# Patient Record
Sex: FEMALE | Race: WHITE | Employment: OTHER | URBAN - METROPOLITAN AREA
[De-identification: names, ages, dates, MRNs, and addresses within clinical notes are randomized per-mention and may not be internally consistent; named-entity substitution may affect disease eponyms.]

---

## 2020-06-25 ENCOUNTER — OFFICE VISIT (OUTPATIENT)
Dept: FAMILY MEDICINE CLINIC | Facility: CLINIC | Age: 85
End: 2020-06-25
Payer: MEDICARE

## 2020-06-25 VITALS
HEART RATE: 79 BPM | SYSTOLIC BLOOD PRESSURE: 116 MMHG | BODY MASS INDEX: 24.32 KG/M2 | TEMPERATURE: 98.3 F | WEIGHT: 128.8 LBS | HEIGHT: 61 IN | RESPIRATION RATE: 16 BRPM | OXYGEN SATURATION: 98 % | DIASTOLIC BLOOD PRESSURE: 84 MMHG

## 2020-06-25 DIAGNOSIS — E55.9 VITAMIN D INSUFFICIENCY: ICD-10-CM

## 2020-06-25 DIAGNOSIS — I10 ESSENTIAL HYPERTENSION: ICD-10-CM

## 2020-06-25 DIAGNOSIS — I35.0 AORTIC VALVE STENOSIS, ETIOLOGY OF CARDIAC VALVE DISEASE UNSPECIFIED: ICD-10-CM

## 2020-06-25 DIAGNOSIS — R53.83 FATIGUE, UNSPECIFIED TYPE: ICD-10-CM

## 2020-06-25 DIAGNOSIS — E78.00 PURE HYPERCHOLESTEROLEMIA: Primary | ICD-10-CM

## 2020-06-25 PROCEDURE — 1036F TOBACCO NON-USER: CPT | Performed by: FAMILY MEDICINE

## 2020-06-25 PROCEDURE — 3074F SYST BP LT 130 MM HG: CPT | Performed by: FAMILY MEDICINE

## 2020-06-25 PROCEDURE — 3008F BODY MASS INDEX DOCD: CPT | Performed by: FAMILY MEDICINE

## 2020-06-25 PROCEDURE — 99214 OFFICE O/P EST MOD 30 MIN: CPT | Performed by: FAMILY MEDICINE

## 2020-06-25 PROCEDURE — 1160F RVW MEDS BY RX/DR IN RCRD: CPT | Performed by: FAMILY MEDICINE

## 2020-06-25 PROCEDURE — 4040F PNEUMOC VAC/ADMIN/RCVD: CPT | Performed by: FAMILY MEDICINE

## 2020-06-25 PROCEDURE — 3079F DIAST BP 80-89 MM HG: CPT | Performed by: FAMILY MEDICINE

## 2020-06-25 RX ORDER — AMLODIPINE BESYLATE 5 MG/1
5 TABLET ORAL DAILY
COMMUNITY
Start: 2020-06-17

## 2020-06-25 RX ORDER — ASPIRIN 81 MG/1
81 TABLET, CHEWABLE ORAL DAILY
COMMUNITY

## 2020-06-25 RX ORDER — UBIDECARENONE 50 MG
CAPSULE ORAL
COMMUNITY
End: 2021-02-04

## 2020-06-25 RX ORDER — ATORVASTATIN CALCIUM 10 MG/1
10 TABLET, FILM COATED ORAL DAILY
Qty: 90 TABLET | Refills: 3 | Status: SHIPPED | OUTPATIENT
Start: 2020-06-25 | End: 2021-01-05 | Stop reason: SDUPTHER

## 2020-09-28 ENCOUNTER — OFFICE VISIT (OUTPATIENT)
Dept: FAMILY MEDICINE CLINIC | Facility: MEDICAL CENTER | Age: 85
End: 2020-09-28
Payer: MEDICARE

## 2020-09-28 VITALS
DIASTOLIC BLOOD PRESSURE: 70 MMHG | TEMPERATURE: 97.7 F | SYSTOLIC BLOOD PRESSURE: 154 MMHG | WEIGHT: 125.6 LBS | HEIGHT: 60 IN | HEART RATE: 68 BPM | BODY MASS INDEX: 24.66 KG/M2

## 2020-09-28 DIAGNOSIS — Z12.39 SCREENING FOR BREAST CANCER: ICD-10-CM

## 2020-09-28 DIAGNOSIS — I35.0 AORTIC VALVE STENOSIS, ETIOLOGY OF CARDIAC VALVE DISEASE UNSPECIFIED: ICD-10-CM

## 2020-09-28 DIAGNOSIS — E55.9 VITAMIN D INSUFFICIENCY: ICD-10-CM

## 2020-09-28 DIAGNOSIS — E78.00 PURE HYPERCHOLESTEROLEMIA: ICD-10-CM

## 2020-09-28 DIAGNOSIS — Z23 NEED FOR IMMUNIZATION AGAINST INFLUENZA: Primary | ICD-10-CM

## 2020-09-28 DIAGNOSIS — I10 ESSENTIAL HYPERTENSION: ICD-10-CM

## 2020-09-28 PROCEDURE — G0008 ADMIN INFLUENZA VIRUS VAC: HCPCS

## 2020-09-28 PROCEDURE — 99214 OFFICE O/P EST MOD 30 MIN: CPT | Performed by: FAMILY MEDICINE

## 2020-09-28 PROCEDURE — 90662 IIV NO PRSV INCREASED AG IM: CPT

## 2020-09-28 NOTE — PROGRESS NOTES
Vj Castro is a new patient  Previous patient of Dr Fung and Dr Ligia Saavedra  She has been in good health  She does see cardiology for aortic stenosis and hypertension  See her last visit in July with Dr Sherman  She is asymptomatic from her aortic stenosis  She in fact walks daily with no chest pain or shortness of breath  TAVR was discussed however it is not felt to be necessary at this time  She takes amlodipine for her HTN  Her previous PCP started her on atorvastatin for hyperlipidemia  She has been taking it for the last several months  She has also been taking red yeast rice every other day with this  She said that her blood pressures been well controlled  She takes her amlodipine at suppertime  HH:Diet is healthy  Eats salads, frits and vegetables  Walks dailyl  Caffeine 2 coffee daily  Alcohol none  Dietary calcium several daily  FH: No breast or colon cancer  SH:  She lives with her  who has dementia  Retired    She has been under some stress caring for her  with dementia  Sleep has been a problem and so she has been fatigued however she wishes to continue to care for him at home  She otherwise is doing well with no complaints  O: /70 (Cuff Size: Standard)   Pulse 68   Temp 97 7 °F (36 5 °C)   Ht 5' (1 524 m)   Wt 57 kg (125 lb 9 6 oz)   BMI 24 53 kg/m²    BP by me 150/70  Physical Exam  Constitutional:       General: She is not in acute distress  Appearance: She is well-developed  Neck:      Thyroid: No thyromegaly  Vascular: No carotid bruit  Cardiovascular:      Rate and Rhythm: Normal rate and regular rhythm  Heart sounds: Murmur present  Comments: Grade 2/6 harsh systolic murmur along the sternal border specially at the right upper sternal border  There is carotid radiation  Pulmonary:      Effort: Pulmonary effort is normal       Breath sounds: Normal breath sounds     Abdominal:      General: Bowel sounds are normal       Palpations: Abdomen is soft  There is no hepatomegaly or mass  Tenderness: There is no abdominal tenderness  Lymphadenopathy:      Cervical: No cervical adenopathy  Assessment  1  Hypertension-blood pressure is somewhat elevated today  Will reassess next visit  Could consider increase in amlodipine  2  Aortic [id number]-[id number]-gr with cardiology  She is asymptomatic at this time  3  Caregiver stress-discussed; offered referral to Department of Aging  4  Hyperlipidemia-recently started on the statin  Lipid profile has not yet been done with treatment  She questions the necessity for statin in the 1st place  Will get blood work and discuss further at next visit  4  Health maintenance-discussed colorectal breast cancer screenings  She is interested in having a mammogram   Would like to consider Hemoccult  Immunizations are up-to-date and will give flu shot today  Declines Shingrix and bone density screening  Plan  Check blood work  Recheck 3 weeks

## 2020-10-02 LAB
25(OH)D3 SERPL-MCNC: 20 NG/ML (ref 30–100)
ALBUMIN SERPL-MCNC: 4.1 G/DL (ref 3.6–5.1)
ALBUMIN/GLOB SERPL: 1.4 (CALC) (ref 1–2.5)
ALP SERPL-CCNC: 87 U/L (ref 37–153)
ALT SERPL-CCNC: 12 U/L (ref 6–29)
AST SERPL-CCNC: 14 U/L (ref 10–35)
BASOPHILS # BLD AUTO: 28 CELLS/UL (ref 0–200)
BASOPHILS NFR BLD AUTO: 0.5 %
BILIRUB SERPL-MCNC: 0.7 MG/DL (ref 0.2–1.2)
BUN SERPL-MCNC: 16 MG/DL (ref 7–25)
BUN/CREAT SERPL: NORMAL (CALC) (ref 6–22)
CALCIUM SERPL-MCNC: 8.9 MG/DL (ref 8.6–10.4)
CHLORIDE SERPL-SCNC: 105 MMOL/L (ref 98–110)
CHOLEST SERPL-MCNC: 142 MG/DL
CHOLEST/HDLC SERPL: 2.4 (CALC)
CO2 SERPL-SCNC: 29 MMOL/L (ref 20–32)
CREAT SERPL-MCNC: 0.8 MG/DL (ref 0.6–0.88)
EOSINOPHIL # BLD AUTO: 220 CELLS/UL (ref 15–500)
EOSINOPHIL NFR BLD AUTO: 4 %
ERYTHROCYTE [DISTWIDTH] IN BLOOD BY AUTOMATED COUNT: 12.3 % (ref 11–15)
GLOBULIN SER CALC-MCNC: 2.9 G/DL (CALC) (ref 1.9–3.7)
GLUCOSE SERPL-MCNC: 98 MG/DL (ref 65–99)
HCT VFR BLD AUTO: 38.6 % (ref 35–45)
HDLC SERPL-MCNC: 60 MG/DL
HGB BLD-MCNC: 12.4 G/DL (ref 11.7–15.5)
LDLC SERPL CALC-MCNC: 69 MG/DL (CALC)
LYMPHOCYTES # BLD AUTO: 847 CELLS/UL (ref 850–3900)
LYMPHOCYTES NFR BLD AUTO: 15.4 %
MCH RBC QN AUTO: 30.7 PG (ref 27–33)
MCHC RBC AUTO-ENTMCNC: 32.1 G/DL (ref 32–36)
MCV RBC AUTO: 95.5 FL (ref 80–100)
MONOCYTES # BLD AUTO: 484 CELLS/UL (ref 200–950)
MONOCYTES NFR BLD AUTO: 8.8 %
NEUTROPHILS # BLD AUTO: 3922 CELLS/UL (ref 1500–7800)
NEUTROPHILS NFR BLD AUTO: 71.3 %
NONHDLC SERPL-MCNC: 82 MG/DL (CALC)
PLATELET # BLD AUTO: 241 THOUSAND/UL (ref 140–400)
PMV BLD REES-ECKER: 9.6 FL (ref 7.5–12.5)
POTASSIUM SERPL-SCNC: 4 MMOL/L (ref 3.5–5.3)
PROT SERPL-MCNC: 7 G/DL (ref 6.1–8.1)
RBC # BLD AUTO: 4.04 MILLION/UL (ref 3.8–5.1)
SL AMB EGFR AFRICAN AMERICAN: 78 ML/MIN/1.73M2
SL AMB EGFR NON AFRICAN AMERICAN: 67 ML/MIN/1.73M2
SODIUM SERPL-SCNC: 138 MMOL/L (ref 135–146)
TRIGL SERPL-MCNC: 52 MG/DL
TSH SERPL-ACNC: 1.31 MIU/L (ref 0.4–4.5)
WBC # BLD AUTO: 5.5 THOUSAND/UL (ref 3.8–10.8)

## 2020-10-22 ENCOUNTER — OFFICE VISIT (OUTPATIENT)
Dept: FAMILY MEDICINE CLINIC | Facility: MEDICAL CENTER | Age: 85
End: 2020-10-22
Payer: MEDICARE

## 2020-10-22 VITALS
TEMPERATURE: 98.1 F | BODY MASS INDEX: 24.46 KG/M2 | WEIGHT: 124.6 LBS | DIASTOLIC BLOOD PRESSURE: 60 MMHG | SYSTOLIC BLOOD PRESSURE: 132 MMHG | HEART RATE: 71 BPM | HEIGHT: 60 IN

## 2020-10-22 DIAGNOSIS — Z12.31 OTHER SCREENING MAMMOGRAM: ICD-10-CM

## 2020-10-22 DIAGNOSIS — Z12.31 BREAST CANCER SCREENING BY MAMMOGRAM: ICD-10-CM

## 2020-10-22 DIAGNOSIS — E55.9 VITAMIN D INSUFFICIENCY: ICD-10-CM

## 2020-10-22 DIAGNOSIS — Z12.11 SCREEN FOR COLON CANCER: ICD-10-CM

## 2020-10-22 DIAGNOSIS — E78.00 PURE HYPERCHOLESTEROLEMIA: Primary | ICD-10-CM

## 2020-10-22 DIAGNOSIS — Z12.31 VISIT FOR SCREENING MAMMOGRAM: ICD-10-CM

## 2020-10-22 DIAGNOSIS — I10 ESSENTIAL HYPERTENSION: ICD-10-CM

## 2020-10-22 PROCEDURE — 99214 OFFICE O/P EST MOD 30 MIN: CPT | Performed by: FAMILY MEDICINE

## 2020-11-27 ENCOUNTER — TELEPHONE (OUTPATIENT)
Dept: FAMILY MEDICINE CLINIC | Facility: MEDICAL CENTER | Age: 85
End: 2020-11-27

## 2020-12-30 ENCOUNTER — TRANSCRIBE ORDERS (OUTPATIENT)
Dept: ADMINISTRATIVE | Facility: HOSPITAL | Age: 85
End: 2020-12-30

## 2020-12-30 DIAGNOSIS — Z12.31 OTHER SCREENING MAMMOGRAM: Primary | ICD-10-CM

## 2021-01-05 DIAGNOSIS — E78.00 PURE HYPERCHOLESTEROLEMIA: ICD-10-CM

## 2021-01-05 NOTE — TELEPHONE ENCOUNTER
Pt called to request Rxs for atorvastatin 10 mg once daily    Please send a 90 day supply to [facility] on [address]

## 2021-01-06 ENCOUNTER — HOSPITAL ENCOUNTER (OUTPATIENT)
Dept: RADIOLOGY | Facility: MEDICAL CENTER | Age: 86
Discharge: HOME | End: 2021-01-06
Payer: MEDICARE

## 2021-01-06 VITALS — HEIGHT: 60 IN | WEIGHT: 124 LBS | BODY MASS INDEX: 24.35 KG/M2

## 2021-01-06 DIAGNOSIS — Z12.31 VISIT FOR SCREENING MAMMOGRAM: ICD-10-CM

## 2021-01-06 DIAGNOSIS — Z12.31 OTHER SCREENING MAMMOGRAM: ICD-10-CM

## 2021-01-06 PROCEDURE — 77063 BREAST TOMOSYNTHESIS BI: CPT

## 2021-01-06 PROCEDURE — 77067 SCR MAMMO BI INCL CAD: CPT

## 2021-01-06 RX ORDER — ATORVASTATIN CALCIUM 10 MG/1
10 TABLET, FILM COATED ORAL DAILY
Qty: 90 TABLET | Refills: 3 | Status: SHIPPED | OUTPATIENT
Start: 2021-01-06 | End: 2022-01-20

## 2021-01-24 ENCOUNTER — IMMUNIZATIONS (OUTPATIENT)
Dept: FAMILY MEDICINE CLINIC | Facility: HOSPITAL | Age: 86
End: 2021-01-24

## 2021-01-24 DIAGNOSIS — Z23 ENCOUNTER FOR IMMUNIZATION: Primary | ICD-10-CM

## 2021-01-24 PROCEDURE — 0011A SARS-COV-2 / COVID-19 MRNA VACCINE (MODERNA) 100 MCG: CPT

## 2021-01-24 PROCEDURE — 91301 SARS-COV-2 / COVID-19 MRNA VACCINE (MODERNA) 100 MCG: CPT

## 2021-01-25 ENCOUNTER — TELEPHONE (OUTPATIENT)
Dept: FAMILY MEDICINE CLINIC | Facility: MEDICAL CENTER | Age: 86
End: 2021-01-25

## 2021-01-25 NOTE — TELEPHONE ENCOUNTER
----- Message from Trevor Diaz MD sent at 1/24/2021 10:35 PM EST -----  Notify mammogram normal How are things going?

## 2021-02-04 ENCOUNTER — OFFICE VISIT (OUTPATIENT)
Dept: FAMILY MEDICINE CLINIC | Facility: MEDICAL CENTER | Age: 86
End: 2021-02-04
Payer: MEDICARE

## 2021-02-04 VITALS
DIASTOLIC BLOOD PRESSURE: 70 MMHG | SYSTOLIC BLOOD PRESSURE: 128 MMHG | HEART RATE: 75 BPM | OXYGEN SATURATION: 98 % | BODY MASS INDEX: 24.5 KG/M2 | HEIGHT: 60 IN | TEMPERATURE: 98.1 F | WEIGHT: 124.8 LBS

## 2021-02-04 DIAGNOSIS — I10 ESSENTIAL HYPERTENSION: ICD-10-CM

## 2021-02-04 DIAGNOSIS — E78.00 PURE HYPERCHOLESTEROLEMIA: ICD-10-CM

## 2021-02-04 DIAGNOSIS — I35.0 NONRHEUMATIC AORTIC VALVE STENOSIS: Primary | ICD-10-CM

## 2021-02-04 PROCEDURE — 99214 OFFICE O/P EST MOD 30 MIN: CPT | Performed by: FAMILY MEDICINE

## 2021-02-04 RX ORDER — BIOTIN 1 MG
400 TABLET ORAL
COMMUNITY

## 2021-02-05 NOTE — PROGRESS NOTES
Delma Baxter is here for   Four-month follow-up  She is doing very well overall except for ongoing stress caring for her  with dementia  Sleep disturbance is been a major problem and she is not getting much sleep  Recently it is better with treatment however she may be looking into home health aides overnight  She has been taking  Amlodipine for her hypertension  Saw for follow-up of her severe c aortic stenosis   A few weeks ago  She is completely asymptomatic  Decided to hold off on TAVR at this time  O: /70 (BP Location: Left arm, Patient Position: Sitting, Cuff Size: Adult)   Pulse 75   Temp 98 1 °F (36 7 °C)   Ht 5' (1 524 m)   Wt 56 6 kg (124 lb 12 8 oz)   SpO2 98%   BMI 24 37 kg/m²    Physical Exam  Constitutional:       General: She is not in acute distress  Appearance: She is well-developed  Neck:      Thyroid: No thyromegaly  Vascular: No carotid bruit  Cardiovascular:      Rate and Rhythm: Normal rate and regular rhythm  Heart sounds: Murmur present  Comments: Systolic 2/6  Pulmonary:      Effort: Pulmonary effort is normal       Breath sounds: Normal breath sounds  Abdominal:      General: Bowel sounds are normal       Palpations: Abdomen is soft  There is no hepatomegaly or mass  Tenderness: There is no abdominal tenderness  Lymphadenopathy:      Cervical: No cervical adenopathy       Assessment   1  Hypertension-controlled with amlodipine   2  Severe aortic stenosis -asymptomatic -follow-up per Cardiology  3  Hyperlipidemia well controlled with atorvastatin   4  Caregiver stress -discussed;  Encouraged use of home health aides if necessary   [address] [place] [address] had COVID vaccination    Plan   as above  Recheck 6 months

## 2021-02-21 ENCOUNTER — IMMUNIZATIONS (OUTPATIENT)
Dept: FAMILY MEDICINE CLINIC | Facility: HOSPITAL | Age: 86
End: 2021-02-21

## 2021-02-21 DIAGNOSIS — Z23 ENCOUNTER FOR IMMUNIZATION: Primary | ICD-10-CM

## 2021-02-21 PROCEDURE — 91301 SARS-COV-2 / COVID-19 MRNA VACCINE (MODERNA) 100 MCG: CPT

## 2021-02-21 PROCEDURE — 0012A SARS-COV-2 / COVID-19 MRNA VACCINE (MODERNA) 100 MCG: CPT

## 2021-05-24 ENCOUNTER — TELEPHONE (OUTPATIENT)
Dept: FAMILY MEDICINE CLINIC | Facility: MEDICAL CENTER | Age: 86
End: 2021-05-24

## 2021-05-24 NOTE — TELEPHONE ENCOUNTER
Pt called to set up a surgical clearance/cataract on 6/9/21  Pt does have a cardiologist  Dr Villa December [phone]  Do you need her to see him prior?   No nephrologist

## 2021-05-26 NOTE — TELEPHONE ENCOUNTER
Pt called, she is scheduled to see you on 6/9 for her surgical clearance  Pt is seeing cardio that day also  I offered pt Wed 6/2 @ 3:00, but pt needs after 4:00 so her daughter can come with her  Please advise

## 2021-06-02 ENCOUNTER — OFFICE VISIT (OUTPATIENT)
Dept: FAMILY MEDICINE CLINIC | Facility: MEDICAL CENTER | Age: 86
End: 2021-06-02
Payer: MEDICARE

## 2021-06-02 VITALS
TEMPERATURE: 97.9 F | OXYGEN SATURATION: 98 % | HEART RATE: 80 BPM | RESPIRATION RATE: 16 BRPM | BODY MASS INDEX: 23.55 KG/M2 | SYSTOLIC BLOOD PRESSURE: 130 MMHG | WEIGHT: 120.6 LBS | DIASTOLIC BLOOD PRESSURE: 60 MMHG

## 2021-06-02 DIAGNOSIS — I35.0 AORTIC VALVE STENOSIS, ETIOLOGY OF CARDIAC VALVE DISEASE UNSPECIFIED: ICD-10-CM

## 2021-06-02 DIAGNOSIS — I10 ESSENTIAL HYPERTENSION: ICD-10-CM

## 2021-06-02 DIAGNOSIS — Z01.818 PRE-OP EXAMINATION: Primary | ICD-10-CM

## 2021-06-02 PROCEDURE — 99214 OFFICE O/P EST MOD 30 MIN: CPT | Performed by: FAMILY MEDICINE

## 2021-06-02 RX ORDER — OFLOXACIN 3 MG/ML
SOLUTION/ DROPS OPHTHALMIC
COMMUNITY
Start: 2021-05-21

## 2021-06-02 RX ORDER — PREDNISOLONE ACETATE 10 MG/ML
SUSPENSION/ DROPS OPHTHALMIC
COMMUNITY
Start: 2021-05-21

## 2021-06-02 NOTE — PROGRESS NOTES
Costa Scott is here for preop clearance  She is having cataract surgery right eye  on 06/16 by Dr Cathy Kirkland  PMH includes hypertension, severe aortic stenosis, hyperlipidemia  she sees cardiology for her aortic stenosis which is asymptomatic  TAVR has been recommended  She is due to see Cardiology soon for cardiac clearance  She has no history of bleeding or clotting disorders  PSH includes   Foot surgery and colonoscopy  There were no problems with anesthesia during those procedures  ALL ACETOMINOPHEN  HH  Nonsmoker  Alcohol none  [address]   Lives with  with dementia  FH N/A    Review of Systems   Constitutional: Negative for chills, fatigue and fever  HENT: Negative for congestion  Respiratory: Negative for cough  Cardiovascular: Negative for chest pain, palpitations and leg swelling  Gastrointestinal: Negative for diarrhea  Skin: Negative for rash  Neurological: Negative for dizziness and headaches  O: /60 (Cuff Size: Standard)   Pulse 80   Temp 97 9 °F (36 6 °C)   Resp 16   Wt 54 7 kg (120 lb 9 6 oz)   SpO2 98%   BMI 23 55 kg/m²    Physical Exam  Constitutional:       Appearance: She is well-developed  HENT:      Head: Normocephalic  Right Ear: Tympanic membrane and external ear normal       Left Ear: Tympanic membrane and external ear normal       Nose: Nose normal    Eyes:      General: No scleral icterus  Conjunctiva/sclera: Conjunctivae normal       Pupils: Pupils are equal, round, and reactive to light  Neck:      Musculoskeletal: Normal range of motion and neck supple  Thyroid: No thyroid mass or thyromegaly  Vascular: No carotid bruit  Cardiovascular:      Rate and Rhythm: Normal rate and regular rhythm  Pulses:           Femoral pulses are 2+ on the right side and 2+ on the left side  Popliteal pulses are 2+ on the right side and 2+ on the left side          Dorsalis pedis pulses are 2+ on the right side and 2+ on the left side         Posterior tibial pulses are 2+ on the right side and 2+ on the left side  Comments: Grade 2/6 harsh systolic murmur throughout precordium  Pulmonary:      Effort: Pulmonary effort is normal  No respiratory distress  Breath sounds: Normal breath sounds  No wheezing or rales  Abdominal:      General: Bowel sounds are normal  There is no distension  Palpations: Abdomen is soft  There is no mass  Tenderness: There is no abdominal tenderness  Musculoskeletal: Normal range of motion  Lymphadenopathy:      Head:      Right side of head: No submandibular or occipital adenopathy  Left side of head: No submandibular or occipital adenopathy  Cervical: No cervical adenopathy  Upper Body:      Right upper body: No supraclavicular adenopathy  Left upper body: No supraclavicular adenopathy  Skin:     Findings: No lesion or rash  Neurological:      Mental Status: She is oriented to person, place, and time  Psychiatric:         Behavior: Behavior normal        Assessment   1  Hypertension-controlled with amlodipine   2  Severe aortic stenosis-asymptomatic- to see Cardiology for clearance   3  Hyperlipidemia-on atorvastatin   4  Cataract right eye- scheduled for surgery    Plan   Medical clearance for cataract surgery pending cardiac clearance

## 2021-06-03 ENCOUNTER — TELEPHONE (OUTPATIENT)
Dept: FAMILY MEDICINE CLINIC | Facility: MEDICAL CENTER | Age: 86
End: 2021-06-03

## 2021-06-04 NOTE — TELEPHONE ENCOUNTER
Holding surgical clearance form for cardiac clearance  Seeing cardiologist next week  Form is at nurses desk  Not to be faxed until cardiology note and med list is included

## 2021-11-30 ENCOUNTER — APPOINTMENT (OUTPATIENT)
Dept: LAB | Facility: MEDICAL CENTER | Age: 86
End: 2021-11-30
Payer: MEDICARE

## 2021-11-30 ENCOUNTER — OFFICE VISIT (OUTPATIENT)
Dept: FAMILY MEDICINE CLINIC | Facility: MEDICAL CENTER | Age: 86
End: 2021-11-30
Payer: MEDICARE

## 2021-11-30 VITALS
SYSTOLIC BLOOD PRESSURE: 136 MMHG | DIASTOLIC BLOOD PRESSURE: 74 MMHG | BODY MASS INDEX: 24.35 KG/M2 | HEART RATE: 68 BPM | HEIGHT: 60 IN | WEIGHT: 124 LBS | RESPIRATION RATE: 16 BRPM | TEMPERATURE: 95.8 F

## 2021-11-30 DIAGNOSIS — Z12.31 ENCOUNTER FOR SCREENING MAMMOGRAM FOR MALIGNANT NEOPLASM OF BREAST: ICD-10-CM

## 2021-11-30 DIAGNOSIS — I10 PRIMARY HYPERTENSION: ICD-10-CM

## 2021-11-30 DIAGNOSIS — Z13.29 THYROID DISORDER SCREENING: ICD-10-CM

## 2021-11-30 DIAGNOSIS — Z23 IMMUNIZATION DUE: Primary | ICD-10-CM

## 2021-11-30 DIAGNOSIS — Z12.11 SCREENING FOR COLON CANCER: ICD-10-CM

## 2021-11-30 DIAGNOSIS — E78.00 PURE HYPERCHOLESTEROLEMIA: ICD-10-CM

## 2021-11-30 LAB
ALBUMIN SERPL BCP-MCNC: 3.7 G/DL (ref 3.5–5)
ALP SERPL-CCNC: 91 U/L (ref 46–116)
ALT SERPL W P-5'-P-CCNC: 26 U/L (ref 12–78)
ANION GAP SERPL CALCULATED.3IONS-SCNC: 7 MMOL/L (ref 4–13)
AST SERPL W P-5'-P-CCNC: 16 U/L (ref 5–45)
BASOPHILS # BLD AUTO: 0.03 THOUSANDS/ΜL (ref 0–0.1)
BASOPHILS NFR BLD AUTO: 1 % (ref 0–1)
BILIRUB SERPL-MCNC: 0.69 MG/DL (ref 0.2–1)
BUN SERPL-MCNC: 14 MG/DL (ref 5–25)
CALCIUM SERPL-MCNC: 9.6 MG/DL (ref 8.3–10.1)
CHLORIDE SERPL-SCNC: 105 MMOL/L (ref 100–108)
CHOLEST SERPL-MCNC: 154 MG/DL
CO2 SERPL-SCNC: 25 MMOL/L (ref 21–32)
CREAT SERPL-MCNC: 0.78 MG/DL (ref 0.6–1.3)
EOSINOPHIL # BLD AUTO: 0.06 THOUSAND/ΜL (ref 0–0.61)
EOSINOPHIL NFR BLD AUTO: 1 % (ref 0–6)
ERYTHROCYTE [DISTWIDTH] IN BLOOD BY AUTOMATED COUNT: 13.5 % (ref 11.6–15.1)
GFR SERPL CREATININE-BSD FRML MDRD: 69 ML/MIN/1.73SQ M
GLUCOSE P FAST SERPL-MCNC: 104 MG/DL (ref 65–99)
HCT VFR BLD AUTO: 38.4 % (ref 34.8–46.1)
HDLC SERPL-MCNC: 68 MG/DL
HGB BLD-MCNC: 12.6 G/DL (ref 11.5–15.4)
IMM GRANULOCYTES # BLD AUTO: 0.02 THOUSAND/UL (ref 0–0.2)
IMM GRANULOCYTES NFR BLD AUTO: 0 % (ref 0–2)
LDLC SERPL CALC-MCNC: 75 MG/DL (ref 0–100)
LYMPHOCYTES # BLD AUTO: 1.26 THOUSANDS/ΜL (ref 0.6–4.47)
LYMPHOCYTES NFR BLD AUTO: 20 % (ref 14–44)
MCH RBC QN AUTO: 31.4 PG (ref 26.8–34.3)
MCHC RBC AUTO-ENTMCNC: 32.8 G/DL (ref 31.4–37.4)
MCV RBC AUTO: 96 FL (ref 82–98)
MONOCYTES # BLD AUTO: 0.45 THOUSAND/ΜL (ref 0.17–1.22)
MONOCYTES NFR BLD AUTO: 7 % (ref 4–12)
NEUTROPHILS # BLD AUTO: 4.6 THOUSANDS/ΜL (ref 1.85–7.62)
NEUTS SEG NFR BLD AUTO: 71 % (ref 43–75)
NONHDLC SERPL-MCNC: 86 MG/DL
NRBC BLD AUTO-RTO: 0 /100 WBCS
PLATELET # BLD AUTO: 282 THOUSANDS/UL (ref 149–390)
PMV BLD AUTO: 10.1 FL (ref 8.9–12.7)
POTASSIUM SERPL-SCNC: 4.5 MMOL/L (ref 3.5–5.3)
PROT SERPL-MCNC: 8.1 G/DL (ref 6.4–8.2)
RBC # BLD AUTO: 4.01 MILLION/UL (ref 3.81–5.12)
SODIUM SERPL-SCNC: 137 MMOL/L (ref 136–145)
TRIGL SERPL-MCNC: 55 MG/DL
TSH SERPL DL<=0.05 MIU/L-ACNC: 1.09 UIU/ML (ref 0.36–3.74)
WBC # BLD AUTO: 6.42 THOUSAND/UL (ref 4.31–10.16)

## 2021-11-30 PROCEDURE — 36415 COLL VENOUS BLD VENIPUNCTURE: CPT

## 2021-11-30 PROCEDURE — 99214 OFFICE O/P EST MOD 30 MIN: CPT | Performed by: FAMILY MEDICINE

## 2021-11-30 PROCEDURE — 84443 ASSAY THYROID STIM HORMONE: CPT

## 2021-11-30 PROCEDURE — 80061 LIPID PANEL: CPT

## 2021-11-30 PROCEDURE — 90662 IIV NO PRSV INCREASED AG IM: CPT | Performed by: FAMILY MEDICINE

## 2021-11-30 PROCEDURE — G0008 ADMIN INFLUENZA VIRUS VAC: HCPCS | Performed by: FAMILY MEDICINE

## 2021-11-30 PROCEDURE — 80053 COMPREHEN METABOLIC PANEL: CPT

## 2021-11-30 PROCEDURE — 85025 COMPLETE CBC W/AUTO DIFF WBC: CPT

## 2021-12-20 ENCOUNTER — APPOINTMENT (OUTPATIENT)
Dept: LAB | Facility: MEDICAL CENTER | Age: 86
End: 2021-12-20
Payer: MEDICARE

## 2021-12-20 DIAGNOSIS — Z12.11 SCREENING FOR COLON CANCER: ICD-10-CM

## 2021-12-20 LAB — HEMOCCULT STL QL IA: NEGATIVE

## 2021-12-20 PROCEDURE — G0328 FECAL BLOOD SCRN IMMUNOASSAY: HCPCS

## 2021-12-21 ENCOUNTER — TELEPHONE (OUTPATIENT)
Dept: FAMILY MEDICINE CLINIC | Facility: MEDICAL CENTER | Age: 86
End: 2021-12-21

## 2022-01-20 DIAGNOSIS — E78.00 PURE HYPERCHOLESTEROLEMIA: ICD-10-CM

## 2022-01-20 RX ORDER — ATORVASTATIN CALCIUM 10 MG/1
TABLET, FILM COATED ORAL
Qty: 90 TABLET | Refills: 0 | Status: SHIPPED | OUTPATIENT
Start: 2022-01-20 | End: 2022-04-25

## 2022-04-25 ENCOUNTER — HOSPITAL ENCOUNTER (OUTPATIENT)
Dept: RADIOLOGY | Facility: MEDICAL CENTER | Age: 87
Discharge: HOME | End: 2022-04-25
Payer: MEDICARE

## 2022-04-25 VITALS — WEIGHT: 124 LBS | HEIGHT: 60 IN | BODY MASS INDEX: 24.35 KG/M2

## 2022-04-25 DIAGNOSIS — Z12.31 ENCOUNTER FOR SCREENING MAMMOGRAM FOR MALIGNANT NEOPLASM OF BREAST: ICD-10-CM

## 2022-04-25 DIAGNOSIS — E78.00 PURE HYPERCHOLESTEROLEMIA: ICD-10-CM

## 2022-04-25 PROCEDURE — 77063 BREAST TOMOSYNTHESIS BI: CPT

## 2022-04-25 PROCEDURE — 77067 SCR MAMMO BI INCL CAD: CPT

## 2022-04-25 RX ORDER — ATORVASTATIN CALCIUM 10 MG/1
TABLET, FILM COATED ORAL
Qty: 90 TABLET | Refills: 0 | Status: SHIPPED | OUTPATIENT
Start: 2022-04-25 | End: 2022-07-18

## 2022-06-02 ENCOUNTER — OFFICE VISIT (OUTPATIENT)
Dept: FAMILY MEDICINE CLINIC | Facility: MEDICAL CENTER | Age: 87
End: 2022-06-02
Payer: MEDICARE

## 2022-06-02 VITALS
TEMPERATURE: 98.2 F | WEIGHT: 124 LBS | HEIGHT: 60 IN | SYSTOLIC BLOOD PRESSURE: 120 MMHG | OXYGEN SATURATION: 98 % | HEART RATE: 78 BPM | DIASTOLIC BLOOD PRESSURE: 68 MMHG | BODY MASS INDEX: 24.35 KG/M2

## 2022-06-02 DIAGNOSIS — Z11.59 NEED FOR HEPATITIS C SCREENING TEST: ICD-10-CM

## 2022-06-02 DIAGNOSIS — Z00.00 MEDICARE ANNUAL WELLNESS VISIT, SUBSEQUENT: Primary | ICD-10-CM

## 2022-06-02 PROCEDURE — 1123F ACP DISCUSS/DSCN MKR DOCD: CPT | Performed by: STUDENT IN AN ORGANIZED HEALTH CARE EDUCATION/TRAINING PROGRAM

## 2022-06-02 PROCEDURE — G0439 PPPS, SUBSEQ VISIT: HCPCS | Performed by: STUDENT IN AN ORGANIZED HEALTH CARE EDUCATION/TRAINING PROGRAM

## 2022-06-02 NOTE — PROGRESS NOTES
Assessment and Plan:     Problem List Items Addressed This Visit        Other    Medicare annual wellness visit, subsequent - Primary     · Completed 2nd COVID 19 booster recently, will present card to  to update chart  Other Visit Diagnoses     Need for hepatitis C screening test        Relevant Orders    Hepatitis C antibody          Depression Screening and Follow-up Plan: Patient was screened for depression during today's encounter  They screened negative with a PHQ-2 score of 0  Falls Plan of Care: balance, strength, and gait training instructions were provided  Home safety education provided  Preventive health issues were discussed with patient, and age appropriate screening tests were ordered as noted in patient's After Visit Summary  Personalized health advice and appropriate referrals for health education or preventive services given if needed, as noted in patient's After Visit Summary       History of Present Illness:     Patient presents for Medicare Annual Wellness visit    Patient Care Team:  Nina Baum DO as PCP - General (Family Medicine)     Problem List:     Patient Active Problem List   Diagnosis    Pure hypercholesterolemia    Osteoarthritis    Left ventricular hypertrophy    Hypertension    Aortic valve stenosis    Medicare annual wellness visit, subsequent      Past Medical and Surgical History:     Past Medical History:   Diagnosis Date    Aortic valve regurgitation     Aortic valve stenosis     Hypertension     Left ventricular hypertrophy     Mitral valve regurgitation     Osteoarthritis     Pure hypercholesterolemia      Past Surgical History:   Procedure Laterality Date    BUNIONECTOMY Left 04/16/2014    COLONOSCOPY  01/04/2012    CORRECTION HAMMER TOE Left 04/16/2014      Family History:     Family History   Problem Relation Age of Onset    Coronary artery disease Father     Throat cancer Father     Diabetes Sister     No Known Problems Mother     No Known Problems Daughter     No Known Problems Maternal Grandmother     No Known Problems Maternal Grandfather     No Known Problems Paternal Grandmother     No Known Problems Paternal Grandfather     No Known Problems Brother     No Known Problems Brother     Cancer Sister     Pancreatic cancer Sister     No Known Problems Daughter     No Known Problems Son     No Known Problems Son     No Known Problems Son       Social History:     Social History     Socioeconomic History    Marital status: Unknown     Spouse name: None    Number of children: None    Years of education: None    Highest education level: None   Occupational History    None   Tobacco Use    Smoking status: Former Smoker     Packs/day: 1 00     Years: 25 00     Pack years: 25 00     Types: Cigarettes    Smokeless tobacco: Never Used   Vaping Use    Vaping Use: Never used   Substance and Sexual Activity    Alcohol use: Not Currently    Drug use: Not Currently    Sexual activity: None   Other Topics Concern    None   Social History Narrative    · Most recent tobacco use screenin2019      · Do you currently or have you served in [email or web address]:   No      Social Determinants of Health     Financial Resource Strain: Not on file   Food Insecurity: Not on file   Transportation Needs: Not on file   Physical Activity: Not on file   Stress: Not on file   Social Connections: Not on file   Intimate Partner Violence: Not on file   Housing Stability: Not on file      Medications and Allergies:     Current Outpatient Medications   Medication Sig Dispense Refill    amLODIPine (NORVASC) 5 mg tablet Take 5 mg by mouth daily      aspirin 81 mg chewable tablet Chew 81 mg daily      atorvastatin (LIPITOR) 10 mg tablet Take 1 tablet by mouth once daily 90 tablet 0    Cholecalciferol (Vitamin D3) 25 MCG (1000 UT) CAPS Take 400 Units by mouth       Multiple Vitamins-Minerals (PRESERVISION AREDS 2 PO) Take by mouth      ofloxacin (OCUFLOX) 0 3 % ophthalmic solution INSTILL 1 DROP INTO RIGHT EYE FOUR TIMES A DAY AS DIRECTED ON INSTRUCTION SHEET      prednisoLONE acetate (PRED FORTE) 1 % ophthalmic suspension INSTILL 1 DROP INTO RIGHT EYE FOUR TIMES A DAY AS DIRECTED FOR USE AFTER CATARACT SURGERY       No current facility-administered medications for this visit  Allergies   Allergen Reactions    Acetaminophen Hives      Immunizations:     Immunization History   Administered Date(s) Administered    COVID-19 MODERNA VACC 0 5 ML IM 01/24/2021, 02/21/2021    Influenza, high dose seasonal 0 7 mL 09/28/2020, 11/30/2021    Pneumococcal Conjugate 13-Valent 02/24/2017    Pneumococcal Polysaccharide PPV23 04/19/2018      Health Maintenance: There are no preventive care reminders to display for this patient  Topic Date Due    DTaP,Tdap,and Td Vaccines (1 - Tdap) Never done    COVID-19 Vaccine (3 - Booster for Moderna series) 07/21/2021      Medicare Health Risk Assessment:     /68 (BP Location: Left arm, Patient Position: Sitting, Cuff Size: Adult)   Pulse 78   Temp 98 2 °F (36 8 °C)   Ht 5' (1 524 m)   Wt 56 2 kg (124 lb)   SpO2 98%   BMI 24 22 kg/m²      Julio Jordan is here for her Subsequent Wellness visit  Health Risk Assessment:   Patient rates overall health as very good  Patient feels that their physical health rating is same  Patient is satisfied with their life  Eyesight was rated as same  Hearing was rated as same  Patient feels that their emotional and mental health rating is same  Patients states they are never, rarely angry  Patient states they are sometimes unusually tired/fatigued  Pain experienced in the last 7 days has been none  Patient states that she has experienced no weight loss or gain in last 6 months  Patient explains response to tired/fatigue questions "I am getting old, more tired"  Depression Screening:   PHQ-2 Score: 0      Fall Risk Screening:    In the past year, patient has experienced: no history of falling in past year      Urinary Incontinence Screening:   Patient has leaked urine accidently in the last six months  Home Safety:  Patient does not have trouble with stairs inside or outside of their home  Patient has working smoke alarms and has working carbon monoxide detector  Home safety hazards include: none  Nutrition:   Current diet is Regular and Limited junk food  Medications:   Patient is currently taking over-the-counter supplements  OTC medications include: see medication list  Patient is able to manage medications  Activities of Daily Living (ADLs)/Instrumental Activities of Daily Living (IADLs):   Walk and transfer into and out of bed and chair?: Yes  Dress and groom yourself?: Yes    Bathe or shower yourself?: Yes    Feed yourself? Yes  Do your laundry/housekeeping?: Yes  Manage your money, pay your bills and track your expenses?: Yes  Make your own meals?: Yes    Do your own shopping?: Yes    Previous Hospitalizations:   Any hospitalizations or ED visits within the last 12 months?: No      Advance Care Planning:   Living will: Yes    Durable POA for healthcare:  Yes    Advanced directive: Yes      Cognitive Screening:   Provider or family/friend/caregiver concerned regarding cognition?: No    PREVENTIVE SCREENINGS      Cardiovascular Screening:    General: Screening Not Indicated and History Lipid Disorder      Diabetes Screening:     General: Screening Current      Colorectal Cancer Screening:     General: Screening Not Indicated      Breast Cancer Screening:     General: Screening Current      Cervical Cancer Screening:    General: Screening Not Indicated      Osteoporosis Screening:    General: Screening Current      Abdominal Aortic Aneurysm (AAA) Screening:        General: Screening Not Indicated      Lung Cancer Screening:     General: Screening Not Indicated      Hepatitis C Screening:    General: Risks and Benefits Discussed    Hep C Screening Accepted: Yes      Screening, Brief Intervention, and Referral to Treatment (SBIRT)    Screening  Typical number of drinks in a day: 0  Typical number of drinks in a week: 0  Interpretation: Low risk drinking behavior  Single Item Drug Screening:  How often have you used an illegal drug (including marijuana) or a prescription medication for non-medical reasons in the past year? never    Single Item Drug Screen Score: 0  Interpretation: Negative screen for possible drug use disorder    Other Counseling Topics:   Car/seat belt/driving safety, skin self-exam, sunscreen and calcium and vitamin D intake and regular weightbearing exercise  Has upcoming appointment with Dermatology for basal cell carcinoma face        Tonya Bending, DO

## 2022-06-02 NOTE — PATIENT INSTRUCTIONS
Physical Activity for Older Adults   AMBULATORY CARE:   What you need to know about physical activity and aging:  Physical activity can help you get or stay physically and mentally healthy as you age  You may be able to do your daily activities more easily  If you have arthritis, your joints may move more easily and with less pain  Your bones and muscles will get stronger  Strength and better balance help prevent osteoporosis and reduce your risk for falls  Regular activity can improve your mood and appetite, and help you sleep better  Your risk for diseases such as cancer, heart disease, diabetes, and stroke will be lower  Activity can help you control your blood pressure and blood sugar levels, and lower your cholesterol  Activity can also slow or prevent cognitive (thinking) problems as you age  Call your doctor or physical therapist if:   · You have pain with any physical activity  · You have questions or concerns about physical activity or your health  Physical activity safety:   · Talk to your healthcare provider before you start doing physical activities  Your provider will check your general health  He or she may recommend you avoid certain activities based on your health  He or she will also make sure your physical activity plan works with any medicines you are taking  Some medicines can make you sleepy or cause balance problems  These can make certain physical activities less safe  · Start slowly and work up to more activity  It is important not to become highly active too quickly  Your body will need time to adjust  For example, you can cause serious injury if you try to lift a weight that is too heavy  Your healthcare provider can help you create a plan  The plan may start with a few minutes of physical activity on certain days of the week  You can add activities slowly over time  This may include making sessions longer, or being active on more days of the week   You can also add more weight as you get stronger  · Do a variety of activities throughout the week  Do conditioning, strength training, flexibility, and balance activities  General guidelines are included below for how much of each activity to get  Your healthcare provider or physical therapist may give you specific instructions to follow  · Stretch before and after you do any conditioning or strengthening activities  You can also do stretching activities on days you do not do any other kind of activity  · Move slowly and smoothly  Avoid fast or jerky motions to help prevent an injury  · Do exercises and stretches on both sides  This helps the muscles on both sides remain strong and flexible  · Stop if you feel sharp pain or an increase in pain  Stop the exercise and contact your healthcare provider if you have these symptoms  It is normal to feel some discomfort, such as a dull ache, during exercise  Regular exercise will help decrease your discomfort over time  · Drink liquid before, during, and after activity  Liquid helps prevent dehydration during exercise  Dehydration can cause you to become dizzy or to fall  Liquids are especially important on hot days  Activities that help improve flexibility:  You can improve your flexibility by doing stretching activities  Only stretch far enough that you feel the stretch but do not feel pain  Hold the stretch for 30 to 60 seconds, or for as long as directed  Repeat the stretch 2 or 3 times before you move to the next body area  Breathe normally  Do not hold your breath  It is important to breathe in and out so you do not tense up during exercise  Tension could prevent your muscles from stretching  The following are examples of flexibility activities:  · Crossover arm stretch:  Relax your shoulders  Hold your upper arm with the opposite hand  Pull your arm across your chest until you feel a stretch  Hold the stretch for 30 seconds  Return to the starting position  · Back stretch:  Lie on your back with your hands behind your head  Bend your knees and turn the lower half of your body to one side  Hold this position for 10 seconds  Repeat 3 times on each side  · Hip stretch:  Lie on the ground  Place both hands on the shin of 1 leg  Pull your knee toward your chest  Repeat on the other side  · Standing quadriceps stretch:  Stand and place one hand against a wall or hold the back of a chair for balance  With your weight on one leg, bend your other leg and grab your ankle  Pull your heel toward your buttocks  · Standing hamstring stretch:  Stand with your feet hips distance apart  Life 1 leg and rest it on a firm surface, such as a table or chair  Keep your toes pointing up  Slide your hands forward along the side of your leg until you feel a stretch  Keep your chest lifted and your back straight  Hold for 30 seconds  Activities that help improve balance:  Good balance can help you prevent falls  Do balance activities a few times each week  You might want to ask someone to help you while you do these activities  The person can help make sure you do not fall  Make sure your path or activity area is clear of objects before you begin  The following are examples of activities that help improve balance:  · Balance on 1 foot:  Hold something sturdy, such as a wall, chair, or walker  Balance your weight on both feet first  Then slowly lift 1 foot off the ground  When you have your balance, you can try letting go of the sturdy object  Make sure it is available for you to hold again if needed  Try to stand on 1 foot for about 10 seconds  Then repeat on the other foot  · Walking in a straight line:  Put your weight equally on both feet to start  Make sure you have your balance  Then walk forward slowly, putting 1 foot directly in front of the other  · Walking backward: If no one is available to stand next to you, stand next to a wall  Lean against the wall, and keep your arm on the wall as you walk  Make sure you have your balance  Then walk backward  Go slowly  Make sure you have good balance before you take the next step back  · Matthew Chi:  Matthew Chi combines slow, precise movements with deep breathing and meditation  Classes may be available  An instructor will show you how to do the moves  Activities that help improve conditioning:  Conditioning includes activities that increase your heart rate and breathing  Activity that uses body weight is called weightbearing exercise  Examples include walking, dancing, and raking leaves  Weightbearing activities help strengthen bones  Nonweightbearing activities include riding a bicycle and swimming  Aim for 150 to 300 minutes (2 5 to 5 hours) of moderate aerobic activity each week  The following are examples of activities that help improve conditioning:  · Walking:  Walking is a good, low-impact exercise  If you are not able to go outdoors, you can still walk in your home  Make sure the walking path in your home is clear and has good lighting  You can also march in place  · Chair exercises:  Chair exercises are a safe way to move if you have balance problems  Sit in a hard chair that has a back  Keep your feet on the ground when you work your arms  Hold the seat or arms of the chair when you work your legs  You can lift weights, use a resistance band, hold an object such as a soup can, or just move your arms and legs  Chair exercise group classes may also be available  An instructor shows you moves to do while you sit in the chair  · Water aerobics:  Water creates resistance  This means it is harder to move in water than it is on dry ground  Water aerobics can help prevent falls while you exercise  You will raise your heart rate and breathing just by walking along the bottom of the pool through the water  You can also  place and work your upper body   Hold pool equipment such as a flotation device or pool noodle to add weight to your activity  Water aerobics group classes may also be available  An instructor shows you moves to do in the water  Activities that help build strength:  Strength training helps you keep the muscles you have and build new muscles  Strong muscles help protect your bones  You can also improve your balance by strengthening your leg, back, and core (abdomen) muscles  If you do not have wights or resistance bands, you can use household items, such as soup cans  You can stand or sit in a chair or wheelchair when you do strength training  Try to work all the major muscle groups, such as your legs, arms, abdomen, and chest  Do strength training at least 2 times each week  Spread the days out so you are not doing strength training 2 days in a row  You can cause injury if you do not rest muscles in between sessions  The following are examples of strength training exercises:  · Weightlifting:  Start with a weight you can lift easily  You can work up to [employer]  Do not try to lift heavy weights right away  You might cause a muscle or tendon injury  Repeat each move until you cannot do it even 1 more time  That is 1 set  Do 2 or 3 sets on each area  · Resistance training: The ends of a resistance band can be held in your hands  You can tie 1 end to a sturdy object  Repeat each move until you cannot do it even 1 more time  That is 1 set  Do 2 or 3 sets on each area  · Other activities:  Work such as digging and shoveling can strengthen muscles  Exercises such as push-ups, sit-ups, or squats can also build muscles  Tips to help you stay on track:   · Start slowly and work up  You do not have to do all the activity at one time  You can do a few minutes at a time  Remember that some physical activity is better than none  Stand up during the day, even if you cannot walk around  Your body uses more energy when you stand   When you do conditioning activities, aim for a speed that is challenging but not too difficult  You should be able to speak a few words at a time but not be able to sing  · Plan activities you enjoy  Do a variety of activities so you stay challenged  You do not have to do regular exercises to be active  Walk outdoors, go shopping, or visit a museum  The more you enjoy your activities, the easier it will be to continue doing them  · Ask for support from the people in your life  Go for a walk after dinner with your family  Meet friends at the park  Find someone who likes the same classes you like  Make plans to attend class together  You may be more likely to go if you know another person is counting on you to be there  Get involved in community events, such as cleaning a community park  Ask someone to help you stay on track  For example, you can tell the person about your daily or weekly activity  What you need to know about nutrition and activity:  Healthy foods will give you the energy you need to be active  Activity and good nutrition work together to help you reach or maintain a healthy weight  Healthy foods include fruits, vegetables, lean meats, fish, cooked beans, whole-grain breads, and low-fat dairy products  Your healthcare provider can help you create a healthy meal plan  He or she can tell you how many calories you need to stay active and still lose weight if needed  Follow up with your doctor or physical therapist as directed:  Write down your questions so you remember to ask them during your visits  © [employer] 2022 Information is for End User's use only and may not be sold, redistributed or otherwise used for commercial purposes  All illustrations and images included in CareNotes® are the copyrighted property of [employer]  or [facility]   The above information is an  only  It is not intended as medical advice for individual conditions or treatments   Talk to your doctor, nurse or pharmacist before following any medical regimen to see if it is safe and effective for you  Medicare Preventive Visit Patient Instructions  Thank you for completing your Welcome to Medicare Visit or Medicare Annual Wellness Visit today  Your next wellness visit will be due in one year (6/3/2023)  The screening/preventive services that you may require over the next 5-10 years are detailed below  Some tests may not apply to you based off risk factors and/or age  Screening tests ordered at today's visit but not completed yet may show as past due  Also, please note that scanned in results may not display below  Preventive Screenings:  Service Recommendations Previous Testing/Comments   Colorectal Cancer Screening  * Colonoscopy    * Fecal Occult Blood Test (FOBT)/Fecal Immunochemical Test (FIT)  * Fecal DNA/Cologuard Test  * Flexible Sigmoidoscopy Age: 54-65 years old   Colonoscopy: every 10 years (may be performed more frequently if at higher risk)  OR  FOBT/FIT: every 1 year  OR  Cologuard: every 3 years  OR  Sigmoidoscopy: every 5 years  Screening may be recommended earlier than age 48 if at higher risk for colorectal cancer  Also, an individualized decision between you and your healthcare provider will decide whether screening between the ages of 74-80 would be appropriate  Colonoscopy: Not on file  FOBT/FIT: 12/20/2021  Cologuard: Not on file  Sigmoidoscopy: Not on file    Screening Not Indicated     Breast Cancer Screening Age: 36 years old  Frequency: every 1-2 years  Not required if history of left and right mastectomy Mammogram: 04/25/2022    Screening Current   Cervical Cancer Screening Between the ages of 21-29, pap smear recommended once every 3 years  Between the ages of 33-67, can perform pap smear with HPV co-testing every 5 years     Recommendations may differ for women with a history of total hysterectomy, cervical cancer, or abnormal pap smears in past  Pap Smear: Not on file    Screening Not Indicated   Hepatitis C Screening Once for adults born between 1945 and 1965  More frequently in patients at high risk for Hepatitis C Hep C Antibody: Not on file        Diabetes Screening 1-2 times per year if you're at risk for diabetes or have pre-diabetes Fasting glucose: 104 mg/dL   A1C: No results in last 5 years    Screening Current   Cholesterol Screening Once every 5 years if you don't have a lipid disorder  May order more often based on risk factors  Lipid panel: 11/30/2021    Screening Not Indicated  History Lipid Disorder     Other Preventive Screenings Covered by Medicare:  1  Abdominal Aortic Aneurysm (AAA) Screening: covered once if your at risk  You're considered to be at risk if you have a family history of AAA  2  Lung Cancer Screening: covers low dose CT scan once per year if you meet all of the following conditions: (1) Age 50-69; (2) No signs or symptoms of lung cancer; (3) Current smoker or have quit smoking within the last 15 years; (4) You have a tobacco smoking history of at least 30 pack years (packs per day multiplied by number of years you smoked); (5) You get a written order from a healthcare provider  3  Glaucoma Screening: covered annually if you're considered high risk: (1) You have diabetes OR (2) Family history of glaucoma OR (3)  aged 48 and older OR (3)  American aged 72 and older  3  Osteoporosis Screening: covered every 2 years if you meet one of the following conditions: (1) You're estrogen deficient and at risk for osteoporosis based off medical history and other findings; (2) Have a vertebral abnormality; (3) On glucocorticoid therapy for more than 3 months; (4) Have primary hyperparathyroidism; (5) On osteoporosis medications and need to assess response to drug therapy  · Last bone density test (DXA Scan): Not on file  5  HIV Screening: covered annually if you're between the age of 12-76   Also covered annually if you are younger than 13 and older than 72 with risk factors for HIV infection  For pregnant patients, it is covered up to 3 times per pregnancy  Immunizations:  Immunization Recommendations   Influenza Vaccine Annual influenza vaccination during flu season is recommended for all persons aged >= 6 months who do not have contraindications   Pneumococcal Vaccine (Prevnar and Pneumovax)  * Prevnar = PCV13  * Pneumovax = PPSV23   Adults 25-60 years old: 1-3 doses may be recommended based on certain risk factors  Adults 72 years old: Prevnar (PCV13) vaccine recommended followed by Pneumovax (PPSV23) vaccine  If already received PPSV23 since turning 65, then PCV13 recommended at least one year after PPSV23 dose  Hepatitis B Vaccine 3 dose series if at intermediate or high risk (ex: diabetes, end stage renal disease, liver disease)   Tetanus (Td) Vaccine - COST NOT COVERED BY MEDICARE PART B Following completion of primary series, a booster dose should be given every 10 years to maintain immunity against tetanus  Td may also be given as tetanus wound prophylaxis  Tdap Vaccine - COST NOT COVERED BY MEDICARE PART B Recommended at least once for all adults  For pregnant patients, recommended with each pregnancy  Shingles Vaccine (Shingrix) - COST NOT COVERED BY MEDICARE PART B  2 shot series recommended in those aged 48 and above     Health Maintenance Due:  There are no preventive care reminders to display for this patient  Immunizations Due:      Topic Date Due    DTaP,Tdap,and Td Vaccines (1 - Tdap) Never done    COVID-19 Vaccine (3 - Booster for Moderna series) 07/21/2021     Advance Directives   What are advance directives? Advance directives are legal documents that state your wishes and plans for medical care  These plans are made ahead of time in case you lose your ability to make decisions for yourself   Advance directives can apply to any medical decision, such as the treatments you want, and if you want to donate organs  What are the types of advance directives? There are many types of advance directives, and each state has rules about how to use them  You may choose a combination of any of the following:  · Living will: This is a written record of the treatment you want  You can also choose which treatments you do not want, which to limit, and which to stop at a certain time  This includes surgery, medicine, IV fluid, and tube feedings  · Durable power of  for healthcare [facility]): This is a written record that states who you want to make healthcare choices for you when you are unable to make them for yourself  This person, called a proxy, is usually a family member or a friend  You may choose more than 1 proxy  · Do not resuscitate (DNR) order:  A DNR order is used in case your heart stops beating or you stop breathing  It is a request not to have certain forms of treatment, such as CPR  A DNR order may be included in other types of advance directives  · Medical directive: This covers the care that you want if you are in a coma, near death, or unable to make decisions for yourself  You can list the treatments you want for each condition  Treatment may include pain medicine, surgery, blood transfusions, dialysis, IV or tube feedings, and a ventilator (breathing machine)  · Values history: This document has questions about your views, beliefs, and how you feel and think about life  This information can help others choose the care that you would choose  Why are advance directives important? An advance directive helps you control your care  Although spoken wishes may be used, it is better to have your wishes written down  Spoken wishes can be misunderstood, or not followed  Treatments may be given even if you do not want them  An advance directive may make it easier for your family to make difficult choices about your care     Urinary Incontinence   Urinary incontinence (UI)  is when you lose control of your bladder  UI develops because your bladder cannot store or empty urine properly  The 3 most common types of UI are stress incontinence, urge incontinence, or both  Medicines:   · May be given to help strengthen your bladder control  Report any side effects of medication to your healthcare provider  Do pelvic muscle exercises often:  Your pelvic muscles help you stop urinating  Squeeze these muscles tight for 5 seconds, then relax for 5 seconds  Gradually work up to squeezing for 10 seconds  Do 3 sets of 15 repetitions a day, or as directed  This will help strengthen your pelvic muscles and improve bladder control  Train your bladder:  Go to the bathroom at set times, such as every 2 hours, even if you do not feel the urge to go  You can also try to hold your urine when you feel the urge to go  For example, hold your urine for 5 minutes when you feel the urge to go  As that becomes easier, hold your urine for 10 minutes  Self-care:   · Keep a UI record  Write down how often you leak urine and how much you leak  Make a note of what you were doing when you leaked urine  · Drink liquids as directed  You may need to limit the amount of liquid you drink to help control your urine leakage  Do not drink any liquid right before you go to bed  Limit or do not have drinks that contain caffeine or alcohol  · Prevent constipation  Eat a variety of high-fiber foods  Good examples are high-fiber cereals, beans, vegetables, and whole-grain breads  Walking is the best way to trigger your intestines to have a bowel movement  · Exercise regularly and maintain a healthy weight  Weight loss and exercise will decrease pressure on your bladder and help you control your leakage  · Use a catheter as directed  to help empty your bladder  A catheter is a tiny, plastic tube that is put into your bladder to drain your urine  · Go to behavior therapy as directed    Behavior therapy may be used to help you learn to control your urge to urinate  © Copyright [employer] 2018 Information is for End User's use only and may not be sold, redistributed or otherwise used for commercial purposes   All illustrations and images included in CareNotes® are the copyrighted property of A D A M , Inc  or [address] [place], [place] [address]anita mirta

## 2022-10-11 PROBLEM — Z00.00 MEDICARE ANNUAL WELLNESS VISIT, SUBSEQUENT: Status: RESOLVED | Noted: 2022-06-02 | Resolved: 2022-10-11

## 2022-12-15 ENCOUNTER — RA CDI HCC (OUTPATIENT)
Dept: OTHER | Facility: HOSPITAL | Age: 87
End: 2022-12-15

## 2022-12-15 NOTE — PROGRESS NOTES
[address]  coding opportunities       Chart reviewed, no opportunity found: CHART REVIEWED, NO OPPORTUNITY FOUND        Patients Insurance     Medicare Insurance: Medicare

## 2023-02-13 ENCOUNTER — OFFICE VISIT (OUTPATIENT)
Dept: FAMILY MEDICINE CLINIC | Facility: MEDICAL CENTER | Age: 88
End: 2023-02-13

## 2023-02-13 VITALS
WEIGHT: 129.4 LBS | HEIGHT: 60 IN | TEMPERATURE: 98.4 F | HEART RATE: 80 BPM | SYSTOLIC BLOOD PRESSURE: 140 MMHG | DIASTOLIC BLOOD PRESSURE: 70 MMHG | BODY MASS INDEX: 25.4 KG/M2 | RESPIRATION RATE: 16 BRPM

## 2023-02-13 DIAGNOSIS — I35.0 NONRHEUMATIC AORTIC VALVE STENOSIS: ICD-10-CM

## 2023-02-13 DIAGNOSIS — I10 PRIMARY HYPERTENSION: ICD-10-CM

## 2023-02-13 DIAGNOSIS — Z09 FOLLOW-UP EXAM, 3-6 MONTHS SINCE PREVIOUS EXAM: Primary | ICD-10-CM

## 2023-02-13 DIAGNOSIS — I51.7 LEFT VENTRICULAR HYPERTROPHY: ICD-10-CM

## 2023-02-13 DIAGNOSIS — Z23 IMMUNIZATION DUE: ICD-10-CM

## 2023-02-13 DIAGNOSIS — M19.90 OSTEOARTHRITIS, UNSPECIFIED OSTEOARTHRITIS TYPE, UNSPECIFIED SITE: ICD-10-CM

## 2023-02-13 DIAGNOSIS — Z51.81 MEDICATION MONITORING ENCOUNTER: ICD-10-CM

## 2023-02-13 DIAGNOSIS — E78.00 PURE HYPERCHOLESTEROLEMIA: ICD-10-CM

## 2023-02-13 NOTE — PROGRESS NOTES
[address] Note  [facility] MRN: [id number] : 1935 Age: 80 y o  Assessment/Plan     1  Follow-up exam, 3-6 months since previous exam    -Patient presents in follow-up today  -She will return for nurse visit for influenza vaccine and in 6 months and sooner as needed    2  Immunization due    -Counseled about PCV 20 vaccine, patient agreeable to receiving today  -Pneumococcal Conjugate Vaccine 20-valent (Pcv20)  -Counseled about influenza vaccine, will return for nurse visit  -Counseled about COVID-19 bivalent vaccine, patient will receive at [facility] 23 about tetanus booster, to receive at pharmacy  -Declined Shingrix vaccination series    3  Nonrheumatic aortic valve stenosis    -Established with cardiology at [address] [place] [address] for echocardiogram next month  -No chest pain, shortness of breath, or syncope    4  Primary hypertension    -Stable, continue amlodipine 5 mg p o  daily at this time  -Patient aware of parameters in which case to contact    5  Left ventricular hypertrophy    -Continue with adequate hypertension control    6  Pure hypercholesterolemia    -We will follow-up lipid panel, continue Lipitor 10 mg p o  nightly at this time  - Lipid Panel with Direct LDL reflex; Future    7  Medication monitoring encounter    -Currently on statin  - Comprehensive metabolic panel; Future    8  Osteoarthritis, unspecified osteoarthritis type, unspecified site    - Body mass index is 25 27 kg/m²  BMI Counseling: Body mass index is 25 27 kg/m²  The BMI is above normal  Nutrition recommendations include encouraging healthy choices of fruits and vegetables  Exercise recommendations include exercising 3-5 times per week and strength training exercises  Rationale for BMI follow-up plan is due to patient being overweight or obese   Patient walks daily, weather dependent          acknowledged understanding of treatment plan, all questions answered  Subjective      Monik Workman is a 80 y o  female who presents for 6-month follow-up  Patient feeling well today, offers no acute complaints  She enjoyed her trip to [facility] in [place]  She particularly enjoyed [place], she states the town that she visited there she could walk to all of the locations  Patient had follow-up with Cardiology  She has order for echocardiogram next month  She denies any chest pain, shortness of breath, or syncopal events  Patient does mention that she has 3 pound weights at home however, not doing related exercises as often  She does state that she walks daily weather permitting       The following portions of the patient's history were reviewed and updated as appropriate: allergies, current medications, past family history, past medical history, past social history, past surgical history and problem list      Past Medical History:   Diagnosis Date   • Aortic valve regurgitation    • Aortic valve stenosis    • Hypertension    • Left ventricular hypertrophy    • Mitral valve regurgitation    • Osteoarthritis    • Pure hypercholesterolemia        Allergies   Allergen Reactions   • Acetaminophen Hives       Past Surgical History:   Procedure Laterality Date   • BUNIONECTOMY Left 04/16/2014   • COLONOSCOPY  01/04/2012   • CORRECTION HAMMER TOE Left 04/16/2014       Family History   Problem Relation Age of Onset   • Coronary artery disease Father    • Throat cancer Father    • Diabetes Sister    • No Known Problems Mother    • No Known Problems Daughter    • No Known Problems Maternal Grandmother    • No Known Problems Maternal Grandfather    • No Known Problems Paternal Grandmother    • No Known Problems Paternal Grandfather    • No Known Problems Brother    • No Known Problems Brother    • Cancer Sister    • Pancreatic cancer Sister    • No Known Problems Daughter    • No Known Problems Son    • No Known Problems Son    • No Known Problems Son        Social History Socioeconomic History   • Marital status: Unknown     Spouse name: None   • Number of children: None   • Years of education: None   • Highest education level: None   Occupational History   • None   Tobacco Use   • Smoking status: Former     Packs/day: 1 00     Years: 25 00     Pack years: 25      Types: Cigarettes   • Smokeless tobacco: Never   Vaping Use   • Vaping Use: Never used   Substance and Sexual Activity   • Alcohol use: Not Currently   • Drug use: Not Currently   • Sexual activity: None   Other Topics Concern   • None   Social History Narrative    · Most recent tobacco use screenin2019      · Do you currently or have you served in [employer]:   No      Social Determinants of Health     Financial Resource Strain: Not on file   Food Insecurity: Not on file   Transportation Needs: Not on file   Physical Activity: Not on file   Stress: Not on file   Social Connections: Not on file   Intimate Partner Violence: Not on file   Housing Stability: Not on file       Current Outpatient Medications   Medication Sig Dispense Refill   • amLODIPine (NORVASC) 5 mg tablet Take 5 mg by mouth daily     • atorvastatin (LIPITOR) 10 mg tablet Take 1 tablet by mouth once daily 90 tablet 1   • Cholecalciferol (Vitamin D3) 25 MCG (1000 UT) CAPS Take 400 Units by mouth      • Multiple Vitamins-Minerals (PRESERVISION AREDS 2 PO) Take by mouth       No current facility-administered medications for this visit  Review of Systems     As noted in HPI    Objective      /70 (Cuff Size: Standard)   Pulse 80   Temp 98 4 °F (36 9 °C)   Resp 16   Ht 5' (1 524 m)   Wt 58 7 kg (129 lb 6 4 oz)   BMI 25 27 kg/m²     Physical Exam  Vitals reviewed  Constitutional:       General: She is not in acute distress  Appearance: Normal appearance  She is not ill-appearing or toxic-appearing  HENT:      Head: Normocephalic and atraumatic        Right Ear: External ear normal       Left Ear: External ear normal       Nose: Nose normal       Mouth/Throat:      Mouth: Mucous membranes are moist       Pharynx: Oropharynx is clear  Eyes:      Extraocular Movements: Extraocular movements intact  Conjunctiva/sclera: Conjunctivae normal       Pupils: Pupils are equal, round, and reactive to light  Cardiovascular:      Rate and Rhythm: Normal rate and regular rhythm  Pulses: Normal pulses  Heart sounds: Murmur heard  Pulmonary:      Effort: Pulmonary effort is normal       Breath sounds: Normal breath sounds  Abdominal:      General: Abdomen is flat  Bowel sounds are normal       Palpations: Abdomen is soft  Tenderness: There is no abdominal tenderness  Musculoskeletal:      Cervical back: Neck supple  Right lower leg: No edema  Left lower leg: No edema  Skin:     General: Skin is warm and dry  Neurological:      Mental Status: She is alert and oriented to person, place, and time  Psychiatric:         Mood and Affect: Mood normal          Behavior: Behavior normal          Thought Content: Thought content normal          Judgment: Judgment normal              Some portions of this record may have been generated with voice recognition software  There may be translation, syntax, or grammatical errors  Occasional wrong word or "sound-a-like" substitutions may have occurred due to the inherent limitations of the voice recognition software  Read the chart carefully and recognize, using context, where substations may have occurred  If you have any questions, please contact the dictating provider for clarification or correction, as needed

## 2023-02-23 ENCOUNTER — APPOINTMENT (OUTPATIENT)
Dept: LAB | Facility: MEDICAL CENTER | Age: 88
End: 2023-02-23

## 2023-02-23 DIAGNOSIS — Z51.81 MEDICATION MONITORING ENCOUNTER: ICD-10-CM

## 2023-02-23 DIAGNOSIS — Z11.59 NEED FOR HEPATITIS C SCREENING TEST: ICD-10-CM

## 2023-02-23 DIAGNOSIS — E78.00 PURE HYPERCHOLESTEROLEMIA: ICD-10-CM

## 2023-02-23 LAB
ALBUMIN SERPL BCP-MCNC: 3.1 G/DL (ref 3.5–5)
ALP SERPL-CCNC: 145 U/L (ref 46–116)
ALT SERPL W P-5'-P-CCNC: 24 U/L (ref 12–78)
ANION GAP SERPL CALCULATED.3IONS-SCNC: 2 MMOL/L (ref 4–13)
AST SERPL W P-5'-P-CCNC: 21 U/L (ref 5–45)
BILIRUB SERPL-MCNC: 0.49 MG/DL (ref 0.2–1)
BUN SERPL-MCNC: 9 MG/DL (ref 5–25)
CALCIUM ALBUM COR SERPL-MCNC: 9.7 MG/DL (ref 8.3–10.1)
CALCIUM SERPL-MCNC: 9 MG/DL (ref 8.3–10.1)
CHLORIDE SERPL-SCNC: 108 MMOL/L (ref 96–108)
CHOLEST SERPL-MCNC: 102 MG/DL
CO2 SERPL-SCNC: 26 MMOL/L (ref 21–32)
CREAT SERPL-MCNC: 0.68 MG/DL (ref 0.6–1.3)
GFR SERPL CREATININE-BSD FRML MDRD: 78 ML/MIN/1.73SQ M
GLUCOSE P FAST SERPL-MCNC: 103 MG/DL (ref 65–99)
HCV AB SER QL: NORMAL
HDLC SERPL-MCNC: 48 MG/DL
LDLC SERPL CALC-MCNC: 38 MG/DL (ref 0–100)
POTASSIUM SERPL-SCNC: 4.2 MMOL/L (ref 3.5–5.3)
PROT SERPL-MCNC: 7.3 G/DL (ref 6.4–8.4)
SODIUM SERPL-SCNC: 136 MMOL/L (ref 135–147)
TRIGL SERPL-MCNC: 80 MG/DL

## 2023-03-07 DIAGNOSIS — R74.8 ELEVATED ALKALINE PHOSPHATASE LEVEL: Primary | ICD-10-CM

## 2023-03-07 DIAGNOSIS — R94.5 ABNORMAL RESULTS OF LIVER FUNCTION STUDIES: ICD-10-CM

## 2023-03-09 ENCOUNTER — APPOINTMENT (OUTPATIENT)
Dept: LAB | Facility: MEDICAL CENTER | Age: 88
End: 2023-03-09

## 2023-03-09 DIAGNOSIS — R74.8 ELEVATED ALKALINE PHOSPHATASE LEVEL: ICD-10-CM

## 2023-03-09 DIAGNOSIS — R94.5 ABNORMAL RESULTS OF LIVER FUNCTION STUDIES: ICD-10-CM

## 2023-03-09 LAB
GGT SERPL-CCNC: 26 U/L (ref 5–85)
HAV IGM SER QL: NORMAL
HBV CORE IGM SER QL: NORMAL
HBV SURFACE AG SER QL: NORMAL
HCV AB SER QL: NORMAL

## 2023-03-15 ENCOUNTER — TELEPHONE (OUTPATIENT)
Dept: FAMILY MEDICINE CLINIC | Facility: MEDICAL CENTER | Age: 88
End: 2023-03-15

## 2023-03-15 DIAGNOSIS — R74.8 ELEVATED ALKALINE PHOSPHATASE LEVEL: Primary | ICD-10-CM

## 2023-03-15 LAB
ALP BONE CFR SERPL: 20 % (ref 14–68)
ALP INTEST CFR SERPL: 3 % (ref 0–18)
ALP LIVER CFR SERPL: 77 % (ref 18–85)
ALP SERPL-CCNC: 131 IU/L (ref 44–121)

## 2023-03-15 NOTE — TELEPHONE ENCOUNTER
----- Message from Katelyn Del Toro DO sent at 3/15/2023  8:28 AM EDT -----  No particular alkaline phosphatase isoenzymes elevated however, ALP still elevated, order placed for abdominal US  Please inform patient

## 2023-03-24 ENCOUNTER — HOSPITAL ENCOUNTER (OUTPATIENT)
Dept: RADIOLOGY | Facility: MEDICAL CENTER | Age: 88
Discharge: HOME | End: 2023-03-24

## 2023-04-26 ENCOUNTER — TELEPHONE (OUTPATIENT)
Dept: FAMILY MEDICINE CLINIC | Facility: MEDICAL CENTER | Age: 88
End: 2023-04-26

## 2023-04-26 DIAGNOSIS — K86.2 PANCREAS CYST: Primary | ICD-10-CM

## 2023-04-26 NOTE — TELEPHONE ENCOUNTER
----- Message from Gerhardt Plate, DO sent at 4/26/2023  9:08 AM EDT -----  Please inform patient that ultrasound did return with multiple cysts of her pancreas for which further clarification is requested with MRI with contrast   That order has been placed  There are also gallstones however no concern for gallbladder infec  tion

## 2023-05-31 ENCOUNTER — HOSPITAL ENCOUNTER (OUTPATIENT)
Dept: MRI IMAGING | Facility: HOSPITAL | Age: 88
Discharge: HOME | End: 2023-05-31
Payer: MEDICARE

## 2023-05-31 DIAGNOSIS — K86.2 PANCREAS CYST: ICD-10-CM

## 2023-05-31 PROCEDURE — A9585 GADOBUTROL INJECTION: HCPCS | Performed by: STUDENT IN AN ORGANIZED HEALTH CARE EDUCATION/TRAINING PROGRAM

## 2023-05-31 PROCEDURE — G1004 CDSM NDSC: HCPCS

## 2023-05-31 PROCEDURE — 74183 MRI ABD W/O CNTR FLWD CNTR: CPT

## 2023-05-31 RX ADMIN — GADOBUTROL 5 ML: 604.72 INJECTION INTRAVENOUS at 16:47

## 2023-06-05 DIAGNOSIS — R74.8 ELEVATED ALKALINE PHOSPHATASE LEVEL: ICD-10-CM

## 2023-06-05 DIAGNOSIS — K86.2 PANCREAS CYST: Primary | ICD-10-CM

## 2023-06-16 ENCOUNTER — CONSULT (OUTPATIENT)
Dept: GASTROENTEROLOGY | Facility: CLINIC | Age: 88
End: 2023-06-16
Payer: MEDICARE

## 2023-06-16 VITALS
WEIGHT: 124 LBS | DIASTOLIC BLOOD PRESSURE: 84 MMHG | SYSTOLIC BLOOD PRESSURE: 161 MMHG | HEIGHT: 60 IN | BODY MASS INDEX: 24.35 KG/M2 | HEART RATE: 91 BPM

## 2023-06-16 DIAGNOSIS — K76.0 HEPATIC STEATOSIS: ICD-10-CM

## 2023-06-16 DIAGNOSIS — Z12.11 COLON CANCER SCREENING: ICD-10-CM

## 2023-06-16 DIAGNOSIS — K86.2 PANCREAS CYST: ICD-10-CM

## 2023-06-16 DIAGNOSIS — R10.30 LOWER ABDOMINAL PAIN: Primary | ICD-10-CM

## 2023-06-16 DIAGNOSIS — R74.8 ELEVATED ALKALINE PHOSPHATASE LEVEL: ICD-10-CM

## 2023-06-16 PROCEDURE — 99204 OFFICE O/P NEW MOD 45 MIN: CPT | Performed by: NURSE PRACTITIONER

## 2023-06-16 NOTE — PROGRESS NOTES
[facility] - Outpatient Consultation  Aisha Rodriguez 80 y o  female MRN: [id number]  Encounter: [id number]          ASSESSMENT AND PLAN:      1  Pancreas cyst  Patient had recent MRI done 5/31 which showed innumerable pancreatic cysts measuring up to 19 mm    - Ambulatory Referral to Gastroenterology  -Recommendations are for for 2 times  If no change after 4 years then no more follow-ups are recommended  repeat MRI in 2 years   2  Elevated alkaline phosphatase level  Recent labs noted elevated alk phos 145  MRI does show severe hepatic steatosis  We will do further liver work-up to rule out any underlying liver disease  GGT which was within normal limits  Hepatitis panel negative  Elevated alk phos may be secondary to fatty liver or underlying liver disease   - Ambulatory Referral to Gastroenterology  - Antimitochondrial antibody; Future  - ROCAEL Screen w/ Reflex to Titer/Pattern; Future  - Anti-smooth muscle antibody, IgG; Future    3  Lower abdominal pain  Patient reports lower abdominal pain which has been ongoing for several months and is unrelated to bowel movement  Patient reports that pain will be intermittent and severity of pain varies from mild to moderate  Patient never had any abdominal surgery  Patient reports she has bowel movements daily  Patient denies any blood in stool or blood from rectal area  Patient denies black tarry stool  Lower abdominal pain may be secondary to lesion, or GYN etiology  - Colonoscopy; schedule for colonoscopy for further evaluation of lower abdominal pain  Prep and procedure explained to patient in detail  Further recommendations pending results of colonoscopy  4  Hepatic steatosis  MRI done 5/31/2023 showed severe hepatic steatosis  No suspicious mass  Hepatic veins and portal veins are patent  Liver enzymes within normal limits except alk phos elevated at 145  Recent alk phos isoenzymes completed  Patient weight 124 pounds  Patient walks a mile a day  5  Colon cancer screening  Up-to-date  Patient reports she was told no further colon cancer screenings were necessary due to her age  Follow-up 4 weeks after procedure     ______________________________________________________________________    HPI:  Misti Sharpe is a 59-year-old female who was referred to office to establish care for pancreatic cyst and for further work-up of elevated alk phos  Patient had recent MRI done 5/31 which showed innumerable pancreatic cysts measuring up to 19 mm and severe hepatic steatosis  No suspicious mass  Hepatic veins and portal veins are patent  Liver enzymes within normal limits except alk phos elevated at 145  Recent alk phos isoenzymes completed  Patient weight 124 pounds  Patient walks a mile a day  Patient reports lower abdominal pain which has been ongoing for several months and is unrelated to bowel movement  Patient reports that pain will be intermittent and severity of pain varies from mild to moderate  Patient never had any abdominal surgery  Patient reports she has bowel movements daily  Patient denies any blood in stool or blood from rectal area  Patient denies black tarry stool  Patient denies nausea, vomiting, acid reflux, heartburn, epigastric or upper abdomen pain  Abdomen exam benign  Patient is a former smoker quit 30 to 40 years ago  No history of alcohol use  Denies illicit drug use  No family history of stomach or colon cancer  Father did have throat cancer  Colonoscopy done many years ago  REVIEW OF SYSTEMS:    CONSTITUTIONAL: Denies any fever, chills, rigors, and weight loss  HEENT: No earache or tinnitus  Denies hearing loss or visual disturbances  CARDIOVASCULAR: No chest pain or palpitations  RESPIRATORY: Denies any cough, hemoptysis, shortness of breath or dyspnea on exertion  GASTROINTESTINAL: As noted in the History of Present Illness  GENITOURINARY: No problems with urination   Denies any hematuria or dysuria  NEUROLOGIC: No dizziness or vertigo, denies headaches  MUSCULOSKELETAL: Denies any muscle or joint pain  SKIN: Denies skin rashes or itching  ENDOCRINE: Denies excessive thirst  Denies intolerance to heat or cold  PSYCHOSOCIAL: Denies depression or anxiety  Denies any recent memory loss         Historical Information   Past Medical History:   Diagnosis Date   • Aortic valve regurgitation    • Aortic valve stenosis    • Hypertension    • Left ventricular hypertrophy    • Mitral valve regurgitation    • Osteoarthritis    • Pure hypercholesterolemia      Past Surgical History:   Procedure Laterality Date   • BUNIONECTOMY Left 04/16/2014   • COLONOSCOPY  01/04/2012   • CORRECTION HAMMER TOE Left 04/16/2014     Social History   Social History     Substance and Sexual Activity   Alcohol Use Not Currently     Social History     Substance and Sexual Activity   Drug Use Not Currently     Social History     Tobacco Use   Smoking Status Former   • Packs/day: 1 00   • Years: 25 00   • Total pack years: 25 00   • Types: Cigarettes   Smokeless Tobacco Never     Family History   Problem Relation Age of Onset   • Coronary artery disease Father    • Throat cancer Father    • Diabetes Sister    • No Known Problems Mother    • No Known Problems Daughter    • No Known Problems Maternal Grandmother    • No Known Problems Maternal Grandfather    • No Known Problems Paternal Grandmother    • No Known Problems Paternal Grandfather    • No Known Problems Brother    • No Known Problems Brother    • Cancer Sister    • Pancreatic cancer Sister    • No Known Problems Daughter    • No Known Problems Son    • No Known Problems Son    • No Known Problems Son        Meds/Allergies       Current Outpatient Medications:   •  amLODIPine (NORVASC) 5 mg tablet  •  atorvastatin (LIPITOR) 10 mg tablet  •  Multiple Vitamins-Minerals (PRESERVISION AREDS 2 PO)  •  Cholecalciferol (Vitamin D3) 25 MCG (1000 UT) CAPS    Allergies Allergen Reactions   • Acetaminophen Hives           Objective     Blood pressure 161/84, pulse 91, height 5' (1 524 m), weight 56 2 kg (124 lb)  Body mass index is 24 22 kg/m²  PHYSICAL EXAM:      General Appearance:   Alert, cooperative, no distress   HEENT:   Normocephalic, atraumatic, anicteric      Neck:  Supple, symmetrical, trachea midline   Lungs:   Clear to auscultation bilaterally; no rales, rhonchi or wheezing; respirations unlabored    Heart[de-identified]   Regular rate and rhythm; no murmur, rub, or gallop  Abdomen:   Soft, non-tender, non-distended; normal bowel sounds; no masses, no organomegaly    Genitalia:   Deferred    Rectal:   Deferred    Extremities:  No cyanosis, clubbing or edema    Pulses:  2+ and symmetric    Skin:  No jaundice, rashes, or lesions    Lymph nodes:  No palpable cervical lymphadenopathy        Lab Results:   No visits with results within 1 Day(s) from this visit  Latest known visit with results is:   Appointment on 03/09/2023   Component Date Value   • GGT 03/09/2023 26    • Alk Phos Isoenzymes 03/09/2023 131 (H)    • Alk Phos Liver Fract 03/09/2023 77    • Alk Phos Bone Fract 03/09/2023 20    • Alk Phos Intestine Fract 03/09/2023 3    • Hepatitis B Surface Ag 03/09/2023 Non-reactive    • Hep A IgM 03/09/2023 Non-reactive    • Hepatitis C Ab 03/09/2023 Non-reactive    • Hep B C IgM 03/09/2023 Non-reactive          Radiology Results:   MRI abdomen w wo contrast and mrcp    Result Date: 6/3/2023  Narrative: MRI OF THE ABDOMEN WITH AND WITHOUT CONTRAST WITH MRCP INDICATION: 80 years / Female  K86 2: Cyst of pancreas  COMPARISON: Abdominal ultrasound 4/11/2023  TECHNIQUE:  Multiplanar/multisequence MRI of the abdomen with 3D MRCP was performed before and after administration of contrast  IV Contrast:  5 mL of Gadobutrol injection (SINGLE-DOSE) FINDINGS: LOWER CHEST:   Unremarkable  LIVER: Severe hepatic steatosis  No suspicious mass  The hepatic veins and portal veins are patent  BILE DUCTS:  No intrahepatic or extrahepatic bile duct dilation  Common bile duct is normal in caliber  No choledocholithiasis, biliary stricture or suspicious mass  GALLBLADDER:  Normal  PANCREAS: Pancreatic parenchymal atrophy  Innumerable pancreatic cysts measuring up to 19 mm in the head/uncinate process #7/20  No pancreatic ductal dilation  ADRENAL GLANDS:  Normal  SPLEEN:  Normal  KIDNEYS/PROXIMAL URETERS:  No hydroureteronephrosis  No suspicious renal mass  Simple cyst  BOWEL:   No dilated loops of bowel  PERITONEUM/RETROPERITONEUM:  No ascites  LYMPH NODES:  No abdominal lymphadenopathy  VASCULAR STRUCTURES:  No aneurysm  ABDOMINAL WALL:  Unremarkable  OSSEOUS STRUCTURES:  No suspicious osseous lesion  Impression: Innumerable pancreatic cysts measuring up to 19 mm  For simple cyst(s) between 1 5 to 2 0 cm, recommend followup every 2 years for 2 time  If no change after 4 years, then no more followups  Recommend next followup in 2 years  Preferred imaging modality:  abdomen MRI and MRCP with and without IV contrast, or triple phase abdomen CT with IV contrast, or abdomen MRI and MRCP without IV contrast  The study was marked in EPIC for significant notification   Workstation performed: [id number]

## 2023-06-16 NOTE — PATIENT INSTRUCTIONS
Placed on recall schedule for MRI in 2 years  MiraLAX and Dulcolax bowel prep  May take extra strength Gas-X as needed up to 4 times a dayScheduled date of colonoscopy (as of today):07/12/23  Physician performing colonoscopy:Daniel  Location of colonoscopy:[facility]  Bowel prep reviewed with patient:Miralax  Instructions reviewed with patient by:Abigail BARNEY  Clearances: None

## 2023-06-19 ENCOUNTER — APPOINTMENT (OUTPATIENT)
Dept: LAB | Facility: MEDICAL CENTER | Age: 88
End: 2023-06-19
Payer: MEDICARE

## 2023-06-19 DIAGNOSIS — R74.8 ELEVATED ALKALINE PHOSPHATASE LEVEL: ICD-10-CM

## 2023-06-19 LAB — ANA SER QL IA: NEGATIVE

## 2023-06-19 PROCEDURE — 86038 ANTINUCLEAR ANTIBODIES: CPT

## 2023-06-19 PROCEDURE — 86381 MITOCHONDRIAL ANTIBODY EACH: CPT

## 2023-06-19 PROCEDURE — 86015 ACTIN ANTIBODY EACH: CPT

## 2023-06-19 PROCEDURE — 36415 COLL VENOUS BLD VENIPUNCTURE: CPT

## 2023-06-20 LAB — MITOCHONDRIA M2 IGG SER-ACNC: <20 UNITS (ref 0–20)

## 2023-06-21 LAB — ACTIN IGG SERPL-ACNC: 6 UNITS (ref 0–19)

## 2023-06-28 ENCOUNTER — TELEPHONE (OUTPATIENT)
Dept: GASTROENTEROLOGY | Facility: CLINIC | Age: 88
End: 2023-06-28

## 2023-06-28 NOTE — TELEPHONE ENCOUNTER
----- Message from Matt Sheets sent at 6/28/2023  8:16 AM EDT -----  Regarding: move to the hospital  As per anesthesia  please move to the hospital due to his aortic stenosis

## 2023-06-28 NOTE — TELEPHONE ENCOUNTER
Faxed cardiac clearance to Dr Adalgisa Aguilar at [phone]   For colonoscopy scheduled for 7/13/23

## 2023-06-28 NOTE — TELEPHONE ENCOUNTER
Please call pt to complete ASC Assessment  Did pt answer yes to cardiac list?  Is clearance needed? Is pt diabetic? Thank you so much!

## 2023-06-29 ENCOUNTER — TELEPHONE (OUTPATIENT)
Dept: FAMILY MEDICINE CLINIC | Facility: MEDICAL CENTER | Age: 88
End: 2023-06-29

## 2023-06-29 NOTE — TELEPHONE ENCOUNTER
Patient denies abdominal pain, vomiting, nausea, or fever  She mainly has diarrhea in the morning but she also has not eaten much in the last few days because she has no appetite  She is staying hydrated  Advised home care and BRAT diet, stressed the importance of eating to bind up the stool  She agrees, will call us tomorrow and let us know how she is doing

## 2023-06-29 NOTE — TELEPHONE ENCOUNTER
Pt has had diarrhea since Monday, no appetite  Hasn't taken anything otc, she wasn't sure what she can take  Please call

## 2023-06-30 NOTE — TELEPHONE ENCOUNTER
[facility], [facility]  Just returning your call  I'm sorry I didn't get your call earlier and I was asked to call you to let you know how I feel today  And I do feel better  I don't have gurgling in my stomach anymore and I I'm there's just very tired more than anything  So I'll be here the rest of the day  If you want to talk to me, I'll be here at [phone]  Thank you   Bye, bye

## 2023-07-05 ENCOUNTER — TELEPHONE (OUTPATIENT)
Dept: GASTROENTEROLOGY | Facility: CLINIC | Age: 88
End: 2023-07-05

## 2023-07-05 NOTE — TELEPHONE ENCOUNTER
Spoke to pt confirming pt's colonoscopy scheduled on 7/13/23 at [facility] with Dr. Estevan Stock. Informed DH would be calling the day prior with the arrival time. Pt has instructions and did not have any questions. She did say that she was doing better and is not sure if wants to have procedure done or not. She will call us back if decides to cancel.

## 2023-07-07 ENCOUNTER — TELEPHONE (OUTPATIENT)
Dept: GASTROENTEROLOGY | Facility: CLINIC | Age: 88
End: 2023-07-07

## 2023-07-07 NOTE — TELEPHONE ENCOUNTER
Spoke to pt confirming pt's colonoscopy scheduled on 7/13/23 at [facility] with Dr. Shanda Pope. Informed [facility] would be calling the day prior with the arrival time. Pt has instructions and did not have any questions at this time.

## 2023-07-13 ENCOUNTER — ANESTHESIA EVENT (OUTPATIENT)
Dept: GASTROENTEROLOGY | Facility: HOSPITAL | Age: 88
End: 2023-07-13

## 2023-07-13 ENCOUNTER — ANESTHESIA (OUTPATIENT)
Dept: GASTROENTEROLOGY | Facility: HOSPITAL | Age: 88
End: 2023-07-13

## 2023-07-13 ENCOUNTER — HOSPITAL ENCOUNTER (OUTPATIENT)
Dept: GASTROENTEROLOGY | Facility: HOSPITAL | Age: 88
Setting detail: OUTPATIENT SURGERY
End: 2023-07-13
Payer: MEDICARE

## 2023-07-13 VITALS
RESPIRATION RATE: 18 BRPM | SYSTOLIC BLOOD PRESSURE: 145 MMHG | TEMPERATURE: 97.2 F | DIASTOLIC BLOOD PRESSURE: 84 MMHG | HEART RATE: 76 BPM | WEIGHT: 117.3 LBS | BODY MASS INDEX: 22.15 KG/M2 | HEIGHT: 61 IN | OXYGEN SATURATION: 98 %

## 2023-07-13 DIAGNOSIS — R10.30 LOWER ABDOMINAL PAIN: ICD-10-CM

## 2023-07-13 DIAGNOSIS — K63.89 COLONIC MASS: Primary | ICD-10-CM

## 2023-07-13 PROCEDURE — 45381 COLONOSCOPY SUBMUCOUS NJX: CPT | Performed by: INTERNAL MEDICINE

## 2023-07-13 PROCEDURE — 45380 COLONOSCOPY AND BIOPSY: CPT | Performed by: INTERNAL MEDICINE

## 2023-07-13 PROCEDURE — 88341 IMHCHEM/IMCYTCHM EA ADD ANTB: CPT | Performed by: PATHOLOGY

## 2023-07-13 PROCEDURE — 88342 IMHCHEM/IMCYTCHM 1ST ANTB: CPT | Performed by: PATHOLOGY

## 2023-07-13 PROCEDURE — 88305 TISSUE EXAM BY PATHOLOGIST: CPT | Performed by: PATHOLOGY

## 2023-07-13 RX ORDER — SODIUM CHLORIDE, SODIUM LACTATE, POTASSIUM CHLORIDE, CALCIUM CHLORIDE 600; 310; 30; 20 MG/100ML; MG/100ML; MG/100ML; MG/100ML
INJECTION, SOLUTION INTRAVENOUS CONTINUOUS PRN
Status: DISCONTINUED | OUTPATIENT
Start: 2023-07-13 | End: 2023-07-13

## 2023-07-13 RX ORDER — SODIUM CHLORIDE, SODIUM LACTATE, POTASSIUM CHLORIDE, CALCIUM CHLORIDE 600; 310; 30; 20 MG/100ML; MG/100ML; MG/100ML; MG/100ML
125 INJECTION, SOLUTION INTRAVENOUS CONTINUOUS
Status: CANCELLED | OUTPATIENT
Start: 2023-07-13

## 2023-07-13 RX ORDER — PROPOFOL 10 MG/ML
INJECTION, EMULSION INTRAVENOUS AS NEEDED
Status: DISCONTINUED | OUTPATIENT
Start: 2023-07-13 | End: 2023-07-13

## 2023-07-13 RX ORDER — LIDOCAINE HYDROCHLORIDE 10 MG/ML
INJECTION, SOLUTION EPIDURAL; INFILTRATION; INTRACAUDAL; PERINEURAL AS NEEDED
Status: DISCONTINUED | OUTPATIENT
Start: 2023-07-13 | End: 2023-07-13

## 2023-07-13 RX ORDER — PHENYLEPHRINE HYDROCHLORIDE 10 MG/ML
INJECTION INTRAVENOUS AS NEEDED
Status: DISCONTINUED | OUTPATIENT
Start: 2023-07-13 | End: 2023-07-13

## 2023-07-13 RX ADMIN — PHENYLEPHRINE HYDROCHLORIDE 100 MCG: 10 INJECTION INTRAVENOUS at 10:27

## 2023-07-13 RX ADMIN — PROPOFOL 20 MG: 10 INJECTION, EMULSION INTRAVENOUS at 10:20

## 2023-07-13 RX ADMIN — PHENYLEPHRINE HYDROCHLORIDE 100 MCG: 10 INJECTION INTRAVENOUS at 10:14

## 2023-07-13 RX ADMIN — LIDOCAINE HYDROCHLORIDE 30 MG: 10 INJECTION, SOLUTION EPIDURAL; INFILTRATION; INTRACAUDAL; PERINEURAL at 10:14

## 2023-07-13 RX ADMIN — PROPOFOL 20 MG: 10 INJECTION, EMULSION INTRAVENOUS at 10:16

## 2023-07-13 RX ADMIN — PROPOFOL 20 MG: 10 INJECTION, EMULSION INTRAVENOUS at 10:22

## 2023-07-13 RX ADMIN — PROPOFOL 50 MG: 10 INJECTION, EMULSION INTRAVENOUS at 10:14

## 2023-07-13 RX ADMIN — SODIUM CHLORIDE, SODIUM LACTATE, POTASSIUM CHLORIDE, AND CALCIUM CHLORIDE: .6; .31; .03; .02 INJECTION, SOLUTION INTRAVENOUS at 10:07

## 2023-07-13 RX ADMIN — PROPOFOL 10 MG: 10 INJECTION, EMULSION INTRAVENOUS at 10:28

## 2023-07-13 RX ADMIN — PROPOFOL 10 MG: 10 INJECTION, EMULSION INTRAVENOUS at 10:18

## 2023-07-13 RX ADMIN — PROPOFOL 10 MG: 10 INJECTION, EMULSION INTRAVENOUS at 10:25

## 2023-07-13 NOTE — H&P
History and Physical -  [facility]  Domo Tam 80 y.o. female MRN: [id number]                  HPI: Domo Tam is a 80y.o. year old female who presents for lower abdominal pain    REVIEW OF SYSTEMS: Per the HPI, and otherwise unremarkable.     Historical Information   Past Medical History:   Diagnosis Date   • Aortic valve regurgitation    • Aortic valve stenosis    • Hypertension    • Left ventricular hypertrophy    • Mitral valve regurgitation    • Osteoarthritis    • Pure hypercholesterolemia      Past Surgical History:   Procedure Laterality Date   • BUNIONECTOMY Left 04/16/2014   • COLONOSCOPY  01/04/2012   • CORRECTION HAMMER TOE Left 04/16/2014     Social History   Social History     Substance and Sexual Activity   Alcohol Use Not Currently     Social History     Substance and Sexual Activity   Drug Use Not Currently     Social History     Tobacco Use   Smoking Status Former   • Packs/day: 1.00   • Years: 25.00   • Total pack years: 25.00   • Types: Cigarettes   Smokeless Tobacco Never     Family History   Problem Relation Age of Onset   • Coronary artery disease Father    • Throat cancer Father    • Diabetes Sister    • No Known Problems Mother    • No Known Problems Daughter    • No Known Problems Maternal Grandmother    • No Known Problems Maternal Grandfather    • No Known Problems Paternal Grandmother    • No Known Problems Paternal Grandfather    • No Known Problems Brother    • No Known Problems Brother    • Cancer Sister    • Pancreatic cancer Sister    • No Known Problems Daughter    • No Known Problems Son    • No Known Problems Son    • No Known Problems Son        Meds/Allergies     (Not in a hospital admission)      Allergies   Allergen Reactions   • Acetaminophen Hives       Objective     /67   Pulse 99   Temp (!) 97.1 °F (36.2 °C) (Temporal)   Resp 20   Ht 5' 1" (1.549 m)   Wt 53.2 kg (117 lb 4.8 oz)   SpO2 93%   BMI 22.16 kg/m²       PHYSICAL EXAM    Gen: NAD  CV: RRR  CHEST: Clear  ABD: soft, NT/ND  EXT: no edema      ASSESSMENT/PLAN:  This is a 80y.o. year old female here for colonoscopy, and she is stable and optimized for her procedure.

## 2023-07-13 NOTE — ANESTHESIA PREPROCEDURE EVALUATION
Procedure:  COLONOSCOPY    Relevant Problems   CARDIO   (+) Aortic valve stenosis   (+) Hypertension   (+) Pure hypercholesterolemia      GI/HEPATIC   (+) Hepatic steatosis   (+) Pancreas cyst      MUSCULOSKELETAL   (+) Osteoarthritis        Physical Exam    Airway    Mallampati score: II  TM Distance: >3 FB  Neck ROM: full     Dental   upper dentures,     Cardiovascular      Pulmonary      Other Findings        Anesthesia Plan  ASA Score- 3     Anesthesia Type- IV sedation with anesthesia with ASA Monitors. Additional Monitors:   Airway Plan:           Plan Factors-Exercise tolerance (METS): >4 METS. Chart reviewed. EKG reviewed. Patient summary reviewed. Patient is not a current smoker. Induction- intravenous. Postoperative Plan-     Informed Consent- Anesthetic plan and risks discussed with patient. I personally reviewed this patient with the CRNA. Discussed and agreed on the Anesthesia Plan with the CRNA. Soy Valdes

## 2023-07-13 NOTE — ANESTHESIA POSTPROCEDURE EVALUATION
Post-Op Assessment Note    CV Status:  Stable  Pain Score: 0    Pain management: adequate     Mental Status:  Alert and awake   Hydration Status:  Euvolemic and stable   PONV Controlled:  Controlled   Airway Patency:  Patent      Post Op Vitals Reviewed: Yes      Staff: CRNA         No notable events documented.     BP   134/63   Temp     Pulse 72   Resp 14   SpO2 97

## 2023-07-14 ENCOUNTER — NURSE TRIAGE (OUTPATIENT)
Age: 88
End: 2023-07-14

## 2023-07-14 ENCOUNTER — DOCUMENTATION (OUTPATIENT)
Dept: HEMATOLOGY ONCOLOGY | Facility: CLINIC | Age: 88
End: 2023-07-14

## 2023-07-14 DIAGNOSIS — R10.30 LOWER ABDOMINAL PAIN: Primary | ICD-10-CM

## 2023-07-14 DIAGNOSIS — K63.89 COLONIC MASS: ICD-10-CM

## 2023-07-14 NOTE — PROGRESS NOTES
Intake received/ Chart reviewed for services completed outside of [facility]    Pathology completed  Colonoscopy 7/13/2023    Imaging completed:  MRI 5/31/2023 CT scheduled for 7/20/2023    All records needed are in patients chart. No records retrieval needed at this time.   Pt scheduled to see Dr Mac Esteves 8/7/2023, navigation to follow after consult is complete

## 2023-07-14 NOTE — TELEPHONE ENCOUNTER
SPOKE WITH PT, INFORMED ORDER HAS BEEN PLACED FOR NECESSARY BLOOD WORK FOR UPCOMING CT SCAN. PT AGREEABLE, WILL CALL THE LAB TO CONFIRM ORDER, SHE WILL CALL US BACK IF SHE NEEDS ANYTHING FURTHER.

## 2023-07-14 NOTE — TELEPHONE ENCOUNTER
----- Message from Benedetta Mcburney sent at 7/14/2023  3:35 PM EDT -----  Pt was told that she will need labs done prior to her cat scan on 7/20/23.  She is not sure of which labs, will need an order, please advise

## 2023-07-17 ENCOUNTER — APPOINTMENT (OUTPATIENT)
Dept: LAB | Facility: MEDICAL CENTER | Age: 88
End: 2023-07-17
Payer: MEDICARE

## 2023-07-17 DIAGNOSIS — R10.30 LOWER ABDOMINAL PAIN: ICD-10-CM

## 2023-07-17 DIAGNOSIS — K63.89 COLONIC MASS: ICD-10-CM

## 2023-07-17 LAB
ALBUMIN SERPL BCP-MCNC: 2.8 G/DL (ref 3.5–5)
ALP SERPL-CCNC: 128 U/L (ref 46–116)
ALT SERPL W P-5'-P-CCNC: 25 U/L (ref 12–78)
ANION GAP SERPL CALCULATED.3IONS-SCNC: 5 MMOL/L
AST SERPL W P-5'-P-CCNC: 24 U/L (ref 5–45)
BILIRUB SERPL-MCNC: 0.34 MG/DL (ref 0.2–1)
BUN SERPL-MCNC: 14 MG/DL (ref 5–25)
CALCIUM ALBUM COR SERPL-MCNC: 9.4 MG/DL (ref 8.3–10.1)
CALCIUM SERPL-MCNC: 8.4 MG/DL (ref 8.3–10.1)
CHLORIDE SERPL-SCNC: 106 MMOL/L (ref 96–108)
CO2 SERPL-SCNC: 24 MMOL/L (ref 21–32)
CREAT SERPL-MCNC: 0.87 MG/DL (ref 0.6–1.3)
GFR SERPL CREATININE-BSD FRML MDRD: 59 ML/MIN/1.73SQ M
GLUCOSE P FAST SERPL-MCNC: 206 MG/DL (ref 65–99)
POTASSIUM SERPL-SCNC: 4.4 MMOL/L (ref 3.5–5.3)
PROT SERPL-MCNC: 6.8 G/DL (ref 6.4–8.4)
SODIUM SERPL-SCNC: 135 MMOL/L (ref 135–147)

## 2023-07-17 PROCEDURE — 36415 COLL VENOUS BLD VENIPUNCTURE: CPT

## 2023-07-17 PROCEDURE — 80053 COMPREHEN METABOLIC PANEL: CPT

## 2023-07-17 PROCEDURE — 88305 TISSUE EXAM BY PATHOLOGIST: CPT | Performed by: PATHOLOGY

## 2023-07-18 ENCOUNTER — DOCUMENTATION (OUTPATIENT)
Dept: HEMATOLOGY ONCOLOGY | Facility: CLINIC | Age: 88
End: 2023-07-18

## 2023-07-18 ENCOUNTER — TELEPHONE (OUTPATIENT)
Age: 88
End: 2023-07-18

## 2023-07-18 ENCOUNTER — TELEPHONE (OUTPATIENT)
Dept: FAMILY MEDICINE CLINIC | Facility: MEDICAL CENTER | Age: 88
End: 2023-07-18

## 2023-07-18 DIAGNOSIS — C18.9 ADENOCARCINOMA, COLON (HCC): Primary | ICD-10-CM

## 2023-07-18 DIAGNOSIS — C18.2 MALIGNANT NEOPLASM OF ASCENDING COLON (HCC): Primary | ICD-10-CM

## 2023-07-18 NOTE — TELEPHONE ENCOUNTER
Please call Dr. Stevan Lewis office and let them know that I called patient and discussed the biopsy results. I also sent a message to the [address] line in order to try to get her set up sooner with surgical oncology and hem/onc. Patient is doing well at this time and denies any symptoms. She reports having regular bowel movements. I recommended starting MiraLAX daily if not due to concern that she may develop obstruction in the setting of large mass. She will call with any questions or concerns in the meantime.

## 2023-07-18 NOTE — TELEPHONE ENCOUNTER
I called and spoke to Panfilo Vicente at Dr Yolanda Shultz office and gave the message from Phuc Sullivan. Panfilo Vicente will relay the message to Dr Angel Luis Farnsworth.

## 2023-07-18 NOTE — TELEPHONE ENCOUNTER
Patients GI provider:  Dr. Martin Delay     Number to return call: [phone]    Reason for call: 's office, patients PCP, calling as  received a tiger text in regards to abnormal tissue exam from patients colonoscopy completed on 7/13/23. 's office wanting to know if our office will be reaching out to patient with results as they are abnormal and provider is out of the office or if she would need to reach out to the patient. Please advise.      Scheduled procedure/appointment date if applicable: N/A

## 2023-07-18 NOTE — TELEPHONE ENCOUNTER
Manny Garcia from Dr. Kaitlynn Pate office called to say that Jose Mccloud, the PA called the pt to discuss results and also called the [facility] line to get pt set up for hem/onc sooner. Pt is doing well at this time and denies any symptoms. They recommended she use Miralax daily.

## 2023-07-24 ENCOUNTER — TELEPHONE (OUTPATIENT)
Age: 88
End: 2023-07-24

## 2023-07-24 ENCOUNTER — HOSPITAL ENCOUNTER (OUTPATIENT)
Dept: RADIOLOGY | Facility: MEDICAL CENTER | Age: 88
Discharge: HOME | End: 2023-07-24
Payer: MEDICARE

## 2023-07-24 DIAGNOSIS — E78.00 PURE HYPERCHOLESTEROLEMIA: ICD-10-CM

## 2023-07-24 DIAGNOSIS — K63.89 COLONIC MASS: ICD-10-CM

## 2023-07-24 PROCEDURE — 74177 CT ABD & PELVIS W/CONTRAST: CPT

## 2023-07-24 PROCEDURE — G1004 CDSM NDSC: HCPCS

## 2023-07-24 RX ORDER — ATORVASTATIN CALCIUM 10 MG/1
TABLET, FILM COATED ORAL
Qty: 90 TABLET | Refills: 0 | Status: SHIPPED | OUTPATIENT
Start: 2023-07-24

## 2023-07-24 RX ADMIN — IOHEXOL 85 ML: 350 INJECTION, SOLUTION INTRAVENOUS at 13:57

## 2023-07-24 NOTE — TELEPHONE ENCOUNTER
Patients GI provider:  Dr. Radha Coker     Number to return call: [phone]    Reason for call: Dr. Cuauhtemoc Rivera from [facility] requesting a call back regarding imaging results and recommended next steps for the patient     Scheduled procedure/appointment date if applicable: N/A

## 2023-07-24 NOTE — TELEPHONE ENCOUNTER
Spoke to patient and discussed findings. She is currently moving her bowels, denies any nausea/vomiting or significant abdominal pain. Discussed that these would be findings of obstruction and would require her to present to the ER urgently and she stated understanding. She will otherwise follow-up with surgical oncology as scheduled on Monday.

## 2023-07-28 PROBLEM — C18.9 COLON ADENOCARCINOMA (HCC): Status: ACTIVE | Noted: 2023-07-28

## 2023-07-31 ENCOUNTER — CONSULT (OUTPATIENT)
Dept: SURGICAL ONCOLOGY | Facility: CLINIC | Age: 88
End: 2023-07-31
Payer: MEDICARE

## 2023-07-31 ENCOUNTER — TELEPHONE (OUTPATIENT)
Dept: ANESTHESIOLOGY | Facility: CLINIC | Age: 88
End: 2023-07-31

## 2023-07-31 ENCOUNTER — TELEPHONE (OUTPATIENT)
Dept: HEMATOLOGY ONCOLOGY | Facility: CLINIC | Age: 88
End: 2023-07-31

## 2023-07-31 VITALS
BODY MASS INDEX: 22.47 KG/M2 | DIASTOLIC BLOOD PRESSURE: 86 MMHG | HEIGHT: 61 IN | SYSTOLIC BLOOD PRESSURE: 148 MMHG | HEART RATE: 85 BPM | RESPIRATION RATE: 18 BRPM | TEMPERATURE: 97.3 F | WEIGHT: 119 LBS | OXYGEN SATURATION: 98 %

## 2023-07-31 DIAGNOSIS — C18.9 COLON ADENOCARCINOMA (HCC): Primary | ICD-10-CM

## 2023-07-31 DIAGNOSIS — K52.89 OTHER SPECIFIED NONINFECTIVE GASTROENTERITIS AND COLITIS: ICD-10-CM

## 2023-07-31 PROCEDURE — 99205 OFFICE O/P NEW HI 60 MIN: CPT | Performed by: STUDENT IN AN ORGANIZED HEALTH CARE EDUCATION/TRAINING PROGRAM

## 2023-07-31 NOTE — TELEPHONE ENCOUNTER
Called and spoke to patient regarding scheduling her CT scan for her. Advised it is on 8/8/2023 at 8:00AM at the [address] [facility] location.

## 2023-07-31 NOTE — H&P (VIEW-ONLY)
Surgical Oncology Consultation    [address] [facility]  [facility]  [address] [facility] [address]    Patient: Karen Kruger  1935  [id number]    Primary Care provider:  Renee Rock DO  [address] [facility] [address]    Referring provider:  Referral Self  No address on file    Diagnoses and all orders for this visit:    Colon adenocarcinoma ([address])  -     Incentive spirometry; Standing  -     Insert and maintain IV line; Standing  -     Void On-Call to O.R.; Standing  -     Place sequential compression device; Standing  -     Case request operating room: RESECTION COLON RIGHT; Standing  -     CBC and differential; Future  -     Comprehensive metabolic panel; Future  -     Prepare Leukoreduced RBC: 2 Units; Future  -     Type and screen; Future  -     EKG 12 lead; Future  -     ceFAZolin (ANCEF) 2,000 mg in dextrose 5 % 100 mL IVPB  -     metroNIDAZOLE (FLAGYL) (HIGH DOSE) IVPB 500 mg  -     Ambulatory referral to surgical optimization; Future  -     Ambulatory referral to Cardiology; Future  -     CT chest wo contrast; Future  -     Case request operating room: RESECTION COLON RIGHT    Other specified noninfective gastroenteritis and colitis  -     ceFAZolin (ANCEF) 2,000 mg in dextrose 5 % 100 mL IVPB        Chief Complaint   Patient presents with   • Advice Only       No follow-ups on file. Oncology History   Colon adenocarcinoma ([address])   7/13/2023 Biopsy    A. Colon, “Right ascending colon mass,” Biopsy:  - Invasive adenocarcinoma with mucinous features arising in a tubular adenoma     7/28/2023 Initial Diagnosis    Colon adenocarcinoma (HCC)         History of Present Illness  : This is an 80-year-old female who is seen today with a new diagnosis of a right colon cancer. Briefly, the patient was undergoing screening colonoscopy due to intermittent vague abdominal pain. She was also due for the study.   This revealed a large bulky right colon tumor, biopsy proven to be an invasive adenocarcinoma with mucinous features. A CT of the abdomen and pelvis was obtained, revealing a ascending colon tumor with a potential intussuscepted small bowel segment. There was no evidence of disease outside of this region of the colon. She has not yet had a CT chest or CEA to complete her staging. The patient is actually feeling quite well. She states that she did in late May have what she assumed was a viral GI illness with very poor appetite and nausea with p.o. intake as well as diarrhea. This resolved after approximately 1 week. She otherwise has had very intermittent crampy abdominal pain that is nonspecific. She denies any other changes to her stool habits. No he hematochezia or melena. The patient denies any weight loss, bone pain, headache, chest symptoms. She does have a history of aortic stenosis that has not required medical or interventional treatment. She is very active and lives alone and accomplishes all of her ADLs. She walks 1-1/2 miles per day and is an ECOG of 0. Of note, the patient also has scattered pancreatic cysts which are nonconcerning at this time. Review of Systems  Complete ROS Surg Onc:   Constitutional: The patient denies new or recent history of general fatigue, no recent weight loss, mildly POOR appetite. Eyes: No complaints of visual problems, no scleral icterus. ENT: No complaints of ear pain, no hoarseness, no difficulty swallowing,  no tinnitus and no new masses in head, oral cavity, or neck. Cardiovascular: No complaints of chest pain, no palpitations, no ankle edema. Respiratory: No complaints of shortness of breath, no cough. Gastrointestinal: No complaints of jaundice, no bloody stools, no pale stools. Genitourinary: No complaints of dysuria, no hematuria, no nocturia, no frequent urination, no urethral discharge.    Musculoskeletal: No complaints of weakness, paralysis, joint stiffness or arthralgias. Integumentary: No complaints of rash, no new lesions. Neurological: No complaints of convulsions, no seizures, no dizziness. Hematologic/Lymphatic: No complaints of easy bruising. Endocrine:  No hot or cold intolerance. No polydipsia, polyphagia, or polyuria. Allergy/immunology:  No environmental allergies. No food allergies. Not immunocompromised. Patient Active Problem List   Diagnosis   • Pure hypercholesterolemia   • Osteoarthritis   • Left ventricular hypertrophy   • Hypertension   • Aortic valve stenosis   • Colon cancer screening   • Pancreas cyst   • Elevated alkaline phosphatase level   • Lower abdominal pain   • Hepatic steatosis   • Colon adenocarcinoma [facility])     Past Medical History:   Diagnosis Date   • Aortic valve regurgitation    • Aortic valve stenosis    • Hypertension    • Left ventricular hypertrophy    • Mitral valve regurgitation    • Osteoarthritis    • Pure hypercholesterolemia      Past Surgical History:   Procedure Laterality Date   • BUNIONECTOMY Left 04/16/2014   • COLONOSCOPY  01/04/2012   • CORRECTION HAMMER TOE Left 04/16/2014     Family History   Problem Relation Age of Onset   • Coronary artery disease Father    • Throat cancer Father    • Diabetes Sister    • No Known Problems Mother    • No Known Problems Daughter    • No Known Problems Maternal Grandmother    • No Known Problems Maternal Grandfather    • No Known Problems Paternal Grandmother    • No Known Problems Paternal Grandfather    • No Known Problems Brother    • No Known Problems Brother    • Cancer Sister    • Pancreatic cancer Sister    • No Known Problems Daughter    • No Known Problems Son    • No Known Problems Son    • No Known Problems Son      Social History     Socioeconomic History   • Marital status:       Spouse name: Not on file   • Number of children: Not on file   • Years of education: Not on file   • Highest education level: Not on file   Occupational History • Not on file   Tobacco Use   • Smoking status: Former     Packs/day: 1.00     Years: 25.00     Total pack years: 25.00     Types: Cigarettes   • Smokeless tobacco: Never   Vaping Use   • Vaping Use: Never used   Substance and Sexual Activity   • Alcohol use: Not Currently   • Drug use: Not Currently   • Sexual activity: Not on file   Other Topics Concern   • Not on file   Social History Narrative    · Most recent tobacco use screenin2019      · Do you currently or have you served in the [address] [place] [address] [employer]:   No      Social Determinants of Health     Financial Resource Strain: Not on file   Food Insecurity: Not on file   Transportation Needs: Not on file   Physical Activity: Not on file   Stress: Not on file   Social Connections: Not on file   Intimate Partner Violence: Not on file   Housing Stability: Not on file       Current Outpatient Medications:   •  amLODIPine (NORVASC) 5 mg tablet, Take 5 mg by mouth daily, Disp: , Rfl:   •  atorvastatin (LIPITOR) 10 mg tablet, Take 1 tablet by mouth once daily, Disp: 90 tablet, Rfl: 0  •  Multiple Vitamins-Minerals (PRESERVISION AREDS 2 PO), Take by mouth, Disp: , Rfl:   •  Cholecalciferol (Vitamin D3) 25 MCG (1000 UT) CAPS, Take 400 Units by mouth (Patient not taking: Reported on 2023), Disp: , Rfl:   Allergies   Allergen Reactions   • Acetaminophen Hives       Vitals:    23 1010   BP: 148/86   Pulse: 85   Resp: 18   Temp: (!) 97.3 °F (36.3 °C)   SpO2: 98%       Physical Exam   General: Appears well, appears stated age  Skin: Warm, anicteric  HEENT: Normocephalic, atraumatic; sclera aniceteric, mucous membranes moist; cervical nodes without adenopathy  Cardiopulmonary: RRR, Easy WOB, no BLE edema  Abd: Flat and soft, nontender, no masses appreciated, no hepatosplenomegaly  MSK: Symmetric, no cyanosis, no overt weakness  Lymphatic: No cervical, axillary or inguinal lymphadenopathy  Neuro: Affect appropriate, no gross motor abnormalities      Pathology:  As above    Labs: Reviewed in EPIC    Imaging  CT abdomen pelvis w contrast    Result Date: 7/24/2023  Narrative: CT ABDOMEN AND PELVIS WITH IV CONTRAST INDICATION:   K63.89: Other specified diseases of intestine. COMPARISON: MRI, 5/31/2023 TECHNIQUE:  CT examination of the abdomen and pelvis was performed. Multiplanar 2D reformatted images were created from the source data. This examination, like all CT scans performed in the [facility], was performed utilizing techniques to minimize radiation dose exposure, including the use of iterative reconstruction and automated exposure control. Radiation dose length product (DLP) for this visit:  300 mGy-cm IV Contrast:  85 mL of iohexol (OMNIPAQUE) Enteric Contrast:  Enteric contrast was not administered. FINDINGS: ABDOMEN LOWER CHEST:  No clinically significant abnormality identified in the visualized lower chest. LIVER/BILIARY TREE:  Unremarkable. GALLBLADDER: Under distended, with partially calcified stones. SPLEEN:  Unremarkable. PANCREAS: Redemonstrated multiple pancreatic cysts, the largest in the head 1.5 x 1.8 cm. 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 ADRENAL GLANDS:  Unremarkable. KIDNEYS/URETERS: No hydronephrosis or urinary tract calculus. One or more sharply circumscribed subcentimeter renal hypodensities are present, too small to accurately characterize, and statistically most likely benign findings. According to recent literature (Radiology 2019) no further workup of these findings is recommended. STOMACH AND BOWEL: There has been intussusception at the level of the ileocecal valve, with the leading point likely a previously described mass. The length of intussuscepted segment is approximately 6 cm. This segment felt to be involved by the tumor is  approximately 4.5-5 cm.  There is large amount of oral contrast in the colon distal to the intussusception level indicating no complete obstruction. APPENDIX: Not visualized ABDOMINOPELVIC CAVITY:  No ascites. No pneumoperitoneum. No lymphadenopathy. VESSELS:  Unremarkable for patient's age. PELVIS REPRODUCTIVE ORGANS:  Unremarkable for patient's age. URINARY BLADDER:  Unremarkable. ABDOMINAL WALL/INGUINAL REGIONS:  Unremarkable. OSSEOUS STRUCTURES:  No acute fracture or destructive osseous lesion. Multilevel degenerative changes in the spine. Impression: 6 cm ileocecal intussusception with the leading point felt to be the known right colonic tumor. No radiographic evidence of large bowel obstruction at this time. No findings of metastatic disease. No lymphadenopathy. 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 Recommend MRI follow-up in 2 years; see complete recommendations on previous MRI. Incidental findings, as per the body of the report. The major findings were discussed with the nurse practitioner covering for Dr. Katia Victor,  7/24/2023 at 1440. Workstation performed: [id number]     Colonoscopy    Result Date: 7/13/2023  Narrative: Table formatting from the original result was not included. [address] [facility] [id number] [place] [address] [phone] DATE OF SERVICE: 7/13/23 PHYSICIAN(S): Attending: Nella Fabry, MD Fellow: No Staff Documented Procedure : Colonoscopy with multiple biopsies along with tattooing INDICATION: Lower abdominal pain POST-OP DIAGNOSIS: See the impression below. HISTORY: Prior colonoscopy: More than 10 years ago. BOWEL PREPARATION: Miralax/Dulcolax PREPROCEDURE: Informed consent was obtained for the procedure, including sedation. Risks including but not limited to bleeding, infection, perforation, adverse drug reaction and aspiration were explained in detail. Also explained about less than 100% sensitivity with the exam and other alternatives. The patient was placed in the left lateral decubitus position.  Procedure: Colonoscopy DETAILS OF PROCEDURE: Patient was taken to the procedure room where a time out was performed to confirm correct patient and correct procedure. The patient underwent monitored anesthesia care, which was administered by an anesthesia professional. The patient's blood pressure, heart rate, level of consciousness, oxygen, respirations and ECG were monitored throughout the procedure. A digital rectal exam was performed. The scope was introduced through the anus and advanced to the cecum. Retroflexion was performed in the rectum. The quality of bowel preparation was evaluated using the [facility] Bowel Preparation Scale with scores of: right colon = 2, transverse colon = 2, left colon = 2. The total BBPS score was 6. Bowel prep was adequate. The patient experienced no blood loss. The procedure was moderately difficult due to obstructing mass. The patient tolerated the procedure well. There were no apparent adverse events. ANESTHESIA INFORMATION: ASA: III Anesthesia Type: IV Sedation with Anesthesia MEDICATIONS: No administrations occurring from 1009 to 1033 on 07/13/23 FINDINGS: Single invasive, malignant-appearing and ulcerated mass (not traversable) measuring 8 cm x 6 cm in the cecum and ascending colon, covering the whole circumference; performed partial removal by cold forceps biopsy; tattooed 95 cm proximal to the finding with 5 mL of carbon black and farrukh ink. Large ulcerated malignant mass in distal transverse colon, obstructing 98% of the lumen, cecum was not visualized, scope was unable to pass through the tumor.   Tattooing was done, multiple biopsies were done Two left lateral and right posterior internal medium (grade 2) hemorrhoids observed during digital rectal exam; no bleeding was identified EVENTS: Procedure Events Event Event Time ENDO CECUM REACHED 7/13/2023 10:28 AM ENDO SCOPE OUT TIME 7/13/2023 10:29 AM SPECIMENS: ID Type Source Tests Collected by Time Destination 1 : cold bx ascending colon mass Tissue Large Intestine, Right/Ascending Colon TISSUE EXAM Azalia Jane MD 7/13/2023 10:20 AM  EQUIPMENT: Colonoscope -[id number]     Impression: Single invasive, malignant-appearing and ulcerated mass (not traversable) measuring 8 cm x 6 cm in the cecum and ascending colon, covering the whole circumference; performed partial removal by cold forceps biopsy; tattooed 95 cm proximal to the finding 2 medium (grade 2) hemorrhoids RECOMMENDATION:  No further screening colonoscopies necessary  Life expectancy is less than 10 years   Await pathology results    CAT scan abdomen and pelvis with p.o. and IV contrast for staging    Surgical and medical oncology consult  Azalia Jane MD       I independently reviewed and interpreted the above laboratory and imaging data incl MR, CT, GI eval, path, colonoscopy      Discussion/Summary: This is an 80-year-old female with a new diagnosis of a colon cancer. Today we discussed this diagnosis as well as the expected treatment course. She will also require tumor markers as well as a CT of the chest. The first step for Moris Srinivasan will be a hemicolectomy. We discussed the surgery in detail including the risks benefits and alternatives. We discussed the risks to include bleeding, infection, anastomotic leak, need for additional procedures or surgery. We discussed her history of aortic stenosis and the need for cardiac clearance before surgery, as well as a visit with the surgical optimization clinic. We also discussed the more global risks of stroke, heart attack, PE, death from the overall stress of surgery. She is very healthy and an excellent functional condition and I do think she will fare well with surgery. All questions were answered today of the patient and her family, including the expected operative course, postop course, and the need for additional treatment depending on final pathology.

## 2023-07-31 NOTE — PROGRESS NOTES
Surgical Oncology Consultation    [facility]  [facility]  [address] [facility] [place] [id number]    Patient: Debo Caal  1935  [id number]    Primary Care provider:  Silas Presley DO  [address] [place] [id number]    Referring provider:  Referral Self  No address on file    Diagnoses and all orders for this visit:    Colon adenocarcinoma ([address])  -     Incentive spirometry; Standing  -     Insert and maintain IV line; Standing  -     Void On-Call to O.R.; Standing  -     Place sequential compression device; Standing  -     Case request operating room: RESECTION COLON RIGHT; Standing  -     CBC and differential; Future  -     Comprehensive metabolic panel; Future  -     Prepare Leukoreduced RBC: 2 Units; Future  -     Type and screen; Future  -     EKG 12 lead; Future  -     ceFAZolin (ANCEF) 2,000 mg in dextrose 5 % 100 mL IVPB  -     metroNIDAZOLE (FLAGYL) (HIGH DOSE) IVPB 500 mg  -     Ambulatory referral to surgical optimization; Future  -     Ambulatory referral to Cardiology; Future  -     CT chest wo contrast; Future  -     Case request operating room: RESECTION COLON RIGHT    Other specified noninfective gastroenteritis and colitis  -     ceFAZolin (ANCEF) 2,000 mg in dextrose 5 % 100 mL IVPB        Chief Complaint   Patient presents with   • Advice Only       No follow-ups on file. Oncology History   Colon adenocarcinoma ([address])   7/13/2023 Biopsy    A. Colon, “Right ascending colon mass,” Biopsy:  - Invasive adenocarcinoma with mucinous features arising in a tubular adenoma     7/28/2023 Initial Diagnosis    Colon adenocarcinoma (HCC)         History of Present Illness  : This is an 31-year-old female who is seen today with a new diagnosis of a right colon cancer. Briefly, the patient was undergoing screening colonoscopy due to intermittent vague abdominal pain. She was also due for the study.   This revealed a large bulky right colon tumor, biopsy proven to be an invasive adenocarcinoma with mucinous features. A CT of the abdomen and pelvis was obtained, revealing a ascending colon tumor with a potential intussuscepted small bowel segment. There was no evidence of disease outside of this region of the colon. She has not yet had a CT chest or CEA to complete her staging. The patient is actually feeling quite well. She states that she did in late May have what she assumed was a viral GI illness with very poor appetite and nausea with p.o. intake as well as diarrhea. This resolved after approximately 1 week. She otherwise has had very intermittent crampy abdominal pain that is nonspecific. She denies any other changes to her stool habits. No he hematochezia or melena. The patient denies any weight loss, bone pain, headache, chest symptoms. She does have a history of aortic stenosis that has not required medical or interventional treatment. She is very active and lives alone and accomplishes all of her ADLs. She walks 1-1/2 miles per day and is an ECOG of 0. Of note, the patient also has scattered pancreatic cysts which are nonconcerning at this time. Review of Systems  Complete ROS Surg Onc:   Constitutional: The patient denies new or recent history of general fatigue, no recent weight loss, mildly POOR appetite. Eyes: No complaints of visual problems, no scleral icterus. ENT: No complaints of ear pain, no hoarseness, no difficulty swallowing,  no tinnitus and no new masses in head, oral cavity, or neck. Cardiovascular: No complaints of chest pain, no palpitations, no ankle edema. Respiratory: No complaints of shortness of breath, no cough. Gastrointestinal: No complaints of jaundice, no bloody stools, no pale stools. Genitourinary: No complaints of dysuria, no hematuria, no nocturia, no frequent urination, no urethral discharge.    Musculoskeletal: No complaints of weakness, paralysis, joint stiffness or arthralgias. Integumentary: No complaints of rash, no new lesions. Neurological: No complaints of convulsions, no seizures, no dizziness. Hematologic/Lymphatic: No complaints of easy bruising. Endocrine:  No hot or cold intolerance. No polydipsia, polyphagia, or polyuria. Allergy/immunology:  No environmental allergies. No food allergies. Not immunocompromised. Patient Active Problem List   Diagnosis   • Pure hypercholesterolemia   • Osteoarthritis   • Left ventricular hypertrophy   • Hypertension   • Aortic valve stenosis   • Colon cancer screening   • Pancreas cyst   • Elevated alkaline phosphatase level   • Lower abdominal pain   • Hepatic steatosis   • Colon adenocarcinoma [facility])     Past Medical History:   Diagnosis Date   • Aortic valve regurgitation    • Aortic valve stenosis    • Hypertension    • Left ventricular hypertrophy    • Mitral valve regurgitation    • Osteoarthritis    • Pure hypercholesterolemia      Past Surgical History:   Procedure Laterality Date   • BUNIONECTOMY Left 04/16/2014   • COLONOSCOPY  01/04/2012   • CORRECTION HAMMER TOE Left 04/16/2014     Family History   Problem Relation Age of Onset   • Coronary artery disease Father    • Throat cancer Father    • Diabetes Sister    • No Known Problems Mother    • No Known Problems Daughter    • No Known Problems Maternal Grandmother    • No Known Problems Maternal Grandfather    • No Known Problems Paternal Grandmother    • No Known Problems Paternal Grandfather    • No Known Problems Brother    • No Known Problems Brother    • Cancer Sister    • Pancreatic cancer Sister    • No Known Problems Daughter    • No Known Problems Son    • No Known Problems Son    • No Known Problems Son      Social History     Socioeconomic History   • Marital status:       Spouse name: Not on file   • Number of children: Not on file   • Years of education: Not on file   • Highest education level: Not on file   Occupational History • Not on file   Tobacco Use   • Smoking status: Former     Packs/day: 1.00     Years: 25.00     Total pack years: 25.00     Types: Cigarettes   • Smokeless tobacco: Never   Vaping Use   • Vaping Use: Never used   Substance and Sexual Activity   • Alcohol use: Not Currently   • Drug use: Not Currently   • Sexual activity: Not on file   Other Topics Concern   • Not on file   Social History Narrative    · Most recent tobacco use screenin2019      · Do you currently or have you served in the [address] [place] [address] [employer]:   No      Social Determinants of Health     Financial Resource Strain: Not on file   Food Insecurity: Not on file   Transportation Needs: Not on file   Physical Activity: Not on file   Stress: Not on file   Social Connections: Not on file   Intimate Partner Violence: Not on file   Housing Stability: Not on file       Current Outpatient Medications:   •  amLODIPine (NORVASC) 5 mg tablet, Take 5 mg by mouth daily, Disp: , Rfl:   •  atorvastatin (LIPITOR) 10 mg tablet, Take 1 tablet by mouth once daily, Disp: 90 tablet, Rfl: 0  •  Multiple Vitamins-Minerals (PRESERVISION AREDS 2 PO), Take by mouth, Disp: , Rfl:   •  Cholecalciferol (Vitamin D3) 25 MCG (1000 UT) CAPS, Take 400 Units by mouth (Patient not taking: Reported on 2023), Disp: , Rfl:   Allergies   Allergen Reactions   • Acetaminophen Hives       Vitals:    23 1010   BP: 148/86   Pulse: 85   Resp: 18   Temp: (!) 97.3 °F (36.3 °C)   SpO2: 98%       Physical Exam   General: Appears well, appears stated age  Skin: Warm, anicteric  HEENT: Normocephalic, atraumatic; sclera aniceteric, mucous membranes moist; cervical nodes without adenopathy  Cardiopulmonary: RRR, Easy WOB, no BLE edema  Abd: Flat and soft, nontender, no masses appreciated, no hepatosplenomegaly  MSK: Symmetric, no cyanosis, no overt weakness  Lymphatic: No cervical, axillary or inguinal lymphadenopathy  Neuro: Affect appropriate, no gross motor abnormalities      Pathology:  As above    Labs: Reviewed in EPIC    Imaging  CT abdomen pelvis w contrast    Result Date: 7/24/2023  Narrative: CT ABDOMEN AND PELVIS WITH IV CONTRAST INDICATION:   K63.89: Other specified diseases of intestine. COMPARISON: MRI, 5/31/2023 TECHNIQUE:  CT examination of the abdomen and pelvis was performed. Multiplanar 2D reformatted images were created from the source data. This examination, like all CT scans performed in the [facility], was performed utilizing techniques to minimize radiation dose exposure, including the use of iterative reconstruction and automated exposure control. Radiation dose length product (DLP) for this visit:  300 mGy-cm IV Contrast:  85 mL of iohexol (OMNIPAQUE) Enteric Contrast:  Enteric contrast was not administered. FINDINGS: ABDOMEN LOWER CHEST:  No clinically significant abnormality identified in the visualized lower chest. LIVER/BILIARY TREE:  Unremarkable. GALLBLADDER: Under distended, with partially calcified stones. SPLEEN:  Unremarkable. PANCREAS: Redemonstrated multiple pancreatic cysts, the largest in the head 1.5 x 1.8 cm. 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 ADRENAL GLANDS:  Unremarkable. KIDNEYS/URETERS: No hydronephrosis or urinary tract calculus. One or more sharply circumscribed subcentimeter renal hypodensities are present, too small to accurately characterize, and statistically most likely benign findings. According to recent literature (Radiology 2019) no further workup of these findings is recommended. STOMACH AND BOWEL: There has been intussusception at the level of the ileocecal valve, with the leading point likely a previously described mass. The length of intussuscepted segment is approximately 6 cm. This segment felt to be involved by the tumor is  approximately 4.5-5 cm.  There is large amount of oral contrast in the colon distal to the intussusception level indicating no complete obstruction. APPENDIX: Not visualized ABDOMINOPELVIC CAVITY:  No ascites. No pneumoperitoneum. No lymphadenopathy. VESSELS:  Unremarkable for patient's age. PELVIS REPRODUCTIVE ORGANS:  Unremarkable for patient's age. URINARY BLADDER:  Unremarkable. ABDOMINAL WALL/INGUINAL REGIONS:  Unremarkable. OSSEOUS STRUCTURES:  No acute fracture or destructive osseous lesion. Multilevel degenerative changes in the spine. Impression: 6 cm ileocecal intussusception with the leading point felt to be the known right colonic tumor. No radiographic evidence of large bowel obstruction at this time. No findings of metastatic disease. No lymphadenopathy. 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 Recommend MRI follow-up in 2 years; see complete recommendations on previous MRI. Incidental findings, as per the body of the report. The major findings were discussed with the nurse practitioner covering for Dr. Ramona Garzon,  7/24/2023 at 1440. Workstation performed: [id number]     Colonoscopy    Result Date: 7/13/2023  Narrative: Table formatting from the original result was not included. [address] [facility] [address] [place] [address] [phone] DATE OF SERVICE: 7/13/23 PHYSICIAN(S): Attending: Anibal Escobedo MD Fellow: No Staff Documented Procedure : Colonoscopy with multiple biopsies along with tattooing INDICATION: Lower abdominal pain POST-OP DIAGNOSIS: See the impression below. HISTORY: Prior colonoscopy: More than 10 years ago. BOWEL PREPARATION: Miralax/Dulcolax PREPROCEDURE: Informed consent was obtained for the procedure, including sedation. Risks including but not limited to bleeding, infection, perforation, adverse drug reaction and aspiration were explained in detail. Also explained about less than 100% sensitivity with the exam and other alternatives. The patient was placed in the left lateral decubitus position.  Procedure: Colonoscopy DETAILS OF PROCEDURE: Patient was taken to the procedure room where a time out was performed to confirm correct patient and correct procedure. The patient underwent monitored anesthesia care, which was administered by an anesthesia professional. The patient's blood pressure, heart rate, level of consciousness, oxygen, respirations and ECG were monitored throughout the procedure. A digital rectal exam was performed. The scope was introduced through the anus and advanced to the cecum. Retroflexion was performed in the rectum. The quality of bowel preparation was evaluated using the [facility] Bowel Preparation Scale with scores of: right colon = 2, transverse colon = 2, left colon = 2. The total BBPS score was 6. Bowel prep was adequate. The patient experienced no blood loss. The procedure was moderately difficult due to obstructing mass. The patient tolerated the procedure well. There were no apparent adverse events. ANESTHESIA INFORMATION: ASA: III Anesthesia Type: IV Sedation with Anesthesia MEDICATIONS: No administrations occurring from 1009 to 1033 on 07/13/23 FINDINGS: Single invasive, malignant-appearing and ulcerated mass (not traversable) measuring 8 cm x 6 cm in the cecum and ascending colon, covering the whole circumference; performed partial removal by cold forceps biopsy; tattooed 95 cm proximal to the finding with 5 mL of carbon black and farrukh ink. Large ulcerated malignant mass in distal transverse colon, obstructing 98% of the lumen, cecum was not visualized, scope was unable to pass through the tumor.   Tattooing was done, multiple biopsies were done Two left lateral and right posterior internal medium (grade 2) hemorrhoids observed during digital rectal exam; no bleeding was identified EVENTS: Procedure Events Event Event Time ENDO CECUM REACHED 7/13/2023 10:28 AM ENDO SCOPE OUT TIME 7/13/2023 10:29 AM SPECIMENS: ID Type Source Tests Collected by Time Destination 1 : cold bx ascending colon mass Tissue Large Intestine, Right/Ascending Colon TISSUE EXAM Courtney Noel MD 7/13/2023 10:20 AM  EQUIPMENT: Colonoscope -[id number]     Impression: Single invasive, malignant-appearing and ulcerated mass (not traversable) measuring 8 cm x 6 cm in the cecum and ascending colon, covering the whole circumference; performed partial removal by cold forceps biopsy; tattooed 95 cm proximal to the finding 2 medium (grade 2) hemorrhoids RECOMMENDATION:  No further screening colonoscopies necessary  Life expectancy is less than 10 years   Await pathology results    CAT scan abdomen and pelvis with p.o. and IV contrast for staging    Surgical and medical oncology consult  Courtney Noel MD       I independently reviewed and interpreted the above laboratory and imaging data incl MR, CT, GI eval, path, colonoscopy      Discussion/Summary: This is an 42-year-old female with a new diagnosis of a colon cancer. Today we discussed this diagnosis as well as the expected treatment course. She will also require tumor markers as well as a CT of the chest. The first step for Lio Cea will be a hemicolectomy. We discussed the surgery in detail including the risks benefits and alternatives. We discussed the risks to include bleeding, infection, anastomotic leak, need for additional procedures or surgery. We discussed her history of aortic stenosis and the need for cardiac clearance before surgery, as well as a visit with the surgical optimization clinic. We also discussed the more global risks of stroke, heart attack, PE, death from the overall stress of surgery. She is very healthy and an excellent functional condition and I do think she will fare well with surgery. All questions were answered today of the patient and her family, including the expected operative course, postop course, and the need for additional treatment depending on final pathology.

## 2023-08-01 ENCOUNTER — TELEPHONE (OUTPATIENT)
Dept: SURGICAL ONCOLOGY | Facility: CLINIC | Age: 88
End: 2023-08-01

## 2023-08-01 ENCOUNTER — TELEPHONE (OUTPATIENT)
Dept: ANESTHESIOLOGY | Facility: CLINIC | Age: 88
End: 2023-08-01

## 2023-08-01 ENCOUNTER — CONSULT (OUTPATIENT)
Dept: HEMATOLOGY ONCOLOGY | Facility: CLINIC | Age: 88
End: 2023-08-01
Payer: MEDICARE

## 2023-08-01 VITALS
HEIGHT: 61 IN | SYSTOLIC BLOOD PRESSURE: 118 MMHG | DIASTOLIC BLOOD PRESSURE: 70 MMHG | RESPIRATION RATE: 17 BRPM | BODY MASS INDEX: 22.81 KG/M2 | HEART RATE: 85 BPM | WEIGHT: 120.8 LBS | TEMPERATURE: 97.8 F | OXYGEN SATURATION: 98 %

## 2023-08-01 DIAGNOSIS — C18.9 COLON ADENOCARCINOMA (HCC): Primary | ICD-10-CM

## 2023-08-01 DIAGNOSIS — C18.2 MALIGNANT NEOPLASM OF ASCENDING COLON (HCC): ICD-10-CM

## 2023-08-01 PROCEDURE — 99204 OFFICE O/P NEW MOD 45 MIN: CPT | Performed by: INTERNAL MEDICINE

## 2023-08-01 NOTE — TELEPHONE ENCOUNTER
Spoke to patient to reschedule CT prior to Cardiac Clearance and we did 8/2 at 11:30 in [address], Patient ok with date and time change

## 2023-08-01 NOTE — PROGRESS NOTES
Oncology Consult Note  Mason Miles 80 y.o. female MRN: [id number]  Unit/Bed#:  Encounter: [id number]      Presenting Complaint: Colon adenocarcinoma of the right side    History of Presenting Illness: Mason Miles is seen for initial consultation 8/1/2023 at the referral of Cathryn Saravia   80-year-old  female who had intermittent vague abdominal pain underwent colonoscopy with a large bulky right colon tumor with intussusception underwent biopsy on July 2023 showing invasive adenocarcinoma with mucinous features    CAT scan of the abdomen and pelvis showed ascending colon tumor with intussuscepted small bowel segment no evidence of distant metastasis    Denied any weight loss headache blurred vision nausea vomiting diarrhea constipation dysuria hematuria melena or hematochezia    MRI of the abdomen showed innumerable pancreatic cysts measuring up to 19 mm most of these are simple cysts    She has aortic valve stenosis, hypertension otherwise her ECOG score is 0-1      Review of Systems - As stated in the HPI otherwise the fourteen point review of systems was negative. Past Medical History:   Diagnosis Date   • Aortic valve regurgitation    • Aortic valve stenosis    • Hypertension    • Left ventricular hypertrophy    • Mitral valve regurgitation    • Osteoarthritis    • Pure hypercholesterolemia        Social History     Socioeconomic History   • Marital status:       Spouse name: Not on file   • Number of children: Not on file   • Years of education: Not on file   • Highest education level: Not on file   Occupational History   • Not on file   Tobacco Use   • Smoking status: Former     Packs/day: 1.00     Years: 25.00     Total pack years: 25.00     Types: Cigarettes   • Smokeless tobacco: Never   Vaping Use   • Vaping Use: Never used   Substance and Sexual Activity   • Alcohol use: Not Currently   • Drug use: Not Currently   • Sexual activity: Not on file   Other Topics Concern   • Not on file   Social History Narrative    · Most recent tobacco use screenin2019      · Do you currently or have you served in the [address] [place] [address] Street:   No      Social Determinants of Health     Financial Resource Strain: Not on file   Food Insecurity: Not on file   Transportation Needs: Not on file   Physical Activity: Not on file   Stress: Not on file   Social Connections: Not on file   Intimate Partner Violence: Not on file   Housing Stability: Not on file       Family History   Problem Relation Age of Onset   • Coronary artery disease Father    • Throat cancer Father    • Diabetes Sister    • No Known Problems Mother    • No Known Problems Daughter    • No Known Problems Maternal Grandmother    • No Known Problems Maternal Grandfather    • No Known Problems Paternal Grandmother    • No Known Problems Paternal Grandfather    • No Known Problems Brother    • No Known Problems Brother    • Cancer Sister    • Pancreatic cancer Sister    • No Known Problems Daughter    • No Known Problems Son    • No Known Problems Son    • No Known Problems Son        Allergies   Allergen Reactions   • Acetaminophen Hives         Current Outpatient Medications:   •  amLODIPine (NORVASC) 5 mg tablet, Take 5 mg by mouth daily, Disp: , Rfl:   •  atorvastatin (LIPITOR) 10 mg tablet, Take 1 tablet by mouth once daily, Disp: 90 tablet, Rfl: 0      /70 (BP Location: Left arm, Patient Position: Sitting, Cuff Size: Adult)   Pulse 85   Temp 97.8 °F (36.6 °C)   Resp 17   Ht 5' 1" (1.549 m)   Wt 54.8 kg (120 lb 12.8 oz)   SpO2 98%   BMI 22.82 kg/m²       General Appearance:    Alert, oriented        Eyes:    PERRL   Ears:    Normal external ear canals, both ears   Nose:   Nares normal, septum midline   Throat:   Mucosa moist. Pharynx without injection.     Neck:   Supple       Lungs:     Clear to auscultation bilaterally   Chest Wall:    No tenderness or deformity    Heart:    Regular rate and rhythm with systolic murmur Abdomen:     Soft, non-tender, bowel sounds +, no organomegaly           Extremities:   Extremities no cyanosis or edema       Skin:   no rash or icterus. Lymph nodes:   Cervical, supraclavicular, and axillary nodes normal   Neurologic:   CNII-XII intact, normal strength, sensation and reflexes     Throughout               No results found for this or any previous visit (from the past 48 hour(s)). CT abdomen pelvis w contrast    Result Date: 7/24/2023  Narrative: CT ABDOMEN AND PELVIS WITH IV CONTRAST INDICATION:   K63.89: Other specified diseases of intestine. COMPARISON: MRI, 5/31/2023 TECHNIQUE:  CT examination of the abdomen and pelvis was performed. Multiplanar 2D reformatted images were created from the source data. This examination, like all CT scans performed in the [facility], was performed utilizing techniques to minimize radiation dose exposure, including the use of iterative reconstruction and automated exposure control. Radiation dose length product (DLP) for this visit:  300 mGy-cm IV Contrast:  85 mL of iohexol (OMNIPAQUE) Enteric Contrast:  Enteric contrast was not administered. FINDINGS: ABDOMEN LOWER CHEST:  No clinically significant abnormality identified in the visualized lower chest. LIVER/BILIARY TREE:  Unremarkable. GALLBLADDER: Under distended, with partially calcified stones. SPLEEN:  Unremarkable. PANCREAS: Redemonstrated multiple pancreatic cysts, the largest in the head 1.5 x 1.8 cm. 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 ADRENAL GLANDS:  Unremarkable. KIDNEYS/URETERS: No hydronephrosis or urinary tract calculus. One or more sharply circumscribed subcentimeter renal hypodensities are present, too small to accurately characterize, and statistically most likely benign findings. According to recent literature (Radiology 2019) no further workup of these findings is recommended.  STOMACH AND BOWEL: There has been intussusception at the level of the ileocecal valve, with the leading point likely a previously described mass. The length of intussuscepted segment is approximately 6 cm. This segment felt to be involved by the tumor is  approximately 4.5-5 cm. There is large amount of oral contrast in the colon distal to the intussusception level indicating no complete obstruction. APPENDIX: Not visualized ABDOMINOPELVIC CAVITY:  No ascites. No pneumoperitoneum. No lymphadenopathy. VESSELS:  Unremarkable for patient's age. PELVIS REPRODUCTIVE ORGANS:  Unremarkable for patient's age. URINARY BLADDER:  Unremarkable. ABDOMINAL WALL/INGUINAL REGIONS:  Unremarkable. OSSEOUS STRUCTURES:  No acute fracture or destructive osseous lesion. Multilevel degenerative changes in the spine. Impression: 6 cm ileocecal intussusception with the leading point felt to be the known right colonic tumor. No radiographic evidence of large bowel obstruction at this time. No findings of metastatic disease. No lymphadenopathy. 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 Recommend MRI follow-up in 2 years; see complete recommendations on previous MRI. Incidental findings, as per the body of the report. The major findings were discussed with the nurse practitioner covering for Dr. Lisa Hill,  7/24/2023 at 1440. Workstation performed: [id number]     Colonoscopy    Result Date: 7/13/2023  Narrative: Table formatting from the original result was not included. [id number] [id number] [place] [address] [phone] DATE OF SERVICE: 7/13/23 PHYSICIAN(S): Attending: Sue Mccrary MD Fellow: No Staff Documented Procedure : Colonoscopy with multiple biopsies along with tattooing INDICATION: Lower abdominal pain POST-OP DIAGNOSIS: See the impression below. HISTORY: Prior colonoscopy: More than 10 years ago.  BOWEL PREPARATION: Miralax/Dulcolax PREPROCEDURE: Informed consent was obtained for the procedure, including sedation. Risks including but not limited to bleeding, infection, perforation, adverse drug reaction and aspiration were explained in detail. Also explained about less than 100% sensitivity with the exam and other alternatives. The patient was placed in the left lateral decubitus position. Procedure: Colonoscopy DETAILS OF PROCEDURE: Patient was taken to the procedure room where a time out was performed to confirm correct patient and correct procedure. The patient underwent monitored anesthesia care, which was administered by an anesthesia professional. The patient's blood pressure, heart rate, level of consciousness, oxygen, respirations and ECG were monitored throughout the procedure. A digital rectal exam was performed. The scope was introduced through the anus and advanced to the cecum. Retroflexion was performed in the rectum. The quality of bowel preparation was evaluated using the [facility] Bowel Preparation Scale with scores of: right colon = 2, transverse colon = 2, left colon = 2. The total BBPS score was 6. Bowel prep was adequate. The patient experienced no blood loss. The procedure was moderately difficult due to obstructing mass. The patient tolerated the procedure well. There were no apparent adverse events. ANESTHESIA INFORMATION: ASA: III Anesthesia Type: IV Sedation with Anesthesia MEDICATIONS: No administrations occurring from 1009 to 1033 on 07/13/23 FINDINGS: Single invasive, malignant-appearing and ulcerated mass (not traversable) measuring 8 cm x 6 cm in the cecum and ascending colon, covering the whole circumference; performed partial removal by cold forceps biopsy; tattooed 95 cm proximal to the finding with 5 mL of carbon black and farrukh ink. Large ulcerated malignant mass in distal transverse colon, obstructing 98% of the lumen, cecum was not visualized, scope was unable to pass through the tumor.   Tattooing was done, multiple biopsies were done Two left lateral and right posterior internal medium (grade 2) hemorrhoids observed during digital rectal exam; no bleeding was identified EVENTS: Procedure Events Event Event Time ENDO CECUM REACHED 7/13/2023 10:28 AM ENDO SCOPE OUT TIME 7/13/2023 10:29 AM SPECIMENS: ID Type Source Tests Collected by Time Destination 1 : cold bx ascending colon mass Tissue Large Intestine, Right/Ascending Colon TISSUE EXAM Cuca Johnson MD 7/13/2023 10:20 AM  EQUIPMENT: Colonoscope -[id number]     Impression: Single invasive, malignant-appearing and ulcerated mass (not traversable) measuring 8 cm x 6 cm in the cecum and ascending colon, covering the whole circumference; performed partial removal by cold forceps biopsy; tattooed 95 cm proximal to the finding 2 medium (grade 2) hemorrhoids RECOMMENDATION:  No further screening colonoscopies necessary  Life expectancy is less than 10 years   Await pathology results    CAT scan abdomen and pelvis with p.o. 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

## 2023-08-01 NOTE — TELEPHONE ENCOUNTER
Spoke with pt about surgery date. She is agreeable to 8/15 at [facility]. Reviewed that she will receive a phone call the evening before with an arrival time to the hospital the morning of surgery. Reviewed CT scan appt and cardiology clearance appt with pt. Instructed her to call with any questions or concerns.

## 2023-08-02 ENCOUNTER — OFFICE VISIT (OUTPATIENT)
Dept: LAB | Facility: CLINIC | Age: 88
End: 2023-08-02
Payer: MEDICARE

## 2023-08-02 ENCOUNTER — ANESTHESIA EVENT (INPATIENT)
Dept: PERIOP | Facility: HOSPITAL | Age: 88
DRG: 330 | End: 2023-08-02
Payer: MEDICARE

## 2023-08-02 ENCOUNTER — LAB REQUISITION (OUTPATIENT)
Dept: LAB | Facility: HOSPITAL | Age: 88
End: 2023-08-02
Payer: MEDICARE

## 2023-08-02 ENCOUNTER — HOSPITAL ENCOUNTER (OUTPATIENT)
Dept: RADIOLOGY | Facility: MEDICAL CENTER | Age: 88
Discharge: HOME | End: 2023-08-02
Payer: MEDICARE

## 2023-08-02 ENCOUNTER — APPOINTMENT (OUTPATIENT)
Dept: LAB | Facility: MEDICAL CENTER | Age: 88
End: 2023-08-02
Payer: MEDICARE

## 2023-08-02 DIAGNOSIS — C18.9 MALIGNANT NEOPLASM OF COLON, UNSPECIFIED (HCC): ICD-10-CM

## 2023-08-02 DIAGNOSIS — C18.9 COLON ADENOCARCINOMA (HCC): ICD-10-CM

## 2023-08-02 LAB
ABO GROUP BLD: NORMAL
ALBUMIN SERPL BCP-MCNC: 2.8 G/DL (ref 3.5–5)
ALP SERPL-CCNC: 131 U/L (ref 46–116)
ALT SERPL W P-5'-P-CCNC: 28 U/L (ref 12–78)
ANION GAP SERPL CALCULATED.3IONS-SCNC: 4 MMOL/L
AST SERPL W P-5'-P-CCNC: 26 U/L (ref 5–45)
BASOPHILS # BLD AUTO: 0.02 THOUSANDS/ÂΜL (ref 0–0.1)
BASOPHILS NFR BLD AUTO: 0 % (ref 0–1)
BILIRUB SERPL-MCNC: 0.48 MG/DL (ref 0.2–1)
BLD GP AB SCN SERPL QL: NEGATIVE
BUN SERPL-MCNC: 13 MG/DL (ref 5–25)
CALCIUM ALBUM COR SERPL-MCNC: 9.6 MG/DL (ref 8.3–10.1)
CALCIUM SERPL-MCNC: 8.6 MG/DL (ref 8.3–10.1)
CHLORIDE SERPL-SCNC: 107 MMOL/L (ref 96–108)
CO2 SERPL-SCNC: 25 MMOL/L (ref 21–32)
CREAT SERPL-MCNC: 0.81 MG/DL (ref 0.6–1.3)
EOSINOPHIL # BLD AUTO: 0.08 THOUSAND/ÂΜL (ref 0–0.61)
EOSINOPHIL NFR BLD AUTO: 1 % (ref 0–6)
ERYTHROCYTE [DISTWIDTH] IN BLOOD BY AUTOMATED COUNT: 17.5 % (ref 11.6–15.1)
GFR SERPL CREATININE-BSD FRML MDRD: 65 ML/MIN/1.73SQ M
GLUCOSE SERPL-MCNC: 140 MG/DL (ref 65–140)
HCT VFR BLD AUTO: 31.8 % (ref 34.8–46.1)
HGB BLD-MCNC: 10 G/DL (ref 11.5–15.4)
IMM GRANULOCYTES # BLD AUTO: 0.04 THOUSAND/UL (ref 0–0.2)
IMM GRANULOCYTES NFR BLD AUTO: 1 % (ref 0–2)
LYMPHOCYTES # BLD AUTO: 1.43 THOUSANDS/ÂΜL (ref 0.6–4.47)
LYMPHOCYTES NFR BLD AUTO: 20 % (ref 14–44)
MCH RBC QN AUTO: 26.7 PG (ref 26.8–34.3)
MCHC RBC AUTO-ENTMCNC: 31.4 G/DL (ref 31.4–37.4)
MCV RBC AUTO: 85 FL (ref 82–98)
MONOCYTES # BLD AUTO: 0.57 THOUSAND/ÂΜL (ref 0.17–1.22)
MONOCYTES NFR BLD AUTO: 8 % (ref 4–12)
NEUTROPHILS # BLD AUTO: 5.14 THOUSANDS/ÂΜL (ref 1.85–7.62)
NEUTS SEG NFR BLD AUTO: 70 % (ref 43–75)
NRBC BLD AUTO-RTO: 0 /100 WBCS
PLATELET # BLD AUTO: 300 THOUSANDS/UL (ref 149–390)
PMV BLD AUTO: 9.4 FL (ref 8.9–12.7)
POTASSIUM SERPL-SCNC: 3.9 MMOL/L (ref 3.5–5.3)
PROT SERPL-MCNC: 7.4 G/DL (ref 6.4–8.4)
RBC # BLD AUTO: 3.74 MILLION/UL (ref 3.81–5.12)
RH BLD: POSITIVE
SODIUM SERPL-SCNC: 136 MMOL/L (ref 135–147)
SPECIMEN EXPIRATION DATE: NORMAL
WBC # BLD AUTO: 7.28 THOUSAND/UL (ref 4.31–10.16)

## 2023-08-02 PROCEDURE — 71250 CT THORAX DX C-: CPT

## 2023-08-02 PROCEDURE — 36415 COLL VENOUS BLD VENIPUNCTURE: CPT

## 2023-08-02 PROCEDURE — G1004 CDSM NDSC: HCPCS

## 2023-08-02 PROCEDURE — 85025 COMPLETE CBC W/AUTO DIFF WBC: CPT

## 2023-08-02 PROCEDURE — 86900 BLOOD TYPING SEROLOGIC ABO: CPT | Performed by: STUDENT IN AN ORGANIZED HEALTH CARE EDUCATION/TRAINING PROGRAM

## 2023-08-02 PROCEDURE — 86901 BLOOD TYPING SEROLOGIC RH(D): CPT | Performed by: STUDENT IN AN ORGANIZED HEALTH CARE EDUCATION/TRAINING PROGRAM

## 2023-08-02 PROCEDURE — 93005 ELECTROCARDIOGRAM TRACING: CPT

## 2023-08-02 PROCEDURE — 80053 COMPREHEN METABOLIC PANEL: CPT

## 2023-08-02 PROCEDURE — 86850 RBC ANTIBODY SCREEN: CPT | Performed by: STUDENT IN AN ORGANIZED HEALTH CARE EDUCATION/TRAINING PROGRAM

## 2023-08-03 ENCOUNTER — TELEPHONE (OUTPATIENT)
Dept: SURGICAL ONCOLOGY | Facility: CLINIC | Age: 88
End: 2023-08-03

## 2023-08-03 LAB
ATRIAL RATE: 74 BPM
P AXIS: 55 DEGREES
PR INTERVAL: 134 MS
QRS AXIS: 48 DEGREES
QRSD INTERVAL: 106 MS
QT INTERVAL: 408 MS
QTC INTERVAL: 452 MS
T WAVE AXIS: 33 DEGREES
VENTRICULAR RATE: 74 BPM

## 2023-08-03 PROCEDURE — 93010 ELECTROCARDIOGRAM REPORT: CPT | Performed by: INTERNAL MEDICINE

## 2023-08-03 NOTE — TELEPHONE ENCOUNTER
LM for patient stating her albumin level is low from her PAT. Encouraged her to take in extra calories and protein prior to her surgery with Dr Travis Dumont on 8/15.

## 2023-08-07 PROBLEM — Z01.89 ENCOUNTER FOR GERIATRIC ASSESSMENT: Status: ACTIVE | Noted: 2023-08-07

## 2023-08-08 ENCOUNTER — OFFICE VISIT (OUTPATIENT)
Dept: CARDIOLOGY CLINIC | Facility: CLINIC | Age: 88
End: 2023-08-08
Payer: MEDICARE

## 2023-08-08 VITALS
OXYGEN SATURATION: 98 % | HEIGHT: 61 IN | DIASTOLIC BLOOD PRESSURE: 60 MMHG | WEIGHT: 120 LBS | SYSTOLIC BLOOD PRESSURE: 132 MMHG | HEART RATE: 85 BPM | BODY MASS INDEX: 22.66 KG/M2

## 2023-08-08 DIAGNOSIS — I51.7 LEFT VENTRICULAR HYPERTROPHY: ICD-10-CM

## 2023-08-08 DIAGNOSIS — I35.0 NONRHEUMATIC AORTIC VALVE STENOSIS: ICD-10-CM

## 2023-08-08 DIAGNOSIS — C18.9 COLON ADENOCARCINOMA (HCC): ICD-10-CM

## 2023-08-08 DIAGNOSIS — I38 PRE-OPERATIVE CARDIOVASCULAR EXAMINATION, VALVULAR HEART DISEASE: ICD-10-CM

## 2023-08-08 DIAGNOSIS — I10 PRIMARY HYPERTENSION: Primary | ICD-10-CM

## 2023-08-08 DIAGNOSIS — E78.00 PURE HYPERCHOLESTEROLEMIA: ICD-10-CM

## 2023-08-08 DIAGNOSIS — Z01.810 PRE-OPERATIVE CARDIOVASCULAR EXAMINATION, VALVULAR HEART DISEASE: ICD-10-CM

## 2023-08-08 PROCEDURE — 99204 OFFICE O/P NEW MOD 45 MIN: CPT | Performed by: INTERNAL MEDICINE

## 2023-08-08 NOTE — PROGRESS NOTES
Cardiology Consultation     Kobi Can  [id number]  1935  [facility]  [address] [address]    1. Primary hypertension        2. Colon adenocarcinoma [facility])  Ambulatory referral to Cardiology      3. Nonrheumatic aortic valve stenosis        4. Left ventricular hypertrophy        5. Pure hypercholesterolemia        6. Pre-operative cardiovascular examination, valvular heart disease          Chief Complaint   Patient presents with   • New Patient Visit     Referred by Dr. Calvin Muro    • Pre-op Exam     Surgery: RIGHT PARTIAL COLECTOMY (Abdomen) Surgeon: Dr. Calvin Muro Date: 08/15/2023      HPI: Patient is here for cardiac evaluation prior to right partial colectomy. Patient feels well, without complaints. No reported chest pain, shortness of breath, palpitations, lightheadedness, syncope, LE edema, orthopnea, PND, or significant weight changes. Patient remains active without any increased fatigue out of the ordinary, continues to walk 1.5-2 miles a few times a week. Patient Active Problem List   Diagnosis   • Pure hypercholesterolemia   • Osteoarthritis   • Left ventricular hypertrophy   • Hypertension   • Aortic valve stenosis   • Colon cancer screening   • Pancreas cyst   • Elevated alkaline phosphatase level   • Lower abdominal pain   • Hepatic steatosis   • Colon adenocarcinoma (HCC)   • Encounter for geriatric assessment   • Pre-operative cardiovascular examination, valvular heart disease     Past Medical History:   Diagnosis Date   • Aortic valve regurgitation    • Aortic valve stenosis    • Hypertension    • Left ventricular hypertrophy    • Mitral valve regurgitation    • Osteoarthritis    • Pure hypercholesterolemia      Social History     Socioeconomic History   • Marital status:       Spouse name: Not on file   • Number of children: Not on file   • Years of education: Not on file   • Highest education level: Not on file   Occupational History   • Not on file   Tobacco Use   • Smoking status: Former     Packs/day: 1.00     Years: 25.00     Total pack years: 25.00     Types: Cigarettes   • Smokeless tobacco: Never   Vaping Use   • Vaping Use: Never used   Substance and Sexual Activity   • Alcohol use: Not Currently   • Drug use: Not Currently   • Sexual activity: Not on file   Other Topics Concern   • Not on file   Social History Narrative    · Most recent tobacco use screenin2019      · Do you currently or have you served in [employer] [address] [place] [address] @Pay:   No      Social Determinants of Health     Financial Resource Strain: Not on file   Food Insecurity: Not on file   Transportation Needs: Not on file   Physical Activity: Not on file   Stress: Not on file   Social Connections: Not on file   Intimate Partner Violence: Not on file   Housing Stability: Not on file      Family History   Problem Relation Age of Onset   • Coronary artery disease Father    • Throat cancer Father    • Diabetes Sister    • No Known Problems Mother    • No Known Problems Daughter    • No Known Problems Maternal Grandmother    • No Known Problems Maternal Grandfather    • No Known Problems Paternal Grandmother    • No Known Problems Paternal Grandfather    • No Known Problems Brother    • No Known Problems Brother    • Cancer Sister    • Pancreatic cancer Sister    • No Known Problems Daughter    • No Known Problems Son    • No Known Problems Son    • No Known Problems Son      Past Surgical History:   Procedure Laterality Date   • BUNIONECTOMY Left 2014   • COLONOSCOPY  2012   • CORRECTION HAMMER TOE Left 2014       Current Outpatient Medications:   •  amLODIPine (NORVASC) 5 mg tablet, Take 5 mg by mouth daily, Disp: , Rfl:   •  atorvastatin (LIPITOR) 10 mg tablet, Take 1 tablet by mouth once daily, Disp: 90 tablet, Rfl: 0  •  Multiple Vitamins-Minerals (PRESERVISION AREDS 2 PO), Take by mouth, Disp: , Rfl:   Allergies   Allergen Reactions   • Acetaminophen Hives     Vitals:    08/08/23 0830   BP: 132/60   BP Location: Left arm   Patient Position: Sitting   Cuff Size: Adult   Pulse: 85   SpO2: 98%   Weight: 54.4 kg (120 lb)   Height: 5' 1" (1.549 m)       Labs:  Lab Requisition on 08/02/2023   Component Date Value   • ABO Grouping 08/02/2023 A    • Rh Factor 08/02/2023 Positive    • Antibody Screen 08/02/2023 Negative    • Specimen Expiration Date 08/02/2023 [id number]    Office Visit on 08/02/2023   Component Date Value   • Ventricular Rate 08/02/2023 74    • Atrial Rate 08/02/2023 74    • CT Interval 08/02/2023 134    • QRSD Interval 08/02/2023 106    • QT Interval 08/02/2023 408    • QTC Interval 08/02/2023 452    • P Axis 08/02/2023 55    • QRS Axis 08/02/2023 48    • T Wave [place] 08/02/2023 33    Appointment on 08/02/2023   Component Date Value   • WBC 08/02/2023 7.28    • RBC 08/02/2023 3.74 (L)    • Hemoglobin 08/02/2023 10.0 (L)    • Hematocrit 08/02/2023 31.8 (L)    • MCV 08/02/2023 85    • MCH 08/02/2023 26.7 (L)    • MCHC 08/02/2023 31.4    • RDW 08/02/2023 17.5 (H)    • MPV 08/02/2023 9.4    • Platelets 19/31/1258 300    • nRBC 08/02/2023 0    • Neutrophils Relative 08/02/2023 70    • Immat GRANS % 08/02/2023 1    • Lymphocytes Relative 08/02/2023 20    • Monocytes Relative 08/02/2023 8    • Eosinophils Relative 08/02/2023 1    • Basophils Relative 08/02/2023 0    • Neutrophils Absolute 08/02/2023 5.14    • Immature Grans Absolute 08/02/2023 0.04    • Lymphocytes Absolute 08/02/2023 1.43    • Monocytes Absolute 08/02/2023 0.57    • Eosinophils Absolute 08/02/2023 0.08    • Basophils Absolute 08/02/2023 0.02    • Sodium 08/02/2023 136    • Potassium 08/02/2023 3.9    • Chloride 08/02/2023 107    • CO2 08/02/2023 25    • ANION GAP 08/02/2023 4    • BUN 08/02/2023 13    • Creatinine 08/02/2023 0.81    • Glucose 08/02/2023 140    • Calcium 08/02/2023 8.6    • Corrected Calcium 08/02/2023 9.6 • AST 08/02/2023 26    • ALT 08/02/2023 28    • Alkaline Phosphatase 08/02/2023 131 (H)    • Total Protein 08/02/2023 7.4    • Albumin 08/02/2023 2.8 (L)    • Total Bilirubin 08/02/2023 0.48    • eGFR 08/02/2023 65    Appointment on 07/17/2023   Component Date Value   • Sodium 07/17/2023 135    • Potassium 07/17/2023 4.4    • Chloride 07/17/2023 106    • CO2 07/17/2023 24    • ANION GAP 07/17/2023 5    • BUN 07/17/2023 14    • Creatinine 07/17/2023 0.87    • Glucose, Fasting 07/17/2023 206 (H)    • Calcium 07/17/2023 8.4    • Corrected Calcium 07/17/2023 9.4    • AST 07/17/2023 24    • ALT 07/17/2023 25    • Alkaline Phosphatase 07/17/2023 128 (H)    • Total Protein 07/17/2023 6.8    • Albumin 07/17/2023 2.8 (L)    • Total Bilirubin 07/17/2023 0.34    • eGFR 07/17/2023 59    Hospital Outpatient Visit on 07/13/2023   Component Date Value   • Case Report 07/13/2023                      Value:Surgical Pathology Report                         Case: [id number]                                   Authorizing Provider:  Mitzi Coombs MD Collected:           07/13/2023 1020              Ordering Location:     [address] [place] [address]   Received:            07/13/2023 1354                                     Endoscopy                                                                    Pathologist:           Dorcas Zhong MD                                                           Specimen:    Large Intestine, Right/Ascending Colon, cold bx ascending colon mass                      • Addendum 3 07/13/2023                      Value: This result contains rich text formatting which cannot be displayed here. • Addendum 2 07/13/2023                      Value: This result contains rich text formatting which cannot be displayed here. • Addendum 07/13/2023                      Value: This result contains rich text formatting which cannot be displayed here. • Final Diagnosis 07/13/2023                      Value: This result contains rich text formatting which cannot be displayed here. • Note 07/13/2023                      Value: This result contains rich text formatting which cannot be displayed here. • Additional Information 07/13/2023                      Value: This result contains rich text formatting which cannot be displayed here. • Synoptic Checklist 07/13/2023                      Value:                            COLON/RECTUM POLYP FORM - GI - A                                                                                     :    Adenocarcinoma     • Gross Description 07/13/2023                      Value: This result contains rich text formatting which cannot be displayed here. • Clinical Information 07/13/2023                      Value:· Single invasive, malignant-appearing and ulcerated mass (not traversable) measuring 8 cm x 6 cm in the cecum and ascending colon, covering the whole circumference; performed partial removal by cold forceps biopsy; tattooed 95 cm proximal to the finding with 5 mL of carbon black and farrukh ink. Large ulcerated malignant mass in distal transverse colon, obstructing 98% of the lumen, cecum was not visualized, scope was unable to pass through the tumor.   Tattooing was done, multiple biopsies were done  · Two left lateral and right posterior internal medium (grade 2) hemorrhoids observed during digital rectal exam; no bleeding was identified   Appointment on 06/19/2023   Component Date Value   • Mitochondrial Ab 06/19/2023 <20.0    • ROCAEL 06/19/2023 Negative    • Smooth Muscle Ab 06/19/2023 6    Appointment on 03/09/2023   Component Date Value   • GGT 03/09/2023 26    • Alk Phos Isoenzymes 03/09/2023 131 (H)    • Alk Phos Liver Fract 03/09/2023 77    • Alk Phos Bone Fract 03/09/2023 20    • Alk Phos Intestine Fract 03/09/2023 3    • Hepatitis B Surface Ag 03/09/2023 Non-reactive    • Hep A IgM 03/09/2023 Non-reactive    • Hepatitis C Ab 03/09/2023 Non-reactive    • Hep B C IgM 03/09/2023 Non-reactive    Appointment on 02/23/2023   Component Date Value   • Hepatitis C Ab 02/23/2023 Non-reactive    • Cholesterol 02/23/2023 102    • Triglycerides 02/23/2023 80    • HDL, Direct 02/23/2023 48 (L)    • LDL Calculated 02/23/2023 38    • Sodium 02/23/2023 136    • Potassium 02/23/2023 4.2    • Chloride 02/23/2023 108    • CO2 02/23/2023 26    • ANION GAP 02/23/2023 2 (L)    • BUN 02/23/2023 9    • Creatinine 02/23/2023 0.68    • Glucose, Fasting 02/23/2023 103 (H)    • Calcium 02/23/2023 9.0    • Corrected Calcium 02/23/2023 9.7    • AST 02/23/2023 21    • ALT 02/23/2023 24    • Alkaline Phosphatase 02/23/2023 145 (H)    • Total Protein 02/23/2023 7.3    • Albumin 02/23/2023 3.1 (L)    • Total Bilirubin 02/23/2023 0.49    • eGFR 02/23/2023 78      Lab Results   Component Value Date    TRIG 80 02/23/2023    TRIG 52 10/01/2020    HDL 48 (L) 02/23/2023    HDL 60 10/01/2020     Imaging: CT abdomen pelvis w contrast    Result Date: 7/24/2023  Narrative: CT ABDOMEN AND PELVIS WITH IV CONTRAST INDICATION:   K63.89: Other specified diseases of intestine. COMPARISON: MRI, 5/31/2023 TECHNIQUE:  CT examination of the abdomen and pelvis was performed. Multiplanar 2D reformatted images were created from the source data. This examination, like all CT scans performed in the [facility], was performed utilizing techniques to minimize radiation dose exposure, including the use of iterative reconstruction and automated exposure control. Radiation dose length product (DLP) for this visit:  300 mGy-cm IV Contrast:  85 mL of iohexol (OMNIPAQUE) Enteric Contrast:  Enteric contrast was not administered. FINDINGS: ABDOMEN LOWER CHEST:  No clinically significant abnormality identified in the visualized lower chest. LIVER/BILIARY TREE:  Unremarkable. GALLBLADDER: Under distended, with partially calcified stones. SPLEEN:  Unremarkable.  PANCREAS: Redemonstrated multiple pancreatic cysts, the largest in the head 1.5 x 1.8 cm. 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 ADRENAL GLANDS:  Unremarkable. KIDNEYS/URETERS: No hydronephrosis or urinary tract calculus. One or more sharply circumscribed subcentimeter renal hypodensities are present, too small to accurately characterize, and statistically most likely benign findings. According to recent literature (Radiology 2019) no further workup of these findings is recommended. STOMACH AND BOWEL: There has been intussusception at the level of the ileocecal valve, with the leading point likely a previously described mass. The length of intussuscepted segment is approximately 6 cm. This segment felt to be involved by the tumor is  approximately 4.5-5 cm. There is large amount of oral contrast in the colon distal to the intussusception level indicating no complete obstruction. APPENDIX: Not visualized ABDOMINOPELVIC CAVITY:  No ascites. No pneumoperitoneum. No lymphadenopathy. VESSELS:  Unremarkable for patient's age. PELVIS REPRODUCTIVE ORGANS:  Unremarkable for patient's age. URINARY BLADDER:  Unremarkable. ABDOMINAL WALL/INGUINAL REGIONS:  Unremarkable. OSSEOUS STRUCTURES:  No acute fracture or destructive osseous lesion. Multilevel degenerative changes in the spine. Impression: 6 cm ileocecal intussusception with the leading point felt to be the known right colonic tumor. No radiographic evidence of large bowel obstruction at this time. No findings of metastatic disease. No lymphadenopathy. 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 Recommend MRI follow-up in 2 years; see complete recommendations on previous MRI. Incidental findings, as per the body of the report. The major findings were discussed with the nurse practitioner covering for Dr. Bulmaro Yuan,  7/24/2023 at 1440.  Workstation performed: [id number]     Colonoscopy    Result Date: 7/13/2023  Narrative: Table formatting from the original result was not included. [id number] [facility] [id number] [place] [phone] DATE OF SERVICE: 7/13/23 PHYSICIAN(S): Attending: Kevin Lombardi MD Fellow: No Staff Documented Procedure : Colonoscopy with multiple biopsies along with tattooing INDICATION: Lower abdominal pain POST-OP DIAGNOSIS: See the impression below. HISTORY: Prior colonoscopy: More than 10 years ago. BOWEL PREPARATION: Miralax/Dulcolax PREPROCEDURE: Informed consent was obtained for the procedure, including sedation. Risks including but not limited to bleeding, infection, perforation, adverse drug reaction and aspiration were explained in detail. Also explained about less than 100% sensitivity with the exam and other alternatives. The patient was placed in the left lateral decubitus position. Procedure: Colonoscopy DETAILS OF PROCEDURE: Patient was taken to the procedure room where a time out was performed to confirm correct patient and correct procedure. The patient underwent monitored anesthesia care, which was administered by an anesthesia professional. The patient's blood pressure, heart rate, level of consciousness, oxygen, respirations and ECG were monitored throughout the procedure. A digital rectal exam was performed. The scope was introduced through the anus and advanced to the cecum. Retroflexion was performed in the rectum. The quality of bowel preparation was evaluated using the [facility] Bowel Preparation Scale with scores of: right colon = 2, transverse colon = 2, left colon = 2. The total BBPS score was 6. Bowel prep was adequate. The patient experienced no blood loss. The procedure was moderately difficult due to obstructing mass. The patient tolerated the procedure well. There were no apparent adverse events.  ANESTHESIA INFORMATION: ASA: III Anesthesia Type: IV Sedation with Anesthesia MEDICATIONS: No administrations occurring from 1009 to 1033 on 07/13/23 FINDINGS: Single invasive, malignant-appearing and ulcerated mass (not traversable) measuring 8 cm x 6 cm in the cecum and ascending colon, covering the whole circumference; performed partial removal by cold forceps biopsy; tattooed 95 cm proximal to the finding with 5 mL of carbon black and farrukh ink. Large ulcerated malignant mass in distal transverse colon, obstructing 98% of the lumen, cecum was not visualized, scope was unable to pass through the tumor. Tattooing was done, multiple biopsies were done Two left lateral and right posterior internal medium (grade 2) hemorrhoids observed during digital rectal exam; no bleeding was identified EVENTS: Procedure Events Event Event Time ENDO CECUM REACHED 7/13/2023 10:28 AM ENDO SCOPE OUT TIME 7/13/2023 10:29 AM SPECIMENS: ID Type Source Tests Collected by Time Destination 1 : cold bx ascending colon mass Tissue Large Intestine, Right/Ascending Colon TISSUE EXAM Haja Espinosa MD 7/13/2023 10:20 AM  EQUIPMENT: Colonoscope -[id number]     Impression: Single invasive, malignant-appearing and ulcerated mass (not traversable) measuring 8 cm x 6 cm in the cecum and ascending colon, covering the whole circumference; performed partial removal by cold forceps biopsy; tattooed 95 cm proximal to the finding 2 medium (grade 2) hemorrhoids RECOMMENDATION:  No further screening colonoscopies necessary  Life expectancy is less than 10 years   Await pathology results    CAT scan abdomen and pelvis with p.o. and IV contrast for staging    Surgical and medical oncology consult  Haja Espinosa MD       Review of Systems:  Review of Systems   Constitutional: Negative for activity change, appetite change, chills, diaphoresis, fatigue and unexpected weight change. HENT: Negative for hearing loss, nosebleeds and sore throat. Eyes: Negative for photophobia and visual disturbance. Respiratory: Negative for cough, chest tightness, shortness of breath and wheezing.     Cardiovascular: Negative for chest pain, palpitations and leg swelling. Gastrointestinal: Negative for abdominal pain, diarrhea, nausea and vomiting. Endocrine: Negative for polyuria. Genitourinary: Negative for dysuria, frequency and hematuria. Musculoskeletal: Negative for arthralgias, back pain, gait problem and neck pain. Skin: Negative for pallor and rash. Neurological: Negative for dizziness, syncope and headaches. Hematological: Does not bruise/bleed easily. Psychiatric/Behavioral: Negative for behavioral problems and confusion. Physical Exam:  Physical Exam  Vitals reviewed. Constitutional:       Appearance: She is well-developed. She is not diaphoretic. HENT:      Head: Normocephalic and atraumatic. Nose: Nose normal.   Eyes:      General: No scleral icterus. Pupils: Pupils are equal, round, and reactive to light. Neck:      Vascular: No JVD. Cardiovascular:      Rate and Rhythm: Normal rate and regular rhythm. Heart sounds: Murmur heard. Systolic murmur is present with a grade of 3/6. Diastolic murmur is present with a grade of 2/4. No friction rub. No gallop. Pulmonary:      Effort: Pulmonary effort is normal. No respiratory distress. Breath sounds: Normal breath sounds. No wheezing or rales. Abdominal:      General: Bowel sounds are normal. There is no distension. Palpations: Abdomen is soft. Tenderness: There is no abdominal tenderness. Musculoskeletal:         General: No deformity. Normal range of motion. Cervical back: Normal range of motion and neck supple. Skin:     General: Skin is warm and dry. Findings: No rash. Neurological:      Mental Status: She is alert and oriented to person, place, and time. Cranial Nerves: No cranial nerve deficit. Psychiatric:         Behavior: Behavior normal.       Blood pressure 132/60, pulse 85, height 5' 1" (1.549 m), weight 54.4 kg (120 lb), SpO2 98 %.     Discussion/Summary:  Preop cardiac risk evaluation: With cardiac disease including aortic stenosis that is noted to be moderate to severe in severity with a mean gradient of 25 mmHg and peak gradient of 42 mmHg with an aortic valve area of 0.83 cm² on echocardiogram in March 2023. Patient also noted to have moderate aortic regurgitation and moderate annular calcification of the mitral valve with mild mitral regurgitation. She has a normal LV size and function with grade 1 diastolic dysfunction. Patient has no pulmonary hypertension. She also carries a diagnosis of hypertension and hyperlipidemia. She has no active symptoms or ECG changes suggestive of ischemia, heart failure, or arrhythmias. She is very active at baseline and goes for walks several times a week without any symptoms. No further cardiac work-up recommended prior to intermediate cardiac risk abdominal surgery. At this time, patient would be considered intermediate cardiac risk considering underlying valvular disease. Would proceed with caution regarding iatrogenic volume status since aortic stenosis is a preload dependent state, however she also has aortic regurgitation which can cause her to become volume overloaded as well. Would watch volume status closely perioperatively and avoid significant iatrogenic volume resuscitation. Continue blood pressure management with amlodipine perioperatively. No additional medications required currently. She is not on a beta-blocker at baseline, therefore the addition of one is not necessary. Hypertension: Well-controlled on current regiment. Continue amlodipine. Hyperlipidemia: LDL very well controlled at 38 in February 2023.

## 2023-08-08 NOTE — LETTER
August 8, 2023     Shaila,  [address] Hayden Smith    Patient: Domo Tam   YOB: 1935   Date of Visit: 8/8/2023       Dear Dr. Sri Phan:    Thank you for referring Domo Tam to me for evaluation. Below are my notes for this consultation. If you have questions, please do not hesitate to call me. I look forward to following your patient along with you. Sincerely,        Ana Luisa Monroy MD        CC: MD Ana Luisa Zapata MD  8/8/2023  8:46 AM  Incomplete                                             Cardiology Consultation     Domo Tam  [id number]  1935  [facility] [place] [address]    1. Colon adenocarcinoma [facility])  Ambulatory referral to Cardiology        Chief Complaint   Patient presents with   • New Patient Visit     Referred by Dr. Sanchez Collazo    • Pre-op Exam     Surgery: RIGHT PARTIAL COLECTOMY (Abdomen) Surgeon: Dr. Sanchez Collazo Date: 08/15/2023      HPI: Patient is here for cardiac evaluation prior to right partial colectomy. Patient feels well, without complaints. No reported chest pain, shortness of breath, palpitations, lightheadedness, syncope, LE edema, orthopnea, PND, or significant weight changes. Patient remains active without any increased fatigue out of the ordinary.       Patient Active Problem List   Diagnosis   • Pure hypercholesterolemia   • Osteoarthritis   • Left ventricular hypertrophy   • Hypertension   • Aortic valve stenosis   • Colon cancer screening   • Pancreas cyst   • Elevated alkaline phosphatase level   • Lower abdominal pain   • Hepatic steatosis   • Colon adenocarcinoma [facility])   • Encounter for geriatric assessment     Past Medical History:   Diagnosis Date   • Aortic valve regurgitation    • Aortic valve stenosis    • Hypertension    • Left ventricular hypertrophy    • Mitral valve regurgitation    • Osteoarthritis    • Pure hypercholesterolemia      Social History     Socioeconomic History   • Marital status:       Spouse name: Not on file   • Number of children: Not on file   • Years of education: Not on file   • Highest education level: Not on file   Occupational History   • Not on file   Tobacco Use   • Smoking status: Former     Packs/day: 1.00     Years: 25.00     Total pack years: 25.00     Types: Cigarettes   • Smokeless tobacco: Never   Vaping Use   • Vaping Use: Never used   Substance and Sexual Activity   • Alcohol use: Not Currently   • Drug use: Not Currently   • Sexual activity: Not on file   Other Topics Concern   • Not on file   Social History Narrative    · Most recent tobacco use screenin2019      · Do you currently or have you served in the [address] [place] [address] [employer]:   No      Social Determinants of Health     Financial Resource Strain: Not on file   Food Insecurity: Not on file   Transportation Needs: Not on file   Physical Activity: Not on file   Stress: Not on file   Social Connections: Not on file   Intimate Partner Violence: Not on file   Housing Stability: Not on file      Family History   Problem Relation Age of Onset   • Coronary artery disease Father    • Throat cancer Father    • Diabetes Sister    • No Known Problems Mother    • No Known Problems Daughter    • No Known Problems Maternal Grandmother    • No Known Problems Maternal Grandfather    • No Known Problems Paternal Grandmother    • No Known Problems Paternal Grandfather    • No Known Problems Brother    • No Known Problems Brother    • Cancer Sister    • Pancreatic cancer Sister    • No Known Problems Daughter    • No Known Problems Son    • No Known Problems Son    • No Known Problems Son      Past Surgical History:   Procedure Laterality Date   • BUNIONECTOMY Left 2014   • COLONOSCOPY  2012   • CORRECTION HAMMER TOE Left 2014       Current Outpatient Medications:   •  amLODIPine (NORVASC) 5 mg tablet, Take 5 mg by mouth daily, Disp: , Rfl:   •  atorvastatin (LIPITOR) 10 mg tablet, Take 1 tablet by mouth once daily, Disp: 90 tablet, Rfl: 0  •  Multiple Vitamins-Minerals (PRESERVISION AREDS 2 PO), Take by mouth, Disp: , Rfl:   Allergies   Allergen Reactions   • Acetaminophen Hives     Vitals:    08/08/23 0830   BP: 132/60   BP Location: Left arm   Patient Position: Sitting   Cuff Size: Adult   Pulse: 85   SpO2: 98%   Weight: 54.4 kg (120 lb)   Height: 5' 1" (1.549 m)       Labs:  Lab Requisition on 08/02/2023   Component Date Value   • ABO Grouping 08/02/2023 A    • Rh Factor 08/02/2023 Positive    • Antibody Screen 08/02/2023 Negative    • Specimen Expiration Date 08/02/2023 [id number]    Office Visit on 08/02/2023   Component Date Value   • Ventricular Rate 08/02/2023 74    • Atrial Rate 08/02/2023 74    • NC Interval 08/02/2023 134    • QRSD Interval 08/02/2023 106    • QT Interval 08/02/2023 408    • QTC Interval 08/02/2023 452    • P Axis 08/02/2023 55    • QRS Axis 08/02/2023 48    • T Wave [place] 08/02/2023 33    Appointment on 08/02/2023   Component Date Value   • WBC 08/02/2023 7.28    • RBC 08/02/2023 3.74 (L)    • Hemoglobin 08/02/2023 10.0 (L)    • Hematocrit 08/02/2023 31.8 (L)    • MCV 08/02/2023 85    • MCH 08/02/2023 26.7 (L)    • MCHC 08/02/2023 31.4    • RDW 08/02/2023 17.5 (H)    • MPV 08/02/2023 9.4    • Platelets 56/47/3680 300    • nRBC 08/02/2023 0    • Neutrophils Relative 08/02/2023 70    • Immat GRANS % 08/02/2023 1    • Lymphocytes Relative 08/02/2023 20    • Monocytes Relative 08/02/2023 8    • Eosinophils Relative 08/02/2023 1    • Basophils Relative 08/02/2023 0    • Neutrophils Absolute 08/02/2023 5.14    • Immature Grans Absolute 08/02/2023 0.04    • Lymphocytes Absolute 08/02/2023 1.43    • Monocytes Absolute 08/02/2023 0.57    • Eosinophils Absolute 08/02/2023 0.08    • Basophils Absolute 08/02/2023 0.02    • Sodium 08/02/2023 136    • Potassium 08/02/2023 3.9    • Chloride 08/02/2023 107    • CO2 08/02/2023 25    • ANION GAP 08/02/2023 4    • BUN 08/02/2023 13    • Creatinine 08/02/2023 0.81    • Glucose 08/02/2023 140    • Calcium 08/02/2023 8.6    • Corrected Calcium 08/02/2023 9.6    • AST 08/02/2023 26    • ALT 08/02/2023 28    • Alkaline Phosphatase 08/02/2023 131 (H)    • Total Protein 08/02/2023 7.4    • Albumin 08/02/2023 2.8 (L)    • Total Bilirubin 08/02/2023 0.48    • eGFR 08/02/2023 65    Appointment on 07/17/2023   Component Date Value   • Sodium 07/17/2023 135    • Potassium 07/17/2023 4.4    • Chloride 07/17/2023 106    • CO2 07/17/2023 24    • ANION GAP 07/17/2023 5    • BUN 07/17/2023 14    • Creatinine 07/17/2023 0.87    • Glucose, Fasting 07/17/2023 206 (H)    • Calcium 07/17/2023 8.4    • Corrected Calcium 07/17/2023 9.4    • AST 07/17/2023 24    • ALT 07/17/2023 25    • Alkaline Phosphatase 07/17/2023 128 (H)    • Total Protein 07/17/2023 6.8    • Albumin 07/17/2023 2.8 (L)    • Total Bilirubin 07/17/2023 0.34    • eGFR 07/17/2023 59    Hospital Outpatient Visit on 07/13/2023   Component Date Value   • Case Report 07/13/2023                      Value:Surgical Pathology Report                         Case: [id number]                                   Authorizing Provider:  Daniela Hernandez MD Collected:           07/13/2023 1020              Ordering Location:     [address] [place] [address]   Received:            07/13/2023 1354                                     Endoscopy                                                                    Pathologist:           Bret Emanuel MD                                                           Specimen:    Large Intestine, Right/Ascending Colon, cold bx ascending colon mass                      • Addendum 3 07/13/2023                      Value: This result contains rich text formatting which cannot be displayed here. • Addendum 2 07/13/2023                      Value: This result contains rich text formatting which cannot be displayed here. • Addendum 07/13/2023                      Value: This result contains rich text formatting which cannot be displayed here. • Final Diagnosis 07/13/2023                      Value: This result contains rich text formatting which cannot be displayed here. • Note 07/13/2023                      Value: This result contains rich text formatting which cannot be displayed here. • Additional Information 07/13/2023                      Value: This result contains rich text formatting which cannot be displayed here. • Synoptic Checklist 07/13/2023                      Value:                            COLON/RECTUM POLYP FORM - GI - A                                                                                     :    Adenocarcinoma     • Gross Description 07/13/2023                      Value: This result contains rich text formatting which cannot be displayed here. • Clinical Information 07/13/2023                      Value:· Single invasive, malignant-appearing and ulcerated mass (not traversable) measuring 8 cm x 6 cm in the cecum and ascending colon, covering the whole circumference; performed partial removal by cold forceps biopsy; tattooed 95 cm proximal to the finding with 5 mL of carbon black and farrukh ink. Large ulcerated malignant mass in distal transverse colon, obstructing 98% of the lumen, cecum was not visualized, scope was unable to pass through the tumor.   Tattooing was done, multiple biopsies were done  · Two left lateral and right posterior internal medium (grade 2) hemorrhoids observed during digital rectal exam; no bleeding was identified   Appointment on 06/19/2023   Component Date Value   • Mitochondrial Ab 06/19/2023 <20.0    • ROCAEL 06/19/2023 Negative    • Smooth Muscle Ab 06/19/2023 6    Appointment on 03/09/2023   Component Date Value   • GGT 03/09/2023 26    • Alk Phos Isoenzymes 03/09/2023 131 (H)    • Alk Phos Liver Fract 03/09/2023 77    • Alk Phos Bone Fract 03/09/2023 20    • Alk Phos Intestine Fract 03/09/2023 3    • Hepatitis B Surface Ag 03/09/2023 Non-reactive    • Hep A IgM 03/09/2023 Non-reactive    • Hepatitis C Ab 03/09/2023 Non-reactive    • Hep B C IgM 03/09/2023 Non-reactive    Appointment on 02/23/2023   Component Date Value   • Hepatitis C Ab 02/23/2023 Non-reactive    • Cholesterol 02/23/2023 102    • Triglycerides 02/23/2023 80    • HDL, Direct 02/23/2023 48 (L)    • LDL Calculated 02/23/2023 38    • Sodium 02/23/2023 136    • Potassium 02/23/2023 4.2    • Chloride 02/23/2023 108    • CO2 02/23/2023 26    • ANION GAP 02/23/2023 2 (L)    • BUN 02/23/2023 9    • Creatinine 02/23/2023 0.68    • Glucose, Fasting 02/23/2023 103 (H)    • Calcium 02/23/2023 9.0    • Corrected Calcium 02/23/2023 9.7    • AST 02/23/2023 21    • ALT 02/23/2023 24    • Alkaline Phosphatase 02/23/2023 145 (H)    • Total Protein 02/23/2023 7.3    • Albumin 02/23/2023 3.1 (L)    • Total Bilirubin 02/23/2023 0.49    • eGFR 02/23/2023 78      Lab Results   Component Value Date    TRIG 80 02/23/2023    TRIG 52 10/01/2020    HDL 48 (L) 02/23/2023    HDL 60 10/01/2020     Imaging: CT abdomen pelvis w contrast    Result Date: 7/24/2023  Narrative: CT ABDOMEN AND PELVIS WITH IV CONTRAST INDICATION:   K63.89: Other specified diseases of intestine. COMPARISON: MRI, 5/31/2023 TECHNIQUE:  CT examination of the abdomen and pelvis was performed. Multiplanar 2D reformatted images were created from the source data. This examination, like all CT scans performed in the [facility], was performed utilizing techniques to minimize radiation dose exposure, including the use of iterative reconstruction and automated exposure control. Radiation dose length product (DLP) for this visit:  300 mGy-cm IV Contrast:  85 mL of iohexol (OMNIPAQUE) Enteric Contrast:  Enteric contrast was not administered.  FINDINGS: ABDOMEN LOWER CHEST:  No clinically significant abnormality identified in the visualized lower chest. LIVER/BILIARY TREE:  Unremarkable. GALLBLADDER: Under distended, with partially calcified stones. SPLEEN:  Unremarkable. PANCREAS: Redemonstrated multiple pancreatic cysts, the largest in the head 1.5 x 1.8 cm. 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 ADRENAL GLANDS:  Unremarkable. KIDNEYS/URETERS: No hydronephrosis or urinary tract calculus. One or more sharply circumscribed subcentimeter renal hypodensities are present, too small to accurately characterize, and statistically most likely benign findings. According to recent literature (Radiology 2019) no further workup of these findings is recommended. STOMACH AND BOWEL: There has been intussusception at the level of the ileocecal valve, with the leading point likely a previously described mass. The length of intussuscepted segment is approximately 6 cm. This segment felt to be involved by the tumor is  approximately 4.5-5 cm. There is large amount of oral contrast in the colon distal to the intussusception level indicating no complete obstruction. APPENDIX: Not visualized ABDOMINOPELVIC CAVITY:  No ascites. No pneumoperitoneum. No lymphadenopathy. VESSELS:  Unremarkable for patient's age. PELVIS REPRODUCTIVE ORGANS:  Unremarkable for patient's age. URINARY BLADDER:  Unremarkable. ABDOMINAL WALL/INGUINAL REGIONS:  Unremarkable. OSSEOUS STRUCTURES:  No acute fracture or destructive osseous lesion. Multilevel degenerative changes in the spine. Impression: 6 cm ileocecal intussusception with the leading point felt to be the known right colonic tumor. No radiographic evidence of large bowel obstruction at this time. No findings of metastatic disease. No lymphadenopathy. 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 Recommend MRI follow-up in 2 years; see complete recommendations on previous MRI. Incidental findings, as per the body of the report.  The major findings were discussed with the nurse practitioner covering for Dr. Martin Delay,  7/24/2023 at 1440. Workstation performed: [id number]     Colonoscopy    Result Date: 7/13/2023  Narrative: Table formatting from the original result was not included. [address] [facility] [address] [phone] DATE OF SERVICE: 7/13/23 PHYSICIAN(S): Attending: Jennyfer Camarena MD Fellow: No Staff Documented Procedure : Colonoscopy with multiple biopsies along with tattooing INDICATION: Lower abdominal pain POST-OP DIAGNOSIS: See the impression below. HISTORY: Prior colonoscopy: More than 10 years ago. BOWEL PREPARATION: Miralax/Dulcolax PREPROCEDURE: Informed consent was obtained for the procedure, including sedation. Risks including but not limited to bleeding, infection, perforation, adverse drug reaction and aspiration were explained in detail. Also explained about less than 100% sensitivity with the exam and other alternatives. The patient was placed in the left lateral decubitus position. Procedure: Colonoscopy DETAILS OF PROCEDURE: Patient was taken to the procedure room where a time out was performed to confirm correct patient and correct procedure. The patient underwent monitored anesthesia care, which was administered by an anesthesia professional. The patient's blood pressure, heart rate, level of consciousness, oxygen, respirations and ECG were monitored throughout the procedure. A digital rectal exam was performed. The scope was introduced through the anus and advanced to the cecum. Retroflexion was performed in the rectum. The quality of bowel preparation was evaluated using the [facility] Bowel Preparation Scale with scores of: right colon = 2, transverse colon = 2, left colon = 2. The total BBPS score was 6. Bowel prep was adequate. The patient experienced no blood loss. The procedure was moderately difficult due to obstructing mass. The patient tolerated the procedure well.  There were no apparent adverse events. ANESTHESIA INFORMATION: ASA: III Anesthesia Type: IV Sedation with Anesthesia MEDICATIONS: No administrations occurring from 1009 to 1033 on 07/13/23 FINDINGS: Single invasive, malignant-appearing and ulcerated mass (not traversable) measuring 8 cm x 6 cm in the cecum and ascending colon, covering the whole circumference; performed partial removal by cold forceps biopsy; tattooed 95 cm proximal to the finding with 5 mL of carbon black and farrukh ink. Large ulcerated malignant mass in distal transverse colon, obstructing 98% of the lumen, cecum was not visualized, scope was unable to pass through the tumor. Tattooing was done, multiple biopsies were done Two left lateral and right posterior internal medium (grade 2) hemorrhoids observed during digital rectal exam; no bleeding was identified EVENTS: Procedure Events Event Event Time ENDO CECUM REACHED 7/13/2023 10:28 AM ENDO SCOPE OUT TIME 7/13/2023 10:29 AM SPECIMENS: ID Type Source Tests Collected by Time Destination 1 : cold bx ascending colon mass Tissue Large Intestine, Right/Ascending Colon TISSUE EXAM Mitzi Coombs MD 7/13/2023 10:20 AM  EQUIPMENT: Colonoscope -[id number]     Impression: Single invasive, malignant-appearing and ulcerated mass (not traversable) measuring 8 cm x 6 cm in the cecum and ascending colon, covering the whole circumference; performed partial removal by cold forceps biopsy; tattooed 95 cm proximal to the finding 2 medium (grade 2) hemorrhoids RECOMMENDATION:  No further screening colonoscopies necessary  Life expectancy is less than 10 years   Await pathology results    CAT scan abdomen and pelvis with p.o. and IV contrast for staging    Surgical and medical oncology consult  Mitzi Coombs MD       Review of Systems:  Review of Systems    Physical Exam:  Physical Exam  Blood pressure 132/60, pulse 85, height 5' 1" (1.549 m), weight 54.4 kg (120 lb), SpO2 98 %.     Discussion/Summary:  Preop cardiac risk evaluation: With cardiac disease including aortic stenosis that is noted to be moderate to severe in severity with a mean gradient of 25 mmHg and peak gradient of 42 mmHg with an aortic valve area of 0.83 cm² on echocardiogram in March 2023. Patient also noted to have moderate aortic regurgitation and moderate annular calcification of the mitral valve with mild mitral regurgitation. She has a normal LV size and function with grade 1 diastolic dysfunction. Patient has no pulmonary hypertension. She also carries a diagnosis of hypertension and hyperlipidemia. She has no active symptoms or ECG changes suggestive of ischemia, heart failure, or arrhythmias. She is very active at baseline and goes for walks several times a week without any symptoms. No further cardiac work-up recommended prior to intermediate cardiac risk abdominal surgery. At this time, patient would be considered intermediate cardiac risk considering underlying valvular disease. Would proceed with caution regarding iatrogenic volume status since aortic stenosis is a preload dependent state, however she also has aortic regurgitation which can cause her to become volume overloaded as well. Would watch volume status closely perioperatively and avoid significant iatrogenic volume resuscitation. Continue blood pressure management with amlodipine perioperatively. No additional medications required currently. She is not on a beta-blocker at baseline, therefore the addition of one is not necessary. Hypertension: Well-controlled on current regiment. Continue amlodipine. Hyperlipidemia: LDL very well controlled at 38 in February 2023.

## 2023-08-08 NOTE — LETTER
Cardiology Pre Operative Clearance      PRE OPERATIVE CARDIAC RISK ASSESSMENT    08/08/23    Alberto Whtye  1935  [id number]    Date of Surgery: 08/15/2023    Type of Surgery: RIGHT PARTIAL COLECTOMY (Abdomen)    Surgeon: Dr. Cyndi Darby    No Cardiac Contraindication for Planned Surgical Procedures    Anticoagulation: None     Physician Comment: She is very active at baseline and goes for walks several times a week without any symptoms. No further cardiac work-up recommended prior to intermediate cardiac risk abdominal surgery. At this time, patient would be considered intermediate cardiac risk considering underlying valvular disease. Would proceed with caution regarding iatrogenic volume status since aortic stenosis is a preload dependent state, however she also has aortic regurgitation which can cause her to become volume overloaded as well. Would watch volume status closely perioperatively and avoid significant iatrogenic volume resuscitation. Continue blood pressure management with amlodipine perioperatively. No additional medications required currently. She is not on a beta-blocker at baseline, therefore the addition of one is not necessary. Electronically Signed: Cory Forte.  Montrell Conley MD, [facility]

## 2023-08-09 NOTE — PRE-PROCEDURE INSTRUCTIONS
Pre-Surgery Instructions:   Medication Instructions   • amLODIPine (NORVASC) 5 mg tablet Take night before surgery   • atorvastatin (LIPITOR) 10 mg tablet Take night before surgery   • Multiple Vitamins-Minerals (PRESERVISION AREDS 2 PO) Stop taking 7 days prior to surgery. No bowel prep, clear liquids 48 hr preop    Medication instructions for day surgery reviewed. Please use only a sip of water to take your instructed medications. Avoid all over the counter vitamins, supplements and NSAIDS for one week prior to surgery per anesthesia guidelines. Tylenol is ok to take as needed. You will receive a call one business day prior to surgery with an arrival time and hospital directions. If your surgery is scheduled on a Monday, the hospital will be calling you on the Friday prior to your surgery. If you have not heard from anyone by 8pm, please call the hospital supervisor through the hospital  at [phone]. Horacio Veras [phone]). Do not eat or drink anything after midnight the night before your surgery, including candy, mints, lifesavers, or chewing gum. Do not drink alcohol 24hrs before your surgery. Try not to smoke at least 24hrs before your surgery. Follow the pre surgery showering instructions as listed in the [facility] Surgical Experience Booklet” or otherwise provided by your surgeon's office. Do not shave the surgical area 24 hours before surgery. Do not apply any lotions, creams, including makeup, cologne, deodorant, or perfumes after showering on the day of your surgery. No contact lenses, eye make-up, or artificial eyelashes. Remove nail polish, including gel polish, and any artificial, gel, or acrylic nails if possible. Remove all jewelry including rings and body piercing jewelry. Wear causal clothing that is easy to take on and off. Consider your type of surgery. Keep any valuables, jewelry, piercings at home.  Please bring any specially ordered equipment (sling, braces) if indicated. Arrange for a responsible person to drive you to and from the hospital on the day of your surgery. Visitor Guidelines discussed. Call the surgeon's office with any new illnesses, exposures, or additional questions prior to surgery. Please reference your [facility] Surgical Experience Booklet” for additional information to prepare for your upcoming surgery.

## 2023-08-10 DIAGNOSIS — N64.9 BREAST LESION: Primary | ICD-10-CM

## 2023-08-15 ENCOUNTER — HOSPITAL ENCOUNTER (INPATIENT)
Facility: HOSPITAL | Age: 88
LOS: 6 days | Discharge: HOME | DRG: 330 | End: 2023-08-21
Attending: STUDENT IN AN ORGANIZED HEALTH CARE EDUCATION/TRAINING PROGRAM | Admitting: STUDENT IN AN ORGANIZED HEALTH CARE EDUCATION/TRAINING PROGRAM
Payer: MEDICARE

## 2023-08-15 ENCOUNTER — ANESTHESIA (INPATIENT)
Dept: PERIOP | Facility: HOSPITAL | Age: 88
DRG: 330 | End: 2023-08-15
Payer: MEDICARE

## 2023-08-15 DIAGNOSIS — C18.9 COLON ADENOCARCINOMA (HCC): ICD-10-CM

## 2023-08-15 DIAGNOSIS — K52.89 OTHER SPECIFIED NONINFECTIVE GASTROENTERITIS AND COLITIS: ICD-10-CM

## 2023-08-15 DIAGNOSIS — Z90.49 S/P RIGHT COLECTOMY: Primary | ICD-10-CM

## 2023-08-15 PROBLEM — Z12.11 COLON CANCER SCREENING: Status: RESOLVED | Noted: 2022-06-02 | Resolved: 2023-08-15

## 2023-08-15 LAB
ABO GROUP BLD: NORMAL
ANION GAP SERPL CALCULATED.3IONS-SCNC: 9 MMOL/L
BUN SERPL-MCNC: 9 MG/DL (ref 5–25)
CALCIUM SERPL-MCNC: 7.6 MG/DL (ref 8.3–10.1)
CHLORIDE SERPL-SCNC: 105 MMOL/L (ref 96–108)
CO2 SERPL-SCNC: 23 MMOL/L (ref 21–32)
CREAT SERPL-MCNC: 0.63 MG/DL (ref 0.6–1.3)
ERYTHROCYTE [DISTWIDTH] IN BLOOD BY AUTOMATED COUNT: 17.2 % (ref 11.6–15.1)
GFR SERPL CREATININE-BSD FRML MDRD: 80 ML/MIN/1.73SQ M
GLUCOSE SERPL-MCNC: 203 MG/DL (ref 65–140)
GLUCOSE SERPL-MCNC: 211 MG/DL (ref 65–140)
HCT VFR BLD AUTO: 30.1 % (ref 34.8–46.1)
HGB BLD-MCNC: 9.8 G/DL (ref 11.5–15.4)
LACTATE SERPL-SCNC: 2.1 MMOL/L (ref 0.5–2)
LACTATE SERPL-SCNC: 3.2 MMOL/L (ref 0.5–2)
MAGNESIUM SERPL-MCNC: 1.9 MG/DL (ref 1.6–2.6)
MCH RBC QN AUTO: 27.5 PG (ref 26.8–34.3)
MCHC RBC AUTO-ENTMCNC: 32.6 G/DL (ref 31.4–37.4)
MCV RBC AUTO: 85 FL (ref 82–98)
PHOSPHATE SERPL-MCNC: 2.6 MG/DL (ref 2.3–4.1)
PLATELET # BLD AUTO: 328 THOUSANDS/UL (ref 149–390)
PMV BLD AUTO: 9 FL (ref 8.9–12.7)
POTASSIUM SERPL-SCNC: 2.8 MMOL/L (ref 3.5–5.3)
RBC # BLD AUTO: 3.56 MILLION/UL (ref 3.81–5.12)
RH BLD: POSITIVE
SODIUM SERPL-SCNC: 137 MMOL/L (ref 135–147)
WBC # BLD AUTO: 20.71 THOUSAND/UL (ref 4.31–10.16)

## 2023-08-15 PROCEDURE — 84100 ASSAY OF PHOSPHORUS: CPT | Performed by: STUDENT IN AN ORGANIZED HEALTH CARE EDUCATION/TRAINING PROGRAM

## 2023-08-15 PROCEDURE — 03HY32Z INSERTION OF MONITORING DEVICE INTO UPPER ARTERY, PERCUTANEOUS APPROACH: ICD-10-PCS | Performed by: STUDENT IN AN ORGANIZED HEALTH CARE EDUCATION/TRAINING PROGRAM

## 2023-08-15 PROCEDURE — 80048 BASIC METABOLIC PNL TOTAL CA: CPT | Performed by: STUDENT IN AN ORGANIZED HEALTH CARE EDUCATION/TRAINING PROGRAM

## 2023-08-15 PROCEDURE — 83605 ASSAY OF LACTIC ACID: CPT | Performed by: STUDENT IN AN ORGANIZED HEALTH CARE EDUCATION/TRAINING PROGRAM

## 2023-08-15 PROCEDURE — 99291 CRITICAL CARE FIRST HOUR: CPT | Performed by: SURGERY

## 2023-08-15 PROCEDURE — 83735 ASSAY OF MAGNESIUM: CPT | Performed by: STUDENT IN AN ORGANIZED HEALTH CARE EDUCATION/TRAINING PROGRAM

## 2023-08-15 PROCEDURE — 0DTF0ZZ RESECTION OF RIGHT LARGE INTESTINE, OPEN APPROACH: ICD-10-PCS | Performed by: STUDENT IN AN ORGANIZED HEALTH CARE EDUCATION/TRAINING PROGRAM

## 2023-08-15 PROCEDURE — 82948 REAGENT STRIP/BLOOD GLUCOSE: CPT

## 2023-08-15 PROCEDURE — 44160 REMOVAL OF COLON: CPT | Performed by: STUDENT IN AN ORGANIZED HEALTH CARE EDUCATION/TRAINING PROGRAM

## 2023-08-15 PROCEDURE — 85027 COMPLETE CBC AUTOMATED: CPT | Performed by: STUDENT IN AN ORGANIZED HEALTH CARE EDUCATION/TRAINING PROGRAM

## 2023-08-15 PROCEDURE — 88341 IMHCHEM/IMCYTCHM EA ADD ANTB: CPT | Performed by: PATHOLOGY

## 2023-08-15 PROCEDURE — C9290 INJ, BUPIVACAINE LIPOSOME: HCPCS | Performed by: INTERNAL MEDICINE

## 2023-08-15 PROCEDURE — 88309 TISSUE EXAM BY PATHOLOGIST: CPT | Performed by: PATHOLOGY

## 2023-08-15 PROCEDURE — 88342 IMHCHEM/IMCYTCHM 1ST ANTB: CPT | Performed by: PATHOLOGY

## 2023-08-15 RX ORDER — MAGNESIUM HYDROXIDE 1200 MG/15ML
LIQUID ORAL AS NEEDED
Status: DISCONTINUED | OUTPATIENT
Start: 2023-08-15 | End: 2023-08-15 | Stop reason: HOSPADM

## 2023-08-15 RX ORDER — OXYCODONE HYDROCHLORIDE 10 MG/1
10 TABLET ORAL EVERY 4 HOURS PRN
Status: DISCONTINUED | OUTPATIENT
Start: 2023-08-15 | End: 2023-08-16

## 2023-08-15 RX ORDER — LIDOCAINE HYDROCHLORIDE 10 MG/ML
INJECTION, SOLUTION EPIDURAL; INFILTRATION; INTRACAUDAL; PERINEURAL AS NEEDED
Status: DISCONTINUED | OUTPATIENT
Start: 2023-08-15 | End: 2023-08-15

## 2023-08-15 RX ORDER — CHLORHEXIDINE GLUCONATE 0.12 MG/ML
15 RINSE ORAL EVERY 12 HOURS SCHEDULED
Status: DISCONTINUED | OUTPATIENT
Start: 2023-08-15 | End: 2023-08-21 | Stop reason: HOSPADM

## 2023-08-15 RX ORDER — ATORVASTATIN CALCIUM 10 MG/1
10 TABLET, FILM COATED ORAL
Status: DISCONTINUED | OUTPATIENT
Start: 2023-08-16 | End: 2023-08-21 | Stop reason: HOSPADM

## 2023-08-15 RX ORDER — SODIUM CHLORIDE, SODIUM LACTATE, POTASSIUM CHLORIDE, CALCIUM CHLORIDE 600; 310; 30; 20 MG/100ML; MG/100ML; MG/100ML; MG/100ML
INJECTION, SOLUTION INTRAVENOUS CONTINUOUS PRN
Status: DISCONTINUED | OUTPATIENT
Start: 2023-08-15 | End: 2023-08-15

## 2023-08-15 RX ORDER — ETOMIDATE 2 MG/ML
INJECTION INTRAVENOUS AS NEEDED
Status: DISCONTINUED | OUTPATIENT
Start: 2023-08-15 | End: 2023-08-15

## 2023-08-15 RX ORDER — ONDANSETRON 2 MG/ML
INJECTION INTRAMUSCULAR; INTRAVENOUS AS NEEDED
Status: DISCONTINUED | OUTPATIENT
Start: 2023-08-15 | End: 2023-08-15

## 2023-08-15 RX ORDER — OXYCODONE HYDROCHLORIDE 5 MG/1
5 TABLET ORAL EVERY 4 HOURS PRN
Status: DISCONTINUED | OUTPATIENT
Start: 2023-08-15 | End: 2023-08-16

## 2023-08-15 RX ORDER — ROCURONIUM BROMIDE 10 MG/ML
INJECTION, SOLUTION INTRAVENOUS AS NEEDED
Status: DISCONTINUED | OUTPATIENT
Start: 2023-08-15 | End: 2023-08-15

## 2023-08-15 RX ORDER — AMLODIPINE BESYLATE 5 MG/1
5 TABLET ORAL
Status: DISCONTINUED | OUTPATIENT
Start: 2023-08-16 | End: 2023-08-21 | Stop reason: HOSPADM

## 2023-08-15 RX ORDER — ESMOLOL HYDROCHLORIDE 10 MG/ML
INJECTION INTRAVENOUS AS NEEDED
Status: DISCONTINUED | OUTPATIENT
Start: 2023-08-15 | End: 2023-08-15

## 2023-08-15 RX ORDER — CEFAZOLIN SODIUM 1 G/3ML
INJECTION, POWDER, FOR SOLUTION INTRAMUSCULAR; INTRAVENOUS AS NEEDED
Status: DISCONTINUED | OUTPATIENT
Start: 2023-08-15 | End: 2023-08-15

## 2023-08-15 RX ORDER — METRONIDAZOLE 500 MG/100ML
500 INJECTION, SOLUTION INTRAVENOUS ONCE
Status: COMPLETED | OUTPATIENT
Start: 2023-08-15 | End: 2023-08-15

## 2023-08-15 RX ORDER — INSULIN LISPRO 100 [IU]/ML
1-5 INJECTION, SOLUTION INTRAVENOUS; SUBCUTANEOUS EVERY 6 HOURS SCHEDULED
Status: DISCONTINUED | OUTPATIENT
Start: 2023-08-15 | End: 2023-08-16

## 2023-08-15 RX ORDER — OXYCODONE HYDROCHLORIDE 5 MG/1
5 TABLET ORAL EVERY 4 HOURS PRN
Status: DISCONTINUED | OUTPATIENT
Start: 2023-08-15 | End: 2023-08-15

## 2023-08-15 RX ORDER — ONDANSETRON 2 MG/ML
4 INJECTION INTRAMUSCULAR; INTRAVENOUS EVERY 6 HOURS PRN
Status: DISCONTINUED | OUTPATIENT
Start: 2023-08-15 | End: 2023-08-21 | Stop reason: HOSPADM

## 2023-08-15 RX ORDER — BUPIVACAINE HYDROCHLORIDE 2.5 MG/ML
INJECTION, SOLUTION EPIDURAL; INFILTRATION; INTRACAUDAL AS NEEDED
Status: DISCONTINUED | OUTPATIENT
Start: 2023-08-15 | End: 2023-08-15

## 2023-08-15 RX ORDER — MAGNESIUM SULFATE 1 G/100ML
1 INJECTION INTRAVENOUS ONCE
Status: COMPLETED | OUTPATIENT
Start: 2023-08-15 | End: 2023-08-15

## 2023-08-15 RX ORDER — HYDROMORPHONE HCL IN WATER/PF 6 MG/30 ML
0.2 PATIENT CONTROLLED ANALGESIA SYRINGE INTRAVENOUS
Status: DISCONTINUED | OUTPATIENT
Start: 2023-08-15 | End: 2023-08-15 | Stop reason: HOSPADM

## 2023-08-15 RX ORDER — POTASSIUM CHLORIDE 20MEQ/15ML
40 LIQUID (ML) ORAL ONCE
Status: COMPLETED | OUTPATIENT
Start: 2023-08-15 | End: 2023-08-15

## 2023-08-15 RX ORDER — FENTANYL CITRATE/PF 50 MCG/ML
25 SYRINGE (ML) INJECTION
Status: DISCONTINUED | OUTPATIENT
Start: 2023-08-15 | End: 2023-08-15 | Stop reason: HOSPADM

## 2023-08-15 RX ORDER — NALOXONE HYDROCHLORIDE 0.4 MG/ML
0.1 INJECTION, SOLUTION INTRAMUSCULAR; INTRAVENOUS; SUBCUTANEOUS
Status: DISCONTINUED | OUTPATIENT
Start: 2023-08-15 | End: 2023-08-21 | Stop reason: HOSPADM

## 2023-08-15 RX ORDER — DEXAMETHASONE SODIUM PHOSPHATE 10 MG/ML
INJECTION, SOLUTION INTRAMUSCULAR; INTRAVENOUS AS NEEDED
Status: DISCONTINUED | OUTPATIENT
Start: 2023-08-15 | End: 2023-08-15

## 2023-08-15 RX ORDER — SODIUM CHLORIDE, SODIUM LACTATE, POTASSIUM CHLORIDE, CALCIUM CHLORIDE 600; 310; 30; 20 MG/100ML; MG/100ML; MG/100ML; MG/100ML
40 INJECTION, SOLUTION INTRAVENOUS CONTINUOUS
Status: DISCONTINUED | OUTPATIENT
Start: 2023-08-15 | End: 2023-08-16

## 2023-08-15 RX ORDER — CEFAZOLIN SODIUM 2 G/50ML
2000 SOLUTION INTRAVENOUS ONCE
Status: DISCONTINUED | OUTPATIENT
Start: 2023-08-15 | End: 2023-08-15

## 2023-08-15 RX ORDER — HEPARIN SODIUM 5000 [USP'U]/ML
5000 INJECTION, SOLUTION INTRAVENOUS; SUBCUTANEOUS EVERY 8 HOURS SCHEDULED
Status: DISCONTINUED | OUTPATIENT
Start: 2023-08-15 | End: 2023-08-16

## 2023-08-15 RX ORDER — SODIUM CHLORIDE, SODIUM LACTATE, POTASSIUM CHLORIDE, CALCIUM CHLORIDE 600; 310; 30; 20 MG/100ML; MG/100ML; MG/100ML; MG/100ML
40 INJECTION, SOLUTION INTRAVENOUS CONTINUOUS
Status: DISCONTINUED | OUTPATIENT
Start: 2023-08-15 | End: 2023-08-15

## 2023-08-15 RX ORDER — FENTANYL CITRATE 50 UG/ML
INJECTION, SOLUTION INTRAMUSCULAR; INTRAVENOUS AS NEEDED
Status: DISCONTINUED | OUTPATIENT
Start: 2023-08-15 | End: 2023-08-15

## 2023-08-15 RX ORDER — ONDANSETRON 2 MG/ML
4 INJECTION INTRAMUSCULAR; INTRAVENOUS ONCE AS NEEDED
Status: DISCONTINUED | OUTPATIENT
Start: 2023-08-15 | End: 2023-08-15 | Stop reason: HOSPADM

## 2023-08-15 RX ADMIN — PHENYLEPHRINE HYDROCHLORIDE 100 MCG: 10 INJECTION INTRAVENOUS at 16:48

## 2023-08-15 RX ADMIN — METRONIDAZOLE: 500 SOLUTION INTRAVENOUS at 16:45

## 2023-08-15 RX ADMIN — CHLORHEXIDINE GLUCONATE 15 ML: 1.2 SOLUTION ORAL at 21:23

## 2023-08-15 RX ADMIN — SODIUM CHLORIDE, SODIUM LACTATE, POTASSIUM CHLORIDE, AND CALCIUM CHLORIDE 40 ML/HR: .6; .31; .03; .02 INJECTION, SOLUTION INTRAVENOUS at 20:45

## 2023-08-15 RX ADMIN — MORPHINE SULFATE 2 MG: 2 INJECTION, SOLUTION INTRAMUSCULAR; INTRAVENOUS at 21:43

## 2023-08-15 RX ADMIN — HEPARIN SODIUM 5000 UNITS: 5000 INJECTION INTRAVENOUS; SUBCUTANEOUS at 21:23

## 2023-08-15 RX ADMIN — FENTANYL CITRATE 25 MCG: 50 INJECTION INTRAMUSCULAR; INTRAVENOUS at 18:57

## 2023-08-15 RX ADMIN — CEFAZOLIN 1000 MG: 1 INJECTION, POWDER, FOR SOLUTION INTRAMUSCULAR; INTRAVENOUS at 16:50

## 2023-08-15 RX ADMIN — ROCURONIUM BROMIDE 50 MG: 10 INJECTION, SOLUTION INTRAVENOUS at 16:36

## 2023-08-15 RX ADMIN — POTASSIUM CHLORIDE 40 MEQ: 1.5 SOLUTION ORAL at 20:35

## 2023-08-15 RX ADMIN — ONDANSETRON 4 MG: 2 INJECTION INTRAMUSCULAR; INTRAVENOUS at 18:44

## 2023-08-15 RX ADMIN — SODIUM CHLORIDE, SODIUM LACTATE, POTASSIUM CHLORIDE, AND CALCIUM CHLORIDE 100 ML/HR: .6; .31; .03; .02 INJECTION, SOLUTION INTRAVENOUS at 15:40

## 2023-08-15 RX ADMIN — BUPIVACAINE 10 ML: 13.3 INJECTION, SUSPENSION, LIPOSOMAL INFILTRATION at 19:06

## 2023-08-15 RX ADMIN — FENTANYL CITRATE 25 MCG: 50 INJECTION INTRAMUSCULAR; INTRAVENOUS at 19:14

## 2023-08-15 RX ADMIN — SUGAMMADEX 120 MG: 100 INJECTION, SOLUTION INTRAVENOUS at 19:07

## 2023-08-15 RX ADMIN — SODIUM CHLORIDE, SODIUM LACTATE, POTASSIUM CHLORIDE, AND CALCIUM CHLORIDE: .6; .31; .03; .02 INJECTION, SOLUTION INTRAVENOUS at 16:39

## 2023-08-15 RX ADMIN — FENTANYL CITRATE 25 MCG: 50 INJECTION INTRAMUSCULAR; INTRAVENOUS at 19:38

## 2023-08-15 RX ADMIN — SODIUM CHLORIDE, SODIUM LACTATE, POTASSIUM CHLORIDE, CALCIUM CHLORIDE: 600; 310; 30; 20 INJECTION, SOLUTION INTRAVENOUS at 16:10

## 2023-08-15 RX ADMIN — BUPIVACAINE HYDROCHLORIDE 15 ML: 2.5 INJECTION, SOLUTION EPIDURAL; INFILTRATION; INTRACAUDAL; PERINEURAL at 19:06

## 2023-08-15 RX ADMIN — SODIUM CHLORIDE, SODIUM LACTATE, POTASSIUM CHLORIDE, AND CALCIUM CHLORIDE 250 ML: .6; .31; .03; .02 INJECTION, SOLUTION INTRAVENOUS at 21:23

## 2023-08-15 RX ADMIN — ETOMIDATE 12 MG: 2 INJECTION INTRAVENOUS at 16:36

## 2023-08-15 RX ADMIN — PHENYLEPHRINE HYDROCHLORIDE 100 MCG: 10 INJECTION INTRAVENOUS at 16:51

## 2023-08-15 RX ADMIN — OXYCODONE HYDROCHLORIDE 5 MG: 5 TABLET ORAL at 20:56

## 2023-08-15 RX ADMIN — BUPIVACAINE 10 ML: 13.3 INJECTION, SUSPENSION, LIPOSOMAL INFILTRATION at 19:07

## 2023-08-15 RX ADMIN — INSULIN LISPRO 1 UNITS: 100 INJECTION, SOLUTION INTRAVENOUS; SUBCUTANEOUS at 20:35

## 2023-08-15 RX ADMIN — ESMOLOL HYDROCHLORIDE 10 MG: 100 INJECTION, SOLUTION INTRAVENOUS at 17:03

## 2023-08-15 RX ADMIN — FENTANYL CITRATE 50 MCG: 50 INJECTION INTRAMUSCULAR; INTRAVENOUS at 16:43

## 2023-08-15 RX ADMIN — FENTANYL CITRATE 50 MCG: 50 INJECTION INTRAMUSCULAR; INTRAVENOUS at 17:00

## 2023-08-15 RX ADMIN — MAGNESIUM SULFATE HEPTAHYDRATE 1 G: 1 INJECTION, SOLUTION INTRAVENOUS at 20:53

## 2023-08-15 RX ADMIN — POTASSIUM PHOSPHATE, MONOBASIC AND POTASSIUM PHOSPHATE, DIBASIC 27 MMOL: 224; 236 INJECTION, SOLUTION, CONCENTRATE INTRAVENOUS at 21:23

## 2023-08-15 RX ADMIN — FENTANYL CITRATE 25 MCG: 50 INJECTION INTRAMUSCULAR; INTRAVENOUS at 16:05

## 2023-08-15 RX ADMIN — PHENYLEPHRINE HYDROCHLORIDE 40 MCG/MIN: 10 INJECTION INTRAVENOUS at 16:43

## 2023-08-15 RX ADMIN — LIDOCAINE HYDROCHLORIDE 50 MG: 10 INJECTION, SOLUTION EPIDURAL; INFILTRATION; INTRACAUDAL; PERINEURAL at 16:36

## 2023-08-15 RX ADMIN — ROCURONIUM BROMIDE 10 MG: 10 INJECTION, SOLUTION INTRAVENOUS at 17:59

## 2023-08-15 RX ADMIN — OXYCODONE HYDROCHLORIDE 10 MG: 10 TABLET ORAL at 23:25

## 2023-08-15 RX ADMIN — BUPIVACAINE HYDROCHLORIDE 15 ML: 2.5 INJECTION, SOLUTION EPIDURAL; INFILTRATION; INTRACAUDAL; PERINEURAL at 19:07

## 2023-08-15 RX ADMIN — FENTANYL CITRATE 25 MCG: 50 INJECTION INTRAMUSCULAR; INTRAVENOUS at 15:55

## 2023-08-15 RX ADMIN — DEXAMETHASONE SODIUM PHOSPHATE 10 MG: 10 INJECTION, SOLUTION INTRAMUSCULAR; INTRAVENOUS at 17:03

## 2023-08-15 RX ADMIN — PHENYLEPHRINE HYDROCHLORIDE 200 MCG: 10 INJECTION INTRAVENOUS at 16:40

## 2023-08-15 NOTE — ANESTHESIA PREPROCEDURE EVALUATION
Procedure:  RIGHT PARTIAL COLECTOMY (Abdomen)    Relevant Problems   CARDIO   (+) Hypertension   (+) Mitral valve regurgitation   (+) Pure hypercholesterolemia      GI/HEPATIC   (+) Colon adenocarcinoma (HCC)   (+) Hepatic steatosis   (+) Pancreas cyst      MUSCULOSKELETAL   (+) Osteoarthritis      Cardiovascular and Mediastinum   (+) Left ventricular hypertrophy   (+) Severe aortic stenosis by prior echocardiogram             Anesthesia Plan  ASA Score- 4     Anesthesia Type- general with ASA Monitors. Additional Monitors: arterial line. Airway Plan: ETT. Comment: Preinduction a line. Epidural if patient agrees. .       Plan Factors-Exercise tolerance (METS): >4 METS. Chart reviewed. EKG reviewed. Imaging results reviewed. Existing labs reviewed. Patient summary reviewed. Patient is not a current smoker. Induction- intravenous. Postoperative Plan- Plan for postoperative opioid use. Planned trial extubation    Informed Consent- Anesthetic plan and risks discussed with patient. I personally reviewed this patient with the CRNA. Discussed and agreed on the Anesthesia Plan with the CRNA. [facility] VITALS  There were no vitals taken for this visit. BP Readings from Last 3 Encounters:   08/08/23 132/60   08/01/23 118/70   07/31/23 148/86     LABS  Results from Last 12 Months   Lab Units 08/02/23  1155 07/17/23  1047   SODIUM mmol/L 136 135   POTASSIUM mmol/L 3.9 4.4   CHLORIDE mmol/L 107 106   CO2 mmol/L 25 24   ANION GAP mmol/L 4 5   BUN mg/dL 13 14   CREATININE mg/dL 0.81 0.87   CALCIUM mg/dL 8.6 8.4   GLUCOSE RANDOM mg/dL 140  --    AST U/L 26 24   ALT U/L 28 25   ALK PHOS U/L 131* 128*   TOTAL BILIRUBIN mg/dL 0.48 0.34   ALBUMIN g/dL 2.8* 2.8*     Results from Last 12 Months   Lab Units 08/02/23  1155   WBC Thousand/uL 7.28   HEMOGLOBIN g/dL 10.0*   HEMATOCRIT % 31.8*   PLATELETS Thousands/uL 300     No results for input(s): "APTT", "INR", "PTT" in the last 8784 hours.     ECG  Encounter Date: 08/02/23   ECG 12 lead   Result Value    Ventricular Rate 74    Atrial Rate 74    WY Interval 134    QRSD Interval 106    QT Interval 408    QTC Interval 452    P Axis 55    QRS Axis 48    T Wave Axis 33    Narrative    Normal sinus rhythm  Possible Left atrial enlargement  Incomplete right bundle branch block  No previous ECGs available  Confirmed by Mohsen Pearce ([id number]) on 8/3/2023 10:09:52 AM     No results found for this or any previous visit. ECHOCARDIOGRAPHY, OTHER CARDIAC TESTING, AND IMAGING  2023 TTE  Narrative    This result has an attachment that is not available. •  Left Ventricle: Left ventricle is normal in size. Wall thickness is   normal. Systolic function is normal with an ejection fraction of 60-65%. Wall motion is within normal limits. There is grade I (mild) diastolic   dysfunction. •  Left Atrium: Left atrium cavity is mildly dilated. Left atrium volume   index is moderately increased. •  Aortic Valve: The aortic valve is trileaflet. The leaflets are   moderately thickened. The leaflets are moderately calcified. There is   moderate regurgitation. There is moderate to severe stenosis with mean   gradient of 25 mmHg.  Peak gradient is 42 mmHg with SE of 0.83 cm². •  Mitral Valve: The leaflets are mildly thickened. There is moderate   annular calcification. There is mild regurgitation. There is no evidence   of mitral valve stenosis. •  Tricuspid Valve: There is trace regurgitation. •  Pulmonic Valve: There is trace regurgitation. The estimated pulmonary   artery systolic pressure is 12.8 mmHg. •  Pericardium: There is no pericardial effusion. Compared to prior similar study of 7/19/2021, slight progression of aortic   stenosis noted.  SE was 1.0 cm² with mean gradient of 21 mmHg on prior   study. Left Ventricle   Left ventricle is normal in size. Wall thickness is normal. Systolic function is normal with an ejection fraction of 60-65%.  Wall motion is within normal limits. There is grade I (mild) diastolic dysfunction. Right Ventricle   Right ventricle cavity is normal. Systolic function is normal.     Left Atrium   Left atrium cavity is mildly dilated. Left atrium volume index is moderately increased. Right Atrium   Right atrium cavity is normal.     IVC/SVC   The inferior vena cava demonstrates a diameter of <=21 mm and collapses >50%; therefore, the right atrial pressure is estimated at 0-5 mmHg. Mitral Valve   The leaflets are mildly thickened. There is moderate annular calcification. There is mild regurgitation. There is no evidence of mitral valve stenosis. Tricuspid Valve   Tricuspid valve structure is normal. There is trace regurgitation. There is no evidence of tricuspid valve stenosis. The right ventricular systolic pressure is normal.     Aortic Valve   The aortic valve is trileaflet. The leaflets are moderately thickened. The leaflets are moderately calcified. There is moderate regurgitation. There is moderate to severe stenosis with mean gradient of 25 mmHg. Peak gradient is 42 mmHg with SE of 0.83 cm². Pulmonic Valve   Pulmonic valve structure is normal. There is trace regurgitation. There is no evidence of pulmonic valve stenosis. The estimated pulmonary artery systolic pressure is 92.3 mmHg. Ascending Aorta   The aorta appears normal in size. Pericardium   There is no pericardial effusion. Study Details   A complete 2D echocardiogram was performed. Overall the study quality was good. Prior Study   There is a prior study available for comparison. As compared to the previous study, there are changes. Slight progression of aortic stenosis noted.       ANESTHESIA RISK-BENEFIT DISCUSSION  BENEFITS INCLUDE THE FOLLOWING ([address] [id number], PMID [id number]): (1) Involvement of a dedicated anesthesia team reduces mortality and morbidity for major surgeries, (2) The team provides as much analgesia/sedation/amnesia/akinesia as is reasonably possible, and (3) The team strives to reduce discomfort and distress as much as reasonably achievable. RISKS (AND PLANS TO MITIGATE RISKS) INCLUDES THE FOLLOWING:     Neurologic Risks: IntraOp awareness (Risk is ~1:1,000 - 1:14,000; PMID [id number]), Stroke (Risk ~<0.1-2% for most cases; PMID [id number]), nerve injury, vision loss, and POCD. Since neuraxial anesthesia may be used, I also discussed risk of bleeding - including epidural hematomas, infection, hypotension, and failed or patchy neuraxial blockade. Airway and Pulmonary Risks: Dental or mouth injury, throat pain, critical hypoxia, pneumothorax, prolonged intubation, post-op respiratory compromise. • Airway/Intubation risks and information: No prior advanced airway notes in [facility] EMR  • Major [facility] risk factors include: Age >80: 1 pt and Case surgical time estimated at >2hrs: 1pt, (Score 2-3= Intermediate risk, 13.3%). Cardiovascular Risks: Hypotension, arrhythmias, and gabi-op cardiac injury (MACE). In cases where specialized vascular access is needed, then additional risks including bleeding, infection, or injury to adjacent structures. If a bypass circuit is needed then risk of stroke, blood clot, and vasoplegia. • Signs of active, severe cardiac instability: Has severe AS but surgical and Cardiolgy teams feel it is of intermediate risk and desire to proceed. • Claudio's RCRI score items: none  • MACE risk: Additional risk 2/2 AS that is challenging to quantify. • Are gabi-op or intra-op beta blockers indicated? (PMID [id number]): no   FEN/GI Risks: Aspiration (Approximately 0.5% risk per the IRIS trial) and PONV (10-80% depending on Apfel criteria). • Does the patient meet ASA NPO guidelines?: Yes   Adverse drug reaction risks:  Allergic reactions, overdoses, drug-drug interactions, injury to a fetus or  in pregnant or breastfeeding patients, increased risk of injury or accident if performing potentially hazardous tasks before anesthetic medications have been fully excreted/metabolized.   • Recent medications relevant to anesthetic plan: See MAR or Med Review  • Personal or family history of anesthesia complications: no  • Pregnancy Status: Not applicable   Mortality risks associated with anesthesia based on ASA-PS (PMID 98006865): ASA-PS IV: Estimated risk  1:1,800

## 2023-08-15 NOTE — ANESTHESIA PROCEDURE NOTES
Peripheral Block    Patient location during procedure: holding area  Start time: 8/15/2023 7:06 PM  Reason for block: at surgeon's request and post-op pain management  Staffing  Performed by: Mary Ann Real MD  Authorized by: Mary Ann Real MD    Preanesthetic Checklist  Completed: patient identified, IV checked, site marked, risks and benefits discussed, surgical consent, monitors and equipment checked, pre-op evaluation and timeout performed  Peripheral Block  Patient position: supine  Prep: ChloraPrep  Patient monitoring: frequent blood pressure checks, continuous pulse oximetry and heart rate  Block type: TAP  Laterality: bilateral  Injection technique: single-shot  Procedures: ultrasound guided, Ultrasound guidance required for the procedure to increase accuracy and safety of medication placement and decrease risk of complications.   Ultrasound permanent image saved  Needle  Needle type: Stimuplex   Needle gauge: 20 G  Needle length: 4 in  Needle localization: anatomical landmarks and ultrasound guidance  Assessment  Injection assessment: incremental injection, frequent aspiration, injected with ease, negative aspiration, negative for heart rate change, no paresthesia on injection, no symptoms of intraneural/intravenous injection and needle tip visualized at all times  Paresthesia pain: none  Post-procedure:  site cleaned  patient tolerated the procedure well with no immediate complications

## 2023-08-15 NOTE — ANESTHESIA POSTPROCEDURE EVALUATION
Post-Op Assessment Note    CV Status:  Stable  Pain Score: 0    Pain management: adequate     Mental Status:  Awake   Hydration Status:  Stable   PONV Controlled:  None   Airway Patency:  Patent      Post Op Vitals Reviewed: Yes      Staff: CRNA     Post-op block assessment: no complications      No notable events documented. BP   117/46 (cuff)   Temp  98.2   Pulse  83   Resp   24   SpO2   96% on RA   Postop VS in PACU noted above, SV non-obstructed. Denies pain. Arterial line waveform dampened despite power flushing so using cuff BPs.

## 2023-08-15 NOTE — OP NOTE
OPERATIVE REPORT  PATIENT NAME: Latrice Juarez    :  1935  MRN: [id number]  Pt Location: BE OR ROOM 04    SURGERY DATE: 8/15/2023    Surgeon(s) and Role:     * Sussy Amin MD - Primary     * Jennifer Henderson MD - Dandre Cedillo MD - Assisting    Preop Diagnosis:  Colon adenocarcinoma [facility]) [C18.9]    Post-Op Diagnosis Codes:     * Colon adenocarcinoma ([address]) [C18.9]    Procedure(s):  RIGHT HEMICOLECTOMY    Specimen(s):  ID Type Source Tests Collected by Time Destination   1 : right colectomy Tissue Soft Tissue, Other TISSUE EXAM Sussy Amin MD 8/15/2023 9259        Estimated Blood Loss:   Minimal    Drains:  Urethral Catheter 16 Fr. (Active)   Number of days: 0       [REMOVED] NG/OG/Enteral Tube Orogastric 18 Fr Center mouth (Removed)   Number of days: 0       Anesthesia Type:   General w/ Epidural    Operative Indications:  Colon adenocarcinoma ([address]) [C18.9]    Operative Findings:  - No evidence of metastatic disease  - Large right colon lesion with visible transluminal tattoo s/p right colectomy with primary ileocolic anastomosis     Complications:   None    Procedure and Technique:  After obtaining informed written consent, the patient was brought back to the operating room and placed in the supine position on the operating room table with both arms extended. Bilateral lower extremity SCDs were placed, preoperative antibiotics were administered, and an arterial line was placed by the anesthesia team. The patient underwent uneventful induction of general anesthesia, a Pulido catheter was placed by nursing staff, and a universal timeout was undertaken where the patient's identity and proposed procedure were confirmed by all in the room. A midline incision was made extending from the subxiphoid region to just below the umbilicus. The subcutaneous tissues were divided with electrocautery, the underlying linea alba was identified/incised, and the peritoneum was entered.  Initial inspection revealed no injury from entry and we were able to almost immediately visulize the large tattooed right colonic lesion in question. The abdomen was carefully inspected and there was no evidence of metastasis. An Moe wound protector was placed and the Bookwalter retractor was set up. The appendix, cecum, and ascending colon were fully mobilized by dividing the lateral peritoneal attachments along the white line of Toldt distal to the hepatic flexure. The duodenum was identified/protected and the transverse colon was mobilized off of the greater omentum to allow for full mobilization of the right colon. Once we were able to completely exteriorize the terminal ileum and right colon, transection points were selected proximally/distally. The terminal ileum was divided with a 75mm blue load of the KENIA stapler, it's mesentery was taken down to the root with the Enseal device, and high ligation of the ileocolic pedicle was performed with an 0 silk. Using another blue load of the 75mm KENIA stapler, the colon was transected just left of the right branch of the middle colic artery and it's mesentery was divided with the Enseal device. The right colic pedicle was identified, secured with an 0 silk tie, and the remaining mesentery was transected with the Enseal. The right colon and terminal ileum were then able to be fully extracted and they were sent for pathology labeled as "right colectomy". The transected ileum and transverse colon were aligned and an enterotomy was made on the corner of each staple line. The anastomosis was formed with a blue load of the 75mm KENIA stapler and intra-luminal inspection revealed no evidence of hemorrhage with an anastomosis that was widely patent. The common enterotomy was closed with a blue load of the 60mm TX stapler, the crossing staple lines/corners were imbricated with 3-0 silk, and a crotch stitch was placed using the same.     Our outer gloves were changed, we transitioned to a clean closure set, and the wound protector was removed. The abdomen was irrigated/evacuated and hemostasis was confirmed to be excellent. Following the confirmation of instrument/sponge/sharp counts, the fascia was reapproximated with two 1. PDS sutures, beginning cephalad/caudally and meeting in the middle. The dermis was closed with 3-0 Vicryl, the skin was closed with 4-0 Monocryl/skin glue, and the patient was uneventfully extubated from general anesthesia to be transported to PACU in stable condition with no immediate complications noted. Dr. Octaviano Coombs was present and scrubbed for the entire procedure.     Patient Disposition:  PACU     Colon Resection  Operation performed with curative intent Yes   Tumor Location (select all that apply) Ascending colon   Extent of colon and vascular resection (select all that apply) Right hemicolectomy - ileocolic, right colic (if present)        SIGNATURE: Amie Gruber MD  DATE: August 15, 2023  TIME: 7:10 PM

## 2023-08-15 NOTE — ANESTHESIA PROCEDURE NOTES
Arterial Line Insertion    Performed by: Ascencion Britt MD PhD  Authorized by: Ascencion Britt MD PhD  Consent: Written consent obtained. Consent given by: patient  Patient understanding: patient states understanding of the procedure being performed  Patient consent: the patient's understanding of the procedure matches consent given  Patient identity confirmed: arm band, provided demographic data and hospital-assigned identification number  Time out: Immediately prior to procedure a "time out" was called to verify the correct patient, procedure, equipment, support staff and site/side marked as required.   Indications: hemodynamic monitoring  Orientation:  Left  Location: radial artery  Sedation:  Patient sedated: no    Procedure Details:  Claude's test normal: yes  Needle gauge: 20  Seldinger technique: Seldinger technique used  Number of attempts: 4    Post-procedure:  Post-procedure: dressing applied  Waveform: good waveform  Post-procedure CNS: normal  Patient tolerance: patient tolerated the procedure well with no immediate complications

## 2023-08-15 NOTE — CONSULTS
[address] [place] [address]  H&P: Critical Care  Name: Galina Webster 80 y.o. female I MRN: [id number]  Unit/Bed#: ICCU 208-01 I Date of Admission: 8/15/2023   Date of Service: 8/15/2023 I Hospital Day: 0      Assessment/Plan   Neuro:   · Diagnosis: Acute Pain  · Plan: PRN oxycodone, prn morphine. Allergy to tylenol. · Diagnosis: Acute Nausea  · Plan: PRN zofran  · Diagnosis: Geriatric population, high risk procedure,   · Gerontology consulted, appreciate recs. ICU-CAM, delirium precautions      CV:   · Diagnosis: Hx of severe AS, moderate AR, mild MR, HTN, HLD  · Plan: Cardiology evaluated preoperatively. Watch volume status closely. Avoid significant fluid shifts as able. · Restart home amlodipine 8/16 as blood pressures allow  · Restart home statin on 8/16  · Not currently requiring pressors. Goal MAP> 65. · Continue arterial line for today  · Obtain post-operative EKG      Pulm:  No active issues. Currently on room air. Encourage IS. GI:   · Diagnosis: adenocarcinoma of colon  · S/p right hemicolectomy on 8/15  · Appreciate surgical oncology recommendations  · Diet advancement per surg onc. :   · Diagnosis: Pulido in place  · Plan: Continue for today. Monitor urine output. Trend Cr. F/E/N:    · Fluids: Plyte @40  · Electrolytes: Replace for goal K>4, PO4>3, Mg>2  · Nutrition: NPO, sips of clears      Heme/Onc:   · Diagnosis: Anemia of chronic disease  · Plan: Hgb on 8/2 of 10. Post-operative Hgb 9.8  · Transfuse for Hgb <7 or if symptomatic  · Trend Hgb  · DVT ppx:  · SCDs, SQH      Endo:   No active issues. Hypoglycemia protocol. ID:   · Diagnosis: Leukocytosis  · Plan: Likely stress response 2/2 surgery. · Trend WBC, fever curve. · Received perioperative antibiotics. No indication for continued antibiotics. MSK/Skin:   · Diagnosis: Physical deconditioning  · Plan: PT/OT consult. Encourage OOB.  Frequent turning and repositioning      Disposition: Stepdown Level 1       History of Present Illness     HPI: Valentin Garza is a 80 y.o. who presents with history of severe AS, moderate AR, mild MR, and right colon adenocarcinoma now s/p right colectomy on 8/15. Per report, operation was uneventful with no blood products administered. She was extubated post-operatively, not requiring pressors. Given her significant valvular abnormalities, patient was admitted to the critical care service for close hemodynamic monitoring. Endorsing expected post-operative pain. Denies nausea, vomiting, fevers, chills. History obtained from chart review and the patient. Review of Systems     Negative except as per HPI    Historical Information   Past Medical History:  No date: Aortic valve regurgitation  No date: Aortic valve stenosis  No date: Hypertension  No date: Left ventricular hypertrophy  No date: Mitral valve regurgitation  No date: Osteoarthritis  No date: Pure hypercholesterolemia Past Surgical History:  2014: BUNIONECTOMY; Left  2012: COLONOSCOPY  2014: CORRECTION HAMMER TOE;  Left   Current Outpatient Medications   Medication Instructions   • amLODIPine (NORVASC) 5 mg, Oral, Daily at bedtime   • atorvastatin (LIPITOR) 10 mg tablet Take 1 tablet by mouth once daily   • Multiple Vitamins-Minerals (PRESERVISION AREDS 2 PO) Oral    Allergies   Allergen Reactions   • Acetaminophen Hives      Social History     Tobacco Use   • Smoking status: Former     Packs/day: 1.00     Years: 25.00     Total pack years: 25.00     Types: Cigarettes     Quit date:      Years since quittin.6   • Smokeless tobacco: Never   Vaping Use   • Vaping Use: Never used   Substance Use Topics   • Alcohol use: Not Currently   • Drug use: Not Currently    Family History   Problem Relation Age of Onset   • Coronary artery disease Father    • Throat cancer Father    • Diabetes Sister    • No Known Problems Mother    • No Known Problems Daughter    • No Known Problems Maternal Grandmother • No Known Problems Maternal Grandfather    • No Known Problems Paternal Grandmother    • No Known Problems Paternal Grandfather    • No Known Problems Brother    • No Known Problems Brother    • Cancer Sister    • Pancreatic cancer Sister    • No Known Problems Daughter    • No Known Problems Son    • No Known Problems Son    • No Known Problems Son           Objective                            Vitals I/O      Most Recent Min/Max in 24hrs   Temp 97.5 °F (36.4 °C) Temp  Min: 97.2 °F (36.2 °C)  Max: 98.2 °F (36.8 °C)   Pulse 72 Pulse  Min: 72  Max: 86   Resp 12 Resp  Min: 12  Max: 18   /54 BP  Min: 108/54  Max: 136/62   O2 Sat 97 % SpO2  Min: 95 %  Max: 97 %      Intake/Output Summary (Last 24 hours) at 8/15/2023 2036  Last data filed at 8/15/2023 2003  Gross per 24 hour   Intake 1300 ml   Output 200 ml   Net 1100 ml         Diet NPO; Sips of clear liquids     Invasive Monitoring Physical exam   Arterial Line  [place] BP    No data recorded   MAP    No data recorded    Physical Exam:  General: No acute distress, alert  HEENT: EOMI  Neurological: No focal neurological deficits  CV: Well perfused, regular rate and rhythm  Lungs: Normal work of breathing, no increased respiratory effort  Abdomen: Soft, appropriately tender, non-distended. Incision c/d/i   Genitourinary: Pulido in place  Extremities: No edema, clubbing or cyanosis  Skin: Warm, dry       Diagnostic Studies      EKG: Reviewed  Imaging:  I have personally reviewed pertinent reports.        Medications:  Scheduled PRN   [START ON 8/16/2023] amLODIPine, 5 mg, HS  [START ON 8/16/2023] atorvastatin, 10 mg, HS  heparin (porcine), 5,000 Units, Q8H [address]  insulin lispro, 1-5 Units, Q6H FIDEL  magnesium sulfate, 1 g, Once  potassium phosphate, 27 mmol, Once      morphine injection, 2 mg, Q4H PRN  naloxone, 0.1 mg, Q1MIN PRN  ondansetron, 4 mg, Q6H PRN  oxyCODONE, 5 mg, Q4H PRN  oxyCODONE, 2.5 mg, Q4H PRN       Continuous    lactated ringers, 40 mL/hr Labs:    CBC    Recent Labs     08/15/23  1940   WBC 20.71*   HGB 9.8*   HCT 30.1*        BMP    Recent Labs     08/15/23  1940   SODIUM 137   K 2.8*      CO2 23   AGAP 9   BUN 9   CREATININE 0.63   CALCIUM 7.6*       Coags    No recent results     Additional Electrolytes  Recent Labs     08/15/23  1940   MG 1.9   PHOS 2.6          Blood Gas    No recent results  No recent results LFTs  No recent results    Infectious  No recent results  Glucose  Recent Labs     08/15/23  1940   GLUC 203*             Inpatient consult to Surgical Critical Care  Consult performed by: Florentino Balderas MD  Consult ordered by: MD Florentino Gonzalez MD

## 2023-08-15 NOTE — ANESTHESIA PROCEDURE NOTES
Epidural Block    Reason for block: procedure for pain and at surgeon's request  Staffing  Performed by: Jorge Rendon MD PhD  Authorized by:  Mary Ann Real MD    Preanesthetic Checklist  Completed: patient identified, IV checked, site marked, risks and benefits discussed, surgical consent, monitors and equipment checked, pre-op evaluation and timeout performed  Epidural  Patient position: sitting  Prep: ChloraPrep  Patient monitoring: cardiac monitor and frequent blood pressure checks  Approach: midline  Location: lumbar  Injection technique: NAHID air  Needle  Needle type: Tuohy   Needle gauge: 18 G  Catheter type: side hole  Catheter size: 20 G  Test dose: negativenegative aspiration for CSF, negative aspiration for heme and no paresthesia on injection  patient tolerated the procedure well with no immediate complications

## 2023-08-16 LAB
ANION GAP SERPL CALCULATED.3IONS-SCNC: 4 MMOL/L
BUN SERPL-MCNC: 9 MG/DL (ref 5–25)
CALCIUM SERPL-MCNC: 7.9 MG/DL (ref 8.3–10.1)
CHLORIDE SERPL-SCNC: 106 MMOL/L (ref 96–108)
CO2 SERPL-SCNC: 25 MMOL/L (ref 21–32)
CREAT SERPL-MCNC: 0.59 MG/DL (ref 0.6–1.3)
ERYTHROCYTE [DISTWIDTH] IN BLOOD BY AUTOMATED COUNT: 17.2 % (ref 11.6–15.1)
GFR SERPL CREATININE-BSD FRML MDRD: 82 ML/MIN/1.73SQ M
GLUCOSE SERPL-MCNC: 179 MG/DL (ref 65–140)
GLUCOSE SERPL-MCNC: 180 MG/DL (ref 65–140)
GLUCOSE SERPL-MCNC: 193 MG/DL (ref 65–140)
GLUCOSE SERPL-MCNC: 255 MG/DL (ref 65–140)
HCT VFR BLD AUTO: 30.3 % (ref 34.8–46.1)
HGB BLD-MCNC: 10.2 G/DL (ref 11.5–15.4)
MAGNESIUM SERPL-MCNC: 2.4 MG/DL (ref 1.6–2.6)
MCH RBC QN AUTO: 27.9 PG (ref 26.8–34.3)
MCHC RBC AUTO-ENTMCNC: 33.7 G/DL (ref 31.4–37.4)
MCV RBC AUTO: 83 FL (ref 82–98)
PHOSPHATE SERPL-MCNC: 4.1 MG/DL (ref 2.3–4.1)
PLATELET # BLD AUTO: 305 THOUSANDS/UL (ref 149–390)
PMV BLD AUTO: 8.9 FL (ref 8.9–12.7)
POTASSIUM SERPL-SCNC: 3.9 MMOL/L (ref 3.5–5.3)
RBC # BLD AUTO: 3.66 MILLION/UL (ref 3.81–5.12)
SODIUM SERPL-SCNC: 135 MMOL/L (ref 135–147)
WBC # BLD AUTO: 14.55 THOUSAND/UL (ref 4.31–10.16)

## 2023-08-16 PROCEDURE — 84100 ASSAY OF PHOSPHORUS: CPT | Performed by: STUDENT IN AN ORGANIZED HEALTH CARE EDUCATION/TRAINING PROGRAM

## 2023-08-16 PROCEDURE — 80048 BASIC METABOLIC PNL TOTAL CA: CPT | Performed by: STUDENT IN AN ORGANIZED HEALTH CARE EDUCATION/TRAINING PROGRAM

## 2023-08-16 PROCEDURE — 97166 OT EVAL MOD COMPLEX 45 MIN: CPT

## 2023-08-16 PROCEDURE — 85027 COMPLETE CBC AUTOMATED: CPT | Performed by: STUDENT IN AN ORGANIZED HEALTH CARE EDUCATION/TRAINING PROGRAM

## 2023-08-16 PROCEDURE — 99222 1ST HOSP IP/OBS MODERATE 55: CPT | Performed by: STUDENT IN AN ORGANIZED HEALTH CARE EDUCATION/TRAINING PROGRAM

## 2023-08-16 PROCEDURE — 97162 PT EVAL MOD COMPLEX 30 MIN: CPT

## 2023-08-16 PROCEDURE — 99222 1ST HOSP IP/OBS MODERATE 55: CPT | Performed by: INTERNAL MEDICINE

## 2023-08-16 PROCEDURE — 83735 ASSAY OF MAGNESIUM: CPT | Performed by: STUDENT IN AN ORGANIZED HEALTH CARE EDUCATION/TRAINING PROGRAM

## 2023-08-16 PROCEDURE — 99024 POSTOP FOLLOW-UP VISIT: CPT | Performed by: STUDENT IN AN ORGANIZED HEALTH CARE EDUCATION/TRAINING PROGRAM

## 2023-08-16 PROCEDURE — 82948 REAGENT STRIP/BLOOD GLUCOSE: CPT

## 2023-08-16 PROCEDURE — 99233 SBSQ HOSP IP/OBS HIGH 50: CPT | Performed by: STUDENT IN AN ORGANIZED HEALTH CARE EDUCATION/TRAINING PROGRAM

## 2023-08-16 RX ORDER — OXYCODONE HYDROCHLORIDE 5 MG/1
5 TABLET ORAL EVERY 4 HOURS PRN
Status: DISCONTINUED | OUTPATIENT
Start: 2023-08-16 | End: 2023-08-21 | Stop reason: HOSPADM

## 2023-08-16 RX ORDER — INSULIN LISPRO 100 [IU]/ML
1-5 INJECTION, SOLUTION INTRAVENOUS; SUBCUTANEOUS
Status: DISCONTINUED | OUTPATIENT
Start: 2023-08-16 | End: 2023-08-21 | Stop reason: HOSPADM

## 2023-08-16 RX ORDER — POTASSIUM CHLORIDE 20 MEQ/1
20 TABLET, EXTENDED RELEASE ORAL ONCE
Status: COMPLETED | OUTPATIENT
Start: 2023-08-16 | End: 2023-08-16

## 2023-08-16 RX ORDER — ENOXAPARIN SODIUM 100 MG/ML
40 INJECTION SUBCUTANEOUS DAILY
Status: DISCONTINUED | OUTPATIENT
Start: 2023-08-16 | End: 2023-08-21 | Stop reason: HOSPADM

## 2023-08-16 RX ORDER — METHOCARBAMOL 500 MG/1
250 TABLET, FILM COATED ORAL EVERY 6 HOURS SCHEDULED
Status: DISCONTINUED | OUTPATIENT
Start: 2023-08-16 | End: 2023-08-21 | Stop reason: HOSPADM

## 2023-08-16 RX ADMIN — INSULIN LISPRO 2 UNITS: 100 INJECTION, SOLUTION INTRAVENOUS; SUBCUTANEOUS at 17:44

## 2023-08-16 RX ADMIN — METHOCARBAMOL 250 MG: 500 TABLET ORAL at 10:05

## 2023-08-16 RX ADMIN — POTASSIUM CHLORIDE 20 MEQ: 1500 TABLET, EXTENDED RELEASE ORAL at 08:47

## 2023-08-16 RX ADMIN — METHOCARBAMOL 250 MG: 500 TABLET ORAL at 23:39

## 2023-08-16 RX ADMIN — HEPARIN SODIUM 5000 UNITS: 5000 INJECTION INTRAVENOUS; SUBCUTANEOUS at 05:01

## 2023-08-16 RX ADMIN — OXYCODONE HYDROCHLORIDE 10 MG: 10 TABLET ORAL at 04:57

## 2023-08-16 RX ADMIN — CHLORHEXIDINE GLUCONATE 15 ML: 1.2 SOLUTION ORAL at 08:47

## 2023-08-16 RX ADMIN — INSULIN LISPRO 1 UNITS: 100 INJECTION, SOLUTION INTRAVENOUS; SUBCUTANEOUS at 05:25

## 2023-08-16 RX ADMIN — AMLODIPINE BESYLATE 5 MG: 5 TABLET ORAL at 22:08

## 2023-08-16 RX ADMIN — METHOCARBAMOL 250 MG: 500 TABLET ORAL at 17:35

## 2023-08-16 RX ADMIN — INSULIN LISPRO 1 UNITS: 100 INJECTION, SOLUTION INTRAVENOUS; SUBCUTANEOUS at 13:23

## 2023-08-16 RX ADMIN — ENOXAPARIN SODIUM 40 MG: 40 INJECTION SUBCUTANEOUS at 13:23

## 2023-08-16 RX ADMIN — ATORVASTATIN CALCIUM 10 MG: 10 TABLET, FILM COATED ORAL at 22:08

## 2023-08-16 NOTE — PHYSICAL THERAPY NOTE
Physical Therapy Evaluation     Patient's Name: Kemal Banks    Admitting Diagnosis  Colon adenocarcinoma [facility]) [C18.9]    Problem List  Patient Active Problem List   Diagnosis    Pure hypercholesterolemia    Osteoarthritis    Left ventricular hypertrophy    Hypertension    Severe aortic stenosis by prior echocardiogram    Pancreas cyst    Elevated alkaline phosphatase level    Lower abdominal pain    Hepatic steatosis    Colon adenocarcinoma [facility])    Encounter for geriatric assessment    Pre-operative cardiovascular examination, valvular heart disease    Mitral valve regurgitation    S/P right colectomy       Past Medical History  Past Medical History:   Diagnosis Date    Aortic valve regurgitation     Aortic valve stenosis     Hypertension     Left ventricular hypertrophy     Mitral valve regurgitation     Osteoarthritis     Pure hypercholesterolemia        Past Surgical History  Past Surgical History:   Procedure Laterality Date    BUNIONECTOMY Left 04/16/2014    COLONOSCOPY  01/04/2012    CORRECTION HAMMER TOE Left 04/16/2014    NM COLECTOMY PARTIAL W/ANASTOMOSIS N/A 8/15/2023    Procedure: RIGHT HEMICOLECTOMY;  Surgeon: José Rosario MD;  Location: [facility];  Service: Surgical Oncology        08/16/23 1135   PT Last Visit   PT Visit Date 08/16/23   Note Type   Note type Evaluation   Pain Assessment   Pain Assessment Tool 0-10   Pain Score No Pain   Restrictions/Precautions   Weight Bearing Precautions Per Order No   Other Precautions Multiple lines; Fall Risk;Pain;Telemetry   Home Living   Type of [address] [place] [address] Dr One level;Performs ADLs on one level; Able to live on main level with bedroom/bathroom  (1 BAYLEE)   Bathroom Shower/Tub Tub/shower unit   Bathroom Toilet Standard   Home Equipment Walker;Cane  (was not using PTA)   Prior Function   Level of [place] Independent with ADLs; Independent with functional mobility; Independent with IADLS   Lives With Alone   Receives Help From Family   IADLs Independent with driving; Independent with meal prep; Independent with medication management   Falls in the last 6 months 0   Vocational Retired   Comments Upon d/c pt plans to stay with daughter for ~1 wk and then son ~1 wk for increased assistance as needed. Home setup for daughter : 1 SH with 1 BAYLEE   General   Family/Caregiver Present Yes   Cognition   Overall Cognitive Status WFL   Arousal/Participation Cooperative   Orientation Level Oriented X4   Memory Within functional limits   Following Commands Follows all commands and directions without difficulty   Comments pt pleasant and cooeprative   RLE Assessment   RLE Assessment WFL   LLE Assessment   LLE Assessment WFL   Bed Mobility   Supine to Sit Unable to assess   Sit to Supine Unable to assess   Additional Comments pt sititng EOB upon arrival. pt left sitting OOB in recliner with all needs wihtin reach   Transfers   Sit to Stand 5  Supervision   Stand to Sit 5  Supervision   Additional Comments transfers without AD   Ambulation/Elevation   Gait pattern Excessively slow; Short stride   Gait Assistance   (CGA initially progressed to S level)   Additional items Assist x 1;Verbal cues   Assistive Device None  (light HHA, practiced with daughter for increased support as needed)   Distance 150ft   Balance   Static Sitting Fair +   Dynamic Sitting Fair +   Static Standing Fair   Dynamic Standing Fair   Ambulatory Fair   Activity Tolerance   Activity Tolerance Patient tolerated treatment well   Medical Staff Made Aware OT Gopi Gerber; co-eval performed this date 2* increased medical complexity and multiple co-morbidities   Nurse Made Aware RN cleared pt to participate in therapy session   Assessment   Prognosis Good   Assessment Pt seen for mdoerate complexity PT evaluation. Pt with active PT eval/treat and up as tolerated orders. Pt is a 80 y.o. female who was admitted to [address] [place] [address] on 8/15 with colon adenocarcinoma s/p R colectomy.  Pt's active problems include: hypercholesterolemia, OA, L ventricular hypertrophy, HTN, severe aortic stenosis, pancreas cyst.Pt  has a past medical history of Aortic valve regurgitation, Aortic valve stenosis, Hypertension, Left ventricular hypertrophy, Mitral valve regurgitation, Osteoarthritis, and Pure hypercholesterolemia. Pt resides alone in 1  with 1 BAYLEE and was independent prior to hospital admission. Currently upon evaluation pt performing funational transfers at S level and ambulation at S/ CGA. Pt was left sitting OOB in reclienr at the end of PT session with all needs within reach. Pt with no questions or concerns regarding d/c home; appears to be functioning at/ near baseline mobility levels. Pt with no further acute inpatient PT needs at this time- please re-consult if needed. PT to d/c pt and recommends home with family support. Pt reporting very supportive family- planning to stay with daughter for increased assistance. Encourage pt to ambulate at least 3-4x/day with restorative/ nursing staff while remaining in hospital. The patient's [facility] Mobility Inpatient Short Form Raw Score is 18. A Raw score of greater than 16 suggests the patient may benefit from discharge to home. Please also refer to the recommendation of the Physical Therapist for safe discharge planning.    Goals   Patient Goals to walk   Plan   PT Frequency   (eval only- d/c PT)   Recommendation   PT Discharge Recommendation No rehabilitation needs  (home with increased social support as needed)   [facility] Mobility Inpatient   Turning in Flat Bed Without Bedrails 3   Lying on Back to Sitting on Edge of Flat Bed Without Bedrails 3   Moving Bed to Chair 3   Standing Up From Chair Using Arms 3   Walk in Room 3   Climb 3-5 Stairs With Railing 3   Basic Mobility Inpatient Raw Score 18   Basic Mobility Standardized Score 41.05   Highest Level Of Mobility   JH-HLM Goal 6: Walk 10 steps or more   JH-HLM Achieved 7: Walk 25 feet or more   Modified [place] Scale Modified Brennon Scale 3           Valiant Sloan, PT DPT

## 2023-08-16 NOTE — QUICK NOTE
Postop note -surgical oncology  Saray Murillo 80 y.o. female MRN: [id number]  [facility] Encounter: [id number]    Assessment:  80 y.o. female with history of severe aortic stenosis, p/wcolon adenocarcinoma, is s/p Procedure(s) (LRB):  RIGHT HEMICOLECTOMY (N/A). Patient with lactic acidosis to 3.2 postoperatively she was given a 250 cc bolus and improved her lactate to 2.1. Plan:  - Diet NPO; Sips of clear liquids  - Pain and Nausea control PRN  - Will add PCA pump if pain control is not optimized  - Incentive spirometry  - OOB, ambulate  - Keep Pulido  - Minimize IV fluid intake    Subjective/Objective     Subjective: Has some mild discomfort but it is overall tolerable. Objective:   Vitals: Blood pressure 108/54, pulse 72, temperature 97.5 °F (36.4 °C), temperature source Oral, resp. rate 12, height 5' (1.524 m), weight 54 kg (119 lb), SpO2 97 %. ,Body mass index is 23.24 kg/m². I/O       08/14 0701  08/15 0700 08/15 0701  08/16 0700    I.V. (mL/kg)  1200 (22.2)    IV Piggyback  100    Total Intake(mL/kg)  1300 (24.1)    Urine (mL/kg/hr)  200    Total Output  200    Net  +1100                Physical Exam:  Gen: NAD, Aox3, Comfortable in bed  Chest: Normal work of breathing, no respiratory distress  Abd: Soft, ND, appropriately tender. Surgical sites are clean, dry, intact. Ext: No Edema  Skin: Warm, Dry, Intact      Lab, Imaging and other studies:   I have personally reviewed pertinent reports.   , CBC with diff:   Lab Results   Component Value Date    WBC 20.71 (H) 08/15/2023    HGB 9.8 (L) 08/15/2023    HCT 30.1 (L) 08/15/2023    MCV 85 08/15/2023     08/15/2023    RBC 3.56 (L) 08/15/2023    MCH 27.5 08/15/2023    MCHC 32.6 08/15/2023    RDW 17.2 (H) 08/15/2023    MPV 9.0 08/15/2023   , BMP/CMP:   Lab Results   Component Value Date    SODIUM 137 08/15/2023    K 2.8 (L) 08/15/2023     08/15/2023    CO2 23 08/15/2023    BUN 9 08/15/2023    CREATININE 0.63 08/15/2023    CALCIUM 7.6 (L) 08/15/2023    EGFR 80 08/15/2023   , Magnesium: No components found for: "MAG"        David Rangel MD  8/15/2023  8:52 PM

## 2023-08-16 NOTE — NUTRITION
Nutrition Assessment Summary:       08/16/23 1510   Recommendations/Interventions   Interventions/Recommendations Diet to advance   Intervention Comments When diet advances, please change supplement to chocolate Glucerna BID. Encouraged pt to continue with high protein oral nutrition supplements after discharge. Recommendations to Provider Advance diet as medically able, recommend low fiber. Diabetic clear liquids ordered per primary team, pt with no hx of DM in PMH but having blood sugars >180 in the hospital. If blood sugars improve, recommend removing CHO restriction.

## 2023-08-16 NOTE — CONSULTS
Consultation - Geriatrics   Kwadwo Au 80 y.o. female MRN: [id number]  Unit/Bed#: [facility] 208-01 Encounter: [id number]      Assessment/Plan  Encephalopathy  Mild confusion noted on exam, oriented x 4, but believes she was here 2 nights ago  At risk for delirium secondary to age, anesthesia, surgery, medications  Baseline: alert and oriented x 4, no hx of confusion  Maintain delirium precautions:  Provide frequent redirection, reorientation, distraction techniques  Avoid deliriogenic medications such as tramadol, benzodiazepines, anticholinergics,  Benadryl  Treat pain, See geriatric pain protocol  Monitor for constipation and urinary retention  Encourage early and frequent moblization, OOB  Encourage Hydration/ Nutrition  Implement sleep hygiene, limit night time interuptions, group activities    Acute pain due to surgery  Geriatric pain protocol  Acetaminophen not ordered secondary to patient reported allergy of hives  Agree with low dose scheduled robaxin 250 mg po Q6 started 8/16/23  Pt was initially on low dose oxycodone due to pain dose was increased  Continue to monitor with new started of scheduled robaxin anticipate may be ok with lower dose of prn oxycodone:  Oxycodone 2.5 mg po Q 4 prn moderate pain  Oxycodone 5 mg po Q 4 prn severe pain   Monitor for constipation  Also can consider addition of gabapentin 100 mg po TID, would hold off for now secondary to above changes    Insomnia  Related to hospitalization  Encourage daytime activity  Recommend melatonin 3 mg po QHS prn    Frailty  Clinical Frail Scale: 4- Vulnerable  Albumin 2.8 (8/2/23)   Reported recent weight loss  continue protein supplementation, ensure clear  Has supportive family  PT/OT  encourage oob, mobilization    Ambulatory dysfunction  Secondary to hospitalization  Fall precautions  Encourage mobilization, oob  PT/OT    Cognitive screening  Repetition and forgetfulness on exam  Recommend check TSH and Vitamin B 12  CT head (2015) images reviewed, microangiopathic changes      Adenocarcinoma right colon. S/p right hemicolectomy on 8/15  Surgery following    Advanced Care Planning  Full code    Home medication review  [facility], refills current  Atorvastatin 10 mg po Q evening  Amlodipine 10 mg po daily  AREDS BID      History of Present Illness   Physician Requesting Consult: Anthony Corona MD  Reason for Consult / Principal Problem: right hemicolectomy  Hx and PE limited by:NA  HPI: Saray Murillo is a 80y.o. year old female who presents with adenocarcinoma of her right colon. She is admitted for right hemicolectomy. Preoperatively she was evaluated by the surgical optimization center. Post op she is consulted by inpatient geriatrics for delirium precautions/ frailty. She has HTN, OA, left ventricular hypertrophy,    Prior to arrival she lives at home alone. She is independent with IADLs and ADLs. She ambulates 1 mile daily. She has had unintentional weight loss. Upon exam pt is sitting on edge of bed in recliner chair. She is alert and oriented x 4. She has some repetition on exam, asking about what is ordered for pain several times during conversation. When explained, she states "that's right you did tell me". She also reports not sleeping well 2 nights ago when she was here. Daughter reminded her she was not here 2 nights ago. Inpatient consult to Gerontology  Consult performed by: ALEXANDER Mackey  Consult ordered by: Heather Oh MD          Review of Systems   Constitutional: Positive for unexpected weight change. HENT: Positive for hearing loss. Eyes: Negative for visual disturbance. Respiratory: Negative for cough. Cardiovascular: Negative for chest pain. Gastrointestinal: Negative for constipation. Genitourinary: Negative for difficulty urinating. Musculoskeletal: Positive for gait problem. Neurological: Positive for dizziness. Psychiatric/Behavioral: Positive for sleep disturbance. Historical Information   Past Medical History:   Diagnosis Date   • Aortic valve regurgitation    • Aortic valve stenosis    • Hypertension    • Left ventricular hypertrophy    • Mitral valve regurgitation    • Osteoarthritis    • Pure hypercholesterolemia      Past Surgical History:   Procedure Laterality Date   • BUNIONECTOMY Left 2014   • COLONOSCOPY  2012   • CORRECTION HAMMER TOE Left 2014     Social History   Social History     Substance and Sexual Activity   Alcohol Use Not Currently     Social History     Substance and Sexual Activity   Drug Use Not Currently     Social History     Tobacco Use   Smoking Status Former   • Packs/day: 1.00   • Years: 25.00   • Total pack years: 25.00   • Types: Cigarettes   • Quit date: 200   • Years since quittin.6   Smokeless Tobacco Never         Family History:   Family History   Problem Relation Age of Onset   • Coronary artery disease Father    • Throat cancer Father    • Diabetes Sister    • No Known Problems Mother    • No Known Problems Daughter    • No Known Problems Maternal Grandmother    • No Known Problems Maternal Grandfather    • No Known Problems Paternal Grandmother    • No Known Problems Paternal Grandfather    • No Known Problems Brother    • No Known Problems Brother    • Cancer Sister    • Pancreatic cancer Sister    • No Known Problems Daughter    • No Known Problems Son    • No Known Problems Son    • No Known Problems Son        Meds/Allergies   Current meds:   Current Facility-Administered Medications   Medication Dose Route Frequency   • amLODIPine (NORVASC) tablet 5 mg  5 mg Oral HS   • atorvastatin (LIPITOR) tablet 10 mg  10 mg Oral HS   • chlorhexidine (PERIDEX) 0.12 % oral rinse 15 mL  15 mL Mouth/Throat Q12H [address]   • enoxaparin (LOVENOX) subcutaneous injection 40 mg  40 mg Subcutaneous Daily   • insulin lispro (HumaLOG) 100 units/mL subcutaneous injection 1-5 Units  1-5 Units Subcutaneous TID With Meals   • methocarbamol (ROBAXIN) tablet 250 mg  250 mg Oral Q6H FIDEL   • naloxone (NARCAN) injection 0.1 mg  0.1 mg Intravenous Q1MIN PRN   • ondansetron (ZOFRAN) injection 4 mg  4 mg Intravenous Q6H PRN   • oxyCODONE (ROXICODONE) immediate release tablet 10 mg  10 mg Oral Q4H PRN   • oxyCODONE (ROXICODONE) IR tablet 5 mg  5 mg Oral Q4H PRN      Current PTA meds:  Medications Prior to Admission   Medication   • amLODIPine (NORVASC) 5 mg tablet   • atorvastatin (LIPITOR) 10 mg tablet   • Multiple Vitamins-Minerals (PRESERVISION AREDS 2 PO)        Allergies   Allergen Reactions   • Acetaminophen Hives       Objective   Vitals: Blood pressure 112/59, pulse 74, temperature 97.6 °F (36.4 °C), temperature source Oral, resp. rate 12, height 5' (1.524 m), weight 57.3 kg (126 lb 5.2 oz), SpO2 95 %. ,Body mass index is 24.67 kg/m². Physical Exam  Vitals and nursing note reviewed. HENT:      Head: Normocephalic. Nose: No congestion. Mouth/Throat:      Mouth: Mucous membranes are moist.   Eyes:      General:         Right eye: No discharge. Left eye: No discharge. Cardiovascular:      Rate and Rhythm: Normal rate and regular rhythm. Pulses: Normal pulses. Pulmonary:      Effort: Pulmonary effort is normal.   Abdominal:      Palpations: Abdomen is soft. Musculoskeletal:         General: Normal range of motion. Cervical back: Normal range of motion. Skin:     General: Skin is warm. Neurological:      Mental Status: She is alert and oriented to person, place, and time.    Psychiatric:         Mood and Affect: Mood normal.         Lab Results:   Results from last 7 days   Lab Units 08/16/23  0527   WBC Thousand/uL 14.55*   HEMOGLOBIN g/dL 10.2*   HEMATOCRIT % 30.3*   PLATELETS Thousands/uL 305        Results from last 7 days   Lab Units 08/16/23  0527   POTASSIUM mmol/L 3.9   CHLORIDE mmol/L 106   CO2 mmol/L 25   BUN mg/dL 9   CREATININE mg/dL 0.59*   CALCIUM mg/dL 7.9*       Imaging Studies: I have personally reviewed pertinent reports. EKG, Pathology, and Other Studies: I have personally reviewed pertinent reports.     VTE Prophylaxis: Sequential compression device (Venodyne)     Code Status: Level 1 - Full Code

## 2023-08-16 NOTE — PLAN OF CARE
Problem: GENITOURINARY - ADULT  Goal: Maintains or returns to baseline urinary function  Description: INTERVENTIONS:  - Assess urinary function  - Encourage oral fluids to ensure adequate hydration if ordered  - Administer IV fluids as ordered to ensure adequate hydration  - Administer ordered medications as needed  - Offer frequent toileting  - Follow urinary retention protocol if ordered  Outcome: Progressing     Problem: METABOLIC, FLUID AND ELECTROLYTES - ADULT  Goal: Electrolytes maintained within normal limits  Description: INTERVENTIONS:  - Monitor labs and assess patient for signs and symptoms of electrolyte imbalances  - Administer electrolyte replacement as ordered  - Monitor response to electrolyte replacements, including repeat lab results as appropriate  - Instruct patient on fluid and nutrition as appropriate  Outcome: Progressing     Problem: SKIN/TISSUE INTEGRITY - ADULT  Goal: Skin Integrity remains intact(Skin Breakdown Prevention)  Description: Assess:  -Perform Shan assessment every   -Clean and moisturize skin every   -Inspect skin when repositioning, toileting, and assisting with ADLS  -Assess under medical devices such as  every   -Assess extremities for adequate circulation and sensation     Bed Management:  -Have minimal linens on bed & keep smooth, unwrinkled  -Change linens as needed when moist or perspiring  -Avoid sitting or lying in one position for more than  hours while in bed  -Keep HOB at degrees     Toileting:  -Offer bedside commode  -Assess for incontinence every   -Use incontinent care products after each incontinent episode such as     Activity:  -Mobilize patient  times a day  -Encourage activity and walks on unit  -Encourage or provide ROM exercises   -Turn and reposition patient every Hours  -Use appropriate equipment to lift or move patient in bed  -Instruct/ Assist with weight shifting every  when out of bed in chair  -Consider limitation of chair time  hour intervals    Skin Care:  -Avoid use of baby powder, tape, friction and shearing, hot water or constrictive clothing  -Relieve pressure over bony prominences using   -Do not massage red bony areas    Next Steps:  -Teach patient strategies to minimize risks such as    -Consider consults to  interdisciplinary teams such a  Outcome: Progressing yes

## 2023-08-16 NOTE — CONSULTS
Consultation - Acute Pain Service  Faye Koehler 80 y.o. female MRN: [id number]  [facility] Encounter: [id number]               Faye Koehler is a 80 y.o. female with PMHx of severe AS/mod MR who originally presented with colonic adenocarcinoma s/p colectomy on 8/15. She received bilateral TAP blocks with exparel for post-operative analgesia. APS consulted for post-operative pain control. * S/P right colectomy  Assessment & Plan  Pain well controlled on current MMA  S/p bilateral TAP blocks with exparel on 8/15  Continue current MMA:  - PO oxycodone 5/10mg q4hr PRN for moderate-severe pain  - D/c IV morphine for breakthrough  - Will add PO robaxin 250mg q6hr vandana      APS will sign off at this time. Thank you for the consult. All opioids and other analgesics to be written at discretion of primary team. Please contact Acute Pain Service - via SAVO from [phone] with additional questions or concerns. See SAVO or Luke for additional contacts and after hours information. History of Present Illness    Admit Date:  8/15/2023  Hospital Day:  1 day  Primary Service:  Oncology-Surgical  Attending Provider:  Esteban Syed MD  Physician Requesting Consult: Esteban Syed MD  Reason for Consult / Principal Problem: Post-operative pain control  HPI: Faye Koehler is a 80y.o. year old female who presents with colonic adenocarcinoma s/p colectomy on 8/15. Current pain location(s): Pain Score: 5  Pain Location/Orientation: Location: Abdomen  Pain Scale: Pain Assessment Tool: 0-10  Current Analgesic regimen:  See above    Pain History: N/A  Pain Management Physician:  N/A    I have reviewed the patient's controlled substance dispensing history in the Prescription Drug Monitoring Program in compliance with the [facility] regulations before prescribing any controlled substances. Inpatient consult to Acute Pain Service  Consult performed by: Princess Lamar MD  Consult ordered by:  Pam Roblero MD          Review of Systems   Constitutional: Negative. HENT: Negative. Respiratory: Negative. Cardiovascular: Negative. Gastrointestinal: Positive for abdominal pain. Genitourinary: Negative. Musculoskeletal: Negative. Skin: Negative. Neurological: Negative. Psychiatric/Behavioral: Negative. Historical Information   Past Medical History:   Diagnosis Date   • Aortic valve regurgitation    • Aortic valve stenosis    • Hypertension    • Left ventricular hypertrophy    • Mitral valve regurgitation    • Osteoarthritis    • Pure hypercholesterolemia      Past Surgical History:   Procedure Laterality Date   • BUNIONECTOMY Left 2014   • COLONOSCOPY  2012   • CORRECTION HAMMER TOE Left 2014     Social History   Social History     Substance and Sexual Activity   Alcohol Use Not Currently     Social History     Substance and Sexual Activity   Drug Use Not Currently     Social History     Tobacco Use   Smoking Status Former   • Packs/day: 1.00   • Years: 25.00   • Total pack years: 25.00   • Types: Cigarettes   • Quit date: 200   • Years since quittin.6   Smokeless Tobacco Never     Family History: non-contributory    Meds/Allergies   all current active meds have been reviewed    Allergies   Allergen Reactions   • Acetaminophen Hives       Objective   Vitals:    23 0335 23 0500 23 0600 23 0815   BP: 134/79 120/58  112/59   Pulse: 86 80  74   Resp:       Temp: 97.7 °F (36.5 °C)   97.6 °F (36.4 °C)   TempSrc: Oral   Oral   SpO2: 96% 96%  95%   Weight:   57.3 kg (126 lb 5.2 oz)    Height:             Intake/Output Summary (Last 24 hours) at 2023 1051  Last data filed at 2023 0855  Gross per 24 hour   Intake 2833.34 ml   Output 1300 ml   Net 1533.34 ml       Physical Exam  HENT:      Head: Atraumatic. Mouth/Throat:      Mouth: Mucous membranes are dry. Eyes:      Pupils: Pupils are equal, round, and reactive to light. Cardiovascular:      Rate and Rhythm: Regular rhythm. Pulses: Normal pulses. Pulmonary:      Effort: Pulmonary effort is normal.   Abdominal:      Palpations: Abdomen is soft. Tenderness: There is abdominal tenderness. Musculoskeletal:         General: Normal range of motion. Skin:     General: Skin is dry. Neurological:      General: No focal deficit present. Mental Status: She is alert and oriented to person, place, and time. Psychiatric:         Mood and Affect: Mood normal.         Lab Results:  Estimated Creatinine Clearance: 52.2 mL/min (A) (by C-G formula based on SCr of 0.59 mg/dL (L)). Lab Results   Component Value Date    WBC 14.55 (H) 08/16/2023    HGB 10.2 (L) 08/16/2023    HCT 30.3 (L) 08/16/2023     08/16/2023         Component Value Date/Time    K 3.9 08/16/2023 0527    K 4.0 10/01/2020 0850     08/16/2023 0527     10/01/2020 0850    CO2 25 08/16/2023 0527    CO2 29 10/01/2020 0850    BUN 9 08/16/2023 0527    BUN 16 10/01/2020 0850    CREATININE 0.59 (L) 08/16/2023 0527         Component Value Date/Time    CALCIUM 7.9 (L) 08/16/2023 0527    CALCIUM 8.9 10/01/2020 0850    ALKPHOS 131 (H) 08/02/2023 1155    ALKPHOS 87 10/01/2020 0850    AST 26 08/02/2023 1155    AST 14 10/01/2020 0850    ALT 28 08/02/2023 1155    ALT 12 10/01/2020 0850    TP 7.4 08/02/2023 1155    TP 7.0 10/01/2020 0850    ALB 2.8 (L) 08/02/2023 1155       Imaging Studies/EKG: I have personally reviewed pertinent reports. Counseling / Coordination of Care  Total floor / unit time spent today 20 minutes. Greater than 50% of total time was spent with the patient and / or family counseling and / or coordination of care. Please note that the APS provides consultative services regarding pain management only.   With the exception of ketamine and epidural infusions and except when indicated, final decisions regarding starting or changing doses of analgesic medications are at the discretion of the consulting service.   Karel Mantilla MD  Acute Pain Service

## 2023-08-16 NOTE — PROGRESS NOTES
Progress Note -surgical oncology  Cathleen Lin 80 y.o. female MRN: [id number]  [facility] Encounter: [id number]    Assessment:  80 y.o. female with h/o of severe AS, mod MVR, who p/w a mucinous adenoCa of the colon, now 1 Day Post-Op s/p Procedure(s) (LRB):  RIGHT HEMICOLECTOMY (N/A). Lactic acidosis improved over night from 3.2, now 2.1 following a 250 cc bolus. Plan:  - Will likely advance to clears this morning  - Pain and Nausea control PRN  - Incentive spirometry  - OOB, ambulate  -  D/c brumfield   - f/u Cardiology  - Minimize IVF    Subjective/Objective     Subjective: Pain is better controlled since surgery. Denies nausea or emesis. Objective:   Vitals: Blood pressure (!) 88/50, pulse 80, temperature 97.8 °F (36.6 °C), temperature source Oral, resp. rate 12, height 5' (1.524 m), weight 57.7 kg (127 lb 3.2 oz), SpO2 95 %. ,Body mass index is 24.84 kg/m². I/O       08/14 0701  08/15 0700 08/15 0701  08/16 0700    I.V. (mL/kg)  1200 (20.8)    IV Piggyback  100    Total Intake(mL/kg)  1300 (22.5)    Urine (mL/kg/hr)  200    Total Output  200    Net  +1100                Physical Exam:  Gen: NAD, Aox3, Comfortable in bed  Chest: Normal work of breathing, no respiratory distress  Abd: Soft, moderately distended, appropriately tender. Surgical sites are clean, dry, and intact   Ext: No Edema  Skin: Warm, Dry, Intact      Lab, Imaging and other studies:   I have personally reviewed pertinent reports.   , CBC with diff:   Lab Results   Component Value Date    WBC 14.55 (H) 08/16/2023    HGB 10.2 (L) 08/16/2023    HCT 30.3 (L) 08/16/2023    MCV 83 08/16/2023     08/16/2023    RBC 3.66 (L) 08/16/2023    MCH 27.9 08/16/2023    MCHC 33.7 08/16/2023    RDW 17.2 (H) 08/16/2023    MPV 8.9 08/16/2023   , BMP/CMP:   Lab Results   Component Value Date    SODIUM 135 08/16/2023    K 3.9 08/16/2023     08/16/2023    CO2 25 08/16/2023    BUN 9 08/16/2023    CREATININE 0.59 (L) 08/16/2023    CALCIUM 7.9 (L) 08/16/2023    EGFR 82 08/16/2023     VTE Pharmacologic Prophylaxis: Heparin  VTE Mechanical Prophylaxis: sequential compression device        Bryon Pinzon MD  8/16/2023  12:13 AM

## 2023-08-16 NOTE — RESPIRATORY THERAPY NOTE
RT Protocol Note  Karen Kruger 80 y.o. female MRN: [id number]  Unit/Bed#: [facility] 208-01 Encounter: [id number]    Assessment    Principal Problem:    S/P right colectomy  Active Problems:    Pure hypercholesterolemia    Osteoarthritis    Left ventricular hypertrophy    Hypertension    Severe aortic stenosis by prior echocardiogram    Pancreas cyst    Hepatic steatosis    Colon adenocarcinoma (HCC)    Mitral valve regurgitation      Home Pulmonary Medications:  None         Past Medical History:   Diagnosis Date    Aortic valve regurgitation     Aortic valve stenosis     Hypertension     Left ventricular hypertrophy     Mitral valve regurgitation     Osteoarthritis     Pure hypercholesterolemia      Social History     Socioeconomic History    Marital status:       Spouse name: None    Number of children: None    Years of education: None    Highest education level: None   Occupational History    None   Tobacco Use    Smoking status: Former     Packs/day: 1.00     Years: 25.00     Total pack years: 25.00     Types: Cigarettes     Quit date:      Years since quittin.6    Smokeless tobacco: Never   Vaping Use    Vaping Use: Never used   Substance and Sexual Activity    Alcohol use: Not Currently    Drug use: Not Currently    Sexual activity: None   Other Topics Concern    None   Social History Narrative    · Most recent tobacco use screenin2019      · Do you currently or have you served in the [address] [place] [address] [employer]:   No      Social Determinants of Health     Financial Resource Strain: Not on file   Food Insecurity: Not on file   Transportation Needs: Not on file   Physical Activity: Not on file   Stress: Not on file   Social Connections: Not on file   Intimate Partner Violence: Not on file   Housing Stability: Not on file       Subjective         Objective    Physical Exam:   Assessment Type: Assess only  General Appearance: Awake  Respiratory Pattern: Normal, Symmetrical, Spontaneous  Chest Assessment: Chest expansion symmetrical  Bilateral Breath Sounds: Clear    Vitals:  Blood pressure 108/54, pulse 72, temperature 97.5 °F (36.4 °C), temperature source Oral, resp. rate 12, height 5' (1.524 m), weight 57.7 kg (127 lb 3.2 oz), SpO2 97 %. Imaging and other studies: I have personally reviewed pertinent reports. Plan    Respiratory Plan: Discontinue Protocol, No distress/Pulmonary history        Resp Comments: pt assessed per respiratory protocol. Pt post op right colectomy. No pulmonary history or pulmonary home meds noted. Pt has clear bilateral breath sounds currently breathing room air. Will discontinue protocol at this time. 08/15/23 2214   Respiratory Protocol   Protocol Initiated? Yes   Protocol Selection Respiratory   Language Barrier? No   Medical & Social History Reviewed? Yes   Diagnostic Studies Reviewed? Yes   Physical Assessment Performed? Yes   Respiratory Plan Discontinue Protocol; No distress/Pulmonary history   Respiratory Assessment   Assessment Type Assess only   General Appearance Awake   Respiratory Pattern Normal;Symmetrical;Spontaneous   Chest Assessment Chest expansion symmetrical   Bilateral Breath Sounds Clear   Resp Comments pt assessed per respiratory protocol. Pt post op right colectomy. No pulmonary history or pulmonary home meds noted. Pt has clear bilateral breath sounds currently breathing room air. Will discontinue protocol at this time.

## 2023-08-16 NOTE — ASSESSMENT & PLAN NOTE
Pain well controlled on current MMA  S/p bilateral TAP blocks with exparel on 8/15  Continue current MMA:  - PO oxycodone 5/10mg q4hr PRN for moderate-severe pain  - D/c IV morphine for breakthrough  - Will add PO robaxin 250mg q6hr vandana

## 2023-08-16 NOTE — CASE MANAGEMENT
Case Management Assessment & Discharge Planning Note    Patient name Jacque Cardona  Location [facility] 208/[facility] 091-38 MRN [id number]  : 1935 Date 2023       Current Admission Date: 8/15/2023  Current Admission Diagnosis:S/P right colectomy   Patient Active Problem List    Diagnosis Date Noted   • S/P right colectomy 08/15/2023   • Mitral valve regurgitation    • Pre-operative cardiovascular examination, valvular heart disease 2023   • Encounter for geriatric assessment 2023   • Colon adenocarcinoma ([address]) 2023   • Pancreas cyst 2023   • Elevated alkaline phosphatase level 2023   • Lower abdominal pain 2023   • Hepatic steatosis 2023   • Pure hypercholesterolemia    • Osteoarthritis    • Left ventricular hypertrophy    • Hypertension    • Severe aortic stenosis by prior echocardiogram       LOS (days): 1  Geometric Mean LOS (GMLOS) (days): 5.30  Days to GMLOS:4.3     OBJECTIVE:    Risk of Unplanned Readmission Score: 13.72         Current admission status: Inpatient       Preferred Pharmacy:   [address] [place] [address]  Phone: [phone] Fax: [phone]    Primary Care Provider: Denilson Carlos DO    Primary Insurance: MEDICARE  Secondary Insurance: AARP    ASSESSMENT:  [place]uf Proxies    There are no active Health Care Proxies on file. Readmission Root Cause  30 Day Readmission: No    Patient Information  Admitted from[de-identified] Home  Mental Status: Alert  During Assessment patient was accompanied by: Daughter (Sim Dejesus)  Assessment information provided by[de-identified] Patient, Daughter  Primary Caregiver: Self  Support Systems: Self, [address] [place], [place] [address]vd of Residence: [facility] [address] [facility] do you live in?: [address] entry access options.  Select all that apply.: Stairs  Number of steps to enter home.: 3  Do the steps have railings?: Yes  Type of Current Residence: [place]  In the last 12 months, was there a time when you were not able to pay the mortgage or rent on time?: No  In the last 12 months, how many places have you lived?: 1  In the last 12 months, was there a time when you did not have a steady place to sleep or slept in a shelter (including now)?: No  Homeless/housing insecurity resource given?: N/A  Living Arrangements: Lives Alone  Is patient a ?: No    Activities of Daily Living Prior to Admission  Functional Status: Independent  Completes ADLs independently?: Yes  Ambulates independently?: Yes  Does patient use assisted devices?: Yes  Assisted Devices (DME) used: [employer]  Does patient currently own DME?: Yes  What DME does the patient currently own?: Madison Doyle (Owns a walker but does not use it)  Does patient have a history of Outpatient Therapy (PT/OT)?: Yes (Pt stated she had PT 30+ years ago.  Could not remember name of agency.)  Does the patient have a history of Short-Term Rehab?: No  Does patient have a history of HHC?: No  Does patient currently have [address]?: No      Patient Information Continued  Income Source: Pension/[employer]  Does patient have prescription coverage?: No  Within the past 12 months, you worried that your food would run out before you got the money to buy more.: Never true  Within the past 12 months, the food you bought just didn't last and you didn't have money to get more.: Never true  Food insecurity resource given?: N/A  Does patient receive dialysis treatments?: No  Does patient have a history of substance abuse?: No  Does patient have a history of Mental Health Diagnosis?: No      Means of Transportation  Means of Transport to Appts[de-identified] Drives Self  In the past 12 months, has lack of transportation kept you from medical appointments or from getting medications?: No  In the past 12 months, has lack of transportation kept you from meetings, work, or from getting things needed for daily living?: No  Was application for public transport provided?: N/A        DISCHARGE DETAILS:    Discharge planning discussed with[de-identified] Pt and daughter Isidra Balderas at bedside  Freedom of Choice: Yes     CM contacted family/caregiver?: No- see comments (DaughterIsidra, was present at bedside)    Additional Comments: CM Resident met with Pt and daughter at bedside. Pt is independent at baseline. There are currently no recommendations from PT/OT. Isidra Rizvi will be transporting home. CM will be available for any d/c needs. CM reviewed d/c planning process including the following: identifying help at home, patient preference for d/c planning needs, Discharge Lounge, [facility] Meds to Bed program, availability of treatment team to discuss questions or concerns patient and/or family may have regarding understanding medications and recognizing signs and symptoms once discharged. CM also encouraged patient to follow up with all recommended appointments after discharge. Patient advised of importance for patient and family to participate in managing patient’s medical well being.

## 2023-08-16 NOTE — PROGRESS NOTES
[address] [facility]  Progress Note: Critical Care  Name: Shanda Pablo 80 y.o. female I MRN: [id number]  Unit/Bed#: [facility] 208-01 I Date of Admission: 8/15/2023   Date of Service: 2023 I Hospital Day: 1    Assessment/Plan   Neuro:   · Diagnosis: Acute pain  · Plan: PRN oxycodone, PRN morphine. Allergy to tylenol. · MMEq: 96  · Diagnosis: Acute nausea  · PRN zofran  · Diagnosis: Geriatric population, high risk procedure  · Gerontology consulted. Appreciate recs. ICU-CAM, delirium precautions. Regulate sleep wake cycle      CV:   · Diagnosis: Hx of severe AS, moderate AR, mild MR, HTN, HLD  · Cardiology evaluated preoperatively. Watch volume status closely. Avoid significant fluid shifts as able. · Restart home amlodipine  as blood pressures allow  · Restart home statin on   · Not currently requiring pressors. Goal MAP> 65. · Discontinue arterial line      Pulm:  No active issues. Currently on room air. Encourage IS. GI:   · Diagnosis: adenocarcinoma of colon  · S/p right hemicolectomy on 8/15  · Appreciate surgical oncology recommendations  · Diet advancement per surg onc      :   · Diagnosis: Brumfield in place  · Plan: Monitor urine output. Trend Cr. · Discontinue brumfield  · Cr: 0.59 from 0.63  · UOP: 1300cc      F/E/N:   · Fluids: [place] @40  · Electrolytes: Replace for goal K>4, PO4>3, Mg>2. 3.9,4.1,2.4  · Nutrition: Sips of clears, advancement per surg onc      Heme/Onc:   · Diagnosis: Anemia of chronic disease  · Plan: Hgb 10.2 from 9.8  · Transfuse for Hgb <7, or if symptomatic  · Daily CBC  · DVT ppx:  · SCDs, SQH      Endo:   No active issues. Hypoglycemia protocol. SSI  B-203 (180)  Insulin: 2u    ID:   · Diagnosis: Leukocytosis  · Likely stress response 2/2 surgery. · Trend WBC, fever curve. · Received perioperative antibiotics. No indication for continued antibiotics.   · WBC: 14.5 from  20.7      MSK/Skin:   · Diagnosis: Physical deconditioning  · Plan: PT/OT. Encourage ambulation, OOB. Frequent turning and repositioning. Disposition: Med Surg with Telemetry    ICU Core Measures     A: Assess, Prevent, and Manage Pain · Has pain been assessed? Yes  · Need for changes to pain regimen? No   B: Both SAT/SAT  · N/A   C: Choice of Sedation · RASS Goal: 0 Alert and Calm  · Need for changes to sedation or analgesia regimen? No   D: Delirium · CAM-ICU: Negative   E: Early Mobility  · Plan for early mobility? Yes   F: Family Engagement · Plan for family engagement today? Yes       Review of Invasive Devices: Pulido Plan: Pulido to be removed. Order has been placed        Prophylaxis:  VTE VTE covered by:  heparin (porcine), Subcutaneous, 5,000 Units at 08/16/23 0501       Stress Ulcer  not ordered          Subjective   HPI/24hr events: No acute events overnight. Afebrile, hemodynamically stable. Tolerating diet. No nausea, or vomiting, fevers or chills. Endorsing expected post-operative pain. Lactate elevated overnight. Received 250cc bolus of LR. Objective                            Vitals I/O      Most Recent Min/Max in 24hrs   Temp 97.7 °F (36.5 °C) Temp  Min: 97.2 °F (36.2 °C)  Max: 98.2 °F (36.8 °C)   Pulse 80 Pulse  Min: 72  Max: 86   Resp 12 Resp  Min: 12  Max: 18   /58 BP  Min: 88/50  Max: 136/68   O2 Sat 96 % SpO2  Min: 95 %  Max: 97 %      Intake/Output Summary (Last 24 hours) at 8/16/2023 0634  Last data filed at 8/16/2023 0501  Gross per 24 hour   Intake 2230.67 ml   Output 1300 ml   Net 930.67 ml         Diet NPO; Sips of clear liquids     Invasive Monitoring Physical exam   Arterial Line  [place] BP    No data recorded   MAP    No data recorded    Physical Exam  Eyes:      Extraocular Movements: Extraocular movements intact. Skin:     General: Skin is warm. HENT:      Head: Normocephalic and atraumatic. Right Ear: No drainage. Left Ear: No drainage. Nose: No nasogastric tube.       Mouth/Throat: Mouth: Mucous membranes are dry. Neck:     Vascular: No JVD. Cardiovascular:      Rate and Rhythm: Normal rate and regular rhythm. Pulses: Normal pulses. Musculoskeletal:         General: No swelling or tenderness. Right lower leg: No edema. Left lower leg: No edema. Abdominal:      Palpations: Abdomen is soft. Tenderness: There is abdominal tenderness (appropriate). There is no guarding. Constitutional:       General: She is not in acute distress. Appearance: She is not ill-appearing. Interventions: She is not intubated and not restrained. Pulmonary:      Effort: Pulmonary effort is normal. No respiratory distress. She is not intubated. Neurological:      General: No focal deficit present. Mental Status: She is alert. Mental status is at baseline. Motor: Strength full and intact in all extremities. Genitourinary/Anorectal:  FoleyVitals reviewed. Diagnostic Studies      EKG: Reviewed  Imaging:  I have personally reviewed pertinent reports.        Medications:  Scheduled PRN   amLODIPine, 5 mg, HS  atorvastatin, 10 mg, HS  chlorhexidine, 15 mL, Q12H FIDEL  heparin (porcine), 5,000 Units, Q8H [address]  insulin lispro, 1-5 Units, Q6H FIDEL      morphine injection, 2 mg, Q4H PRN  naloxone, 0.1 mg, Q1MIN PRN  ondansetron, 4 mg, Q6H PRN  oxyCODONE, 10 mg, Q4H PRN  oxyCODONE, 5 mg, Q4H PRN       Continuous    lactated ringers, 40 mL/hr, Last Rate: 40 mL/hr (08/15/23 2045)         Labs:    CBC    Recent Labs     08/15/23  1940 08/16/23  0527   WBC 20.71* 14.55*   HGB 9.8* 10.2*   HCT 30.1* 30.3*    305     BMP    Recent Labs     08/15/23  1940 08/16/23  0527   SODIUM 137 135   K 2.8* 3.9    106   CO2 23 25   AGAP 9 4   BUN 9 9   CREATININE 0.63 0.59*   CALCIUM 7.6* 7.9*       Coags    No recent results     Additional Electrolytes  Recent Labs     08/15/23  1940 08/16/23  0527   MG 1.9 2.4   PHOS 2.6 4.1          Blood Gas    No recent results  No recent results LFTs  No recent results    Infectious  No recent results  Glucose  Recent Labs     08/15/23  1940 08/16/23  0527   GLUC 203* [place] Riana Bose MD

## 2023-08-16 NOTE — OCCUPATIONAL THERAPY NOTE
Occupational Therapy Evaluation     Patient Name: Faye Koehler  GPJCJ'K Date: 8/16/2023  Problem List  Principal Problem:    S/P right colectomy  Active Problems:    Pure hypercholesterolemia    Osteoarthritis    Left ventricular hypertrophy    Hypertension    Severe aortic stenosis by prior echocardiogram    Pancreas cyst    Hepatic steatosis    Colon adenocarcinoma (HCC)    Mitral valve regurgitation    Past Medical History  Past Medical History:   Diagnosis Date    Aortic valve regurgitation     Aortic valve stenosis     Hypertension     Left ventricular hypertrophy     Mitral valve regurgitation     Osteoarthritis     Pure hypercholesterolemia      Past Surgical History  Past Surgical History:   Procedure Laterality Date    BUNIONECTOMY Left 04/16/2014    COLONOSCOPY  01/04/2012    CORRECTION HAMMER TOE Left 04/16/2014    TN COLECTOMY PARTIAL W/ANASTOMOSIS N/A 8/15/2023    Procedure: RIGHT HEMICOLECTOMY;  Surgeon: Esteban Syed MD;  Location: [facility];  Service: Surgical Oncology         08/16/23 1134   OT Last Visit   OT Visit Date 08/16/23   Note Type   Note type Evaluation   Pain Assessment   Pain Assessment Tool 0-10   Pain Score No Pain   Restrictions/Precautions   Weight Bearing Precautions Per Order No   Other Precautions Multiple lines;Telemetry   Home Living   Type of [address] [place] [address] One level;Stairs to enter with rails  (1 lexis vs 2 lexis, pending use of front or back entrance.)   Bathroom Shower/Tub Tub/shower unit   Bathroom Toilet Standard   Bathroom Equipment Grab bars in shower   [address] Walker;Cane  (not using pta)   Prior Function   Level of [place] Independent with ADLs; Independent with functional mobility; Independent with IADLS   Lives With Alone   Receives Help From Family   IADLs Independent with driving; Independent with meal prep; Independent with medication management   Falls in the last 6 months 0   Vocational Retired Comments reports she will be staying with her dtr x1 week who has the same home setup as above. Lifestyle   Autonomy pta, pt was I W ADL/IADL, no  AD mobility. +    Reciprocal Relationships supportive dtr and son- reports she will be staying with dtr upon d/c for one week, then her son will be staying with her for following week. dtr also closeby to A if needed. Service to Others retired   Intrinsic Gratification going for walks everyday   Subjective   Subjective "I feel good"   ADL   Where Assessed Edge of bed   Eating Assistance [address]  Modified independent   [address]  6  Modified independent   Functional Assistance 6  Modified independent   Bed Mobility   Additional Comments found sitting EOB, left in chair w all needs in reach   Transfers   Sit to Stand 5  Supervision   Stand to Sit 5  Supervision   Additional Comments no AD   Functional Mobility   Functional Mobility 5  Supervision   Additional Comments CGA/HHA for inital steps. practiced w pt's dtr who provided HHA, was comfortable w providing this level of assistance and was doing so safely. Balance   Static Sitting Fair +   Dynamic Sitting Fair +   Static Standing Fair   Dynamic Standing Fair -   Ambulatory Fair -   Activity Tolerance   Activity Tolerance Patient tolerated treatment well   Medical Staff Made Aware DPT Festus Ahumada 2' pts med complexity, comorbidities and regression from baseline. Nurse Made Aware ok per R N   RUE Assessment   RUE Assessment WFL   LUE Assessment   LUE Assessment WFL   Hand Function   Gross Motor Coordination Functional   Fine Motor Coordination Functional   Cognition   Overall Cognitive Status WFL   Arousal/Participation Alert; Responsive; Cooperative   Attention Within functional limits   Orientation Level Oriented X4   Memory Within functional limits   Following Commands Follows one step commands without difficulty   Comments pt pleasant and cooperative, overall G safety awareness and insight to condition t/o session. Assessment   Prognosis Good   Assessment Pt is a 80 y.o. female admitted 8/15/23 w colon adenocarcinoma, underwent R hemicolectomy 8/15/23, POD #1 w active OT eval and treat orders. PMH includes  has a past medical history of Aortic valve regurgitation, Aortic valve stenosis, Hypertension, Left ventricular hypertrophy, Mitral valve regurgitation, Osteoarthritis, and Pure hypercholesterolemia. Pt lives alone in a [facility] with 1 vs 2 BAYLEE, reports tub shower with grab bars, standard toilet. Pta, pt was independent w/ ADL/IADL and functional mobility, was  driving and was not using any DME at baseline. Currently, pt is ind for UB ADL, mod I for LB ADL and completed transfers/FM w S w HHA as needed. From OT standpoint, pt is functioning at baseline level and does not appear to require additional acute OT at this time. Discussed pt's comfort with d/c from OT and any concerns with returning to previous environment; pt does not report any at this time. The patient's raw score on the AM-PAC Daily Activity inpatient short form is 22, standardized score is 47.1, greater than 39.4. Patients at this level are likely to benefit from discharge to home. Please refer to the recommendation of the Occupational Therapist for safe discharge planning. From OT perspective, pt should D/C HOME when medically stable. Acute OT no longer rq at this time, D/C OT. Goals   Patient Goals get home   Recommendation   OT Discharge Recommendation No rehabilitation needs   AM-PAC Daily Activity Inpatient   Lower Body Dressing 3   Bathing 3   Toileting 4   Upper Body Dressing 4   Grooming 4   Eating 4   Daily Activity Raw Score 22   Daily Activity Standardized Score (Calc for Raw Score >=11) 47. [address] Following a Speech/Presentation 4   Understanding Ordinary Conversation 4   Taking Medications 4   Remembering Where Things Are Placed or Put Away 4   Remembering List of 4-5 Errands 4   Taking Care of Complicated Tasks 4   Applied Cognition Raw Score 24   Applied Cognition Standardized Score 62.21   Modified Brennon Scale   Modified Brennon Scale 2   End of Consult   Education Provided Yes   Patient Position at End of Consult Bedside chair; All needs within reach   Nurse Communication Nurse aware of consult       [place] Sera, MOT, OTR/L

## 2023-08-17 LAB
ANION GAP SERPL CALCULATED.3IONS-SCNC: 5 MMOL/L
BASOPHILS # BLD AUTO: 0.03 THOUSANDS/ÂΜL (ref 0–0.1)
BASOPHILS NFR BLD AUTO: 0 % (ref 0–1)
BUN SERPL-MCNC: 13 MG/DL (ref 5–25)
CALCIUM SERPL-MCNC: 8.3 MG/DL (ref 8.3–10.1)
CHLORIDE SERPL-SCNC: 107 MMOL/L (ref 96–108)
CO2 SERPL-SCNC: 25 MMOL/L (ref 21–32)
CREAT SERPL-MCNC: 0.69 MG/DL (ref 0.6–1.3)
EOSINOPHIL # BLD AUTO: 0.01 THOUSAND/ÂΜL (ref 0–0.61)
EOSINOPHIL NFR BLD AUTO: 0 % (ref 0–6)
ERYTHROCYTE [DISTWIDTH] IN BLOOD BY AUTOMATED COUNT: 17.2 % (ref 11.6–15.1)
GFR SERPL CREATININE-BSD FRML MDRD: 77 ML/MIN/1.73SQ M
GLUCOSE SERPL-MCNC: 124 MG/DL (ref 65–140)
GLUCOSE SERPL-MCNC: 127 MG/DL (ref 65–140)
GLUCOSE SERPL-MCNC: 136 MG/DL (ref 65–140)
GLUCOSE SERPL-MCNC: 152 MG/DL (ref 65–140)
GLUCOSE SERPL-MCNC: 207 MG/DL (ref 65–140)
HCT VFR BLD AUTO: 28.8 % (ref 34.8–46.1)
HGB BLD-MCNC: 9.5 G/DL (ref 11.5–15.4)
IMM GRANULOCYTES # BLD AUTO: 0.09 THOUSAND/UL (ref 0–0.2)
IMM GRANULOCYTES NFR BLD AUTO: 1 % (ref 0–2)
LYMPHOCYTES # BLD AUTO: 1.71 THOUSANDS/ÂΜL (ref 0.6–4.47)
LYMPHOCYTES NFR BLD AUTO: 13 % (ref 14–44)
MAGNESIUM SERPL-MCNC: 2.3 MG/DL (ref 1.6–2.6)
MCH RBC QN AUTO: 27.4 PG (ref 26.8–34.3)
MCHC RBC AUTO-ENTMCNC: 33 G/DL (ref 31.4–37.4)
MCV RBC AUTO: 83 FL (ref 82–98)
MONOCYTES # BLD AUTO: 0.73 THOUSAND/ÂΜL (ref 0.17–1.22)
MONOCYTES NFR BLD AUTO: 5 % (ref 4–12)
NEUTROPHILS # BLD AUTO: 10.95 THOUSANDS/ÂΜL (ref 1.85–7.62)
NEUTS SEG NFR BLD AUTO: 81 % (ref 43–75)
NRBC BLD AUTO-RTO: 0 /100 WBCS
PLATELET # BLD AUTO: 311 THOUSANDS/UL (ref 149–390)
PMV BLD AUTO: 9.4 FL (ref 8.9–12.7)
POTASSIUM SERPL-SCNC: 3.6 MMOL/L (ref 3.5–5.3)
RBC # BLD AUTO: 3.47 MILLION/UL (ref 3.81–5.12)
SODIUM SERPL-SCNC: 137 MMOL/L (ref 135–147)
WBC # BLD AUTO: 13.52 THOUSAND/UL (ref 4.31–10.16)

## 2023-08-17 PROCEDURE — 82948 REAGENT STRIP/BLOOD GLUCOSE: CPT

## 2023-08-17 PROCEDURE — 83735 ASSAY OF MAGNESIUM: CPT | Performed by: PHYSICIAN ASSISTANT

## 2023-08-17 PROCEDURE — 99024 POSTOP FOLLOW-UP VISIT: CPT | Performed by: STUDENT IN AN ORGANIZED HEALTH CARE EDUCATION/TRAINING PROGRAM

## 2023-08-17 PROCEDURE — 85025 COMPLETE CBC W/AUTO DIFF WBC: CPT | Performed by: PHYSICIAN ASSISTANT

## 2023-08-17 PROCEDURE — 80048 BASIC METABOLIC PNL TOTAL CA: CPT | Performed by: PHYSICIAN ASSISTANT

## 2023-08-17 RX ORDER — POTASSIUM CHLORIDE 14.9 MG/ML
20 INJECTION INTRAVENOUS
Status: DISCONTINUED | OUTPATIENT
Start: 2023-08-17 | End: 2023-08-17

## 2023-08-17 RX ORDER — POTASSIUM CHLORIDE 20 MEQ/1
40 TABLET, EXTENDED RELEASE ORAL ONCE
Status: COMPLETED | OUTPATIENT
Start: 2023-08-17 | End: 2023-08-17

## 2023-08-17 RX ADMIN — POTASSIUM CHLORIDE 40 MEQ: 1500 TABLET, EXTENDED RELEASE ORAL at 11:53

## 2023-08-17 RX ADMIN — CHLORHEXIDINE GLUCONATE 15 ML: 1.2 SOLUTION ORAL at 10:00

## 2023-08-17 RX ADMIN — INSULIN LISPRO 1 UNITS: 100 INJECTION, SOLUTION INTRAVENOUS; SUBCUTANEOUS at 17:19

## 2023-08-17 RX ADMIN — Medication 2.5 MG: at 15:10

## 2023-08-17 RX ADMIN — POTASSIUM CHLORIDE 20 MEQ: 14.9 INJECTION, SOLUTION INTRAVENOUS at 09:12

## 2023-08-17 RX ADMIN — METHOCARBAMOL 250 MG: 500 TABLET ORAL at 11:53

## 2023-08-17 RX ADMIN — ATORVASTATIN CALCIUM 10 MG: 10 TABLET, FILM COATED ORAL at 20:32

## 2023-08-17 RX ADMIN — METHOCARBAMOL 250 MG: 500 TABLET ORAL at 17:17

## 2023-08-17 RX ADMIN — METHOCARBAMOL 250 MG: 500 TABLET ORAL at 05:56

## 2023-08-17 RX ADMIN — ENOXAPARIN SODIUM 40 MG: 40 INJECTION SUBCUTANEOUS at 10:00

## 2023-08-17 RX ADMIN — AMLODIPINE BESYLATE 5 MG: 5 TABLET ORAL at 20:32

## 2023-08-17 NOTE — PLAN OF CARE
Problem: MOBILITY - ADULT  Goal: Maintain or return to baseline ADL function  Description: INTERVENTIONS:  -  Assess patient's ability to carry out ADLs; assess patient's baseline for ADL function and identify physical deficits which impact ability to perform ADLs (bathing, care of mouth/teeth, toileting, grooming, dressing, etc.)  - Assess/evaluate cause of self-care deficits   - Assess range of motion  - Assess patient's mobility; develop plan if impaired  - Assess patient's need for assistive devices and provide as appropriate  - Encourage maximum independence but intervene and supervise when necessary  - Involve family in performance of ADLs  - Assess for home care needs following discharge   - Consider OT consult to assist with ADL evaluation and planning for discharge  - Provide patient education as appropriate  Outcome: Progressing  Goal: Maintains/Returns to pre admission functional level  Description: INTERVENTIONS:  - Perform BMAT or MOVE assessment daily.   - Set and communicate daily mobility goal to care team and patient/family/caregiver.    - Collaborate with rehabilitation services on mobility goals if consulted  - Out of bed for toileting  - Record patient progress and toleration of activity level   Outcome: Progressing     Problem: GENITOURINARY - ADULT  Goal: Maintains or returns to baseline urinary function  Description: INTERVENTIONS:  - Assess urinary function  - Encourage oral fluids to ensure adequate hydration if ordered  - Administer IV fluids as ordered to ensure adequate hydration  - Administer ordered medications as needed  - Offer frequent toileting  - Follow urinary retention protocol if ordered  Outcome: Progressing  Goal: Absence of urinary retention  Description: INTERVENTIONS:  - Assess patient’s ability to void and empty bladder  - Monitor I/O  - Bladder scan as needed  - Discuss with physician/AP medications to alleviate retention as needed  - Discuss catheterization for long term situations as appropriate  Outcome: Progressing     Problem: METABOLIC, FLUID AND ELECTROLYTES - ADULT  Goal: Electrolytes maintained within normal limits  Description: INTERVENTIONS:  - Monitor labs and assess patient for signs and symptoms of electrolyte imbalances  - Administer electrolyte replacement as ordered  - Monitor response to electrolyte replacements, including repeat lab results as appropriate  - Instruct patient on fluid and nutrition as appropriate  Outcome: Progressing  Goal: Glucose maintained within target range  Description: INTERVENTIONS:  - Monitor Blood Glucose as ordered  - Assess for signs and symptoms of hyperglycemia and hypoglycemia  - Administer ordered medications to maintain glucose within target range  - Assess nutritional intake and initiate nutrition service referral as needed  Outcome: Progressing

## 2023-08-17 NOTE — PROGRESS NOTES
Progress Note - Surgical Oncology  : [facility] Resident on Ema Wu 80 y.o. female MRN: [id number]  Unit/Bed#: [facility] 913-01 Encounter: [id number]    Assessment:  80 y.o. female With history of severe AS, moderate MVR, who presented with a mucinous adenocarcinoma of the colon now status post open right hemicolectomy with Dr. Lizette Vaughan on 8/15     AVSS on RA    UOP: 1600 cc  BM: awaiting return of bowel function    Lab Results   Component Value Date/Time    WBC 14.55 (H) 08/16/2023 05:27 AM    WBC 20.71 (H) 08/15/2023 07:40 PM    HGB 10.2 (L) 08/16/2023 05:27 AM    HGB 9.8 (L) 08/15/2023 07:40 PM    CREATININE 0.59 (L) 08/16/2023 05:27 AM    CREATININE 0.63 08/15/2023 07:40 PM       Plan:  Continue on ADA clear liquid diet W/ toast and crackers, ensures for lunch and dinner  Continue analgesia as needed as needed per geriatric recommendations- oxy 2.5/5 and robaxin 250mg Q6  DVT prophylaxis  Continue antiemetics as needed as needed  Encourage OOB/I-S use  PT/OT- no rehab needs    Subjective/Objective     Subjective: No acute events overnight patient tolerating clear liquid diet and ensures without nausea or vomiting. Patient denies bowel movement or flatus. 5 out of 10 pain well controlled on current regimen. Voiding. Objective:     Physical Exam:  GEN: NAD  HEENT: MMM  CV: RRR  Lung: Normal effort  Ab: Soft, mildly distended, TTP, incision c/d/i  Extrem: No CCE   Neuro: A+Ox3     Vitals: Temp:  [97.6 °F (36.4 °C)-98.7 °F (37.1 °C)] 98.4 °F (36.9 °C)  HR:  [74-94] 85  Resp:  [16] 16  BP: (109-141)/(58-75) 141/74  Body mass index is 24.67 kg/m². I/O       08/15 0701  08/16 0700 08/16 0701  08/17 0700    P. O.  930    I.V. (mL/kg) 1530.7 (26.7) 152.7 (2.7)    IV Piggyback 700     Total Intake(mL/kg) 2230.7 (38.9) 1082.7 (18.9)    Urine (mL/kg/hr) 1300 1600 (1.2)    Total Output 1300 1600    Net +930.7 -517.3                  Lab, Imaging and other studies: I have personally reviewed pertinent reports.   , CBC with diff: No results found for: "WBC", "HGB", "HCT", "MCV", "PLT", "ADJUSTEDWBC", "RBC", "MCH", "MCHC", "RDW", "MPV", "NRBC", BMP/CMP: No results found for: "SODIUM", "K", "CL", "CO2", "ANIONGAP", "BUN", "CREATININE", "GLUCOSE", "CALCIUM", "AST", "ALT", "ALKPHOS", "PROT", "BILITOT", "EGFR"  VTE Pharmacologic Prophylaxis: Enoxaparin (Lovenox)  VTE Mechanical Prophylaxis: sequential compression device      Barrera Bower MD  8/17/2023 5:42 AM

## 2023-08-17 NOTE — RESTORATIVE TECHNICIAN NOTE
Restorative Technician Note      Patient Name: Eura Limb     Restorative Tech Visit Date: 08/17/23  Note Type: Mobility  Patient Position Upon Consult: Supine  Activity Performed: Ambulated  Assistive Device: Roller walker  Patient Position at End of Consult: Bedside chair;  All needs within reach    Chely Juarez Technician

## 2023-08-18 LAB
ANION GAP SERPL CALCULATED.3IONS-SCNC: 5 MMOL/L
BASOPHILS # BLD AUTO: 0.04 THOUSANDS/ÂΜL (ref 0–0.1)
BASOPHILS NFR BLD AUTO: 0 % (ref 0–1)
BUN SERPL-MCNC: 14 MG/DL (ref 5–25)
CALCIUM SERPL-MCNC: 8.1 MG/DL (ref 8.3–10.1)
CHLORIDE SERPL-SCNC: 107 MMOL/L (ref 96–108)
CO2 SERPL-SCNC: 23 MMOL/L (ref 21–32)
CREAT SERPL-MCNC: 0.67 MG/DL (ref 0.6–1.3)
EOSINOPHIL # BLD AUTO: 0.1 THOUSAND/ÂΜL (ref 0–0.61)
EOSINOPHIL NFR BLD AUTO: 1 % (ref 0–6)
ERYTHROCYTE [DISTWIDTH] IN BLOOD BY AUTOMATED COUNT: 17.3 % (ref 11.6–15.1)
GFR SERPL CREATININE-BSD FRML MDRD: 78 ML/MIN/1.73SQ M
GLUCOSE SERPL-MCNC: 107 MG/DL (ref 65–140)
GLUCOSE SERPL-MCNC: 125 MG/DL (ref 65–140)
GLUCOSE SERPL-MCNC: 128 MG/DL (ref 65–140)
GLUCOSE SERPL-MCNC: 159 MG/DL (ref 65–140)
GLUCOSE SERPL-MCNC: 162 MG/DL (ref 65–140)
HCT VFR BLD AUTO: 30.4 % (ref 34.8–46.1)
HGB BLD-MCNC: 9.7 G/DL (ref 11.5–15.4)
IMM GRANULOCYTES # BLD AUTO: 0.05 THOUSAND/UL (ref 0–0.2)
IMM GRANULOCYTES NFR BLD AUTO: 0 % (ref 0–2)
LYMPHOCYTES # BLD AUTO: 1.5 THOUSANDS/ÂΜL (ref 0.6–4.47)
LYMPHOCYTES NFR BLD AUTO: 13 % (ref 14–44)
MCH RBC QN AUTO: 26.7 PG (ref 26.8–34.3)
MCHC RBC AUTO-ENTMCNC: 31.9 G/DL (ref 31.4–37.4)
MCV RBC AUTO: 84 FL (ref 82–98)
MONOCYTES # BLD AUTO: 0.73 THOUSAND/ÂΜL (ref 0.17–1.22)
MONOCYTES NFR BLD AUTO: 7 % (ref 4–12)
NEUTROPHILS # BLD AUTO: 8.8 THOUSANDS/ÂΜL (ref 1.85–7.62)
NEUTS SEG NFR BLD AUTO: 79 % (ref 43–75)
NRBC BLD AUTO-RTO: 0 /100 WBCS
PLATELET # BLD AUTO: 318 THOUSANDS/UL (ref 149–390)
PMV BLD AUTO: 9.5 FL (ref 8.9–12.7)
POTASSIUM SERPL-SCNC: 4 MMOL/L (ref 3.5–5.3)
RBC # BLD AUTO: 3.63 MILLION/UL (ref 3.81–5.12)
SODIUM SERPL-SCNC: 135 MMOL/L (ref 135–147)
WBC # BLD AUTO: 11.22 THOUSAND/UL (ref 4.31–10.16)

## 2023-08-18 PROCEDURE — 88309 TISSUE EXAM BY PATHOLOGIST: CPT | Performed by: PATHOLOGY

## 2023-08-18 PROCEDURE — 82948 REAGENT STRIP/BLOOD GLUCOSE: CPT

## 2023-08-18 PROCEDURE — 88341 IMHCHEM/IMCYTCHM EA ADD ANTB: CPT | Performed by: PATHOLOGY

## 2023-08-18 PROCEDURE — 85025 COMPLETE CBC W/AUTO DIFF WBC: CPT | Performed by: STUDENT IN AN ORGANIZED HEALTH CARE EDUCATION/TRAINING PROGRAM

## 2023-08-18 PROCEDURE — 88342 IMHCHEM/IMCYTCHM 1ST ANTB: CPT | Performed by: PATHOLOGY

## 2023-08-18 PROCEDURE — 80048 BASIC METABOLIC PNL TOTAL CA: CPT | Performed by: STUDENT IN AN ORGANIZED HEALTH CARE EDUCATION/TRAINING PROGRAM

## 2023-08-18 PROCEDURE — 99024 POSTOP FOLLOW-UP VISIT: CPT | Performed by: STUDENT IN AN ORGANIZED HEALTH CARE EDUCATION/TRAINING PROGRAM

## 2023-08-18 RX ADMIN — METHOCARBAMOL 250 MG: 500 TABLET ORAL at 23:43

## 2023-08-18 RX ADMIN — AMLODIPINE BESYLATE 5 MG: 5 TABLET ORAL at 21:00

## 2023-08-18 RX ADMIN — METHOCARBAMOL 250 MG: 500 TABLET ORAL at 05:34

## 2023-08-18 RX ADMIN — METHOCARBAMOL 250 MG: 500 TABLET ORAL at 17:18

## 2023-08-18 RX ADMIN — METHOCARBAMOL 250 MG: 500 TABLET ORAL at 00:06

## 2023-08-18 RX ADMIN — ENOXAPARIN SODIUM 40 MG: 40 INJECTION SUBCUTANEOUS at 09:54

## 2023-08-18 RX ADMIN — ATORVASTATIN CALCIUM 10 MG: 10 TABLET, FILM COATED ORAL at 21:00

## 2023-08-18 RX ADMIN — INSULIN LISPRO 1 UNITS: 100 INJECTION, SOLUTION INTRAVENOUS; SUBCUTANEOUS at 17:18

## 2023-08-18 RX ADMIN — METHOCARBAMOL 250 MG: 500 TABLET ORAL at 12:19

## 2023-08-18 RX ADMIN — INSULIN LISPRO 1 UNITS: 100 INJECTION, SOLUTION INTRAVENOUS; SUBCUTANEOUS at 12:19

## 2023-08-18 NOTE — PROGRESS NOTES
Progress Note  Benoit Childs 80 y.o. female MRN: [id number]  Unit/Bed#: [facility] 913-01 Encounter: [id number]    Assessment:  81yo F with hx of severe AS, moderate MVR, who presented with a mucinous adenocarcinoma of the colon now s/p 8/15 open right hemicolectomy. Subjective:  No acute overnight events. Pain controlled. No BM, no flatus yet. Tolerating clears/toast/crackers/ensures well, no n/v, though says she feels a bit bloated if she drinks too much. Voiding, walking well. Objective:  VSS on RA. /75   Pulse 96   Temp 98.8 °F (37.1 °C)   Resp 18   Ht 5' (1.524 m)   Wt 57.3 kg (126 lb 5.2 oz)   SpO2 95%   BMI 24.67 kg/m²      Physical Exam:  General: NAD  HENT: NCAT  CV: nl rate  Lungs: nl wob. No resp distress. ABD: Soft, nontender, distended. Incisions CDI.   Extrem: No CCE  Neuro: alert & oriented    UOP: 1125cc    08/18/23 labs pending  Recent Labs     08/16/23  0527 08/17/23  0523   WBC 14.55* 13.52*   HGB 10.2* 9.5*    311   SODIUM 135 137   K 3.9 3.6    107   CO2 25 25   BUN 9 13   CREATININE 0.59* 0.69   GLUC 179* 127   CALCIUM 7.9* 8.3   MG 2.4 2.3       Plan:  - continue diabetic CLD/toast/crax, ensures  - DVT ppx  - PT/OT: no rehab needs

## 2023-08-18 NOTE — RESTORATIVE TECHNICIAN NOTE
Restorative Technician Note      Patient Name: [facility]     Restorative Tech Visit Date: 08/18/23  Note Type: Mobility  Patient Position Upon Consult: Bedside chair  Activity Performed: Ambulated  Assistive Device: Roller walker  Patient Position at End of Consult: Bedside chair;  All needs within reach    Saranya Taylor Restorative Technician

## 2023-08-19 LAB
ANION GAP SERPL CALCULATED.3IONS-SCNC: 5 MMOL/L
BUN SERPL-MCNC: 19 MG/DL (ref 5–25)
CALCIUM SERPL-MCNC: 7.8 MG/DL (ref 8.3–10.1)
CHLORIDE SERPL-SCNC: 108 MMOL/L (ref 96–108)
CO2 SERPL-SCNC: 22 MMOL/L (ref 21–32)
CREAT SERPL-MCNC: 0.63 MG/DL (ref 0.6–1.3)
GFR SERPL CREATININE-BSD FRML MDRD: 80 ML/MIN/1.73SQ M
GLUCOSE SERPL-MCNC: 104 MG/DL (ref 65–140)
GLUCOSE SERPL-MCNC: 113 MG/DL (ref 65–140)
GLUCOSE SERPL-MCNC: 119 MG/DL (ref 65–140)
GLUCOSE SERPL-MCNC: 123 MG/DL (ref 65–140)
GLUCOSE SERPL-MCNC: 96 MG/DL (ref 65–140)
POTASSIUM SERPL-SCNC: 3.6 MMOL/L (ref 3.5–5.3)
SODIUM SERPL-SCNC: 135 MMOL/L (ref 135–147)

## 2023-08-19 PROCEDURE — 99024 POSTOP FOLLOW-UP VISIT: CPT | Performed by: SURGERY

## 2023-08-19 PROCEDURE — 82948 REAGENT STRIP/BLOOD GLUCOSE: CPT

## 2023-08-19 PROCEDURE — 80048 BASIC METABOLIC PNL TOTAL CA: CPT | Performed by: SURGERY

## 2023-08-19 RX ORDER — POTASSIUM CHLORIDE 20 MEQ/1
40 TABLET, EXTENDED RELEASE ORAL ONCE
Status: COMPLETED | OUTPATIENT
Start: 2023-08-19 | End: 2023-08-19

## 2023-08-19 RX ADMIN — CHLORHEXIDINE GLUCONATE 15 ML: 1.2 SOLUTION ORAL at 10:16

## 2023-08-19 RX ADMIN — METHOCARBAMOL 250 MG: 500 TABLET ORAL at 19:52

## 2023-08-19 RX ADMIN — ENOXAPARIN SODIUM 40 MG: 40 INJECTION SUBCUTANEOUS at 10:16

## 2023-08-19 RX ADMIN — METHOCARBAMOL 250 MG: 500 TABLET ORAL at 13:22

## 2023-08-19 RX ADMIN — Medication 1 PACKET: at 13:22

## 2023-08-19 RX ADMIN — METHOCARBAMOL 250 MG: 500 TABLET ORAL at 05:39

## 2023-08-19 RX ADMIN — ATORVASTATIN CALCIUM 10 MG: 10 TABLET, FILM COATED ORAL at 22:00

## 2023-08-19 RX ADMIN — POTASSIUM CHLORIDE 40 MEQ: 1500 TABLET, EXTENDED RELEASE ORAL at 13:22

## 2023-08-19 NOTE — PLAN OF CARE
Problem: MOBILITY - ADULT  Goal: Maintain or return to baseline ADL function  Description: INTERVENTIONS:  -  Assess patient's ability to carry out ADLs; assess patient's baseline for ADL function and identify physical deficits which impact ability to perform ADLs (bathing, care of mouth/teeth, toileting, grooming, dressing, etc.)  - Assess/evaluate cause of self-care deficits   - Assess range of motion  - Assess patient's mobility; develop plan if impaired  - Assess patient's need for assistive devices and provide as appropriate  - Encourage maximum independence but intervene and supervise when necessary  - Involve family in performance of ADLs  - Assess for home care needs following discharge   - Consider OT consult to assist with ADL evaluation and planning for discharge  - Provide patient education as appropriate  Outcome: Progressing  Goal: Maintains/Returns to pre admission functional level  Description: INTERVENTIONS:  - Perform BMAT or MOVE assessment daily.   - Set and communicate daily mobility goal to care team and patient/family/caregiver. - Collaborate with rehabilitation services on mobility goals if consulted  - Perform Range of Motion 2 times a day. - Reposition patient every 2 hours.   - Dangle patient 2 times a day  - Stand patient 2 times a day  - Ambulate patient 2 times a day  - Out of bed to chair 2 times a day   - Out of bed for meals 2 times a day  - Out of bed for toileting  - Record patient progress and toleration of activity level   Outcome: Progressing     Problem: GENITOURINARY - ADULT  Goal: Maintains or returns to baseline urinary function  Description: INTERVENTIONS:  - Assess urinary function  - Encourage oral fluids to ensure adequate hydration if ordered  - Administer IV fluids as ordered to ensure adequate hydration  - Administer ordered medications as needed  - Offer frequent toileting  - Follow urinary retention protocol if ordered  Outcome: Progressing  Goal: Absence of urinary retention  Description: INTERVENTIONS:  - Assess patient’s ability to void and empty bladder  - Monitor I/O  - Bladder scan as needed  - Discuss with physician/AP medications to alleviate retention as needed  - Discuss catheterization for long term situations as appropriate  Outcome: Progressing     Problem: METABOLIC, FLUID AND ELECTROLYTES - ADULT  Goal: Electrolytes maintained within normal limits  Description: INTERVENTIONS:  - Monitor labs and assess patient for signs and symptoms of electrolyte imbalances  - Administer electrolyte replacement as ordered  - Monitor response to electrolyte replacements, including repeat lab results as appropriate  - Instruct patient on fluid and nutrition as appropriate  Outcome: Progressing  Goal: Glucose maintained within target range  Description: INTERVENTIONS:  - Monitor Blood Glucose as ordered  - Assess for signs and symptoms of hyperglycemia and hypoglycemia  - Administer ordered medications to maintain glucose within target range  - Assess nutritional intake and initiate nutrition service referral as needed  Outcome: Progressing     Problem: SKIN/TISSUE INTEGRITY - ADULT  Goal: Skin Integrity remains intact(Skin Breakdown Prevention)  Description: Assess:  -Perform Shan assessment every 2  -Clean and moisturize skin every 2  -Inspect skin when repositioning, toileting, and assisting with ADLS  -Assess under medical devices such as 2 every 2  -Assess extremities for adequate circulation and sensation     Bed Management:  -Have minimal linens on bed & keep smooth, unwrinkled  -Change linens as needed when moist or perspiring  -Avoid sitting or lying in one position for more than 2 hours while in bed    Toileting:  -Offer bedside commode  -Assess for incontinence every 2  -Use incontinent care products after each incontinent episode such as 2    Activity:  -Mobilize patient 2 times a day  -Encourage activity and walks on unit  -Encourage or provide ROM exercises -Turn and reposition patient every 2 Hours  -Use appropriate equipment to lift or move patient in bed  -Instruct/ Assist with weight shifting every 2 when out of bed in chair  -Consider limitation of chair time 2 hour intervals    Skin Care:  -Avoid use of baby powder, tape, friction and shearing, hot water or constrictive clothing  -Relieve pressure over bony prominences using 2  -Do not massage red bony areas    Next Steps:  -Teach patient strategies to minimize risks such as 2   -Consider consults to  interdisciplinary teams such as 2  Outcome: Progressing     Problem: Nutrition/Hydration-ADULT  Goal: Nutrient/Hydration intake appropriate for improving, restoring or maintaining nutritional needs  Description: Monitor and assess patient's nutrition/hydration status for malnutrition. Collaborate with interdisciplinary team and initiate plan and interventions as ordered. Monitor patient's weight and dietary intake as ordered or per policy. Utilize nutrition screening tool and intervene as necessary. Determine patient's food preferences and provide high-protein, high-caloric foods as appropriate.      INTERVENTIONS:  - Monitor oral intake, urinary output, labs, and treatment plans  - Assess nutrition and hydration status and recommend course of action  - Evaluate amount of meals eaten  - Assist patient with eating if necessary   - Allow adequate time for meals  - Recommend/ encourage appropriate diets, oral nutritional supplements, and vitamin/mineral supplements  - Order, calculate, and assess calorie counts as needed  - Recommend, monitor, and adjust tube feedings and TPN/PPN based on assessed needs  - Assess need for intravenous fluids  - Provide specific nutrition/hydration education as appropriate  - Include patient/family/caregiver in decisions related to nutrition  Outcome: Progressing

## 2023-08-19 NOTE — PROGRESS NOTES
Progress Note - Surgical Oncology   Orpha Ask 80 y.o. female MRN: [id number]  Unit/Bed#: [facility] 913-01 Encounter: [id number]    Assessment:  81yo F with hx of severe AS, moderate MVR, who presented with a mucinous adenocarcinoma of the colon now s/p 8/15 open right hemicolectomy. Intermittently tachycardic low 100's, other vitals normal on room air  UOP 450cc recorded  Stool x 2    No new labs    Plan:  Continue clear liquids with toast and crackers given ongoing distension, poor appetite and only taking sips of liquids up until this point, if tolerating toast and crackers may be able to advance later today versus tomorrow  PRN analgesia  OOB/ambulate  DVT PPX  PT/OT: No rehab needs    Subjective/Objective     Subjective: No acute events overnight, tolerating sips of clear liquids but states she has poor appetite and has not yet taken any toast or crackers, having loose BM's, pain controlled    Objective:     Blood pressure 134/68, pulse (!) 107, temperature 99 °F (37.2 °C), temperature source Oral, resp. rate 17, height 5' (1.524 m), weight 57.1 kg (125 lb 14.1 oz), SpO2 97 %. ,Body mass index is 24.58 kg/m². Intake/Output Summary (Last 24 hours) at 8/19/2023 0637  Last data filed at 8/18/2023 1101  Gross per 24 hour   Intake --   Output 450 ml   Net -450 ml       Invasive Devices     Peripheral Intravenous Line  Duration           Peripheral IV 08/15/23 Dorsal (posterior); Left Hand 3 days    Peripheral IV 08/15/23 Right Hand 3 days                Physical Exam:   Gen:    NAD  CV:      warm, well-perfused  Lungs: No respiratory distress  Abd:     soft, appropriately tender, mildly distended  Ext:      no CCE  Neuro: A&Ox3

## 2023-08-20 LAB
ANION GAP SERPL CALCULATED.3IONS-SCNC: 7 MMOL/L
BASOPHILS # BLD AUTO: 0.03 THOUSANDS/ÂΜL (ref 0–0.1)
BASOPHILS NFR BLD AUTO: 1 % (ref 0–1)
BUN SERPL-MCNC: 15 MG/DL (ref 5–25)
CALCIUM SERPL-MCNC: 8.1 MG/DL (ref 8.3–10.1)
CHLORIDE SERPL-SCNC: 112 MMOL/L (ref 96–108)
CO2 SERPL-SCNC: 20 MMOL/L (ref 21–32)
CREAT SERPL-MCNC: 0.58 MG/DL (ref 0.6–1.3)
EOSINOPHIL # BLD AUTO: 0.36 THOUSAND/ÂΜL (ref 0–0.61)
EOSINOPHIL NFR BLD AUTO: 6 % (ref 0–6)
ERYTHROCYTE [DISTWIDTH] IN BLOOD BY AUTOMATED COUNT: 17.2 % (ref 11.6–15.1)
GFR SERPL CREATININE-BSD FRML MDRD: 82 ML/MIN/1.73SQ M
GLUCOSE SERPL-MCNC: 111 MG/DL (ref 65–140)
GLUCOSE SERPL-MCNC: 132 MG/DL (ref 65–140)
GLUCOSE SERPL-MCNC: 82 MG/DL (ref 65–140)
GLUCOSE SERPL-MCNC: 86 MG/DL (ref 65–140)
HCT VFR BLD AUTO: 28.1 % (ref 34.8–46.1)
HGB BLD-MCNC: 9.4 G/DL (ref 11.5–15.4)
IMM GRANULOCYTES # BLD AUTO: 0.02 THOUSAND/UL (ref 0–0.2)
IMM GRANULOCYTES NFR BLD AUTO: 0 % (ref 0–2)
LYMPHOCYTES # BLD AUTO: 1.44 THOUSANDS/ÂΜL (ref 0.6–4.47)
LYMPHOCYTES NFR BLD AUTO: 23 % (ref 14–44)
MCH RBC QN AUTO: 27.8 PG (ref 26.8–34.3)
MCHC RBC AUTO-ENTMCNC: 33.5 G/DL (ref 31.4–37.4)
MCV RBC AUTO: 83 FL (ref 82–98)
MONOCYTES # BLD AUTO: 0.58 THOUSAND/ÂΜL (ref 0.17–1.22)
MONOCYTES NFR BLD AUTO: 9 % (ref 4–12)
NEUTROPHILS # BLD AUTO: 3.74 THOUSANDS/ÂΜL (ref 1.85–7.62)
NEUTS SEG NFR BLD AUTO: 61 % (ref 43–75)
NRBC BLD AUTO-RTO: 0 /100 WBCS
PLATELET # BLD AUTO: 263 THOUSANDS/UL (ref 149–390)
PMV BLD AUTO: 9.1 FL (ref 8.9–12.7)
POTASSIUM SERPL-SCNC: 3.7 MMOL/L (ref 3.5–5.3)
RBC # BLD AUTO: 3.38 MILLION/UL (ref 3.81–5.12)
SODIUM SERPL-SCNC: 139 MMOL/L (ref 135–147)
WBC # BLD AUTO: 6.17 THOUSAND/UL (ref 4.31–10.16)

## 2023-08-20 PROCEDURE — 80048 BASIC METABOLIC PNL TOTAL CA: CPT | Performed by: SURGERY

## 2023-08-20 PROCEDURE — 82948 REAGENT STRIP/BLOOD GLUCOSE: CPT

## 2023-08-20 PROCEDURE — 85025 COMPLETE CBC W/AUTO DIFF WBC: CPT | Performed by: SURGERY

## 2023-08-20 PROCEDURE — 99024 POSTOP FOLLOW-UP VISIT: CPT | Performed by: SURGERY

## 2023-08-20 RX ADMIN — METHOCARBAMOL 250 MG: 500 TABLET ORAL at 23:45

## 2023-08-20 RX ADMIN — Medication 1 PACKET: at 09:35

## 2023-08-20 RX ADMIN — CHLORHEXIDINE GLUCONATE 15 ML: 1.2 SOLUTION ORAL at 23:45

## 2023-08-20 RX ADMIN — ATORVASTATIN CALCIUM 10 MG: 10 TABLET, FILM COATED ORAL at 23:45

## 2023-08-20 RX ADMIN — METHOCARBAMOL 250 MG: 500 TABLET ORAL at 13:37

## 2023-08-20 RX ADMIN — METHOCARBAMOL 250 MG: 500 TABLET ORAL at 18:50

## 2023-08-20 RX ADMIN — ENOXAPARIN SODIUM 40 MG: 40 INJECTION SUBCUTANEOUS at 09:36

## 2023-08-20 RX ADMIN — METHOCARBAMOL 250 MG: 500 TABLET ORAL at 06:33

## 2023-08-20 RX ADMIN — CHLORHEXIDINE GLUCONATE 15 ML: 1.2 SOLUTION ORAL at 09:39

## 2023-08-20 NOTE — PLAN OF CARE
Problem: MOBILITY - ADULT  Goal: Maintain or return to baseline ADL function  Description: INTERVENTIONS:  -  Assess patient's ability to carry out ADLs; assess patient's baseline for ADL function and identify physical deficits which impact ability to perform ADLs (bathing, care of mouth/teeth, toileting, grooming, dressing, etc.)  - Assess/evaluate cause of self-care deficits   - Assess range of motion  - Assess patient's mobility; develop plan if impaired  - Assess patient's need for assistive devices and provide as appropriate  - Encourage maximum independence but intervene and supervise when necessary  - Involve family in performance of ADLs  - Assess for home care needs following discharge   - Consider OT consult to assist with ADL evaluation and planning for discharge  - Provide patient education as appropriate  Outcome: Progressing  Goal: Maintains/Returns to pre admission functional level  Description: INTERVENTIONS:  - Perform BMAT or MOVE assessment daily.   - Set and communicate daily mobility goal to care team and patient/family/caregiver.    - Collaborate with rehabilitation services on mobility goals if consulted  - Record patient progress and toleration of activity level   Outcome: Progressing     Problem: GENITOURINARY - ADULT  Goal: Maintains or returns to baseline urinary function  Description: INTERVENTIONS:  - Assess urinary function  - Encourage oral fluids to ensure adequate hydration if ordered  - Administer IV fluids as ordered to ensure adequate hydration  - Administer ordered medications as needed  - Offer frequent toileting  - Follow urinary retention protocol if ordered  Outcome: Progressing  Goal: Absence of urinary retention  Description: INTERVENTIONS:  - Assess patient’s ability to void and empty bladder  - Monitor I/O  - Bladder scan as needed  - Discuss with physician/AP medications to alleviate retention as needed  - Discuss catheterization for long term situations as appropriate  Outcome: Progressing

## 2023-08-20 NOTE — PROGRESS NOTES
Progress Note - Surgical Oncology   Lynsey Gayle 80 y.o. 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 AVSS on RA    UOP: x3 unmeasured  BM: x2 unmeasured    Lab Results   Component Value Date/Time    WBC 6.17 08/20/2023 05:31 AM    WBC 11.22 (H) 08/18/2023 06:37 AM    HGB 9.4 (L) 08/20/2023 05:31 AM    HGB 9.7 (L) 08/18/2023 06:37 AM    CREATININE 0.58 (L) 08/20/2023 05:31 AM    CREATININE 0.63 08/19/2023 09:30 AM       Plan: Adv to low res diet  PRN analgesia  OOB/ambulate  DVT PPX  PT/OT: No rehab needs  Dispo: psb d/c later today vs 8/21 if tolerating diet. Subjective/Objective     Subjective: No acute events overnight. Patient states her pain is well controlled. Has been tolerating her diet well without issue is feeling touch hungrier. Has been able to sleep comfortably overnight. Is passing bowel movements which are soft but have improved since the addition of Metamucil. She has been able to ambulate the halls. She is voiding. Objective:     Blood pressure 138/73, pulse 84, temperature 97.9 °F (36.6 °C), resp. rate 18, height 5' (1.524 m), weight 58.2 kg (128 lb 4.9 oz), SpO2 97 %. ,Body mass index is 25.06 kg/m². No intake or output data in the 24 hours ending 08/20/23 0651    Invasive Devices     Peripheral Intravenous Line  Duration           Peripheral IV 08/15/23 Dorsal (posterior); Left Hand 4 days    Peripheral IV 08/15/23 Right Hand 4 days                Physical Exam:   Gen:    NAD  CV:      warm, well-perfused  Lungs: No respiratory distress  Abd:     soft, appropriately tender, minimal distension greatly improving  Ext:      no CCE  Neuro: A&Ox3

## 2023-08-21 VITALS
OXYGEN SATURATION: 97 % | HEART RATE: 79 BPM | RESPIRATION RATE: 16 BRPM | TEMPERATURE: 98.1 F | BODY MASS INDEX: 25.19 KG/M2 | HEIGHT: 60 IN | SYSTOLIC BLOOD PRESSURE: 118 MMHG | DIASTOLIC BLOOD PRESSURE: 62 MMHG | WEIGHT: 128.31 LBS

## 2023-08-21 LAB
GLUCOSE SERPL-MCNC: 103 MG/DL (ref 65–140)
GLUCOSE SERPL-MCNC: 99 MG/DL (ref 65–140)

## 2023-08-21 PROCEDURE — 82948 REAGENT STRIP/BLOOD GLUCOSE: CPT

## 2023-08-21 PROCEDURE — 99024 POSTOP FOLLOW-UP VISIT: CPT | Performed by: STUDENT IN AN ORGANIZED HEALTH CARE EDUCATION/TRAINING PROGRAM

## 2023-08-21 PROCEDURE — NC001 PR NO CHARGE: Performed by: PHYSICIAN ASSISTANT

## 2023-08-21 RX ORDER — OXYCODONE HYDROCHLORIDE 5 MG/1
2.5 TABLET ORAL EVERY 6 HOURS PRN
Qty: 8 TABLET | Refills: 0 | Status: SHIPPED | OUTPATIENT
Start: 2023-08-21 | End: 2023-08-25

## 2023-08-21 RX ORDER — METHOCARBAMOL 500 MG/1
250 TABLET, FILM COATED ORAL EVERY 8 HOURS SCHEDULED
Qty: 11 TABLET | Refills: 0 | Status: SHIPPED | OUTPATIENT
Start: 2023-08-21 | End: 2023-08-28

## 2023-08-21 RX ORDER — ENOXAPARIN SODIUM 100 MG/ML
40 INJECTION SUBCUTANEOUS DAILY
Qty: 8.4 ML | Refills: 0 | Status: SHIPPED | OUTPATIENT
Start: 2023-08-22 | End: 2023-09-12

## 2023-08-21 RX ADMIN — Medication 1 PACKET: at 08:57

## 2023-08-21 RX ADMIN — METHOCARBAMOL 250 MG: 500 TABLET ORAL at 11:51

## 2023-08-21 RX ADMIN — ENOXAPARIN SODIUM 40 MG: 40 INJECTION SUBCUTANEOUS at 08:56

## 2023-08-21 RX ADMIN — CHLORHEXIDINE GLUCONATE 15 ML: 1.2 SOLUTION ORAL at 08:56

## 2023-08-21 RX ADMIN — METHOCARBAMOL 250 MG: 500 TABLET ORAL at 06:03

## 2023-08-21 NOTE — DISCHARGE INSTRUCTIONS
Low Fiber Diet   WHAT YOU NEED TO KNOW:   A low-fiber diet limits foods that are high in fiber. Fiber is the part of fruits, vegetables, and grains that is not broken down by your body. You may need to follow this diet after surgery on your intestines. You may also need to follow this diet for certain conditions such as Crohn disease or ulcerative colitis. Ask your healthcare provider or dietitian how much fiber you can have each day. DISCHARGE INSTRUCTIONS:   Foods to include:  Read food labels to check the amount of fiber that are found in foods. Some foods that are low in fiber include the following:  Grains:  Choose grains that have less than 2 grams of fiber in each serving. Examples include the following:    Cream of wheat and finely ground grits    Dry cereal made from rice    White bread, white pasta, and white rice    Crackers, bagels, and rolls made from white or refined flour    Other foods:      Canned and well-cooked fruit without skins or seeds, and juice without pulp    Ripe bananas and melons    Canned and well-cooked vegetables without skins or seeds, and vegetable juice    Cow's milk, lactose-free milk, soy milk, and rice milk    Yogurt without nuts, fruit, or granola    Eggs, poultry (such as chicken and turkey), fish, and tender, ground, well-cooked beef    Tofu and smooth peanut butter    Broth and strained soups made of low-fiber foods    Foods to avoid:   Breads, cereals, crackers, and pasta made with whole wheat or whole grains (such as whole oats)    Ambrocio [employer] [place], wild rice, quinoa, kasha, and barley    All fresh fruit with skin, except banana and melons    Dried fruits and fruit juice with pulp    Canned pineapple    Raw vegetables    Nuts, seeds, and popcorn    Beans, nuts, peas, and lentils    Tough meats    Coconut and avocado    What else you should know about a low-fiber diet:  A low-fiber diet can decrease the amount of bowel movements you have.  Drink liquids as directed to avoid constipation. Ask how much liquid to drink each day and which liquids are best for you. © Copyright [employer] 2022 Information is for End User's use only and may not be sold, redistributed or otherwise used for commercial purposes. The above information is an  only. It is not intended as medical advice for individual conditions or treatments. Talk to your doctor, nurse or pharmacist before following any medical regimen to see if it is safe and effective for you.

## 2023-08-21 NOTE — PROGRESS NOTES
Pastoral Care Progress Note    2023  Patient: Cathleen Lin : 1935  Admission Date & Time: 8/15/2023 1314  MRN: [id number] CSN: [id number]         23 1000   Clinical Encounter Type   Visited With Patient   Mandaen Encounters   Mandaen Needs Prayer   Sacramental Encounters   Communion Given Indicator Yes       Vilma Nelson met with the pt. Pt was provided with prayers, blessings and the Sacrament of Holy Communion by her . No further needs were expressed at this time. Chaplains still remain available.

## 2023-08-21 NOTE — PROGRESS NOTES
Pastoral Care Progress Note    2023  Patient: Reji Ocampo : 1935  Admission Date & Time: 8/15/2023 1314  MRN: [id number] [facility]: [id number]         23 0900   Clinical Encounter Type   Visited With Patient   Sabianism Encounters   Sabianism Needs Prayer     Geraldine Lundy met with the pt and provided prayers and blessings. The pt was already anointed by visiting . No further needs were expressed at this time. Chaplains still remain available.

## 2023-08-21 NOTE — DISCHARGE SUMMARY
Discharge Summary -surgical oncology  Bernardo Zamudio 80 y.o. 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 Kerwin Galicia    Attending and Service: Dr. Kerwin Galicia, surgical oncology    Consulting Physician(s): Geriatrics, APS, [facility]    Imaging and Procedures Performed:   8/15/2023 right colectomy by Dr. Kerwin Galicia     Pathology: Pending    Hospital Course: Bernardo Zamudio is a 70-year-old female with a past medical history of severe aortic stenosis, moderate MVR, HTN, HLD who presented for elective surgical intervention including right colectomy. Patient underwent surgical intervention by Dr. Kerwin Galicia without complications. Postoperatively she was transferred to the medical surgical floor where she was started on sips of clear liquids, as needed pain control, DVT prophylaxis, Pulido in place, and was encouraged to be out of bed and ambulating. Her Pulido was removed on postoperative day 1 and her diet was slowly advanced as tolerated. Patient was cleared for discharge on postoperative day 6 as she was tolerating diet without nausea or vomiting, pain remained controlled on the p.o. pain control regimen, she was urinating without any difficulties, utilizing her incentive spirometer and she was out of bed and ambulating without assistance. Lovenox injection 40 mg teaching was given to patient prior to discharge. She is to take Lovenox once daily until 9/12. All discharge instructions as well as postoperative follow-up appointments were discussed with the patient, she was in agreement with plan. All questions answered. On discharge, the patient is instructed to follow-up with the patient's primary care provider within the next 2 weeks to review the events of the recent hospitalization. The patient is instructed to follow-up as scheduled with Dr. Ashely Mora. The patient is instructed to follow the provided discharge instructions. Condition at Discharge: good     Discharge instructions/Information to patient and family:   See after visit summary for information provided to patient and family. Provisions for Follow-Up Care:  See after visit summary for information related to follow-up care and any pertinent home health orders. Disposition: Home    Planned Readmission: No    Discharge Statement   I spent 30 minutes discharging the patient. This time was spent on the day of discharge. I had direct contact with the patient on the day of discharge. Additional documentation is required if more than 30 minutes were spent on discharge. Discharge Medications:  See after visit summary for reconciled discharge medications provided to patient and family.     Rashid Rubio PA-C  8/21/2023  10:23 AM

## 2023-08-21 NOTE — PROGRESS NOTES
Progress Note - Surgical Oncology   Dotty Si 80 y.o. 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 AVSS on RA    UOP: x2 unmeasured  BM: x1 unmeasured    Lab Results   Component Value Date/Time    WBC 6.17 08/20/2023 05:31 AM    WBC 11.22 (H) 08/18/2023 06:37 AM    HGB 9.4 (L) 08/20/2023 05:31 AM    HGB 9.7 (L) 08/18/2023 06:37 AM    CREATININE 0.58 (L) 08/20/2023 05:31 AM    CREATININE 0.63 08/19/2023 09:30 AM       Plan:  Continue low res diet  Dispo: dispo likely today  PRN analgesia  OOB/ambulate  DVT PPX  PT/OT: No rehab needs      Subjective/Objective     Subjective:   No acute events overnight. Patient tolerating diet well without issue denies nausea vomiting belching or bloating. She had bowel movement overnight which was soft slightly liquid. Passing flatus. She is voiding. Pain is well controlled. Patient feels like she is ready to go home. Objective:     Blood pressure 110/61, pulse 76, temperature 98.3 °F (36.8 °C), resp. rate 14, height 5' (1.524 m), weight 58.2 kg (128 lb 4.9 oz), SpO2 98 %. ,Body mass index is 25.06 kg/m². Intake/Output Summary (Last 24 hours) at 8/21/2023 0211  Last data filed at 8/20/2023 1300  Gross per 24 hour   Intake 780 ml   Output --   Net 780 ml       Invasive Devices     Peripheral Intravenous Line  Duration           Peripheral IV 08/20/23 Proximal;Right;Ventral (anterior) Forearm <1 day                Physical Exam:   Gen:    NAD  CV:      warm, well-perfused  Lungs: No respiratory distress  Abd:     soft, minimal tenderness, nondistended, incisions clean dry and intact.    Ext:      no CCE  Neuro: A&Ox3

## 2023-08-21 NOTE — PLAN OF CARE
Problem: MOBILITY - ADULT  Goal: Maintain or return to baseline ADL function  Description: INTERVENTIONS:  -  Assess patient's ability to carry out ADLs; assess patient's baseline for ADL function and identify physical deficits which impact ability to perform ADLs (bathing, care of mouth/teeth, toileting, grooming, dressing, etc.)  - Assess/evaluate cause of self-care deficits   - Assess range of motion  - Assess patient's mobility; develop plan if impaired  - Assess patient's need for assistive devices and provide as appropriate  - Encourage maximum independence but intervene and supervise when necessary  - Involve family in performance of ADLs  - Assess for home care needs following discharge   - Consider OT consult to assist with ADL evaluation and planning for discharge  - Provide patient education as appropriate  Outcome: Adequate for Discharge  Goal: Maintains/Returns to pre admission functional level  Description: INTERVENTIONS:  - Perform BMAT or MOVE assessment daily.   - Set and communicate daily mobility goal to care team and patient/family/caregiver. - Collaborate with rehabilitation services on mobility goals if consulted  - Perform Range of Motion 3 times a day.   - Dangle patient 3 times a day  - Stand patient 3 times a day  - Ambulate patient 3 times a day  - Out of bed to chair 3 times a day   - Out of bed for meals 3 times a day  - Out of bed for toileting  - Record patient progress and toleration of activity level   Outcome: Adequate for Discharge     Problem: GENITOURINARY - ADULT  Goal: Maintains or returns to baseline urinary function  Description: INTERVENTIONS:  - Assess urinary function  - Encourage oral fluids to ensure adequate hydration if ordered  - Administer IV fluids as ordered to ensure adequate hydration  - Administer ordered medications as needed  - Offer frequent toileting  - Follow urinary retention protocol if ordered  Outcome: Adequate for Discharge  Goal: Absence of urinary retention  Description: INTERVENTIONS:  - Assess patient’s ability to void and empty bladder  - Monitor I/O  - Bladder scan as needed  - Discuss with physician/AP medications to alleviate retention as needed  - Discuss catheterization for long term situations as appropriate  Outcome: Adequate for Discharge     Problem: METABOLIC, FLUID AND ELECTROLYTES - ADULT  Goal: Electrolytes maintained within normal limits  Description: INTERVENTIONS:  - Monitor labs and assess patient for signs and symptoms of electrolyte imbalances  - Administer electrolyte replacement as ordered  - Monitor response to electrolyte replacements, including repeat lab results as appropriate  - Instruct patient on fluid and nutrition as appropriate  Outcome: Adequate for Discharge  Goal: Glucose maintained within target range  Description: INTERVENTIONS:  - Monitor Blood Glucose as ordered  - Assess for signs and symptoms of hyperglycemia and hypoglycemia  - Administer ordered medications to maintain glucose within target range  - Assess nutritional intake and initiate nutrition service referral as needed  Outcome: Adequate for Discharge     Problem: Nutrition/Hydration-ADULT  Goal: Nutrient/Hydration intake appropriate for improving, restoring or maintaining nutritional needs  Description: Monitor and assess patient's nutrition/hydration status for malnutrition. Collaborate with interdisciplinary team and initiate plan and interventions as ordered. Monitor patient's weight and dietary intake as ordered or per policy. Utilize nutrition screening tool and intervene as necessary. Determine patient's food preferences and provide high-protein, high-caloric foods as appropriate.      INTERVENTIONS:  - Monitor oral intake, urinary output, labs, and treatment plans  - Assess nutrition and hydration status and recommend course of action  - Evaluate amount of meals eaten  - Assist patient with eating if necessary   - Allow adequate time for meals  - Recommend/ encourage appropriate diets, oral nutritional supplements, and vitamin/mineral supplements  - Order, calculate, and assess calorie counts as needed  - Recommend, monitor, and adjust tube feedings and TPN/PPN based on assessed needs  - Assess need for intravenous fluids  - Provide specific nutrition/hydration education as appropriate  - Include patient/family/caregiver in decisions related to nutrition  Outcome: Adequate for Discharge

## 2023-08-21 NOTE — DISCHARGE INSTR - AVS FIRST PAGE
DISCHARGE INSTRUCTIONS    Follow Up: Follow up with Dr. Montrell Francisco on 8/29 at 10:45AM   Lovenox injection 40mg daily until 9/12    Diet: You may resume a regular diet    Pain: Robaxin 250mg every 6 hours scheduled for 7 days. Oxycodone 2.5mg as needed for moderate/severe pain every 6 hours. Shower: You may shower over the wound. Do not bathe or use a pool or hot tub until cleared by the physician. Activity: As tolerated. You may go up and down stairs, walk as much as you are comfortable, but walk at least 3 times each day. Do not lift anything heavier than 15 pounds for at least 2-4 weeks, unless cleared by the doctor. Driving: Do not drive or make any important decisions while on narcotic pain medication. Generally, you may drive when your off all narcotic pain medications. Medications: Resume all of your previous medications, unless told otherwise by the doctor. You do not need to take the narcotic pain medications unless you are having significant pain and discomfort. Call the office: If you are experiencing any of the following, fevers above 101.5° or chills, significant nausea or vomiting, increase in abdominal pain, if the wound develops drainage and/or is excessive redness around the wound, or if you have significant diarrhea or other worsening symptoms.

## 2023-08-22 ENCOUNTER — DOCUMENTATION (OUTPATIENT)
Dept: HEMATOLOGY ONCOLOGY | Facility: CLINIC | Age: 88
End: 2023-08-22

## 2023-08-22 NOTE — PROGRESS NOTES
Chart rvw'd on 8/22/2023. Colonoscopy date-  7/13/2023     Impression-  • Single invasive, malignant-appearing and ulcerated mass (not traversable) measuring 8 cm x 6 cm in the cecum and ascending colon, covering the whole circumference; performed partial removal by cold forceps biopsy; tattooed 95 cm proximal to the finding  • 2 medium (grade 2) hemorrhoids     Pathology-  7/13/2023-  A. Colon, “Right ascending colon mass,” Biopsy:  - Invasive adenocarcinoma with mucinous features arising in a tubular adenoma     Imaging-  CT CAP scheduled on 7/20/2023.        Scheduled appointments-  Patient is scheduled with Carlos Alberto (surgical oncology) on 8/29/2023 and Dr. Cat Bianchi (medical oncology) on 10/2/2023.         Surgical date-  8/15/2023     Post surgical pathology-  A. Terminal ileum, ascending colon, appendix and omentum, right hemicolectomy:  - Mucinous adenocarcinoma (8.8 cm), moderately differentiated, arising in a tubular adenoma. - Tumor invades through the muscularis propria into pericolorectal tissue (pT3). - Terminal ileum, appendix and omentum with no pathologic abnormality.  - Sixteen lymph nodes, negative for malignancy (0/16).     COLON AND RECTUM: Resection, Including Transanal Disk Excision of Rectal Neoplasms  8th Edition - Protocol posted: 6/22/2022  COLON AND RECTUM: RESECTION - All Specimens  SPECIMEN   Procedure  Right hemicolectomy    TUMOR   Tumor Site  Cecum      Ileocecal valve    Histologic Type  Mucinous adenocarcinoma    Histologic Grade  G2, moderately differentiated    Tumor Size  Greatest dimension (Centimeters): 8.8 cm   Additional Dimension (Centimeters)  4.8 cm     2.3 cm   Tumor Extent  Invades through muscularis propria into the pericolonic or perirectal tissue    Macroscopic Tumor Perforation  Not identified    Lymphovascular Invasion  Not identified    Perineural Invasion  Not identified    Number of Tumor Buds  7 per 'hotspot' field   Tumor Bud Score  Intermediate (5-9)    Type of Polyp in which Invasive Carcinoma Arose  Tubular adenoma    Treatment Effect  No known presurgical therapy    MARGINS   Margin Status for Invasive Carcinoma  All margins negative for invasive carcinoma    Closest Margin(s) to Invasive Carcinoma  Radial (circumferential) or mesenteric    Distance from Invasive Carcinoma to Closest Margin  4.2 cm   Margin Status for Non-Invasive Tumor  All margins negative for high-grade dysplasia / intramucosal carcinoma and low-grade dysplasia    REGIONAL LYMPH NODES   Regional Lymph Node Status  All regional lymph nodes negative for tumor    Number of Lymph Nodes Examined  16    Tumor Deposits  Not identified    PATHOLOGIC STAGE CLASSIFICATION (pTNM, AJCC 8th Edition)   Reporting of pT, pN, and (when applicable) pM categories is based on information available to the pathologist at the time the report is issued. As per the AJCC (Chapter 1, 8th Ed.) it is the managing physician's responsibility to establish the final pathologic stage based upon all pertinent information, including but potentially not limited to this pathology report. pT Category  pT3    pN Category  pN0    ADDITIONAL FINDINGS   Additional Findings  Adenoma(s)    . Final pathological stage- pT3, pN0, all margins negative for high grade dysplasia/ intramucosal carcinoma and invasive carcinoma. No Lymphovascular invasion or perineural invasion. Intermediate tumor bud score.

## 2023-08-23 ENCOUNTER — TRANSITIONAL CARE MANAGEMENT (OUTPATIENT)
Dept: FAMILY MEDICINE CLINIC | Facility: MEDICAL CENTER | Age: 88
End: 2023-08-23

## 2023-08-25 ENCOUNTER — OFFICE VISIT (OUTPATIENT)
Dept: FAMILY MEDICINE CLINIC | Facility: MEDICAL CENTER | Age: 88
End: 2023-08-25
Payer: MEDICARE

## 2023-08-25 VITALS
HEART RATE: 84 BPM | DIASTOLIC BLOOD PRESSURE: 68 MMHG | HEIGHT: 60 IN | TEMPERATURE: 97.8 F | OXYGEN SATURATION: 99 % | WEIGHT: 116 LBS | BODY MASS INDEX: 22.78 KG/M2 | SYSTOLIC BLOOD PRESSURE: 124 MMHG

## 2023-08-25 DIAGNOSIS — Z76.89 ENCOUNTER FOR SUPPORT AND COORDINATION OF TRANSITION OF CARE: Primary | ICD-10-CM

## 2023-08-25 PROCEDURE — 99496 TRANSJ CARE MGMT HIGH F2F 7D: CPT | Performed by: STUDENT IN AN ORGANIZED HEALTH CARE EDUCATION/TRAINING PROGRAM

## 2023-08-25 NOTE — PROGRESS NOTES
[facility] Note  [place], [place], 23     Ovi Durant MRN: [id number] : 1935 Age: 80 y.o. Assessment/Plan     1. 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 Recommend diagnostic mammogram for further evaluation)    Ovi Durant acknowledged understanding of treatment plan, all questions answered. Subjective      Ovi Durant is a 80 y.o. female who presents for transition of care appointment after recent hospitalization for surgical intervention. She presents with her daughter. 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 Patient states yesterday was the first day without any pain. Today she denies any abdominal pain. No issues with urination. No issues with bowel movements. No fever and no chills. She is tolerating her diet well and hydrating well. Hospital course per discharge summary:    Ovi Durant is a 61-year-old female with a past medical history of severe aortic stenosis, moderate MVR, HTN, HLD who presented for elective surgical intervention including right colectomy. Patient underwent surgical intervention by Dr. Laura Aguirre without complications.   Postoperatively she was transferred to the medical surgical floor where she was started on sips of clear liquids, as needed pain control, DVT prophylaxis, Pulido in place, and was encouraged to be out of bed and ambulating.     Her Pulido was removed on postoperative day 1 and her diet was slowly advanced as tolerated.     Patient was cleared for discharge on postoperative day 6 as she was tolerating diet without nausea or vomiting, pain remained controlled on the p.o. pain control regimen, she was urinating without any difficulties, utilizing her incentive spirometer and she was out of bed and ambulating without assistance.     Lovenox injection 40 mg teaching was given to patient prior to discharge. She is to take Lovenox once daily until 9/12.     All discharge instructions as well as postoperative follow-up appointments were discussed with the patient, she was in agreement with plan. All questions answered.     On discharge, the patient is instructed to follow-up with the patient's primary care provider within the next 2 weeks to review the events of the recent hospitalization. The patient is instructed to follow-up as scheduled with Dr. Haven Habermann. The patient is instructed to follow the provided discharge instructions.        TCM Call     Date and time call was made  8/23/2023 11:06 AM    Patient was hospitialized at  [address] [place] [address]    Date of Admission  08/15/23    Date of discharge  08/21/23    Diagnosis  right Colectomy    Disposition  Home    Current Symptoms  None      TCM Call     Post hospital issues  Reduced activity    Should patient be enrolled in anticoag monitoring? No    Scheduled for follow up?   Yes    Patients specialists  Other (comment)    Other specialists names  Surgical Oncology    Did you obtain your prescribed medications  Yes    Do you need help managing your prescriptions or medications  No    Is transportation to your appointment needed  No    I have advised the patient to call PCP with any new or worsening symptoms  [facility] Family    The type of support provided  Emotional    Have you fallen in the last 12 months  No        The following portions of the patient's history were reviewed and updated as appropriate: allergies, current medications, past family history, past medical history, past social history, past surgical history and problem list.    Past Medical History:   Diagnosis Date   • Aortic valve regurgitation    • Aortic valve stenosis    • Hypertension    • Left ventricular hypertrophy    • Mitral valve regurgitation    • Osteoarthritis    • Pure hypercholesterolemia        Allergies   Allergen Reactions   • Acetaminophen Hives       Past Surgical History:   Procedure Laterality Date   • BUNIONECTOMY Left 04/16/2014   • COLONOSCOPY  01/04/2012   • CORRECTION HAMMER TOE Left 04/16/2014   • CT COLECTOMY PARTIAL W/ANASTOMOSIS N/A 8/15/2023    Procedure: RIGHT HEMICOLECTOMY;  Surgeon: Anthony Corona MD;  Location:  MAIN OR;  Service: Surgical Oncology       Family History   Problem Relation Age of Onset   • Coronary artery disease Father    • Throat cancer Father    • Diabetes Sister    • No Known Problems Mother    • No Known Problems Daughter    • No Known Problems Maternal Grandmother    • No Known Problems Maternal Grandfather    • No Known Problems Paternal Grandmother    • No Known Problems Paternal Grandfather    • No Known Problems Brother    • No Known Problems Brother    • Cancer Sister    • Pancreatic cancer Sister    • No Known Problems Daughter    • No Known Problems Son    • No Known Problems Son    • No Known Problems Son        Social History     Socioeconomic History   • Marital status:       Spouse name: None   • Number of children: None   • Years of education: None   • Highest education level: None   Occupational History   • None   Tobacco Use   • Smoking status: Former     Packs/day: 1.00     Years: 25.00     Total pack years: 25.00     Types: Cigarettes     Quit date: 1990     Years since quitting: 33.6   • Smokeless tobacco: Never   Vaping Use   • Vaping Use: Never used   Substance and Sexual Activity   • Alcohol use: Not Currently   • Drug use: Not Currently   • Sexual activity: None   Other Topics Concern   • None   Social History Narrative    · Most recent tobacco use screenin2019      · Do you currently or have you served in the [address] [place] [address] Street:   No      Social Determinants of Health     Financial Resource Strain: Not on file   Food Insecurity: No Food Insecurity (2023)    Hunger Vital Sign    • Worried About Running Out of Food in the Last Year: Never true    • Ran Out of Food in the Last Year: Never true   Transportation Needs: No Transportation Needs (2023)    PRAPARE - Transportation    • Lack of Transportation (Medical): No    • Lack of Transportation (Non-Medical): No   Physical Activity: Not on file   Stress: Not on file   Social Connections: Not on file   Intimate Partner Violence: Not on file   Housing Stability: Low Risk  (2023)    Housing Stability Vital Sign    • Unable to Pay for Housing in the Last Year: No    • Number of Places Lived in the Last Year: 1    • Unstable Housing in the Last Year: No       Current Outpatient Medications   Medication Sig Dispense Refill   • amLODIPine (NORVASC) 5 mg tablet Take 5 mg by mouth daily at bedtime     • atorvastatin (LIPITOR) 10 mg tablet Take 1 tablet by mouth once daily (Patient taking differently: daily at bedtime) 90 tablet 0   • enoxaparin (LOVENOX) 40 mg/0.4 mL Inject 0.4 mL (40 mg total) under the skin in the morning for 21 days Do not start before 2023.  8.4 mL 0   • methocarbamol (ROBAXIN) 500 mg tablet Take 0.5 tablets (250 mg total) by mouth every 8 (eight) hours for 7 days 11 tablet 0   • Multiple Vitamins-Minerals (PRESERVISION AREDS 2 PO) Take by mouth     • oxyCODONE (ROXICODONE) 5 immediate release tablet Take 0.5 tablets (2.5 mg total) by mouth every 6 (six) hours as needed for moderate pain for up to 4 days For continuation of therapy s/p R colectomy by Dr. Zettie Bernheim Daily Amount: 10 mg 8 tablet 0     No current facility-administered medications for this visit. Review of Systems     As noted in HPI    Objective      /68 (BP Location: Left arm, Patient Position: Sitting, Cuff Size: Adult)   Pulse 84   Temp 97.8 °F (36.6 °C)   Ht 5' (1.524 m)   Wt 52.6 kg (116 lb)   SpO2 99%   BMI 22.65 kg/m²     Physical Exam  Vitals reviewed. Constitutional:       General: She is not in acute distress. Appearance: Normal appearance. She is not ill-appearing or toxic-appearing. HENT:      Head: Normocephalic and atraumatic. Mouth/Throat:      Mouth: Mucous membranes are moist.      Pharynx: Oropharynx is clear. Pulmonary:      Effort: Pulmonary effort is normal.   Abdominal:      General: Abdomen is flat. A surgical scar is present. Bowel sounds are normal.      Palpations: Abdomen is soft. Tenderness: There is no guarding or rebound. Comments: Surgical incision site without any extensive surrounding erythema or drainage or swelling   Skin:     General: Skin is warm and dry. Capillary Refill: Capillary refill takes less than 2 seconds. Neurological:      Mental Status: She is alert. Psychiatric:         Mood and Affect: Mood normal.         Behavior: Behavior normal.         Thought Content: Thought content normal.         Judgment: Judgment normal.             Some portions of this record may have been generated with voice recognition software. There may be translation, syntax, or grammatical errors. Occasional wrong word or "sound-a-like" substitutions may have occurred due to the inherent limitations of the voice recognition software. Read the chart carefully and recognize, using context, where substations may have occurred. If you have any questions, please contact the dictating provider for clarification or correction, as needed.

## 2023-08-29 ENCOUNTER — OFFICE VISIT (OUTPATIENT)
Dept: SURGICAL ONCOLOGY | Facility: CLINIC | Age: 88
End: 2023-08-29

## 2023-08-29 VITALS
OXYGEN SATURATION: 99 % | RESPIRATION RATE: 15 BRPM | DIASTOLIC BLOOD PRESSURE: 70 MMHG | SYSTOLIC BLOOD PRESSURE: 120 MMHG | HEART RATE: 88 BPM | WEIGHT: 114 LBS | TEMPERATURE: 97.7 F | HEIGHT: 60 IN | BODY MASS INDEX: 22.38 KG/M2

## 2023-08-29 DIAGNOSIS — C18.9 COLON ADENOCARCINOMA (HCC): Primary | ICD-10-CM

## 2023-08-29 PROCEDURE — 99024 POSTOP FOLLOW-UP VISIT: CPT | Performed by: STUDENT IN AN ORGANIZED HEALTH CARE EDUCATION/TRAINING PROGRAM

## 2023-08-29 NOTE — PROGRESS NOTES
Surgical Oncology Follow Up    [address] [facility]  [facility]  [address] [address]    Geremias Alegria  1935  [id number]    Diagnoses and all orders for this visit:    Colon adenocarcinoma [facility])  -     CT chest abdomen pelvis w contrast; Future        Chief Complaint   Patient presents with   • Follow-up       Return for Office Visit. Oncology History   Colon adenocarcinoma ([address] )   7/13/2023 Biopsy    A. Colon, “Right ascending colon mass,” Biopsy:  - Invasive adenocarcinoma with mucinous features arising in a tubular adenoma     7/28/2023 Initial Diagnosis    Colon adenocarcinoma ([address])     8/15/2023 Surgery    A. Terminal ileum, ascending colon, appendix and omentum, right hemicolectomy:  - Mucinous adenocarcinoma (8.8 cm), moderately differentiated, arising in a tubular adenoma. - Tumor invades through the muscularis propria into pericolorectal tissue (pT3). - Terminal ileum, appendix and omentum with no pathologic abnormality.  - Sixteen lymph nodes, negative for malignancy (0/16).     8/29/2023 -  Cancer Staged    Staging form: Colon and Rectum, AJCC 8th Edition  - Pathologic stage from 8/29/2023: Stage IIA (pT3, pN0, cM0) - Signed by Roro Gimenez MD on 8/29/2023  Total positive nodes: 0           Staging: T3N0 colon cancer  Treatment history: resection 8/2023  Current treatment: postop  Disease status: postop    History of Present Illness: Doing very well postop. Mild ileus but otherwise unevenftul recovery. Doing well - eating well, no n/v, regular BMs, no pain. Review of Systems  Complete ROS Surg Onc:   Constitutional: The patient denies new or recent history of general fatigue, no recent weight loss, no change in appetite. Eyes: No complaints of visual problems, no scleral icterus. ENT: no complaints of ear pain, no hoarseness, no difficulty swallowing,  no tinnitus and no new masses in head, oral cavity, or neck. Cardiovascular: No complaints of chest pain, no palpitations, no ankle edema. Respiratory: No complaints of shortness of breath, no cough. Gastrointestinal: No complaints of jaundice, no bloody stools, no pale stools. Genitourinary: No complaints of dysuria, no hematuria, no nocturia, no frequent urination, no urethral discharge. Musculoskeletal: No complaints of weakness, paralysis, joint stiffness or arthralgias. Integumentary: No complaints of rash, no new lesions. Neurological: No complaints of convulsions, no seizures, no dizziness. Hematologic/Lymphatic: No complaints of easy bruising. Endocrine:  No hot or cold intolerance. No polydipsia, polyphagia, or polyuria. Allergy/immunology:  No environmental allergies. No food allergies. Not immunocompromised. Skin:  No pallor or rash. No wound.         Patient Active Problem List   Diagnosis   • Pure hypercholesterolemia   • Osteoarthritis   • Left ventricular hypertrophy   • Hypertension   • Severe aortic stenosis by prior echocardiogram   • Pancreas cyst   • Elevated alkaline phosphatase level   • Lower abdominal pain   • Hepatic steatosis   • Colon adenocarcinoma [facility])   • Encounter for geriatric assessment   • Pre-operative cardiovascular examination, valvular heart disease   • Mitral valve regurgitation   • S/P right colectomy     Past Medical History:   Diagnosis Date   • Aortic valve regurgitation    • Aortic valve stenosis    • Hypertension    • Left ventricular hypertrophy    • Mitral valve regurgitation    • Osteoarthritis    • Pure hypercholesterolemia      Past Surgical History:   Procedure Laterality Date   • BUNIONECTOMY Left 04/16/2014   • COLONOSCOPY  01/04/2012   • CORRECTION HAMMER TOE Left 04/16/2014   • CO COLECTOMY PARTIAL W/ANASTOMOSIS N/A 8/15/2023    Procedure: RIGHT HEMICOLECTOMY;  Surgeon: Faiza Joiner MD;  [facility];  Service: Surgical Oncology     Family History   Problem Relation Age of Onset   • Coronary artery disease Father    • Throat cancer Father    • Diabetes Sister    • No Known Problems Mother    • No Known Problems Daughter    • No Known Problems Maternal Grandmother    • No Known Problems Maternal Grandfather    • No Known Problems Paternal Grandmother    • No Known Problems Paternal Grandfather    • No Known Problems Brother    • No Known Problems Brother    • Cancer Sister    • Pancreatic cancer Sister    • No Known Problems Daughter    • No Known Problems Son    • No Known Problems Son    • No Known Problems Son      Social History     Socioeconomic History   • Marital status:      Spouse name: Not on file   • Number of children: Not on file   • Years of education: Not on file   • Highest education level: Not on file   Occupational History   • Not on file   Tobacco Use   • Smoking status: Former     Packs/day: 1.00     Years: 25.00     Total pack years: 25.00     Types: Cigarettes     Quit date: 200     Years since quittin.6   • Smokeless tobacco: Never   Vaping Use   • Vaping Use: Never used   Substance and Sexual Activity   • Alcohol use: Not Currently   • Drug use: Not Currently   • Sexual activity: Not on file   Other Topics Concern   • Not on file   Social History Narrative    · Most recent tobacco use screenin2019      · Do you currently or have you served in the [address] [place] [address] Street:   No      Social Determinants of Health     Financial Resource Strain: Not on file   Food Insecurity: No Food Insecurity (2023)    Hunger Vital Sign    • Worried About Running Out of Food in the Last Year: Never true    • Ran Out of Food in the Last Year: Never true   Transportation Needs: No Transportation Needs (2023)    PRAPARE - Transportation    • Lack of Transportation (Medical): No    • Lack of Transportation (Non-Medical):  No   Physical Activity: Not on file   Stress: Not on file   Social Connections: Not on file   Intimate Partner Violence: Not on file   Housing Stability: Low Risk  (8/16/2023)    Housing Stability Vital Sign    • Unable to Pay for Housing in the Last Year: No    • Number of Places Lived in the Last Year: 1    • Unstable Housing in the Last Year: No       Current Outpatient Medications:   •  amLODIPine (NORVASC) 5 mg tablet, Take 5 mg by mouth daily at bedtime, Disp: , Rfl:   •  atorvastatin (LIPITOR) 10 mg tablet, Take 1 tablet by mouth once daily (Patient taking differently: daily at bedtime), Disp: 90 tablet, Rfl: 0  •  enoxaparin (LOVENOX) 40 mg/0.4 mL, Inject 0.4 mL (40 mg total) under the skin in the morning for 21 days Do not start before August 22, 2023., Disp: 8.4 mL, Rfl: 0  •  methocarbamol (ROBAXIN) 500 mg tablet, Take 0.5 tablets (250 mg total) by mouth every 8 (eight) hours for 7 days (Patient not taking: Reported on 8/29/2023), Disp: 11 tablet, Rfl: 0  •  Multiple Vitamins-Minerals (PRESERVISION AREDS 2 PO), Take by mouth (Patient not taking: Reported on 8/29/2023), Disp: , Rfl:   Allergies   Allergen Reactions   • Acetaminophen Hives     Vitals:    08/29/23 1047   BP: 120/70   Pulse: 88   Resp: 15   Temp: 97.7 °F (36.5 °C)   SpO2: 99%       Physical Exam  Constitutional: Patient is well-developed and well-nourished who appears the stated age in no acute distress. Patient is pleasant and talkative. HEENT:  Normocephalic. Sclerae are anicteric. Mucous membranes are moist. Neck is supple without adenopathy. No JVD. Chest: Equal chest rise, easy WOB  Cardiac: Heart is regular rate. Abdomen: Abdomen is non-tender, non-distended and without masses. Incision healing well  Extremities: There is no clubbing or cyanosis. There is no edema. Symmetric. Neuro: Grossly nonfocal. Gait is normal.     Lymphatic: No evidence of cervical adenopathy bilaterally. No evidence of axillary adenopathy bilaterally. No evidence of inguinal adenopathy bilaterally. Skin: Warm, anicteric.     Psych:  Patient is pleasant and talkative. Pathology:  As above    Labs:  Reviewed in Epic personally    Imaging  CT chest wo contrast    Result Date: 8/9/2023  Narrative: CT CHEST WITHOUT IV CONTRAST INDICATION:   C18.9: Malignant neoplasm of colon, unspecified. COMPARISON: Abdominal CT July 24, 2023. TECHNIQUE: CT examination of the chest was performed without intravenous contrast. Multiplanar 2D reformatted images were created from the source data. This examination, like all CT scans performed in the [facility], was performed utilizing techniques to minimize radiation dose exposure, including the use of iterative reconstruction and automated exposure control. Radiation dose length product (DLP) for this visit:  235 mGy-cm FINDINGS: LUNGS: Punctate calcified granulomas are present. No suspicious pulmonary nodules. Minimal prominence of the bibasilar subpleural reticular markings, stable. No honeycombing or bronchiectasis. PLEURA:  Unremarkable. HEART/GREAT VESSELS: Normal heart size. Dense mitral annular calcifications. Mild coronary artery calcifications. No thoracic aortic aneurysm. Moderate aortic root calcification, with otherwise mild aortic calcification. MEDIASTINUM AND YASMIN:  Unremarkable. CHEST WALL AND LOWER NECK: 1.3 x 1.5 cm subcutaneous lesion in the retroareolar right breast. VISUALIZED STRUCTURES IN THE UPPER ABDOMEN: Mild diffuse hepatic steatosis. OSSEOUS STRUCTURES:  No acute fracture or destructive osseous lesion. Impression: 1. No metastatic disease in the chest. 2. 1.3 x 1.5 cm indeterminate subcutaneous lesion in the retroareolar right breast. Recommend diagnostic mammogram for further evaluation. 3. Mild diffuse hepatic steatosis. The study was marked in EPIC for significant notification. Workstation performed: [id number]       I reviewed the above laboratory and imaging data. Discussion/Summary:   T3N0 colon cancer s/p resection 8/2023. Doing very well. Path reviewed.  Needs visit and CT in 6 mo and then CT and scope 6 mo later. All questions answered.

## 2023-09-29 ENCOUNTER — TELEPHONE (OUTPATIENT)
Dept: HEMATOLOGY ONCOLOGY | Facility: CLINIC | Age: 88
End: 2023-09-29

## 2023-09-29 NOTE — TELEPHONE ENCOUNTER
Appointment Change  Cancel, Reschedule, Change to Virtual      Who are you speaking with? Patient   If it is not the patient, is the caller listed on the communication consent form? N/A   Which provider is the appointment scheduled with? Dr. Estevan Childress   When was the original appointment scheduled? Please list date and time 10/2/23 @ 320   At which location is the appointment scheduled to take place? [facility]   Was the appointment rescheduled? Was the appointment changed from an in person visit to a virtual visit? If so, please list the details of the change. no   What is the reason for the appointment change? Patient did not want to r/s at this time       Was STAR transport scheduled? No   Does STAR transport need to be scheduled for the new visit (if applicable) No   Does the patient need an infusion appointment rescheduled? No   Does the patient have an upcoming infusion appointment scheduled? If so, when? No   Is the patient undergoing chemotherapy? No   For appointments cancelled with less than 24 hours:  Was the no-show policy reviewed?  N/A

## 2023-10-09 ENCOUNTER — TELEPHONE (OUTPATIENT)
Dept: CARDIOLOGY CLINIC | Facility: CLINIC | Age: 88
End: 2023-10-09

## 2023-10-09 NOTE — TELEPHONE ENCOUNTER
Hi, I would wonder if Doctor Antonina hZu would order a prescription for me. I saw her in August and I but I don't have a six month appointment scheduled yet. To see her again, I need amlodipine. My cardiologist retired and she said she would take me on as a patient. So if you would give me a call back and let me know if she would order Thank you. Bye, bye. You received a voice mail from [phone].

## 2023-10-10 DIAGNOSIS — I10 PRIMARY HYPERTENSION: Primary | ICD-10-CM

## 2023-10-10 RX ORDER — AMLODIPINE BESYLATE 5 MG/1
5 TABLET ORAL
Qty: 90 TABLET | Refills: 1 | Status: SHIPPED | OUTPATIENT
Start: 2023-10-10

## 2023-10-12 ENCOUNTER — HOSPITAL ENCOUNTER (OUTPATIENT)
Dept: RADIOLOGY | Facility: HOSPITAL | Age: 88
Discharge: HOME | End: 2023-10-12
Payer: MEDICARE

## 2023-10-12 VITALS — WEIGHT: 117 LBS | BODY MASS INDEX: 22.97 KG/M2 | HEIGHT: 60 IN

## 2023-10-12 DIAGNOSIS — R93.89 ABNORMAL FINDING ON CT SCAN: ICD-10-CM

## 2023-10-12 DIAGNOSIS — N64.9 BREAST LESION: ICD-10-CM

## 2023-10-12 PROCEDURE — G0279 TOMOSYNTHESIS, MAMMO: HCPCS

## 2023-10-12 PROCEDURE — 76642 ULTRASOUND BREAST LIMITED: CPT

## 2023-10-12 PROCEDURE — 77066 DX MAMMO INCL CAD BI: CPT

## 2023-10-13 ENCOUNTER — TELEPHONE (OUTPATIENT)
Dept: FAMILY MEDICINE CLINIC | Facility: MEDICAL CENTER | Age: 88
End: 2023-10-13

## 2023-10-13 NOTE — TELEPHONE ENCOUNTER
----- Message from Nina Samaniego DO sent at 10/12/2023  1:23 PM EDT -----  Please inform patient that diagnostic mammogram revealed benign findings.

## 2023-11-01 DIAGNOSIS — E78.00 PURE HYPERCHOLESTEROLEMIA: ICD-10-CM

## 2023-11-01 RX ORDER — ATORVASTATIN CALCIUM 10 MG/1
TABLET, FILM COATED ORAL
Qty: 90 TABLET | Refills: 0 | Status: SHIPPED | OUTPATIENT
Start: 2023-11-01

## 2023-11-02 ENCOUNTER — OFFICE VISIT (OUTPATIENT)
Dept: FAMILY MEDICINE CLINIC | Facility: MEDICAL CENTER | Age: 88
End: 2023-11-02
Payer: MEDICARE

## 2023-11-02 VITALS
DIASTOLIC BLOOD PRESSURE: 78 MMHG | BODY MASS INDEX: 24.26 KG/M2 | WEIGHT: 123.6 LBS | SYSTOLIC BLOOD PRESSURE: 130 MMHG | TEMPERATURE: 97.7 F | HEART RATE: 72 BPM | HEIGHT: 60 IN | OXYGEN SATURATION: 97 %

## 2023-11-02 DIAGNOSIS — Z51.81 MEDICATION MONITORING ENCOUNTER: ICD-10-CM

## 2023-11-02 DIAGNOSIS — Z00.00 MEDICARE ANNUAL WELLNESS VISIT, SUBSEQUENT: Primary | ICD-10-CM

## 2023-11-02 DIAGNOSIS — Z12.31 ENCOUNTER FOR SCREENING MAMMOGRAM FOR MALIGNANT NEOPLASM OF BREAST: ICD-10-CM

## 2023-11-02 DIAGNOSIS — I10 PRIMARY HYPERTENSION: ICD-10-CM

## 2023-11-02 DIAGNOSIS — L98.9 SKIN LESION OF BACK: ICD-10-CM

## 2023-11-02 DIAGNOSIS — E78.00 PURE HYPERCHOLESTEROLEMIA: ICD-10-CM

## 2023-11-02 PROCEDURE — 99213 OFFICE O/P EST LOW 20 MIN: CPT | Performed by: STUDENT IN AN ORGANIZED HEALTH CARE EDUCATION/TRAINING PROGRAM

## 2023-11-02 PROCEDURE — G0439 PPPS, SUBSEQ VISIT: HCPCS | Performed by: STUDENT IN AN ORGANIZED HEALTH CARE EDUCATION/TRAINING PROGRAM

## 2023-11-02 NOTE — PROGRESS NOTES
Assessment and Plan:     Problem List Items Addressed This Visit          Cardiovascular and Mediastinum    Hypertension       Musculoskeletal and Integument    Skin lesion of back     Advised to follow-up promptly with her Dermatologist            Other    Pure hypercholesterolemia    Relevant Orders    Lipid Panel with Direct LDL reflex    Medicare annual wellness visit, subsequent - Primary     Immunizations reviewed  Discussed newest COVID-19 vaccine, influenza vaccine, tetanus booster and Shingrix vaccination series, she declines  She is up-to-date on PCV 20 vaccine          Other Visit Diagnoses       Encounter for screening mammogram for malignant neoplasm of breast        Relevant Orders    Mammo screening bilateral w 3d & cad    Medication monitoring encounter        Relevant Orders    Comprehensive metabolic panel            Depression Screening and Follow-up Plan: Patient was screened for depression during today's encounter. They screened negative with a PHQ-2 score of 0. Preventive health issues were discussed with patient, and age appropriate screening tests were ordered as noted in patient's After Visit Summary. Personalized health advice and appropriate referrals for health education or preventive services given if needed, as noted in patient's After Visit Summary. Follow-up in 6 months and sooner as needed. History of Present Illness:     Patient presents for a Medicare Wellness Visit. Upon discussion of sunscreen use, patient does mention she has a spot on her back that has become bothersome.     HPI   Patient Care Team:  Sofiya Crawford DO as PCP - General (Family Medicine)  Xavier Arizmendi MD (Surgical Oncology)  [facility] Physician Assistant (Gastroenterology)     Review of Systems:     Review of Systems    As noted in HPI      Problem List:     Patient Active Problem List   Diagnosis    Pure hypercholesterolemia    Osteoarthritis    Left ventricular hypertrophy Hypertension    Severe aortic stenosis by prior echocardiogram    Pancreas cyst    Elevated alkaline phosphatase level    Lower abdominal pain    Hepatic steatosis    Colon adenocarcinoma ([address])    Medicare annual wellness visit, subsequent    Pre-operative cardiovascular examination, valvular heart disease    Mitral valve regurgitation    S/P right colectomy    Skin lesion of back      Past Medical and Surgical History:     Past Medical History:   Diagnosis Date    Aortic valve regurgitation     Aortic valve stenosis     Hypertension     Left ventricular hypertrophy     Mitral valve regurgitation     Osteoarthritis     Pure hypercholesterolemia      Past Surgical History:   Procedure Laterality Date    BUNIONECTOMY Left 04/16/2014    COLONOSCOPY  01/04/2012    CORRECTION HAMMER TOE Left 04/16/2014    CO COLECTOMY PARTIAL W/ANASTOMOSIS N/A 8/15/2023    Procedure: RIGHT HEMICOLECTOMY;  Surgeon: Napoleon Nguyễn MD;  Location: [facility];  Service: Surgical Oncology      Family History:     Family History   Problem Relation Age of Onset    No Known Problems Mother     Coronary artery disease Father     Throat cancer Father     Diabetes Sister     Cancer Sister     Pancreatic cancer Sister 61    No Known Problems Daughter     No Known Problems Daughter     No Known Problems Maternal Grandmother     No Known Problems Maternal Grandfather     No Known Problems Paternal Grandmother     No Known Problems Paternal Grandfather     No Known Problems Brother     Cancer Brother     No Known Problems Son     No Known Problems Son     No Known Problems Son       Social History:     Social History     Socioeconomic History    Marital status:       Spouse name: None    Number of children: None    Years of education: None    Highest education level: None   Occupational History    None   Tobacco Use    Smoking status: Former     Packs/day: 1.00     Years: 25.00     Total pack years: 25.00     Types: Cigarettes     Quit date: 1990     Years since quittin.8    Smokeless tobacco: Never   Vaping Use    Vaping Use: Never used   Substance and Sexual Activity    Alcohol use: Not Currently    Drug use: Not Currently    Sexual activity: None   Other Topics Concern    None   Social History Narrative    · Most recent tobacco use screenin2019      · Do you currently or have you served in the [address] [place] [address] Street:   No      Social Determinants of Health     Financial Resource Strain: Low Risk  (2023)    Overall Financial Resource Strain (CARDIA)     Difficulty of Paying Living Expenses: Not hard at all   Food Insecurity: No Food Insecurity (2023)    Hunger Vital Sign     Worried About Running Out of Food in the Last Year: Never true     Ran Out of Food in the Last Year: Never true   Transportation Needs: No Transportation Needs (2023)    PRAPARE - Transportation     Lack of Transportation (Medical): No     Lack of Transportation (Non-Medical): No   Physical Activity: Not on file   Stress: Not on file   Social Connections: Not on file   Intimate Partner Violence: Not on file   Housing Stability: Low Risk  (2023)    Housing Stability Vital Sign     Unable to Pay for Housing in the Last Year: No     Number of Places Lived in the Last Year: 1     Unstable Housing in the Last Year: No      Medications and Allergies:     Current Outpatient Medications   Medication Sig Dispense Refill    amLODIPine (NORVASC) 5 mg tablet Take 1 tablet (5 mg total) by mouth daily at bedtime 90 tablet 1    atorvastatin (LIPITOR) 10 mg tablet Take 1 tablet by mouth once daily 90 tablet 0    Multiple Vitamins-Minerals (PRESERVISION AREDS 2 PO) Take by mouth       No current facility-administered medications for this visit.      Allergies   Allergen Reactions    Acetaminophen Hives      Immunizations:     Immunization History   Administered Date(s) Administered    COVID-19 MODERNA VACC 0.5 ML IM 2021, 2021, 2021, 2022 Influenza, high dose seasonal 0.7 mL 09/28/2020, 11/30/2021    Pneumococcal Conjugate 13-Valent 02/24/2017    Pneumococcal Conjugate Vaccine 20-valent (Pcv20), Polysace 02/13/2023    Pneumococcal Polysaccharide PPV23 04/19/2018      Health Maintenance:         Topic Date Due    Hepatitis C Screening  Completed         Topic Date Due    COVID-19 Vaccine (5 - Moderna series) 07/21/2022      Medicare Screening Tests and Risk Assessments: Alireza is here for her Subsequent Wellness visit. Health Risk Assessment:   Patient rates overall health as very good. Patient feels that their physical health rating is same. Patient is very satisfied with their life. Eyesight was rated as same. Hearing was rated as same. Patient feels that their emotional and mental health rating is same. Patients states they are never, rarely angry. Patient states they are never, rarely unusually tired/fatigued. Pain experienced in the last 7 days has been none. Patient states that she has experienced no weight loss or gain in last 6 months. Depression Screening:   PHQ-2 Score: 0      Fall Risk Screening: In the past year, patient has experienced: no history of falling in past year      Urinary Incontinence Screening:   Patient has leaked urine accidently in the last six months. Home Safety:  Patient does not have trouble with stairs inside or outside of their home. Patient has working smoke alarms and has working carbon monoxide detector. Home safety hazards include: none. Nutrition:   Current diet is Regular. Medications:   Patient is currently taking over-the-counter supplements. OTC medications include: see medication list. Patient is able to manage medications. Activities of Daily Living (ADLs)/Instrumental Activities of Daily Living (IADLs):   Walk and transfer into and out of bed and chair?: Yes  Dress and groom yourself?: Yes    Bathe or shower yourself?: Yes    Feed yourself?  Yes  Do your laundry/housekeeping?: Yes  Manage your money, pay your bills and track your expenses?: Yes  Make your own meals?: Yes    Do your own shopping?: Yes    Previous Hospitalizations:   Any hospitalizations or ED visits within the last 12 months?: Yes    How many hospitalizations have you had in the last year?: 1-2    Advance Care Planning:   Living will: Yes    Durable POA for healthcare: Yes    Advanced directive: Yes      Cognitive Screening:   Provider or family/friend/caregiver concerned regarding cognition?: No    PREVENTIVE SCREENINGS      Cardiovascular Screening:    General: History Lipid Disorder and Screening Current      Diabetes Screening:     General: Screening Current      Colorectal Cancer Screening:     General: Screening Current      Breast Cancer Screening:     General: Screening Current      Cervical Cancer Screening:    General: Screening Not Indicated      Osteoporosis Screening:    General: Risks and Benefits Discussed and Patient Declines      Abdominal Aortic Aneurysm (AAA) Screening:        General: Screening Not Indicated      Lung Cancer Screening:     General: Screening Not Indicated      Hepatitis C Screening:    General: Screening Current    Screening, Brief Intervention, and Referral to Treatment (SBIRT)    Screening  Typical number of drinks in a day: 0  Typical number of drinks in a week: 0  Interpretation: Low risk drinking behavior.     AUDIT-C Screenin) How often did you have a drink containing alcohol in the past year? never  2) How many drinks did you have on a typical day when you were drinking in the past year? 0  3) How often did you have 6 or more drinks on one occasion in the past year? never    AUDIT-C Score: 0  Interpretation: Score 0-2 (female): Negative screen for alcohol misuse    Single Item Drug Screening:  How often have you used an illegal drug (including marijuana) or a prescription medication for non-medical reasons in the past year? never    Single Item Drug Screen Score: 0  Interpretation: Negative screen for possible drug use disorder    Other Counseling Topics:   Car/seat belt/driving safety, skin self-exam, sunscreen and regular weightbearing exercise. No results found. Physical Exam:     /78 (BP Location: Left arm, Patient Position: Sitting, Cuff Size: Adult)   Pulse 72   Temp 97.7 °F (36.5 °C)   Ht 5' (1.524 m)   Wt 56.1 kg (123 lb 9.6 oz)   SpO2 97%   BMI 24.14 kg/m²     Physical Exam  Vitals reviewed. Constitutional:       General: She is not in acute distress. Appearance: Normal appearance. HENT:      Head: Normocephalic and atraumatic. Right Ear: External ear normal.      Left Ear: External ear normal.   Eyes:      Conjunctiva/sclera: Conjunctivae normal.   Pulmonary:      Effort: Pulmonary effort is normal.   Skin:     Findings: Lesion present. Comments: About 1.3 cm skin lesion raised left upper back, various shades of brown and erythematous base   Neurological:      Mental Status: She is alert and oriented to person, place, and time. Psychiatric:         Mood and Affect: Mood normal.         Behavior: Behavior normal.         Thought Content:  Thought content normal.         Judgment: Judgment normal.            Foster Quiet, DO

## 2023-11-02 NOTE — ASSESSMENT & PLAN NOTE
Immunizations reviewed  Discussed newest COVID-19 vaccine, influenza vaccine, tetanus booster and Shingrix vaccination series, she declines  She is up-to-date on PCV 20 vaccine

## 2023-11-02 NOTE — PATIENT INSTRUCTIONS
Medicare Preventive Visit Patient Instructions  Thank you for completing your Welcome to Medicare Visit or Medicare Annual Wellness Visit today. Your next wellness visit will be due in one year (11/2/2024). The screening/preventive services that you may require over the next 5-10 years are detailed below. Some tests may not apply to you based off risk factors and/or age. Screening tests ordered at today's visit but not completed yet may show as past due. Also, please note that scanned in results may not display below. Preventive Screenings:  Service Recommendations Previous Testing/Comments   Colorectal Cancer Screening  * Colonoscopy    * Fecal Occult Blood Test (FOBT)/Fecal Immunochemical Test (FIT)  * Fecal DNA/Cologuard Test  * Flexible Sigmoidoscopy Age: 43-73 years old   Colonoscopy: every 10 years (may be performed more frequently if at higher risk)  OR  FOBT/FIT: every 1 year  OR  Cologuard: every 3 years  OR  Sigmoidoscopy: every 5 years  Screening may be recommended earlier than age 39 if at higher risk for colorectal cancer. Also, an individualized decision between you and your healthcare provider will decide whether screening between the ages of 77-80 would be appropriate. Colonoscopy: 07/13/2023  FOBT/FIT: 12/20/2021  Cologuard: Not on file  Sigmoidoscopy: Not on file    Screening Not Indicated     Breast Cancer Screening Age: 36 years old  Frequency: every 1-2 years  Not required if history of left and right mastectomy Mammogram: 10/12/2023    Screening Current   Cervical Cancer Screening Between the ages of 21-29, pap smear recommended once every 3 years. Between the ages of 32-69, can perform pap smear with HPV co-testing every 5 years.    Recommendations may differ for women with a history of total hysterectomy, cervical cancer, or abnormal pap smears in past. Pap Smear: Not on file    Screening Not Indicated   Hepatitis C Screening Once for adults born between 1945 and 1965  More frequently in patients at high risk for Hepatitis C Hep C Antibody: 03/09/2023    Screening Current   Diabetes Screening 1-2 times per year if you're at risk for diabetes or have pre-diabetes Fasting glucose: 206 mg/dL (7/17/2023)  A1C: No results in last 5 years (No results in last 5 years)  Screening Current   Cholesterol Screening Once every 5 years if you don't have a lipid disorder. May order more often based on risk factors. Lipid panel: 02/23/2023    Screening Not Indicated  History Lipid Disorder     Other Preventive Screenings Covered by Medicare:  Abdominal Aortic Aneurysm (AAA) Screening: covered once if your at risk. You're considered to be at risk if you have a family history of AAA. Lung Cancer Screening: covers low dose CT scan once per year if you meet all of the following conditions: (1) Age 48-67; (2) No signs or symptoms of lung cancer; (3) Current smoker or have quit smoking within the last 15 years; (4) You have a tobacco smoking history of at least 20 pack years (packs per day multiplied by number of years you smoked); (5) You get a written order from a healthcare provider. Glaucoma Screening: covered annually if you're considered high risk: (1) You have diabetes OR (2) Family history of glaucoma OR (3)  aged 48 and older OR (3)  American aged 72 and older  Osteoporosis Screening: covered every 2 years if you meet one of the following conditions: (1) You're estrogen deficient and at risk for osteoporosis based off medical history and other findings; (2) Have a vertebral abnormality; (3) On glucocorticoid therapy for more than 3 months; (4) Have primary hyperparathyroidism; (5) On osteoporosis medications and need to assess response to drug therapy. Last bone density test (DXA Scan): Not on file. HIV Screening: covered annually if you're between the age of 14-79. Also covered annually if you are younger than 13 and older than 72 with risk factors for HIV infection.  For pregnant patients, it is covered up to 3 times per pregnancy. Immunizations:  Immunization Recommendations   Influenza Vaccine Annual influenza vaccination during flu season is recommended for all persons aged >= 6 months who do not have contraindications   Pneumococcal Vaccine   * Pneumococcal conjugate vaccine = PCV13 (Prevnar 13), PCV15 (Vaxneuvance), PCV20 (Prevnar 20)  * Pneumococcal polysaccharide vaccine = PPSV23 (Pneumovax) Adults 75-48 yo with certain risk factors or if 69+ yo  If never received any pneumonia vaccine: recommend Prevnar 20 (PCV20)  Give PCV20 if previously received 1 dose of PCV13 or PPSV23   Hepatitis B Vaccine 3 dose series if at intermediate or high risk (ex: diabetes, end stage renal disease, liver disease)   Respiratory syncytial virus (RSV) Vaccine - COVERED BY MEDICARE PART D  * RSVPreF3 (Arexvy) CDC recommends that adults 61years of age and older may receive a single dose of RSV vaccine using shared clinical decision-making (SCDM)   Tetanus (Td) Vaccine - COST NOT COVERED BY MEDICARE PART B Following completion of primary series, a booster dose should be given every 10 years to maintain immunity against tetanus. Td may also be given as tetanus wound prophylaxis. Tdap Vaccine - COST NOT COVERED BY MEDICARE PART B Recommended at least once for all adults. For pregnant patients, recommended with each pregnancy. Shingles Vaccine (Shingrix) - COST NOT COVERED BY MEDICARE PART B  2 shot series recommended in those 19 years and older who have or will have weakened immune systems or those 50 years and older     Health Maintenance Due:      Topic Date Due   • Hepatitis C Screening  Completed     Immunizations Due:      Topic Date Due   • COVID-19 Vaccine (5 - Moderna series) 07/21/2022     Advance Directives   What are advance directives? Advance directives are legal documents that state your wishes and plans for medical care.  These plans are made ahead of time in case you lose your ability to make decisions for yourself. Advance directives can apply to any medical decision, such as the treatments you want, and if you want to donate organs. What are the types of advance directives? There are many types of advance directives, and each state has rules about how to use them. You may choose a combination of any of the following:  Living will: This is a written record of the treatment you want. You can also choose which treatments you do not want, which to limit, and which to stop at a certain time. This includes surgery, medicine, IV fluid, and tube feedings. Durable power of  for [facility]): This is a written record that states who you want to make healthcare choices for you when you are unable to make them for yourself. This person, called a proxy, is usually a family member or a friend. You may choose more than 1 proxy. Do not resuscitate (DNR) order:  A DNR order is used in case your heart stops beating or you stop breathing. It is a request not to have certain forms of treatment, such as CPR. A DNR order may be included in other types of advance directives. Medical directive: This covers the care that you want if you are in a coma, near death, or unable to make decisions for yourself. You can list the treatments you want for each condition. Treatment may include pain medicine, surgery, blood transfusions, dialysis, IV or tube feedings, and a ventilator (breathing machine). Values history: This document has questions about your views, beliefs, and how you feel and think about life. This information can help others choose the care that you would choose. Why are advance directives important? An advance directive helps you control your care. Although spoken wishes may be used, it is better to have your wishes written down. Spoken wishes can be misunderstood, or not followed. Treatments may be given even if you do not want them.  An advance directive may make it easier for your family to make difficult choices about your care. Urinary Incontinence   Urinary incontinence (UI)  is when you lose control of your bladder. UI develops because your bladder cannot store or empty urine properly. The 3 most common types of UI are stress incontinence, urge incontinence, or both. Medicines:   May be given to help strengthen your bladder control. Report any side effects of medication to your healthcare provider. Do pelvic muscle exercises often:  Your pelvic muscles help you stop urinating. Squeeze these muscles tight for 5 seconds, then relax for 5 seconds. Gradually work up to squeezing for 10 seconds. Do 3 sets of 15 repetitions a day, or as directed. This will help strengthen your pelvic muscles and improve bladder control. Train your bladder:  Go to the bathroom at set times, such as every 2 hours, even if you do not feel the urge to go. You can also try to hold your urine when you feel the urge to go. For example, hold your urine for 5 minutes when you feel the urge to go. As that becomes easier, hold your urine for 10 minutes. Self-care:   Keep a UI record. Write down how often you leak urine and how much you leak. Make a note of what you were doing when you leaked urine. Drink liquids as directed. You may need to limit the amount of liquid you drink to help control your urine leakage. Do not drink any liquid right before you go to bed. Limit or do not have drinks that contain caffeine or alcohol. Prevent constipation. Eat a variety of high-fiber foods. Good examples are high-fiber cereals, beans, vegetables, and whole-grain breads. Walking is the best way to trigger your intestines to have a bowel movement. Exercise regularly and maintain a healthy weight. Weight loss and exercise will decrease pressure on your bladder and help you control your leakage. Use a catheter as directed  to help empty your bladder.  A catheter is a tiny, plastic tube that is put into your bladder to drain your urine. Go to behavior therapy as directed. Behavior therapy may be used to help you learn to control your urge to urinate. © Copyright [employer] 2018 Information is for End User's use only and may not be sold, redistributed or otherwise used for commercial purposes.  All illustrations and images included in CareNotes® are the copyrighted property of [employer] or [address] [place] [address]

## 2024-01-01 PROBLEM — Z00.00 MEDICARE ANNUAL WELLNESS VISIT, SUBSEQUENT: Status: RESOLVED | Noted: 2023-08-07 | Resolved: 2024-01-01

## 2024-01-10 ENCOUNTER — VBI (OUTPATIENT)
Dept: ADMINISTRATIVE | Facility: OTHER | Age: 89
End: 2024-01-10

## 2024-01-10 DIAGNOSIS — E78.00 PURE HYPERCHOLESTEROLEMIA: ICD-10-CM

## 2024-01-10 RX ORDER — ATORVASTATIN CALCIUM 10 MG/1
TABLET, FILM COATED ORAL
Qty: 90 TABLET | Refills: 1 | Status: SHIPPED | OUTPATIENT
Start: 2024-01-10

## 2024-01-17 ENCOUNTER — VBI (OUTPATIENT)
Dept: ADMINISTRATIVE | Facility: OTHER | Age: 89
End: 2024-01-17

## 2024-01-18 ENCOUNTER — VBI (OUTPATIENT)
Dept: ADMINISTRATIVE | Facility: OTHER | Age: 89
End: 2024-01-18

## 2024-01-18 NOTE — TELEPHONE ENCOUNTER
01/18/24 2:49 PM     [employer] SmartForm used to document caregap status.    Ruby Mcnamara MA

## 2024-01-29 ENCOUNTER — VBI (OUTPATIENT)
Dept: ADMINISTRATIVE | Facility: OTHER | Age: 89
End: 2024-01-29

## 2024-02-04 ENCOUNTER — OFFICE VISIT (OUTPATIENT)
Dept: URGENT CARE | Facility: MEDICAL CENTER | Age: 89
End: 2024-02-04
Payer: MEDICARE

## 2024-02-04 VITALS
HEIGHT: 60 IN | DIASTOLIC BLOOD PRESSURE: 82 MMHG | SYSTOLIC BLOOD PRESSURE: 160 MMHG | HEART RATE: 80 BPM | TEMPERATURE: 98.9 F | WEIGHT: 123 LBS | BODY MASS INDEX: 24.15 KG/M2 | OXYGEN SATURATION: 99 % | RESPIRATION RATE: 18 BRPM

## 2024-02-04 DIAGNOSIS — B02.9 HERPES ZOSTER WITHOUT COMPLICATION: Primary | ICD-10-CM

## 2024-02-04 PROCEDURE — G0463 HOSPITAL OUTPT CLINIC VISIT: HCPCS | Performed by: PHYSICIAN ASSISTANT

## 2024-02-04 PROCEDURE — 99213 OFFICE O/P EST LOW 20 MIN: CPT | Performed by: PHYSICIAN ASSISTANT

## 2024-02-04 RX ORDER — VALACYCLOVIR HYDROCHLORIDE 1 G/1
1000 TABLET, FILM COATED ORAL 3 TIMES DAILY
Qty: 21 TABLET | Refills: 0 | Status: SHIPPED | OUTPATIENT
Start: 2024-02-04 | End: 2024-02-14

## 2024-02-04 NOTE — PATIENT INSTRUCTIONS
Motrin as needed for pain  Take Valtrex 1gram tablets 3x daily x 7 days  Follow-up if symptoms persist or worsen  Consider vaccination to prevent future occurences.

## 2024-02-04 NOTE — PROGRESS NOTES
[facility]        NAME: sAhley Eastman is a 88 y.o. female  : 1935    MRN: [id number]  DATE: 2024  TIME: 10:39 AM    Assessment and Plan   Herpes zoster without complication [B02.9]  1. Herpes zoster without complication  valACYclovir (VALTREX) 1,000 mg tablet            Patient Instructions     Motrin as needed for pain  Take Valtrex 1gram tablets 3x daily x 7 days  Follow-up if symptoms persist or worsen  Consider vaccination to prevent future occurences.       Chief Complaint     Chief Complaint   Patient presents with    Herpes Zoster     Patient states she had a painful back on Friday and now started with red rash today          History of Present Illness       Ashley is an 88-year-old female who presents with a rash that developed on her upper back and chest area over the past 24 hours.  Patient reports she had pain and burning on the skin 2 days prior to the rash eruption.  She denies any fever, chills or bodyaches.        Review of Systems   Review of Systems   Constitutional: Negative.    HENT: Negative.     Respiratory: Negative.     Gastrointestinal: Negative.    Skin:  Positive for rash.         Current Medications       Current Outpatient Medications:     valACYclovir (VALTREX) 1,000 mg tablet, Take 1 tablet (1,000 mg total) by mouth 3 (three) times a day for 7 days, Disp: 21 tablet, Rfl: 0    amLODIPine (NORVASC) 5 mg tablet, Take 1 tablet (5 mg total) by mouth daily at bedtime, Disp: 90 tablet, Rfl: 1    atorvastatin (LIPITOR) 10 mg tablet, Take 1 tablet by mouth once daily, Disp: 90 tablet, Rfl: 1    Multiple Vitamins-Minerals (PRESERVISION AREDS 2 PO), Take by mouth, Disp: , Rfl:     Current Allergies     Allergies as of 2024 - Reviewed 2024   Allergen Reaction Noted    Acetaminophen Hives 2015            The following portions of the patient's history were reviewed and updated as appropriate: allergies, current medications, past family history, past  medical history, past social history, past surgical history and problem list.     Past Medical History:   Diagnosis Date    Aortic valve regurgitation     Aortic valve stenosis     Hypertension     Left ventricular hypertrophy     Mitral valve regurgitation     Osteoarthritis     Pure hypercholesterolemia        Past Surgical History:   Procedure Laterality Date    BUNIONECTOMY Left 04/16/2014    COLONOSCOPY  01/04/2012    CORRECTION HAMMER TOE Left 04/16/2014    [place] COLECTOMY PARTIAL W/ANASTOMOSIS N/A 8/15/2023    Procedure: RIGHT HEMICOLECTOMY;  Surgeon: Rosario Rios MD;  Location: [facility];  Service: Surgical Oncology       Family History   Problem Relation Age of Onset    No Known Problems Mother     Coronary artery disease Father     Throat cancer Father     Diabetes Sister     Cancer Sister     Pancreatic cancer Sister 60    No Known Problems Daughter     No Known Problems Daughter     No Known Problems Maternal Grandmother     No Known Problems Maternal Grandfather     No Known Problems Paternal Grandmother     No Known Problems Paternal Grandfather     No Known Problems Brother     Cancer Brother     No Known Problems Son     No Known Problems Son     No Known Problems Son          Medications have been verified.        Objective   /82   Pulse 80   Temp 98.9 °F (37.2 °C)   Resp 18   Ht 5' (1.524 m)   Wt 55.8 kg (123 lb)   SpO2 99%   BMI 24.02 kg/m²   No LMP recorded. Patient is postmenopausal.       Physical Exam     Physical Exam  Constitutional:       General: She is not in acute distress.     Appearance: Normal appearance. She is not ill-appearing.   HENT:      Head: Normocephalic and atraumatic.      Right Ear: Tympanic membrane and ear canal normal.      Left Ear: Tympanic membrane and ear canal normal.      Nose: Nose normal.      Mouth/Throat:      Lips: Pink.      Pharynx: Oropharynx is clear.   Cardiovascular:      Rate and Rhythm: Normal rate and regular rhythm.      Heart  sounds: Normal heart sounds, S1 normal and S2 normal. No murmur heard.  Pulmonary:      Effort: Pulmonary effort is normal.      Breath sounds: Normal breath sounds and air entry.   Skin:     Comments: Diffuse, erythematous vesicular patches noted on the upper back and chest area.  No vesicles or skin drainage.  Rash does not cross midline.   Neurological:      Mental Status: She is alert.

## 2024-02-12 ENCOUNTER — TELEPHONE (OUTPATIENT)
Dept: FAMILY MEDICINE CLINIC | Facility: MEDICAL CENTER | Age: 89
End: 2024-02-12

## 2024-02-12 NOTE — TELEPHONE ENCOUNTER
Pt was at [facility] on [facility] dx'd with shingles, now she has a rash developing on her left side.  Pt was offered appt for tomorrow, but declined due to the snow.  How did you want to proceed?

## 2024-02-13 ENCOUNTER — TELEPHONE (OUTPATIENT)
Dept: HEMATOLOGY ONCOLOGY | Facility: CLINIC | Age: 89
End: 2024-02-13

## 2024-02-13 NOTE — TELEPHONE ENCOUNTER
Left message for pt to call [facility] in regards to her appt scheduled for 3/4/24 at 11:15 AM. That appt does need to be rescheduled.

## 2024-02-14 ENCOUNTER — OFFICE VISIT (OUTPATIENT)
Dept: FAMILY MEDICINE CLINIC | Facility: MEDICAL CENTER | Age: 89
End: 2024-02-14
Payer: MEDICARE

## 2024-02-14 VITALS
OXYGEN SATURATION: 98 % | BODY MASS INDEX: 24.46 KG/M2 | DIASTOLIC BLOOD PRESSURE: 70 MMHG | TEMPERATURE: 97.5 F | HEIGHT: 60 IN | HEART RATE: 82 BPM | SYSTOLIC BLOOD PRESSURE: 140 MMHG | WEIGHT: 124.6 LBS

## 2024-02-14 DIAGNOSIS — D64.9 NORMOCYTIC ANEMIA: ICD-10-CM

## 2024-02-14 DIAGNOSIS — Z13.89 SCREENING FOR NEPHROPATHY: ICD-10-CM

## 2024-02-14 DIAGNOSIS — Z13.29 SCREENING FOR THYROID DISORDER: ICD-10-CM

## 2024-02-14 DIAGNOSIS — B02.9 HERPES ZOSTER WITHOUT COMPLICATION: Primary | ICD-10-CM

## 2024-02-14 PROCEDURE — 99213 OFFICE O/P EST LOW 20 MIN: CPT | Performed by: STUDENT IN AN ORGANIZED HEALTH CARE EDUCATION/TRAINING PROGRAM

## 2024-02-14 NOTE — PATIENT INSTRUCTIONS
Shingles   WHAT YOU NEED TO KNOW:   Shingles is a viral infection that causes a painful rash. Shingles is caused by the varicella-zoster virus. This is the same virus that causes chickenpox. The virus stays in your body after you have chickenpox, without causing any symptoms. Shingles occurs when the virus becomes active again. The active virus travels along a nerve to your skin and causes a rash. The rash usually lasts 2 to 3 weeks. Most people have shingles one time, but it is possible to develop it again.       DISCHARGE INSTRUCTIONS:   Call your local emergency number (911 in the ) if:   You have trouble moving your arms, legs, or face.    You become confused, or have trouble speaking.    You have a seizure.    Return to the emergency department if:   You have weakness in an arm or leg.    You have dizziness, a severe headache, or hearing or vision loss.    You have painful, red, warm skin around the blisters, or the blisters drain pus.    Your neck is stiff or you have trouble moving it.    Call your doctor if:   A painful rash appears near your eye.    The rash spreads to more areas and your pain worsens.    You feel weak or have a headache.    You have a cough, chills, or a fever.    You have abdominal pain or nausea, or you are vomiting.    You have questions or concerns about your condition or care.    Medicines:  You may need any of the following:  Antiviral medicine  fights the virus causing your shingles. Start this medicine within 3 days after you notice the first symptoms. This may help prevent nerve pain. A shingles outbreak can cause nerve pain called post-herpetic neuralgia (PHN). PHN can last a long time after you heal from shingles.    Topical anesthetics  are used to numb the skin and decrease pain. They can be a cream, gel, spray, or patch.    Anticonvulsants and antidepressants  decrease nerve pain and may help you sleep at night.    Antihistamines  may help decrease itching.    Acetaminophen   decreases pain and fever. It is available without a doctor's order. Ask how much to take and how often to take it. Follow directions. Read the labels of all other medicines you are using to see if they also contain acetaminophen, or ask your doctor or pharmacist. Acetaminophen can cause liver damage if not taken correctly.    NSAIDs , such as ibuprofen, help decrease swelling, pain, and fever. This medicine is available with or without a doctor's order. NSAIDs can cause stomach bleeding or kidney problems in certain people. If you take blood thinner medicine, always ask your healthcare provider if NSAIDs are safe for you. Always read the medicine label and follow directions.    A steroid and numbing medicine injection  may decrease severe pain that does not get better with other medicines.    Take your medicine as directed.  Contact your healthcare provider if you think your medicine is not helping or if you have side effects. Tell your provider if you are allergic to any medicine. Keep a list of the medicines, vitamins, and herbs you take. Include the amounts, and when and why you take them. Bring the list or the pill bottles to follow-up visits. Carry your medicine list with you in case of an emergency.    Self-care:   Apply a cool, wet compress  or take a cool bath. This may help decrease itching and pain.    Keep your rash clean and dry.  Cover your rash with a bandage. Do not use bandages with adhesive. Clothes may irritate your skin.    Prevent the spread of the shingles virus:  The virus can be passed to a person who has never had chickenpox. This usually happens if the other person comes in contact with your open sores. This person may get chickenpox, but not shingles. You are contagious until your blisters scab over. Stay away from people who have not had chickenpox or the chickenpox vaccine. Avoid pregnant women, newborns, and people with weak immune systems. They have a higher risk of infection.  Wash  your hands often.  Wash your hands several times each day. Wash after you use the bathroom, change a child's diaper, and before you prepare or eat food. Use soap and water every time. Rub your soapy hands together, lacing your fingers. Wash the front and back of your hands, and in between your fingers. Use the fingers of one hand to scrub under the fingernails of the other hand. Wash for at least 20 seconds. Rinse with warm, running water for several seconds. Then dry your hands with a clean towel or paper towel. Use hand  that contains alcohol if soap and water are not available. Do not touch your eyes, nose, or mouth without washing your hands first.         Cover a sneeze or cough.  Use a tissue that covers your mouth and nose. Throw the tissue away in a trash can right away. Use the bend of your arm if a tissue is not available. Wash your hands well with soap and water or use a hand .       Prevent shingles or another shingles outbreak:   A vaccine may be given to help prevent shingles. You can get the vaccine even if you already had shingles. The vaccine comes in 2 forms. A 2-dose vaccine is usually given to adults 50 years or older. A 1-dose vaccine may be given to adults 60 years or older.    The vaccine can help prevent a future outbreak. If you do get shingles again, the vaccine can keep it from becoming severe. Ask your healthcare provider about other vaccines you may need.    Follow up with your doctor as directed:  Write down your questions so you remember to ask them during your visits.  For more information:   [employer] Disease [employer] and Prevention  [address] [place] [address]  Phone: 8- 152 - [phone]  Phone: 8- 415 - 1058929  Web Address: http://www.cdc.gov    © Copyright [employer] 2023 Information is for End User's use only and may not be sold, redistributed or otherwise used for commercial purposes.  The above information is an  only. It is not intended  as medical advice for individual conditions or treatments. Talk to your doctor, nurse or pharmacist before following any medical regimen to see if it is safe and effective for you.

## 2024-02-15 ENCOUNTER — TELEPHONE (OUTPATIENT)
Dept: HEMATOLOGY ONCOLOGY | Facility: CLINIC | Age: 89
End: 2024-02-15

## 2024-02-15 NOTE — TELEPHONE ENCOUNTER
Appointment Change  Cancel, Reschedule, Change to Virtual      Who are you speaking with? Patient   If it is not the patient, is the caller listed on the communication consent form? N/A   Which provider is the appointment scheduled with? Dr. Rios   When was the original appointment scheduled?    Please list date and time 03/04/2024 @ 11:15AM    At which location is the appointment scheduled to take place? [facility]   Was the appointment rescheduled?     Was the appointment changed from an in person visit to a virtual visit?    If so, please list the details of the change. Yes, 03/19/2024 @11:45AM    What is the reason for the appointment change? Provider

## 2024-02-19 ENCOUNTER — APPOINTMENT (OUTPATIENT)
Dept: LAB | Facility: MEDICAL CENTER | Age: 89
End: 2024-02-19
Payer: MEDICARE

## 2024-02-19 DIAGNOSIS — Z13.29 SCREENING FOR THYROID DISORDER: ICD-10-CM

## 2024-02-19 DIAGNOSIS — D64.9 NORMOCYTIC ANEMIA: ICD-10-CM

## 2024-02-19 DIAGNOSIS — Z13.89 SCREENING FOR NEPHROPATHY: ICD-10-CM

## 2024-02-19 LAB
ALBUMIN SERPL BCP-MCNC: 3.9 G/DL (ref 3.5–5)
ALP SERPL-CCNC: 92 U/L (ref 34–104)
ALT SERPL W P-5'-P-CCNC: 19 U/L (ref 7–52)
ANION GAP SERPL CALCULATED.3IONS-SCNC: 8 MMOL/L
AST SERPL W P-5'-P-CCNC: 17 U/L (ref 13–39)
BASOPHILS # BLD AUTO: 0.03 THOUSANDS/ÂΜL (ref 0–0.1)
BASOPHILS NFR BLD AUTO: 0 % (ref 0–1)
BILIRUB SERPL-MCNC: 0.94 MG/DL (ref 0.2–1)
BUN SERPL-MCNC: 12 MG/DL (ref 5–25)
CALCIUM SERPL-MCNC: 8.9 MG/DL (ref 8.4–10.2)
CHLORIDE SERPL-SCNC: 103 MMOL/L (ref 96–108)
CO2 SERPL-SCNC: 26 MMOL/L (ref 21–32)
CREAT SERPL-MCNC: 0.73 MG/DL (ref 0.6–1.3)
EOSINOPHIL # BLD AUTO: 0.05 THOUSAND/ÂΜL (ref 0–0.61)
EOSINOPHIL NFR BLD AUTO: 1 % (ref 0–6)
ERYTHROCYTE [DISTWIDTH] IN BLOOD BY AUTOMATED COUNT: 15.7 % (ref 11.6–15.1)
GFR SERPL CREATININE-BSD FRML MDRD: 73 ML/MIN/1.73SQ M
GLUCOSE P FAST SERPL-MCNC: 106 MG/DL (ref 65–99)
HCT VFR BLD AUTO: 34.8 % (ref 34.8–46.1)
HGB BLD-MCNC: 11.7 G/DL (ref 11.5–15.4)
IMM GRANULOCYTES # BLD AUTO: 0.02 THOUSAND/UL (ref 0–0.2)
IMM GRANULOCYTES NFR BLD AUTO: 0 % (ref 0–2)
LYMPHOCYTES # BLD AUTO: 1.73 THOUSANDS/ÂΜL (ref 0.6–4.47)
LYMPHOCYTES NFR BLD AUTO: 21 % (ref 14–44)
MCH RBC QN AUTO: 31 PG (ref 26.8–34.3)
MCHC RBC AUTO-ENTMCNC: 33.6 G/DL (ref 31.4–37.4)
MCV RBC AUTO: 92 FL (ref 82–98)
MONOCYTES # BLD AUTO: 0.64 THOUSAND/ÂΜL (ref 0.17–1.22)
MONOCYTES NFR BLD AUTO: 8 % (ref 4–12)
NEUTROPHILS # BLD AUTO: 5.64 THOUSANDS/ÂΜL (ref 1.85–7.62)
NEUTS SEG NFR BLD AUTO: 70 % (ref 43–75)
NRBC BLD AUTO-RTO: 0 /100 WBCS
PLATELET # BLD AUTO: 259 THOUSANDS/UL (ref 149–390)
PMV BLD AUTO: 9.5 FL (ref 8.9–12.7)
POTASSIUM SERPL-SCNC: 4.2 MMOL/L (ref 3.5–5.3)
PROT SERPL-MCNC: 7.2 G/DL (ref 6.4–8.4)
RBC # BLD AUTO: 3.77 MILLION/UL (ref 3.81–5.12)
SODIUM SERPL-SCNC: 137 MMOL/L (ref 135–147)
TSH SERPL DL<=0.05 MIU/L-ACNC: 1.63 UIU/ML (ref 0.45–4.5)
WBC # BLD AUTO: 8.11 THOUSAND/UL (ref 4.31–10.16)

## 2024-02-19 PROCEDURE — 80053 COMPREHEN METABOLIC PANEL: CPT

## 2024-02-19 PROCEDURE — 84443 ASSAY THYROID STIM HORMONE: CPT

## 2024-02-19 PROCEDURE — 36415 COLL VENOUS BLD VENIPUNCTURE: CPT

## 2024-02-19 PROCEDURE — 85025 COMPLETE CBC W/AUTO DIFF WBC: CPT

## 2024-02-29 ENCOUNTER — HOSPITAL ENCOUNTER (OUTPATIENT)
Dept: RADIOLOGY | Facility: MEDICAL CENTER | Age: 89
Discharge: HOME | End: 2024-02-29
Payer: MEDICARE

## 2024-02-29 ENCOUNTER — APPOINTMENT (OUTPATIENT)
Dept: RADIOLOGY | Facility: MEDICAL CENTER | Age: 89
End: 2024-02-29
Payer: MEDICARE

## 2024-02-29 DIAGNOSIS — C18.9 COLON ADENOCARCINOMA (HCC): ICD-10-CM

## 2024-02-29 PROCEDURE — G1004 CDSM NDSC: HCPCS

## 2024-02-29 PROCEDURE — 71260 CT THORAX DX C+: CPT

## 2024-02-29 PROCEDURE — 74177 CT ABD & PELVIS W/CONTRAST: CPT

## 2024-02-29 RX ADMIN — IOHEXOL 85 ML: 350 INJECTION, SOLUTION INTRAVENOUS at 13:41

## 2024-03-07 ENCOUNTER — TELEPHONE (OUTPATIENT)
Dept: HEMATOLOGY ONCOLOGY | Facility: CLINIC | Age: 89
End: 2024-03-07

## 2024-03-07 NOTE — TELEPHONE ENCOUNTER
Appointment Change  Cancel, Reschedule, Change to Virtual      Who are you speaking with? Patient   If it is not the patient, is the caller listed on the communication consent form? N/A   Which provider is the appointment scheduled with? Dr. Rios   When was the original appointment scheduled?    Please list date and time 3/19/24 11:45 AM   At which location is the appointment scheduled to take place? [facility]   Was the appointment rescheduled?     Was the appointment changed from an in person visit to a virtual visit?    If so, please list the details of the change. 4/15/24 1:45 PM   What is the reason for the appointment change? Provider requested change due to scheduling conflict       Was STAR transport scheduled? No   Does STAR transport need to be scheduled for the new visit (if applicable) No   Does the patient need an infusion appointment rescheduled? No   Does the patient have an upcoming infusion appointment scheduled? If so, when? No   Is the patient undergoing chemotherapy? No   For appointments cancelled with less than 24 hours:  Was the no-show policy reviewed? Yes

## 2024-03-27 ENCOUNTER — OFFICE VISIT (OUTPATIENT)
Dept: CARDIOLOGY CLINIC | Facility: CLINIC | Age: 89
End: 2024-03-27
Payer: MEDICARE

## 2024-03-27 VITALS
WEIGHT: 125 LBS | SYSTOLIC BLOOD PRESSURE: 138 MMHG | OXYGEN SATURATION: 97 % | HEART RATE: 78 BPM | HEIGHT: 60 IN | BODY MASS INDEX: 24.54 KG/M2 | DIASTOLIC BLOOD PRESSURE: 68 MMHG

## 2024-03-27 DIAGNOSIS — I35.0 SEVERE AORTIC STENOSIS BY PRIOR ECHOCARDIOGRAM: ICD-10-CM

## 2024-03-27 DIAGNOSIS — I10 PRIMARY HYPERTENSION: Primary | ICD-10-CM

## 2024-03-27 DIAGNOSIS — I34.0 NONRHEUMATIC MITRAL VALVE REGURGITATION: ICD-10-CM

## 2024-03-27 DIAGNOSIS — E78.00 PURE HYPERCHOLESTEROLEMIA: ICD-10-CM

## 2024-03-27 DIAGNOSIS — I51.7 LEFT VENTRICULAR HYPERTROPHY: ICD-10-CM

## 2024-03-27 PROCEDURE — 99214 OFFICE O/P EST MOD 30 MIN: CPT | Performed by: INTERNAL MEDICINE

## 2024-03-27 NOTE — PROGRESS NOTES
Cardiology Consultation     Ashley Eastman  [id number]  1935  [facility]  [address]  [place] [address]    1. Primary hypertension        2. Left ventricular hypertrophy        3. Nonrheumatic mitral valve regurgitation        4. Severe aortic stenosis by prior echocardiogram  Ambulatory referral to Cardiovascular Surgery      5. Pure hypercholesterolemia            Chief Complaint   Patient presents with    Follow-up     F/u      HPI: Patient feels well, without complaints.  No reported chest pain, shortness of breath, palpitations, lightheadedness, syncope, LE edema, orthopnea, PND, or significant weight changes.  Patient remains active without any increased fatigue out of the ordinary.        Patient Active Problem List   Diagnosis    Pure hypercholesterolemia    Osteoarthritis    Left ventricular hypertrophy    Hypertension    Severe aortic stenosis by prior echocardiogram    Pancreas cyst    Elevated alkaline phosphatase level    Lower abdominal pain    Hepatic steatosis    Colon adenocarcinoma (HCC)    Pre-operative cardiovascular examination, valvular heart disease    Mitral valve regurgitation    S/P right colectomy    Skin lesion of back     Past Medical History:   Diagnosis Date    Aortic valve regurgitation     Aortic valve stenosis     Hypertension     Left ventricular hypertrophy     Mitral valve regurgitation     Osteoarthritis     Pure hypercholesterolemia     Shingles outbreak     back and left side     Social History     Socioeconomic History    Marital status:      Spouse name: Not on file    Number of children: Not on file    Years of education: Not on file    Highest education level: Not on file   Occupational History    Not on file   Tobacco Use    Smoking status: Former     Current packs/day: 0.00     Average packs/day: 1 pack/day for 25.0 years (25.0 ttl pk-yrs)      Types: Cigarettes     Start date:      Quit date:      Years since quittin.2     Passive exposure: Past    Smokeless tobacco: Never   Vaping Use    Vaping status: Never Used   Substance and Sexual Activity    Alcohol use: Not Currently    Drug use: Not Currently    Sexual activity: Not on file   Other Topics Concern    Not on file   Social History Narrative    · Most recent tobacco use screenin2019      · Do you currently or have you served in the [place] [employer]:   No      Social Determinants of Health     Financial Resource Strain: Low Risk  (2023)    Overall Financial Resource Strain (CARDIA)     Difficulty of Paying Living Expenses: Not hard at all   Food Insecurity: No Food Insecurity (2023)    Hunger Vital Sign     Worried About Running Out of Food in the Last Year: Never true     Ran Out of Food in the Last Year: Never true   Transportation Needs: No Transportation Needs (2023)    PRAPARE - Transportation     Lack of Transportation (Medical): No     Lack of Transportation (Non-Medical): No   Physical Activity: Not on file   Stress: Not on file   Social Connections: Not on file   Intimate Partner Violence: Not on file   Housing Stability: Low Risk  (2023)    Housing Stability Vital Sign     Unable to Pay for Housing in the Last Year: No     Number of Places Lived in the Last Year: 1     Unstable Housing in the Last Year: No      Family History   Problem Relation Age of Onset    No Known Problems Mother     Coronary artery disease Father     Throat cancer Father     Diabetes Sister     Cancer Sister     Pancreatic cancer Sister 60    No Known Problems Daughter     No Known Problems Daughter     No Known Problems Maternal Grandmother     No Known Problems Maternal Grandfather     No Known Problems Paternal Grandmother     No Known Problems Paternal Grandfather     No Known Problems Brother     Cancer Brother     No Known Problems Son     No Known Problems Son     No  Known Problems Son      Past Surgical History:   Procedure Laterality Date    BUNIONECTOMY Left 04/16/2014    COLONOSCOPY  01/04/2012    CORRECTION HAMMER TOE Left 04/16/2014    [place] COLECTOMY PARTIAL W/ANASTOMOSIS N/A 8/15/2023    Procedure: RIGHT HEMICOLECTOMY;  Surgeon: Rosario Rios MD;  Location: [facility];  Service: Surgical Oncology       Current Outpatient Medications:     amLODIPine (NORVASC) 5 mg tablet, Take 1 tablet (5 mg total) by mouth daily at bedtime, Disp: 90 tablet, Rfl: 1    atorvastatin (LIPITOR) 10 mg tablet, Take 1 tablet by mouth once daily, Disp: 90 tablet, Rfl: 1    Multiple Vitamins-Minerals (PRESERVISION AREDS 2 PO), Take by mouth, Disp: , Rfl:     valACYclovir (VALTREX) 1,000 mg tablet, Take 1 tablet (1,000 mg total) by mouth 3 (three) times a day for 7 days (Patient not taking: Reported on 3/27/2024), Disp: 21 tablet, Rfl: 0  Allergies   Allergen Reactions    Acetaminophen Hives     Vitals:    03/27/24 0932   BP: 138/68   BP Location: Left arm   Patient Position: Sitting   Cuff Size: Standard   Pulse: 78   SpO2: 97%   Weight: 56.7 kg (125 lb)   Height: 5' (1.524 m)       Labs:  Appointment on 02/19/2024   Component Date Value    TSH 3RD GENERATON 02/19/2024 1.633     Sodium 02/19/2024 137     Potassium 02/19/2024 4.2     Chloride 02/19/2024 103     CO2 02/19/2024 26     ANION GAP 02/19/2024 8     BUN 02/19/2024 12     Creatinine 02/19/2024 0.73     Glucose, Fasting 02/19/2024 106 (H)     Calcium 02/19/2024 8.9     AST 02/19/2024 17     ALT 02/19/2024 19     Alkaline Phosphatase 02/19/2024 92     Total Protein 02/19/2024 7.2     Albumin 02/19/2024 3.9     Total Bilirubin 02/19/2024 0.94     eGFR 02/19/2024 73     WBC 02/19/2024 8.11     RBC 02/19/2024 3.77 (L)     Hemoglobin 02/19/2024 11.7     Hematocrit 02/19/2024 34.8     MCV 02/19/2024 92     MCH 02/19/2024 31.0     MCHC 02/19/2024 33.6     RDW 02/19/2024 15.7 (H)     MPV 02/19/2024 9.5     Platelets 02/19/2024 259     nRBC  02/19/2024 0     Neutrophils Relative 02/19/2024 70     Immature Grans % 02/19/2024 0     Lymphocytes Relative 02/19/2024 21     Monocytes Relative 02/19/2024 8     Eosinophils Relative 02/19/2024 1     Basophils Relative 02/19/2024 0     Neutrophils Absolute 02/19/2024 5.64     Absolute Immature Grans 02/19/2024 0.02     Absolute Lymphocytes 02/19/2024 1.73     Absolute Monocytes 02/19/2024 0.64     Eosinophils Absolute 02/19/2024 0.05     Basophils Absolute 02/19/2024 0.03      Lab Results   Component Value Date    TRIG 80 02/23/2023    TRIG 52 10/01/2020    HDL 48 (L) 02/23/2023    HDL 60 10/01/2020     Imaging: CT abdomen pelvis w contrast    Result Date: 7/24/2023  Narrative: CT ABDOMEN AND PELVIS WITH IV CONTRAST INDICATION:   K63.89: Other specified diseases of intestine. COMPARISON: MRI, 5/31/2023 TECHNIQUE:  CT examination of the abdomen and pelvis was performed. Multiplanar 2D reformatted images were created from the source data. This examination, like all CT scans performed in the [facility], was performed utilizing techniques to minimize radiation dose exposure, including the use of iterative reconstruction and automated exposure control. Radiation dose length product (DLP) for this visit:  300 mGy-cm IV Contrast:  85 mL of iohexol (OMNIPAQUE) Enteric Contrast:  Enteric contrast was not administered. FINDINGS: ABDOMEN LOWER CHEST:  No clinically significant abnormality identified in the visualized lower chest. LIVER/BILIARY TREE:  Unremarkable. GALLBLADDER: Under distended, with partially calcified stones. SPLEEN:  Unremarkable. PANCREAS: Redemonstrated multiple pancreatic cysts, the largest in the head 1.5 x 1.8 cm. 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 ADRENAL GLANDS:  Unremarkable. KIDNEYS/URETERS: No hydronephrosis or urinary tract calculus.  One or more sharply circumscribed subcentimeter renal hypodensities are present, too small to accurately  characterize, and statistically most likely benign findings. According to recent literature (Radiology 2019) no further workup of these findings is recommended. STOMACH AND BOWEL: There has been intussusception at the level of the ileocecal valve, with the leading point likely a previously described mass. The length of intussuscepted segment is approximately 6 cm. This segment felt to be involved by the tumor is  approximately 4.5-5 cm. There is large amount of oral contrast in the colon distal to the intussusception level indicating no complete obstruction. APPENDIX: Not visualized ABDOMINOPELVIC CAVITY:  No ascites.  No pneumoperitoneum.  No lymphadenopathy. VESSELS:  Unremarkable for patient's age. PELVIS REPRODUCTIVE ORGANS:  Unremarkable for patient's age. URINARY BLADDER:  Unremarkable. ABDOMINAL WALL/INGUINAL REGIONS:  Unremarkable. OSSEOUS STRUCTURES:  No acute fracture or destructive osseous lesion. Multilevel degenerative changes in the spine.     Impression: 6 cm ileocecal intussusception with the leading point felt to be the known right colonic tumor. No radiographic evidence of large bowel obstruction at this time. No findings of metastatic disease. No lymphadenopathy. 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 Recommend MRI follow-up in 2 years; see complete recommendations on previous MRI. Incidental findings, as per the body of the report. The major findings were discussed with the nurse practitioner covering for Dr. Sanchez,  7/24/2023 at 1440. Workstation performed: [id number]     Colonoscopy    Result Date: 7/13/2023  Narrative: Table formatting from the original result was not included. [facility] [address] [place] [id number] [phone] DATE OF SERVICE: 7/13/23 PHYSICIAN(S): Attending: Dontae Sanchez MD Fellow: No Staff Documented Procedure : Colonoscopy with multiple biopsies along with tattooing INDICATION: Lower  abdominal pain POST-OP DIAGNOSIS: See the impression below. HISTORY: Prior colonoscopy: More than 10 years ago. BOWEL PREPARATION: Miralax/Dulcolax PREPROCEDURE: Informed consent was obtained for the procedure, including sedation. Risks including but not limited to bleeding, infection, perforation, adverse drug reaction and aspiration were explained in detail. Also explained about less than 100% sensitivity with the exam and other alternatives. The patient was placed in the left lateral decubitus position. Procedure: Colonoscopy DETAILS OF PROCEDURE: Patient was taken to the procedure room where a time out was performed to confirm correct patient and correct procedure. The patient underwent monitored anesthesia care, which was administered by an anesthesia professional. The patient's blood pressure, heart rate, level of consciousness, oxygen, respirations and ECG were monitored throughout the procedure. A digital rectal exam was performed. The scope was introduced through the anus and advanced to the cecum. Retroflexion was performed in the rectum. The quality of bowel preparation was evaluated using the [place] Bowel Preparation Scale with scores of: right colon = 2, transverse colon = 2, left colon = 2. The total BBPS score was 6. Bowel prep was adequate. The patient experienced no blood loss. The procedure was moderately difficult due to obstructing mass. The patient tolerated the procedure well. There were no apparent adverse events. ANESTHESIA INFORMATION: ASA: III Anesthesia Type: IV Sedation with Anesthesia MEDICATIONS: No administrations occurring from 1009 to 1033 on 07/13/23 FINDINGS: Single invasive, malignant-appearing and ulcerated mass (not traversable) measuring 8 cm x 6 cm in the cecum and ascending colon, covering the whole circumference; performed partial removal by cold forceps biopsy; tattooed 95 cm proximal to the finding with 5 mL of carbon black and farrukh ink. Large ulcerated malignant mass in  distal transverse colon, obstructing 98% of the lumen, cecum was not visualized, scope was unable to pass through the tumor.  Tattooing was done, multiple biopsies were done Two left lateral and right posterior internal medium (grade 2) hemorrhoids observed during digital rectal exam; no bleeding was identified EVENTS: Procedure Events Event Event Time ENDO CECUM REACHED 7/13/2023 10:28 AM ENDO SCOPE OUT TIME 7/13/2023 10:29 AM SPECIMENS: ID Type Source Tests Collected by Time Destination 1 : cold bx ascending colon mass Tissue Large Intestine, Right/Ascending Colon TISSUE EXAM Dontae Sanchez MD 7/13/2023 10:20 AM  EQUIPMENT: Colonoscope -[id number]     Impression: Single invasive, malignant-appearing and ulcerated mass (not traversable) measuring 8 cm x 6 cm in the cecum and ascending colon, covering the whole circumference; performed partial removal by cold forceps biopsy; tattooed 95 cm proximal to the finding 2 medium (grade 2) hemorrhoids RECOMMENDATION:  No further screening colonoscopies necessary  Life expectancy is less than 10 years   Await pathology results    CAT scan abdomen and pelvis with p.o. and IV contrast for staging    Surgical and medical oncology consult  Dontae Sanchez MD       Review of Systems:  Review of Systems   Constitutional:  Negative for activity change, appetite change, chills, diaphoresis, fatigue and unexpected weight change.   HENT:  Negative for hearing loss, nosebleeds and sore throat.    Eyes:  Negative for photophobia and visual disturbance.   Respiratory:  Negative for cough, chest tightness, shortness of breath and wheezing.    Cardiovascular:  Negative for chest pain, palpitations and leg swelling.   Gastrointestinal:  Negative for abdominal pain, diarrhea, nausea and vomiting.   Endocrine: Negative for polyuria.   Genitourinary:  Negative for dysuria, frequency and hematuria.   Musculoskeletal:  Negative for arthralgias, back pain, gait problem and neck  pain.   Skin:  Negative for pallor and rash.   Neurological:  Negative for dizziness, syncope and headaches.   Hematological:  Does not bruise/bleed easily.   Psychiatric/Behavioral:  Negative for behavioral problems and confusion.        Physical Exam:  Physical Exam  Vitals reviewed.   Constitutional:       Appearance: Normal appearance. She is well-developed. She is not ill-appearing or diaphoretic.   HENT:      Head: Normocephalic and atraumatic.      Nose: Nose normal.   Eyes:      General: No scleral icterus.     Extraocular Movements: Extraocular movements intact.      Pupils: Pupils are equal, round, and reactive to light.   Neck:      Vascular: No JVD.   Cardiovascular:      Rate and Rhythm: Normal rate and regular rhythm.      Heart sounds: Murmur heard.      Systolic murmur is present with a grade of 3/6.      Diastolic murmur is present with a grade of 2/4.      No friction rub. No gallop.   Pulmonary:      Effort: Pulmonary effort is normal. No respiratory distress.      Breath sounds: Normal breath sounds. No wheezing or rales.   Abdominal:      General: Bowel sounds are normal. There is no distension.      Palpations: Abdomen is soft.      Tenderness: There is no abdominal tenderness.   Musculoskeletal:         General: No deformity. Normal range of motion.      Cervical back: Normal range of motion and neck supple.      Right lower leg: No edema.      Left lower leg: No edema.   Skin:     General: Skin is warm and dry.      Findings: No rash.   Neurological:      Mental Status: She is alert and oriented to person, place, and time.      Cranial Nerves: No cranial nerve deficit.   Psychiatric:         Mood and Affect: Mood normal.         Behavior: Behavior normal.       Blood pressure 138/68, pulse 78, height 5' (1.524 m), weight 56.7 kg (125 lb), SpO2 97%.    Discussion/Summary:  Preop cardiac risk evaluation: With cardiac disease including aortic stenosis that is noted to be moderate to severe in  severity with a mean gradient of 25 mmHg and peak gradient of 42 mmHg with an aortic valve area of 0.83 cm² on echocardiogram in March 2023.  Patient also noted to have moderate aortic regurgitation and moderate annular calcification of the mitral valve with mild mitral regurgitation.  She has a normal LV size and function with grade 1 diastolic dysfunction.  Patient has no pulmonary hypertension.  She also carries a diagnosis of hypertension and hyperlipidemia.  She has no active symptoms or ECG changes suggestive of ischemia, heart failure, or arrhythmias.  She is very active at baseline and goes for walks several times a week without any symptoms.  No further cardiac work-up recommended prior to intermediate cardiac risk abdominal surgery.  At this time, patient would be considered intermediate cardiac risk considering underlying valvular disease.  Would proceed with caution regarding iatrogenic volume status since aortic stenosis is a preload dependent state, however she also has aortic regurgitation which can cause her to become volume overloaded as well.  Would watch volume status closely perioperatively and avoid significant iatrogenic volume resuscitation.  Continue blood pressure management with amlodipine perioperatively.  No additional medications required currently.  She is not on a beta-blocker at baseline, therefore the addition of one is not necessary.  Currently remains without symptoms, no need for repeat testing, but will refer to CT surgery regarding whether to address this now surgically.    Hypertension: Well-controlled on current regiment.  Continue amlodipine. No changes made today.    Hyperlipidemia: LDL very well controlled at 38 in February 2023.  Repeat levels ordered in her chart for this year.

## 2024-03-28 ENCOUNTER — TELEPHONE (OUTPATIENT)
Dept: CARDIAC SURGERY | Facility: CLINIC | Age: 89
End: 2024-03-28

## 2024-03-28 DIAGNOSIS — I35.0 SEVERE AORTIC STENOSIS BY PRIOR ECHOCARDIOGRAM: Primary | ICD-10-CM

## 2024-03-28 NOTE — TELEPHONE ENCOUNTER
Spoke with patient and informed her we scheduled her to have an updated echo at  [facility] [place] on 4/18/24 at 10 AM. Patient does not want to schedule an appointment with one of our cardiac surgeons at this time due to conflicts with her daughter's schedule. I offered the [employer] service but patient not interested at this time. I informed her I would call her after we receive the echo results.

## 2024-04-10 ENCOUNTER — TELEPHONE (OUTPATIENT)
Age: 89
End: 2024-04-10

## 2024-04-10 NOTE — TELEPHONE ENCOUNTER
Patient called to report that she has been having an outbreak of shingles since the beginning of February.  Her symptoms have lessened but they are still bothering her and wanted to know if Dr. Greenwood could recommend anything to alleviate them

## 2024-04-12 PROBLEM — Z08 ENCOUNTER FOR FOLLOW-UP SURVEILLANCE OF COLON CANCER: Status: ACTIVE | Noted: 2024-04-12

## 2024-04-12 PROBLEM — Z85.038 PERSONAL HISTORY OF COLON CANCER: Status: ACTIVE | Noted: 2023-07-28

## 2024-04-12 PROBLEM — Z85.038 ENCOUNTER FOR FOLLOW-UP SURVEILLANCE OF COLON CANCER: Status: ACTIVE | Noted: 2024-04-12

## 2024-04-15 ENCOUNTER — OFFICE VISIT (OUTPATIENT)
Dept: SURGICAL ONCOLOGY | Facility: CLINIC | Age: 89
End: 2024-04-15
Payer: MEDICARE

## 2024-04-15 VITALS
HEART RATE: 77 BPM | WEIGHT: 125.5 LBS | DIASTOLIC BLOOD PRESSURE: 86 MMHG | TEMPERATURE: 97.5 F | BODY MASS INDEX: 24.64 KG/M2 | SYSTOLIC BLOOD PRESSURE: 122 MMHG | RESPIRATION RATE: 18 BRPM | OXYGEN SATURATION: 98 % | HEIGHT: 60 IN

## 2024-04-15 DIAGNOSIS — Z85.038 ENCOUNTER FOR FOLLOW-UP SURVEILLANCE OF COLON CANCER: Primary | ICD-10-CM

## 2024-04-15 DIAGNOSIS — I10 PRIMARY HYPERTENSION: ICD-10-CM

## 2024-04-15 DIAGNOSIS — Z08 ENCOUNTER FOR FOLLOW-UP SURVEILLANCE OF COLON CANCER: Primary | ICD-10-CM

## 2024-04-15 DIAGNOSIS — Z85.038 PERSONAL HISTORY OF COLON CANCER: ICD-10-CM

## 2024-04-15 PROCEDURE — 99214 OFFICE O/P EST MOD 30 MIN: CPT | Performed by: STUDENT IN AN ORGANIZED HEALTH CARE EDUCATION/TRAINING PROGRAM

## 2024-04-15 RX ORDER — SACCHAROMYCES BOULARDII 250 MG
250 CAPSULE ORAL 2 TIMES DAILY
Qty: 28 CAPSULE | Refills: 0 | Status: SHIPPED | OUTPATIENT
Start: 2024-04-15

## 2024-04-15 NOTE — PROGRESS NOTES
Surgical Oncology Follow Up    [address] [facility]  [facility]  [address] [facility] [place] [address]    Ashley Eastman  1935  [id number]    Diagnoses and all orders for this visit:    Encounter for follow-up surveillance of colon cancer    Personal history of colon cancer        Chief Complaint   Patient presents with    Follow-up       No follow-ups on file.      Oncology History   Personal history of colon cancer   7/13/2023 Biopsy    A. Colon, “Right ascending colon mass,” Biopsy:  - Invasive adenocarcinoma with mucinous features arising in a tubular adenoma     7/28/2023 Initial Diagnosis    Colon adenocarcinoma (HCC)     8/15/2023 Surgery    A. Terminal ileum, ascending colon, appendix and omentum, right hemicolectomy:  - Mucinous adenocarcinoma (8.8 cm), moderately differentiated, arising in a tubular adenoma.  - Tumor invades through the muscularis propria into pericolorectal tissue (pT3).  - Terminal ileum, appendix and omentum with no pathologic abnormality.  - Sixteen lymph nodes, negative for malignancy (0/16).     8/29/2023 -  Cancer Staged    Staging form: Colon and Rectum, AJCC 8th Edition  - Pathologic stage from 8/29/2023: Stage IIA (pT3, pN0, cM0) - Signed by Rosario Rios MD on 8/29/2023  Total positive nodes: 0           Staging: T3N0 colon cancer  Treatment history: resection 8/2023  Current treatment: obs  Disease status: GEOVANI    History of Present Illness: Doing very well . Mild ileus but otherwise unevenftul recovery. Doing well - eating well, no n/v, regular BMs, no pain. Scan 2/29/24 GEOVANI.     Review of Systems  Complete ROS Surg Onc:   Constitutional: The patient denies new or recent history of general fatigue, no recent weight loss, no change in appetite.   Eyes: No complaints of visual problems, no scleral icterus.   ENT: no complaints of ear pain, no hoarseness, no difficulty swallowing,  no tinnitus and no new masses in head, oral  cavity, or neck.   Cardiovascular: No complaints of chest pain, no palpitations, no ankle edema.   Respiratory: No complaints of shortness of breath, no cough.   Gastrointestinal: No complaints of jaundice, no bloody stools, no pale stools.   Genitourinary: No complaints of dysuria, no hematuria, no nocturia, no frequent urination, no urethral discharge.   Musculoskeletal: No complaints of weakness, paralysis, joint stiffness or arthralgias.  Integumentary: No complaints of rash, no new lesions.   Neurological: No complaints of convulsions, no seizures, no dizziness.   Hematologic/Lymphatic: No complaints of easy bruising.   Endocrine:  No hot or cold intolerance.  No polydipsia, polyphagia, or polyuria.  Allergy/immunology:  No environmental allergies.  No food allergies.  Not immunocompromised.  Skin:  No pallor or rash.  No wound.        Patient Active Problem List   Diagnosis    Pure hypercholesterolemia    Osteoarthritis    Left ventricular hypertrophy    Hypertension    Severe aortic stenosis by prior echocardiogram    Pancreas cyst    Elevated alkaline phosphatase level    Lower abdominal pain    Hepatic steatosis    Personal history of colon cancer    Pre-operative cardiovascular examination, valvular heart disease    Mitral valve regurgitation    S/P right colectomy    Skin lesion of back    Encounter for follow-up surveillance of colon cancer     Past Medical History:   Diagnosis Date    Aortic valve regurgitation     Aortic valve stenosis     Hypertension     Left ventricular hypertrophy     Mitral valve regurgitation     Osteoarthritis     Pure hypercholesterolemia     Shingles outbreak     back and left side     Past Surgical History:   Procedure Laterality Date    BUNIONECTOMY Left 04/16/2014    COLONOSCOPY  01/04/2012    CORRECTION HAMMER TOE Left 04/16/2014    [place] COLECTOMY PARTIAL W/ANASTOMOSIS N/A 8/15/2023    Procedure: RIGHT HEMICOLECTOMY;  Surgeon: Rosario Rios MD;  Location: [facility];   Service: Surgical Oncology     Family History   Problem Relation Age of Onset    No Known Problems Mother     Coronary artery disease Father     Throat cancer Father     Diabetes Sister     Cancer Sister     Pancreatic cancer Sister 60    No Known Problems Daughter     No Known Problems Daughter     No Known Problems Maternal Grandmother     No Known Problems Maternal Grandfather     No Known Problems Paternal Grandmother     No Known Problems Paternal Grandfather     No Known Problems Brother     Cancer Brother     No Known Problems Son     No Known Problems Son     No Known Problems Son      Social History     Socioeconomic History    Marital status:      Spouse name: Not on file    Number of children: Not on file    Years of education: Not on file    Highest education level: Not on file   Occupational History    Not on file   Tobacco Use    Smoking status: Former     Current packs/day: 0.00     Average packs/day: 1 pack/day for 25.0 years (25.0 ttl pk-yrs)     Types: Cigarettes     Start date:      Quit date:      Years since quittin.3     Passive exposure: Past    Smokeless tobacco: Never   Vaping Use    Vaping status: Never Used   Substance and Sexual Activity    Alcohol use: Not Currently    Drug use: Not Currently    Sexual activity: Not on file   Other Topics Concern    Not on file   Social History Narrative    · Most recent tobacco use screenin2019      · Do you currently or have you served in the [employer]:   No      Social Determinants of Health     Financial Resource Strain: Low Risk  (2023)    Overall Financial Resource Strain (CARDIA)     Difficulty of Paying Living Expenses: Not hard at all   Food Insecurity: No Food Insecurity (2023)    Hunger Vital Sign     Worried About Running Out of Food in the Last Year: Never true     Ran Out of Food in the Last Year: Never true   Transportation Needs: No Transportation Needs (2023)    PRAPARE - Transportation      Lack of Transportation (Medical): No     Lack of Transportation (Non-Medical): No   Physical Activity: Not on file   Stress: Not on file   Social Connections: Not on file   Intimate Partner Violence: Not on file   Housing Stability: Low Risk  (8/16/2023)    Housing Stability Vital Sign     Unable to Pay for Housing in the Last Year: No     Number of Places Lived in the Last Year: 1     Unstable Housing in the Last Year: No       Current Outpatient Medications:     amLODIPine (NORVASC) 5 mg tablet, Take 1 tablet (5 mg total) by mouth daily at bedtime, Disp: 90 tablet, Rfl: 1    atorvastatin (LIPITOR) 10 mg tablet, Take 1 tablet by mouth once daily, Disp: 90 tablet, Rfl: 1    Multiple Vitamins-Minerals (PRESERVISION AREDS 2 PO), Take by mouth (Patient not taking: Reported on 4/15/2024), Disp: , Rfl:     valACYclovir (VALTREX) 1,000 mg tablet, Take 1 tablet (1,000 mg total) by mouth 3 (three) times a day for 7 days (Patient not taking: Reported on 3/27/2024), Disp: 21 tablet, Rfl: 0  Allergies   Allergen Reactions    Acetaminophen Hives     Vitals:    04/15/24 1346   BP: 122/86   Pulse: 77   Resp: 18   Temp: 97.5 °F (36.4 °C)   SpO2: 98%       Physical Exam  Constitutional: Patient is well-developed and well-nourished who appears the stated age in no acute distress. Patient is pleasant and talkative.     HEENT:  Normocephalic.  Sclerae are anicteric. Mucous membranes are moist. Neck is supple without adenopathy. No JVD.     Chest: Equal chest rise, easy WOB  Cardiac: Heart is regular rate.     Abdomen: Abdomen is non-tender, non-distended and without masses.   Incision benign  Extremities: There is no clubbing or cyanosis. There is no edema.  Symmetric.  Neuro: Grossly nonfocal. Gait is normal.     Lymphatic: No evidence of cervical adenopathy bilaterally. No evidence of axillary adenopathy bilaterally. No evidence of inguinal adenopathy bilaterally.     Skin: Warm, anicteric.    Psych:  Patient is pleasant and  talkative.    Pathology:  As above    Labs:  Reviewed in Epic personally    Imaging  CT chest wo contrast    Result Date: 8/9/2023  Narrative: CT CHEST WITHOUT IV CONTRAST INDICATION:   C18.9: Malignant neoplasm of colon, unspecified. COMPARISON: Abdominal CT July 24, 2023. TECHNIQUE: CT examination of the chest was performed without intravenous contrast. Multiplanar 2D reformatted images were created from the source data. This examination, like all CT scans performed in the [facility], was performed utilizing techniques to minimize radiation dose exposure, including the use of iterative reconstruction and automated exposure control. Radiation dose length product (DLP) for this visit:  235 mGy-cm FINDINGS: LUNGS: Punctate calcified granulomas are present. No suspicious pulmonary nodules. Minimal prominence of the bibasilar subpleural reticular markings, stable. No honeycombing or bronchiectasis. PLEURA:  Unremarkable. HEART/GREAT VESSELS: Normal heart size. Dense mitral annular calcifications. Mild coronary artery calcifications. No thoracic aortic aneurysm. Moderate aortic root calcification, with otherwise mild aortic calcification. MEDIASTINUM AND YASMIN:  Unremarkable. CHEST WALL AND LOWER NECK: 1.3 x 1.5 cm subcutaneous lesion in the retroareolar right breast. VISUALIZED STRUCTURES IN THE UPPER ABDOMEN: Mild diffuse hepatic steatosis. OSSEOUS STRUCTURES:  No acute fracture or destructive osseous lesion.     Impression: 1. No metastatic disease in the chest. 2. 1.3 x 1.5 cm indeterminate subcutaneous lesion in the retroareolar right breast. Recommend diagnostic mammogram for further evaluation. 3. Mild diffuse hepatic steatosis. The study was marked in EPIC for significant notification. Workstation performed: [id number]       I reviewed the above laboratory and imaging data.    Discussion/Summary:   T3N0 colon cancer s/p resection 8/2023. Doing very well. No alarm sx. Clinically GEOVANI and scans  benign. Next visit in 6 mo needs CT and scope. All questions answered.

## 2024-04-16 RX ORDER — AMLODIPINE BESYLATE 5 MG/1
5 TABLET ORAL
Qty: 90 TABLET | Refills: 1 | Status: SHIPPED | OUTPATIENT
Start: 2024-04-16

## 2024-04-18 ENCOUNTER — HOSPITAL ENCOUNTER (OUTPATIENT)
Dept: NON INVASIVE DIAGNOSTICS | Facility: MEDICAL CENTER | Age: 89
Discharge: HOME | End: 2024-04-18
Payer: MEDICARE

## 2024-04-18 VITALS
BODY MASS INDEX: 24.54 KG/M2 | WEIGHT: 125 LBS | HEIGHT: 60 IN | SYSTOLIC BLOOD PRESSURE: 122 MMHG | HEART RATE: 77 BPM | DIASTOLIC BLOOD PRESSURE: 86 MMHG

## 2024-04-18 DIAGNOSIS — I35.0 SEVERE AORTIC STENOSIS BY PRIOR ECHOCARDIOGRAM: ICD-10-CM

## 2024-04-18 LAB
AORTIC ROOT: 3.4 CM
AORTIC VALVE MEAN VELOCITY: 27.8 M/S
APICAL FOUR CHAMBER EJECTION FRACTION: 68 %
ASCENDING AORTA: 3.1 CM
AV AREA BY CONTINUOUS VTI: 0.8 CM2
AV AREA PEAK VELOCITY: 0.9 CM2
AV LVOT MEAN GRADIENT: 3 MMHG
AV LVOT PEAK GRADIENT: 5 MMHG
AV MEAN GRADIENT: 34 MMHG
AV PEAK GRADIENT: 54 MMHG
AV REGURGITATION PRESSURE HALF TIME: 378 MS
AV VALVE AREA: 0.83 CM2
AV VELOCITY RATIO: 0.29
AVA (PLAN): 1.2 CM2
BSA FOR ECHO PROCEDURE: 1.53 M2
DOP CALC AO PEAK VEL: 3.67 M/S
DOP CALC AO VTI: 97.91 CM
DOP CALC LVOT AREA: 3.14 CM2
DOP CALC LVOT CARDIAC INDEX: 3.3 L/MIN/M2
DOP CALC LVOT CARDIAC OUTPUT: 5.05 L/MIN
DOP CALC LVOT DIAMETER: 2 CM
DOP CALC LVOT PEAK VEL VTI: 25.74 CM
DOP CALC LVOT PEAK VEL: 1.08 M/S
DOP CALC LVOT STROKE INDEX: 51 ML/M2
DOP CALC LVOT STROKE VOLUME: 80.82
E WAVE DECELERATION TIME: 207 MS
E/A RATIO: 0.7
FRACTIONAL SHORTENING: 39 (ref 28–44)
INTERVENTRICULAR SEPTUM IN DIASTOLE (PARASTERNAL SHORT AXIS VIEW): 0.9 CM
INTERVENTRICULAR SEPTUM: 0.9 CM (ref 0.6–1.1)
LAAS-AP2: 21.3 CM2
LAAS-AP4: 18.6 CM2
LEFT ATRIUM SIZE: 4.4 CM
LEFT ATRIUM VOLUME (MOD BIPLANE): 63 ML
LEFT ATRIUM VOLUME INDEX (MOD BIPLANE): 41.2 ML/M2
LEFT INTERNAL DIMENSION IN SYSTOLE: 2.8 CM (ref 2.1–4)
LEFT VENTRICULAR INTERNAL DIMENSION IN DIASTOLE: 4.6 CM (ref 3.5–6)
LEFT VENTRICULAR POSTERIOR WALL IN END DIASTOLE: 0.9 CM
LEFT VENTRICULAR STROKE VOLUME: 66 ML
LVSV (TEICH): 66 ML
MV E'TISSUE VEL-LAT: 12 CM/S
MV E'TISSUE VEL-SEP: 9 CM/S
MV PEAK A VEL: 1.37 M/S
MV PEAK E VEL: 96 CM/S
MV STENOSIS PRESSURE HALF TIME: 60 MS
MV VALVE AREA P 1/2 METHOD: 3.67
RA PRESSURE ESTIMATED: 5 MMHG
RIGHT ATRIUM AREA SYSTOLE A4C: 10 CM2
RIGHT VENTRICLE ID DIMENSION: 3.5 CM
RV PSP: 32 MMHG
SL CV AV DECELERATION TIME RETROGRADE: 1304 MS
SL CV AV PEAK GRADIENT RETROGRADE: 79 MMHG
SL CV LEFT ATRIUM LENGTH A2C: 5.4 CM
SL CV PED ECHO LEFT VENTRICLE DIASTOLIC VOLUME (MOD BIPLANE) 2D: 96 ML
SL CV PED ECHO LEFT VENTRICLE SYSTOLIC VOLUME (MOD BIPLANE) 2D: 30 ML
TR MAX PG: 27 MMHG
TR PEAK VELOCITY: 2.6 M/S
TRICUSPID ANNULAR PLANE SYSTOLIC EXCURSION: 1.9 CM
TRICUSPID VALVE PEAK REGURGITATION VELOCITY: 2.59 M/S

## 2024-04-18 PROCEDURE — 93306 TTE W/DOPPLER COMPLETE: CPT

## 2024-04-18 PROCEDURE — 93306 TTE W/DOPPLER COMPLETE: CPT | Performed by: INTERNAL MEDICINE

## 2024-05-02 ENCOUNTER — APPOINTMENT (OUTPATIENT)
Dept: LAB | Facility: MEDICAL CENTER | Age: 89
End: 2024-05-02
Payer: MEDICARE

## 2024-05-02 DIAGNOSIS — E78.00 PURE HYPERCHOLESTEROLEMIA: ICD-10-CM

## 2024-05-02 DIAGNOSIS — Z51.81 MEDICATION MONITORING ENCOUNTER: ICD-10-CM

## 2024-05-02 LAB
ALBUMIN SERPL BCP-MCNC: 4.1 G/DL (ref 3.5–5)
ALP SERPL-CCNC: 93 U/L (ref 34–104)
ALT SERPL W P-5'-P-CCNC: 19 U/L (ref 7–52)
ANION GAP SERPL CALCULATED.3IONS-SCNC: 7 MMOL/L (ref 4–13)
AST SERPL W P-5'-P-CCNC: 19 U/L (ref 13–39)
BILIRUB SERPL-MCNC: 0.62 MG/DL (ref 0.2–1)
BUN SERPL-MCNC: 12 MG/DL (ref 5–25)
CALCIUM SERPL-MCNC: 9.2 MG/DL (ref 8.4–10.2)
CHLORIDE SERPL-SCNC: 103 MMOL/L (ref 96–108)
CHOLEST SERPL-MCNC: 135 MG/DL
CO2 SERPL-SCNC: 26 MMOL/L (ref 21–32)
CREAT SERPL-MCNC: 0.78 MG/DL (ref 0.6–1.3)
GFR SERPL CREATININE-BSD FRML MDRD: 68 ML/MIN/1.73SQ M
GLUCOSE P FAST SERPL-MCNC: 95 MG/DL (ref 65–99)
HDLC SERPL-MCNC: 55 MG/DL
LDLC SERPL CALC-MCNC: 67 MG/DL (ref 0–100)
POTASSIUM SERPL-SCNC: 4.7 MMOL/L (ref 3.5–5.3)
PROT SERPL-MCNC: 7.5 G/DL (ref 6.4–8.4)
SODIUM SERPL-SCNC: 136 MMOL/L (ref 135–147)
TRIGL SERPL-MCNC: 67 MG/DL

## 2024-05-02 PROCEDURE — 80061 LIPID PANEL: CPT

## 2024-05-02 PROCEDURE — 80053 COMPREHEN METABOLIC PANEL: CPT

## 2024-05-02 PROCEDURE — 36415 COLL VENOUS BLD VENIPUNCTURE: CPT

## 2024-05-08 ENCOUNTER — TELEPHONE (OUTPATIENT)
Dept: ADMINISTRATIVE | Facility: OTHER | Age: 89
End: 2024-05-08

## 2024-05-08 NOTE — TELEPHONE ENCOUNTER
05/08/24 9:06 AM    Patient contacted (left message) to bring Advance Directive, POLST, or Living Will document to next scheduled pcp visit.    Thank you.  Александр Ramos  [facility]

## 2024-05-09 ENCOUNTER — OFFICE VISIT (OUTPATIENT)
Dept: FAMILY MEDICINE CLINIC | Facility: MEDICAL CENTER | Age: 89
End: 2024-05-09
Payer: MEDICARE

## 2024-05-09 VITALS
SYSTOLIC BLOOD PRESSURE: 128 MMHG | HEART RATE: 72 BPM | TEMPERATURE: 98.1 F | DIASTOLIC BLOOD PRESSURE: 64 MMHG | RESPIRATION RATE: 13 BRPM | OXYGEN SATURATION: 99 % | BODY MASS INDEX: 24.65 KG/M2 | WEIGHT: 126.2 LBS

## 2024-05-09 DIAGNOSIS — Z86.19 HISTORY OF SHINGLES: ICD-10-CM

## 2024-05-09 DIAGNOSIS — I10 PRIMARY HYPERTENSION: ICD-10-CM

## 2024-05-09 DIAGNOSIS — Z09 FOLLOW-UP EXAM, 3-6 MONTHS SINCE PREVIOUS EXAM: Primary | ICD-10-CM

## 2024-05-09 DIAGNOSIS — I51.7 LEFT VENTRICULAR HYPERTROPHY: ICD-10-CM

## 2024-05-09 DIAGNOSIS — I34.0 MITRAL VALVE INSUFFICIENCY, UNSPECIFIED ETIOLOGY: ICD-10-CM

## 2024-05-09 DIAGNOSIS — E78.00 PURE HYPERCHOLESTEROLEMIA: ICD-10-CM

## 2024-05-09 DIAGNOSIS — R73.01 ELEVATED FASTING BLOOD SUGAR: ICD-10-CM

## 2024-05-09 DIAGNOSIS — I35.0 SEVERE AORTIC STENOSIS BY PRIOR ECHOCARDIOGRAM: ICD-10-CM

## 2024-05-09 DIAGNOSIS — Z85.038 PERSONAL HISTORY OF COLON CANCER: ICD-10-CM

## 2024-05-09 PROBLEM — R74.8 ELEVATED ALKALINE PHOSPHATASE LEVEL: Status: RESOLVED | Noted: 2023-06-16 | Resolved: 2024-05-09

## 2024-05-09 LAB — SL AMB POCT HEMOGLOBIN AIC: 5.5 (ref ?–6.5)

## 2024-05-09 PROCEDURE — 83036 HEMOGLOBIN GLYCOSYLATED A1C: CPT | Performed by: STUDENT IN AN ORGANIZED HEALTH CARE EDUCATION/TRAINING PROGRAM

## 2024-05-09 PROCEDURE — 99214 OFFICE O/P EST MOD 30 MIN: CPT | Performed by: STUDENT IN AN ORGANIZED HEALTH CARE EDUCATION/TRAINING PROGRAM

## 2024-05-09 NOTE — PROGRESS NOTES
[facility]  John Greenwood, DO, 24     Ashley Eastman MRN: [id number] : 1935 Age: 88 y.o.     Assessment/Plan     1. Follow-up exam, 3-6 months since previous exam    -Patient presents for medical follow-up, she will return in about 6 months and sooner as needed    2. Severe aortic stenosis by prior echocardiogram    -Following with cardiology  -Has consultation appointment with cardiothoracic surgery next month    3. Mitral valve insufficiency, unspecified etiology    -Following with cardiology    4. Primary hypertension    -Stable current regimen, continue amlodipine 5 mg p.o. daily    5. Elevated fasting blood sugar    - POCT hemoglobin A1c 5.5    6. Pure hypercholesterolemia    - I have reviewed pertinent labs:  CMP:   Lab Results   Component Value Date    SODIUM 136 2024    K 4.7 2024     2024    CO2 26 2024    AGAP 7 2024    BUN 12 2024    CREATININE 0.78 2024    GLUC 82 2023    GLUF 95 2024    CALCIUM 9.2 2024    AST 19 2024    ALT 19 2024    ALKPHOS 93 2024    TP 7.5 2024    ALB 4.1 2024    TBILI 0.62 2024    EGFR 68 2024     Lipid Profile:   Lab Results   Component Value Date    CHOLESTEROL 135 2024    HDL 55 2024    TRIG 67 2024    LDLCALC 67 2024    NONHDLC 86 2021       7. Left ventricular hypertrophy    -Continue antihypertensive regimen  -Follows with cardiology    8. Personal history of colon cancer    -Has surveillance encounter with surgical oncology 2024, repeat imaging prior to that visit    9. History of shingles    -Discussed postherpetic neuralgia symptom management options, patient does not prefer oral medication at this time, discussed Lidoderm patch or Zostrix  -We revisited discussion about Shingrix vaccination series      Depression Screening and Follow-up Plan: Patient was screened for depression during  today's encounter. They screened negative with a PHQ-2 score of 0.        Ashley Eastman acknowledged understanding of treatment plan, all questions answered.    Subjective      Ashley Eastman is a 88 y.o. female who presents for medical follow-up.  Patient denies any chest pain, shortness of breath or syncopal events.    The following portions of the patient's history were reviewed and updated as appropriate: allergies, current medications, past family history, past medical history, past social history, past surgical history and problem list.     Past Medical History:   Diagnosis Date    Aortic valve regurgitation     Aortic valve stenosis     Elevated alkaline phosphatase level 06/16/2023    Hypertension     Left ventricular hypertrophy     Mitral valve regurgitation     Osteoarthritis     Pure hypercholesterolemia     Shingles outbreak     back and left side       Allergies   Allergen Reactions    Acetaminophen Hives       Past Surgical History:   Procedure Laterality Date    BUNIONECTOMY Left 04/16/2014    COLONOSCOPY  01/04/2012    CORRECTION HAMMER TOE Left 04/16/2014    [place] COLECTOMY PARTIAL W/ANASTOMOSIS N/A 8/15/2023    Procedure: RIGHT HEMICOLECTOMY;  Surgeon: Rosario Rios MD;  Location: [facility];  Service: Surgical Oncology       Family History   Problem Relation Age of Onset    No Known Problems Mother     Coronary artery disease Father     Throat cancer Father     Diabetes Sister     Cancer Sister     Pancreatic cancer Sister 60    No Known Problems Daughter     No Known Problems Daughter     No Known Problems Maternal Grandmother     No Known Problems Maternal Grandfather     No Known Problems Paternal Grandmother     No Known Problems Paternal Grandfather     No Known Problems Brother     Cancer Brother     No Known Problems Son     No Known Problems Son     No Known Problems Son        Social History     Socioeconomic History    Marital status:      Spouse name: None    Number of children:  None    Years of education: None    Highest education level: None   Occupational History    None   Tobacco Use    Smoking status: Former     Current packs/day: 0.00     Average packs/day: 1 pack/day for 25.0 years (25.0 ttl pk-yrs)     Types: Cigarettes     Start date:      Quit date:      Years since quittin.3     Passive exposure: Past    Smokeless tobacco: Never   Vaping Use    Vaping status: Never Used   Substance and Sexual Activity    Alcohol use: Not Currently    Drug use: Not Currently    Sexual activity: None   Other Topics Concern    None   Social History Narrative    · Most recent tobacco use screenin2019      · Do you currently or have you served in the [employer]:   No      Social Determinants of Health     Financial Resource Strain: Low Risk  (2023)    Overall Financial Resource Strain (CARDIA)     Difficulty of Paying Living Expenses: Not hard at all   Food Insecurity: No Food Insecurity (2023)    Hunger Vital Sign     Worried About Running Out of Food in the Last Year: Never true     Ran Out of Food in the Last Year: Never true   Transportation Needs: No Transportation Needs (2023)    PRAPARE - Transportation     Lack of Transportation (Medical): No     Lack of Transportation (Non-Medical): No   Physical Activity: Not on file   Stress: Not on file   Social Connections: Not on file   Intimate Partner Violence: Not on file   Housing Stability: Low Risk  (2023)    Housing Stability Vital Sign     Unable to Pay for Housing in the Last Year: No     Number of Places Lived in the Last Year: 1     Unstable Housing in the Last Year: No       Current Outpatient Medications   Medication Sig Dispense Refill    amLODIPine (NORVASC) 5 mg tablet TAKE 1 TABLET BY MOUTH ONCE DAILY AT BEDTIME 90 tablet 1    atorvastatin (LIPITOR) 10 mg tablet Take 1 tablet by mouth once daily 90 tablet 1    Multiple Vitamins-Minerals (PRESERVISION AREDS 2 PO) Take by mouth       saccharomyces boulardii (FLORASTOR) 250 mg capsule Take 1 capsule (250 mg total) by mouth 2 (two) times a day 28 capsule 0    valACYclovir (VALTREX) 1,000 mg tablet Take 1 tablet (1,000 mg total) by mouth 3 (three) times a day for 7 days (Patient not taking: Reported on 3/27/2024) 21 tablet 0     No current facility-administered medications for this visit.       Review of Systems     As noted in HPI    Objective      /64 (BP Location: Left arm, Patient Position: Sitting, Cuff Size: Standard)   Pulse 72   Temp 98.1 °F (36.7 °C) (Temporal)   Resp 13   Wt 57.2 kg (126 lb 3.2 oz)   SpO2 99%   BMI 24.65 kg/m²     Physical Exam  Vitals reviewed.   Constitutional:       General: She is not in acute distress.     Appearance: Normal appearance.   HENT:      Head: Normocephalic and atraumatic.      Right Ear: External ear normal.      Left Ear: External ear normal.      Nose: Nose normal.      Mouth/Throat:      Mouth: Mucous membranes are moist.      Pharynx: Oropharynx is clear.   Eyes:      Extraocular Movements: Extraocular movements intact.      Conjunctiva/sclera: Conjunctivae normal.      Pupils: Pupils are equal, round, and reactive to light.   Cardiovascular:      Rate and Rhythm: Normal rate and regular rhythm.      Pulses: Normal pulses.      Heart sounds: Murmur heard.   Pulmonary:      Effort: Pulmonary effort is normal.      Breath sounds: Normal breath sounds.   Abdominal:      General: Abdomen is flat. Bowel sounds are normal.      Palpations: Abdomen is soft.      Tenderness: There is no abdominal tenderness. There is no guarding or rebound.   Musculoskeletal:      Cervical back: Neck supple.      Right lower leg: No edema.      Left lower leg: No edema.   Lymphadenopathy:      Cervical: No cervical adenopathy.   Skin:     General: Skin is warm and dry.      Capillary Refill: Capillary refill takes less than 2 seconds.      Comments: Localized skin erythema left mid back, no raised lesions  "  Neurological:      Mental Status: She is alert and oriented to person, place, and time.   Psychiatric:         Mood and Affect: Mood normal.         Behavior: Behavior normal.         Thought Content: Thought content normal.         Judgment: Judgment normal.             Some portions of this record may have been generated with voice recognition software. There may be translation, syntax, or grammatical errors. Occasional wrong word or \"sound-a-like\" substitutions may have occurred due to the inherent limitations of the voice recognition software. Read the chart carefully and recognize, using context, where substations may have occurred. If you have any questions, please contact the dictating provider for clarification or correction, as needed.  "

## 2024-06-18 ENCOUNTER — OFFICE VISIT (OUTPATIENT)
Dept: CARDIAC SURGERY | Facility: CLINIC | Age: 89
End: 2024-06-18
Payer: MEDICARE

## 2024-06-18 VITALS
HEIGHT: 60 IN | OXYGEN SATURATION: 98 % | WEIGHT: 124.2 LBS | HEART RATE: 72 BPM | SYSTOLIC BLOOD PRESSURE: 140 MMHG | BODY MASS INDEX: 24.39 KG/M2 | DIASTOLIC BLOOD PRESSURE: 63 MMHG

## 2024-06-18 DIAGNOSIS — I35.0 SEVERE AORTIC STENOSIS: Primary | ICD-10-CM

## 2024-06-18 DIAGNOSIS — Z13.6 ENCOUNTER FOR SCREENING FOR STENOSIS OF CAROTID ARTERY: ICD-10-CM

## 2024-06-18 DIAGNOSIS — Z86.79 H/O: RHEUMATIC FEVER: ICD-10-CM

## 2024-06-18 DIAGNOSIS — I35.0 SEVERE AORTIC STENOSIS BY PRIOR ECHOCARDIOGRAM: ICD-10-CM

## 2024-06-18 PROCEDURE — 99204 OFFICE O/P NEW MOD 45 MIN: CPT | Performed by: STUDENT IN AN ORGANIZED HEALTH CARE EDUCATION/TRAINING PROGRAM

## 2024-06-18 NOTE — PROGRESS NOTES
Consultation - Cardiothoracic Surgery   Ashley Eastman 88 y.o. female MRN: [id number]    Physician Requesting Consult: Dr. Holm    Reason for Consult / Principal Problem: Aortic stenosis, Rheumatic    History of Present Illness: Ashley Eastman is a 88 y.o. year old female who presents for initial outpatient surgical consultation for symptomatic severe aortic stenosis.  Patient's PMHx is notable for AS, rheumatic fever in childhood, HTN, HLD, IRBBB, colon adenocarcinoma s/p R hemicolectomy (8/2023), osteoarthritis and shingles (2/2024) w/ post herpetic neuralgia.    Upon interview today patient reports rheumatic fever in childhood but denies having a murmur until adulthood. She was followed by Cardiology at [facility] for several years but lost to follow-up when her cardiologist retired. She was diagnosed with colon cancer July 2023 and was sent to [facility] for cardiac clearance prior to colon surgery.   Patient was seen for cardiology follow up March 2024 and sent for repeat echo which was performed in April and demonstrated severe aortic stenosis. Patient denies chest pain, SOB, lightheadedness, fatigue, change in activity tolerance, weight gain or edema.   Patient is , lives alone in a single story home and is independent with ADL's, household chores, grocery shopping and driving. She walks 1 mile everyday.    Patient is a former smoker, quitting 1990. She denies alcohol or drug use.    Patient obtains routine dental care, last visit 1 year ago.    Past Medical History:  Past Medical History:   Diagnosis Date    Aortic valve regurgitation     Aortic valve stenosis     Elevated alkaline phosphatase level 06/16/2023    Hypertension     Left ventricular hypertrophy     Mitral valve regurgitation     Osteoarthritis     Pure hypercholesterolemia     Shingles outbreak     back and left side         Past Surgical History:   Past Surgical History:   Procedure Laterality Date    BUNIONECTOMY Left 04/16/2014    COLONOSCOPY   2023    CORRECTION HAMMER TOE Left 2014    OK COLECTOMY PARTIAL W/ANASTOMOSIS N/A 08/15/2023    Procedure: RIGHT HEMICOLECTOMY;  Surgeon: Rosario Rios MD;  Location: [facility];  Service: Surgical Oncology         Family History:  Family History   Problem Relation Age of Onset    No Known Problems Mother     Coronary artery disease Father     Throat cancer Father     Diabetes Sister     Cancer Sister     Pancreatic cancer Sister 60    No Known Problems Daughter     No Known Problems Daughter     No Known Problems Maternal Grandmother     No Known Problems Maternal Grandfather     No Known Problems Paternal Grandmother     No Known Problems Paternal Grandfather     No Known Problems Brother     Cancer Brother     No Known Problems Son     No Known Problems Son     No Known Problems Son          Social History:    Social History     Substance and Sexual Activity   Alcohol Use Not Currently     Social History     Substance and Sexual Activity   Drug Use Not Currently     Social History     Tobacco Use   Smoking Status Former    Current packs/day: 0.00    Average packs/day: 1 pack/day for 25.0 years (25.0 ttl pk-yrs)    Types: Cigarettes    Start date:     Quit date:     Years since quittin.4    Passive exposure: Past   Smokeless Tobacco Never         Home Medications:   Prior to Admission medications    Medication Sig Start Date End Date Taking? Authorizing Provider   amLODIPine (NORVASC) 5 mg tablet TAKE 1 TABLET BY MOUTH ONCE DAILY AT BEDTIME 24  Yes Merced Holm MD   atorvastatin (LIPITOR) 10 mg tablet Take 1 tablet by mouth once daily 1/10/24  Yes John Greenwood, DO   Multiple Vitamins-Minerals (PRESERVISION AREDS 2 PO) Take by mouth   Yes Historical Provider, MD   saccharomyces boulardii (FLORASTOR) 250 mg capsule Take 1 capsule (250 mg total) by mouth 2 (two) times a day  Patient not taking: Reported on 2024 4/15/24   Rosario Rios MD   valACYclovir (VALTREX) 1,000 mg  tablet Take 1 tablet (1,000 mg total) by mouth 3 (three) times a day for 7 days  Patient not taking: Reported on 3/27/2024 2/4/24 2/14/24  Jordi Schilling PA-C       Allergies:  Allergies   Allergen Reactions    Acetaminophen Hives       Review of Systems:  Review of Systems - History obtained from chart review and the patient  General ROS: negative  Psychological ROS: negative  Ophthalmic ROS: negative  ENT ROS: negative  Allergy and Immunology ROS: negative  Hematological and Lymphatic ROS: negative  Endocrine ROS: negative  Breast ROS: negative  Respiratory ROS: no cough, shortness of breath, or wheezing  Cardiovascular ROS: no chest pain or dyspnea on exertion  Gastrointestinal ROS: no abdominal pain, change in bowel habits, or black or bloody stools  Genito-Urinary ROS: no dysuria, trouble voiding, or hematuria  Musculoskeletal ROS: negative  Neurological ROS: post herpetic neuralgia left back, lateral chest wall (prior shingles out-break)  Dermatological ROS: negative    Vital Signs:     Vitals:    06/18/24 1131 06/18/24 1132   BP: 140/63 140/63   BP Location: Left arm Right arm   Patient Position: Sitting Sitting   Cuff Size: Standard Standard   Pulse: 72    SpO2: 98%    Weight: 56.3 kg (124 lb 3.2 oz)    Height: 5' (1.524 m)        Physical Exam:    General: Alert, oriented, well developed, appears younger than stated age, no acute distress  HEENT/NECK:  PERRLA.  No jugular venous distention.    Cardiac:Regular rate and rhythm, II-III/VI harsh systolic murmur RUSB   Carotid arteries: 1+ pulses; bruits vs radiation of heart murmur to neck bilaterally.  Pulmonary:  Breath sounds clear bilaterally.  Abdomen:  Non-tender, Non-distended.  Positive bowel sounds.  Upper extremities: 2+ radial pulses; brisk capillary refill; RIGHT hand dominant  Lower extremities: Extremities warm/dry.   PT/DP pulses 2+ bilaterally.  No edema B/L  Neuro: Alert and oriented X 3.  Sensation is grossly intact.  No focal  deficits.  Musculoskeletal: MAEE, stable gait  Skin: Warm/Dry, without rashes or lesions.      Lab Results:   Lab Results   Component Value Date    WBC 8.11 02/19/2024    HGB 11.7 02/19/2024    HCT 34.8 02/19/2024    MCV 92 02/19/2024     02/19/2024     Lab Results   Component Value Date    SODIUM 136 05/02/2024    K 4.7 05/02/2024     05/02/2024    CO2 26 05/02/2024    AGAP 7 05/02/2024    BUN 12 05/02/2024    CREATININE 0.78 05/02/2024    GLUC 82 08/20/2023    GLUF 95 05/02/2024    CALCIUM 9.2 05/02/2024    AST 19 05/02/2024    ALT 19 05/02/2024    ALKPHOS 93 05/02/2024    TP 7.5 05/02/2024    TBILI 0.62 05/02/2024    EGFR 68 05/02/2024     Lab Results   Component Value Date    CHOLESTEROL 135 05/02/2024    CHOLESTEROL 102 02/23/2023    CHOLESTEROL 154 11/30/2021     Lab Results   Component Value Date    HDL 55 05/02/2024    HDL 48 (L) 02/23/2023    HDL 68 11/30/2021     Lab Results   Component Value Date    TRIG 67 05/02/2024    TRIG 80 02/23/2023    TRIG 55 11/30/2021     Lab Results   Component Value Date    NONHDLC 86 11/30/2021     Lab Results   Component Value Date    HGBA1C 5.5 05/09/2024         Imaging Studies:     Echocardiogram: 4/18/23      Left Ventricle: Left ventricular cavity size is normal. Wall thickness is normal. The left ventricular ejection fraction is %. Systolic function is normal. Wall motion is normal. Diastolic function is normal for age.    Right Ventricle: Right ventricular cavity size is normal. Systolic function is normal.    Left Atrium: The atrium is moderately dilated.    Aortic Valve: The aortic valve is trileaflet. The leaflets are severely thickened. The leaflets are severely calcified. There is severely reduced mobility. There is moderate regurgitation. There is moderate to severe stenosis. The aortic valve peak velocity is 3.67 m/s. The aortic valve peak gradient is 54 mmHg. The aortic valve mean gradient is 34 mmHg. The dimensionless velocity index is 0.29. The  aortic valve area is 0.83 cm2.    Mitral Valve: There is annular calcification. There is mild regurgitation.    Tricuspid Valve: There is mild regurgitation.     Findings    Left Ventricle Left ventricular cavity size is normal. Wall thickness is normal. The left ventricular ejection fraction is %. Systolic function is normal. Wall motion is normal. Diastolic function is normal for age.   Right Ventricle Right ventricular cavity size is normal. Systolic function is normal. Wall thickness is normal.   Left Atrium The atrium is moderately dilated.   Right Atrium The atrium is normal in size.   Aortic Valve The aortic valve is trileaflet. The leaflets are severely thickened. The leaflets are severely calcified. There is severely reduced mobility. There is moderate regurgitation. There is moderate to severe stenosis. The aortic valve peak velocity is 3.67 m/s. The aortic valve peak gradient is 54 mmHg. The aortic valve mean gradient is 34 mmHg. The dimensionless velocity index is 0.29. The aortic valve area is 0.83 cm2.   Mitral Valve There is annular calcification.  There is mild regurgitation. There is no evidence of stenosis.   Tricuspid Valve Tricuspid valve structure is normal. There is mild regurgitation. There is no evidence of stenosis. The right ventricular systolic pressure is normal. The estimated right ventricular systolic pressure is 32.00 mmHg.   Pulmonic Valve Pulmonic valve structure is normal. There is trace regurgitation. There is no evidence of stenosis.   Ascending Aorta The aortic root is normal in size.   IVC/SVC The right atrial pressure is estimated at 5.0 mmHg. The inferior vena cava is normal in size. Respirophasic changes were normal.   Pericardium There is no pericardial effusion. The pericardium is normal in appearance.     Left Ventricle Measurements    Function/Volumes   A4C EF 68 %         LVOT stroke volume 80.82         LVOT stroke volume index 51 ml/m2         Left ventricular stroke  volume (2D) 66 mL         LVOT Cardiac Output 5.05 l/min         LVOT Cardiac Index 3.3 l/min/m2         Dimensions   LVIDd 4.6 cm         LVIDS 2.8 cm         IVSd 0.9 cm         LVPWd 0.9 cm         LVOT area 3.14 cm2         FS 39         Diastolic Filling   MV E' Tissue Velocity Septal 9 cm/s         MV E' Tissue Velocity Lateral 12 cm/s         LA Volume Index (BP) 41.2 mL/m2         E/A ratio 0.7         E wave deceleration time 207 ms         MV Peak E Armani 96 cm/s         MV Peak A Armani 1.37 m/s          Report Measurements   AV LVOT peak gradient 5 mmHg              Interventricular Septum Measurements    Shunt Ratio   LVOT peak VTI 25.74 cm         LVOT peak armani 1.08 m/s              Right Ventricle Measurements    Dimensions   RVID d 3.5 cm         Tricuspid annular plane systolic excursion 1.9 cm               Left Atrium Measurements    Dimensions   LA size 4.4 cm         LA length (A2C) 5.4 cm         Volumes   LA volume (BP) 63 mL         LA Volume Index (BP) 41.2 mL/m2               Right Atrium Measurements    Dimensions   RAA A4C 10 cm2               Atrial Septum Measurements    Shunt Ratio   LVOT peak VTI 25.74 cm         LVOT peak armani 1.08 m/s               Aortic Valve Measurements    Stenosis   Aortic valve peak velocity 3.67 m/s         LVOT peak armani 1.08 m/s         Ao VTI 97.91 cm         LVOT peak VTI 25.74 cm         AV mean gradient 34 mmHg         LVOT mn grad 3 mmHg         AV peak gradient 54 mmHg         AV LVOT peak gradient 5 mmHg         Regurgitation   AV peak gradient 79 mmHg         AV Deceleration Time 1,304 ms         AV regurgitation pressure 1/2 time 378 ms         Area/Dimensions   DVI 0.29         AV valve area 0.83 cm2         AV area by cont VTI 0.8 cm2         AV area peak armani 0.9 cm2         LVOT diameter 2 cm         LVOT area 3.14 cm2          Other Measurements   Aortic Valve Area by Planimetry 1.2 cm2              Mitral Valve Measurements    Stenosis   MV stenosis  pressure 1/2 time 60 ms         MV valve area p 1/2 method 3.67               Tricuspid Valve Measurements    RVSP Parameters   TR Peak Armani 2.6 m/s         Est. RA pres 5 mmHg         Triscuspid Valve Regurgitation Peak Gradient 27 mmHg         Right Ventricular Peak Systolic Pressure 32 mmHg               Aorta Measurements    Aortic Dimensions   Ao root 3.4 cm         Asc Ao 3.1 cm               IVC/SVC Measurements    IVC/SVC   Est. RA pres   5 mmHg                EC23    Normal sinus rhythm  Possible Left atrial enlargement  Incomplete right bundle branch block      I have personally reviewed pertinent films in PACS    CARDIOLOGY NOTES REVIEWED    TAVR evaluation Assessment:     [facility]: I    Aortic Stenosis Stage: D3    5 Meter Walk: 6 sec, 7 sec, 8 sec    STS risk score (preliminary): 6.6%    KCCQ-12 completed    Assessment:  Patient Active Problem List    Diagnosis Date Noted    H/O: rheumatic fever 2024    Encounter for follow-up surveillance of colon cancer 2024    Skin lesion of back 2023    S/P right colectomy 08/15/2023    Mitral valve regurgitation     Pre-operative cardiovascular examination, valvular heart disease 2023    Personal history of colon cancer 2023    Pancreas cyst 2023    Lower abdominal pain 2023    Hepatic steatosis 2023    Pure hypercholesterolemia     Osteoarthritis     Left ventricular hypertrophy     Hypertension     Severe aortic stenosis          Impression/Plan:    Ashley Eastman has severe aortic stenosis. She will undergo the following testing for transcatheter aortic valve replacement: Gated CTA of the chest/abdomen/pelvis, cardiac catheterization, dental clearance and carotid artery ultrasound.      Once these studies have been completed, Ashley Eastman will follow up in our office to review the results and to be evaluated to confirm the suitability of proceeding with transcatheter aortic valve replacment.     Ashley Eastman was  comfortable with our recommendations, and their questions were answered to their satisfaction.    Thank you for allowing us to participate in the care of this patient.     The patient recently had a screening colonoscopy in 7/13/23.  Therefore GI referral is not indicated at this time.     SIGNATURE: ALEXANDER Acosta  DATE: June 18, 2024  TIME: 12:17 PM

## 2024-06-18 NOTE — H&P (VIEW-ONLY)
Consultation - Cardiothoracic Surgery   Ashley Eastman 88 y.o. female MRN: [id number]    Physician Requesting Consult: Dr. Holm    Reason for Consult / Principal Problem: Aortic stenosis, Rheumatic    History of Present Illness: Ashley Eastman is a 88 y.o. year old female who presents for initial outpatient surgical consultation for symptomatic severe aortic stenosis.  Patient's PMHx is notable for AS, rheumatic fever in childhood, HTN, HLD, IRBBB, colon adenocarcinoma s/p R hemicolectomy (8/2023), osteoarthritis and shingles (2/2024) w/ post herpetic neuralgia.    Upon interview today patient reports rheumatic fever in childhood but denies having a murmur until adulthood. She was followed by Cardiology at [facility] for several years but lost to follow-up when her cardiologist retired. She was diagnosed with colon cancer July 2023 and was sent to [facility] for cardiac clearance prior to colon surgery.   Patient was seen for cardiology follow up March 2024 and sent for repeat echo which was performed in April and demonstrated severe aortic stenosis. Patient denies chest pain, SOB, lightheadedness, fatigue, change in activity tolerance, weight gain or edema.   Patient is , lives alone in a single story home and is independent with ADL's, household chores, grocery shopping and driving. She walks 1 mile everyday.    Patient is a former smoker, quitting 1990. She denies alcohol or drug use.    Patient obtains routine dental care, last visit 1 year ago.    Past Medical History:  Past Medical History:   Diagnosis Date    Aortic valve regurgitation     Aortic valve stenosis     Elevated alkaline phosphatase level 06/16/2023    Hypertension     Left ventricular hypertrophy     Mitral valve regurgitation     Osteoarthritis     Pure hypercholesterolemia     Shingles outbreak     back and left side         Past Surgical History:   Past Surgical History:   Procedure Laterality Date    BUNIONECTOMY Left 04/16/2014    COLONOSCOPY   2023    CORRECTION HAMMER TOE Left 2014    IN COLECTOMY PARTIAL W/ANASTOMOSIS N/A 08/15/2023    Procedure: RIGHT HEMICOLECTOMY;  Surgeon: Rosario Rios MD;  Location: [facility];  Service: Surgical Oncology         Family History:  Family History   Problem Relation Age of Onset    No Known Problems Mother     Coronary artery disease Father     Throat cancer Father     Diabetes Sister     Cancer Sister     Pancreatic cancer Sister 60    No Known Problems Daughter     No Known Problems Daughter     No Known Problems Maternal Grandmother     No Known Problems Maternal Grandfather     No Known Problems Paternal Grandmother     No Known Problems Paternal Grandfather     No Known Problems Brother     Cancer Brother     No Known Problems Son     No Known Problems Son     No Known Problems Son          Social History:    Social History     Substance and Sexual Activity   Alcohol Use Not Currently     Social History     Substance and Sexual Activity   Drug Use Not Currently     Social History     Tobacco Use   Smoking Status Former    Current packs/day: 0.00    Average packs/day: 1 pack/day for 25.0 years (25.0 ttl pk-yrs)    Types: Cigarettes    Start date:     Quit date:     Years since quittin.4    Passive exposure: Past   Smokeless Tobacco Never         Home Medications:   Prior to Admission medications    Medication Sig Start Date End Date Taking? Authorizing Provider   amLODIPine (NORVASC) 5 mg tablet TAKE 1 TABLET BY MOUTH ONCE DAILY AT BEDTIME 24  Yes Merced Holm MD   atorvastatin (LIPITOR) 10 mg tablet Take 1 tablet by mouth once daily 1/10/24  Yes John Greenwood, DO   Multiple Vitamins-Minerals (PRESERVISION AREDS 2 PO) Take by mouth   Yes Historical Provider, MD   saccharomyces boulardii (FLORASTOR) 250 mg capsule Take 1 capsule (250 mg total) by mouth 2 (two) times a day  Patient not taking: Reported on 2024 4/15/24   Rosario Rios MD   valACYclovir (VALTREX) 1,000 mg  tablet Take 1 tablet (1,000 mg total) by mouth 3 (three) times a day for 7 days  Patient not taking: Reported on 3/27/2024 2/4/24 2/14/24  Jordi Schilling PA-C       Allergies:  Allergies   Allergen Reactions    Acetaminophen Hives       Review of Systems:  Review of Systems - History obtained from chart review and the patient  General ROS: negative  Psychological ROS: negative  Ophthalmic ROS: negative  ENT ROS: negative  Allergy and Immunology ROS: negative  Hematological and Lymphatic ROS: negative  Endocrine ROS: negative  Breast ROS: negative  Respiratory ROS: no cough, shortness of breath, or wheezing  Cardiovascular ROS: no chest pain or dyspnea on exertion  Gastrointestinal ROS: no abdominal pain, change in bowel habits, or black or bloody stools  Genito-Urinary ROS: no dysuria, trouble voiding, or hematuria  Musculoskeletal ROS: negative  Neurological ROS: post herpetic neuralgia left back, lateral chest wall (prior shingles out-break)  Dermatological ROS: negative    Vital Signs:     Vitals:    06/18/24 1131 06/18/24 1132   BP: 140/63 140/63   BP Location: Left arm Right arm   Patient Position: Sitting Sitting   Cuff Size: Standard Standard   Pulse: 72    SpO2: 98%    Weight: 56.3 kg (124 lb 3.2 oz)    Height: 5' (1.524 m)        Physical Exam:    General: Alert, oriented, well developed, appears younger than stated age, no acute distress  HEENT/NECK:  PERRLA.  No jugular venous distention.    Cardiac:Regular rate and rhythm, II-III/VI harsh systolic murmur RUSB   Carotid arteries: 1+ pulses; bruits vs radiation of heart murmur to neck bilaterally.  Pulmonary:  Breath sounds clear bilaterally.  Abdomen:  Non-tender, Non-distended.  Positive bowel sounds.  Upper extremities: 2+ radial pulses; brisk capillary refill; RIGHT hand dominant  Lower extremities: Extremities warm/dry.   PT/DP pulses 2+ bilaterally.  No edema B/L  Neuro: Alert and oriented X 3.  Sensation is grossly intact.  No focal  deficits.  Musculoskeletal: MAEE, stable gait  Skin: Warm/Dry, without rashes or lesions.      Lab Results:   Lab Results   Component Value Date    WBC 8.11 02/19/2024    HGB 11.7 02/19/2024    HCT 34.8 02/19/2024    MCV 92 02/19/2024     02/19/2024     Lab Results   Component Value Date    SODIUM 136 05/02/2024    K 4.7 05/02/2024     05/02/2024    CO2 26 05/02/2024    AGAP 7 05/02/2024    BUN 12 05/02/2024    CREATININE 0.78 05/02/2024    GLUC 82 08/20/2023    GLUF 95 05/02/2024    CALCIUM 9.2 05/02/2024    AST 19 05/02/2024    ALT 19 05/02/2024    ALKPHOS 93 05/02/2024    TP 7.5 05/02/2024    TBILI 0.62 05/02/2024    EGFR 68 05/02/2024     Lab Results   Component Value Date    CHOLESTEROL 135 05/02/2024    CHOLESTEROL 102 02/23/2023    CHOLESTEROL 154 11/30/2021     Lab Results   Component Value Date    HDL 55 05/02/2024    HDL 48 (L) 02/23/2023    HDL 68 11/30/2021     Lab Results   Component Value Date    TRIG 67 05/02/2024    TRIG 80 02/23/2023    TRIG 55 11/30/2021     Lab Results   Component Value Date    NONHDLC 86 11/30/2021     Lab Results   Component Value Date    HGBA1C 5.5 05/09/2024         Imaging Studies:     Echocardiogram: 4/18/23      Left Ventricle: Left ventricular cavity size is normal. Wall thickness is normal. The left ventricular ejection fraction is %. Systolic function is normal. Wall motion is normal. Diastolic function is normal for age.    Right Ventricle: Right ventricular cavity size is normal. Systolic function is normal.    Left Atrium: The atrium is moderately dilated.    Aortic Valve: The aortic valve is trileaflet. The leaflets are severely thickened. The leaflets are severely calcified. There is severely reduced mobility. There is moderate regurgitation. There is moderate to severe stenosis. The aortic valve peak velocity is 3.67 m/s. The aortic valve peak gradient is 54 mmHg. The aortic valve mean gradient is 34 mmHg. The dimensionless velocity index is 0.29. The  aortic valve area is 0.83 cm2.    Mitral Valve: There is annular calcification. There is mild regurgitation.    Tricuspid Valve: There is mild regurgitation.     Findings    Left Ventricle Left ventricular cavity size is normal. Wall thickness is normal. The left ventricular ejection fraction is %. Systolic function is normal. Wall motion is normal. Diastolic function is normal for age.   Right Ventricle Right ventricular cavity size is normal. Systolic function is normal. Wall thickness is normal.   Left Atrium The atrium is moderately dilated.   Right Atrium The atrium is normal in size.   Aortic Valve The aortic valve is trileaflet. The leaflets are severely thickened. The leaflets are severely calcified. There is severely reduced mobility. There is moderate regurgitation. There is moderate to severe stenosis. The aortic valve peak velocity is 3.67 m/s. The aortic valve peak gradient is 54 mmHg. The aortic valve mean gradient is 34 mmHg. The dimensionless velocity index is 0.29. The aortic valve area is 0.83 cm2.   Mitral Valve There is annular calcification.  There is mild regurgitation. There is no evidence of stenosis.   Tricuspid Valve Tricuspid valve structure is normal. There is mild regurgitation. There is no evidence of stenosis. The right ventricular systolic pressure is normal. The estimated right ventricular systolic pressure is 32.00 mmHg.   Pulmonic Valve Pulmonic valve structure is normal. There is trace regurgitation. There is no evidence of stenosis.   Ascending Aorta The aortic root is normal in size.   IVC/SVC The right atrial pressure is estimated at 5.0 mmHg. The inferior vena cava is normal in size. Respirophasic changes were normal.   Pericardium There is no pericardial effusion. The pericardium is normal in appearance.     Left Ventricle Measurements    Function/Volumes   A4C EF 68 %         LVOT stroke volume 80.82         LVOT stroke volume index 51 ml/m2         Left ventricular stroke  volume (2D) 66 mL         LVOT Cardiac Output 5.05 l/min         LVOT Cardiac Index 3.3 l/min/m2         Dimensions   LVIDd 4.6 cm         LVIDS 2.8 cm         IVSd 0.9 cm         LVPWd 0.9 cm         LVOT area 3.14 cm2         FS 39         Diastolic Filling   MV E' Tissue Velocity Septal 9 cm/s         MV E' Tissue Velocity Lateral 12 cm/s         LA Volume Index (BP) 41.2 mL/m2         E/A ratio 0.7         E wave deceleration time 207 ms         MV Peak E Armani 96 cm/s         MV Peak A Armani 1.37 m/s          Report Measurements   AV LVOT peak gradient 5 mmHg              Interventricular Septum Measurements    Shunt Ratio   LVOT peak VTI 25.74 cm         LVOT peak armani 1.08 m/s              Right Ventricle Measurements    Dimensions   RVID d 3.5 cm         Tricuspid annular plane systolic excursion 1.9 cm               Left Atrium Measurements    Dimensions   LA size 4.4 cm         LA length (A2C) 5.4 cm         Volumes   LA volume (BP) 63 mL         LA Volume Index (BP) 41.2 mL/m2               Right Atrium Measurements    Dimensions   RAA A4C 10 cm2               Atrial Septum Measurements    Shunt Ratio   LVOT peak VTI 25.74 cm         LVOT peak armani 1.08 m/s               Aortic Valve Measurements    Stenosis   Aortic valve peak velocity 3.67 m/s         LVOT peak armani 1.08 m/s         Ao VTI 97.91 cm         LVOT peak VTI 25.74 cm         AV mean gradient 34 mmHg         LVOT mn grad 3 mmHg         AV peak gradient 54 mmHg         AV LVOT peak gradient 5 mmHg         Regurgitation   AV peak gradient 79 mmHg         AV Deceleration Time 1,304 ms         AV regurgitation pressure 1/2 time 378 ms         Area/Dimensions   DVI 0.29         AV valve area 0.83 cm2         AV area by cont VTI 0.8 cm2         AV area peak armani 0.9 cm2         LVOT diameter 2 cm         LVOT area 3.14 cm2          Other Measurements   Aortic Valve Area by Planimetry 1.2 cm2              Mitral Valve Measurements    Stenosis   MV stenosis  pressure 1/2 time 60 ms         MV valve area p 1/2 method 3.67               Tricuspid Valve Measurements    RVSP Parameters   TR Peak Armani 2.6 m/s         Est. RA pres 5 mmHg         Triscuspid Valve Regurgitation Peak Gradient 27 mmHg         Right Ventricular Peak Systolic Pressure 32 mmHg               Aorta Measurements    Aortic Dimensions   Ao root 3.4 cm         Asc Ao 3.1 cm               IVC/SVC Measurements    IVC/SVC   Est. RA pres   5 mmHg                EC23    Normal sinus rhythm  Possible Left atrial enlargement  Incomplete right bundle branch block      I have personally reviewed pertinent films in PACS    CARDIOLOGY NOTES REVIEWED    TAVR evaluation Assessment:     [facility]: I    Aortic Stenosis Stage: D3    5 Meter Walk: 6 sec, 7 sec, 8 sec    STS risk score (preliminary): 6.6%    KCCQ-12 completed    Assessment:  Patient Active Problem List    Diagnosis Date Noted    H/O: rheumatic fever 2024    Encounter for follow-up surveillance of colon cancer 2024    Skin lesion of back 2023    S/P right colectomy 08/15/2023    Mitral valve regurgitation     Pre-operative cardiovascular examination, valvular heart disease 2023    Personal history of colon cancer 2023    Pancreas cyst 2023    Lower abdominal pain 2023    Hepatic steatosis 2023    Pure hypercholesterolemia     Osteoarthritis     Left ventricular hypertrophy     Hypertension     Severe aortic stenosis          Impression/Plan:    Ashley Eastman has severe aortic stenosis. She will undergo the following testing for transcatheter aortic valve replacement: Gated CTA of the chest/abdomen/pelvis, cardiac catheterization, dental clearance and carotid artery ultrasound.      Once these studies have been completed, Ashley Eastman will follow up in our office to review the results and to be evaluated to confirm the suitability of proceeding with transcatheter aortic valve replacment.     Ashley Eastman was  comfortable with our recommendations, and their questions were answered to their satisfaction.    Thank you for allowing us to participate in the care of this patient.     The patient recently had a screening colonoscopy in 7/13/23.  Therefore GI referral is not indicated at this time.     SIGNATURE: ALEXANDER Acosta  DATE: June 18, 2024  TIME: 12:17 PM

## 2024-06-18 NOTE — LETTER
June 18, 2024     John Greenwood DO  [address] [place] [address]  [address]  [facility] [address]    Patient: Ashley Eastman   YOB: 1935   Date of Visit: 6/18/2024       Dear Dr. Greenwood:    Thank you for referring Ashley Eastman to me for evaluation. Below are my notes for this consultation.    If you have questions, please do not hesitate to call me. I look forward to following your patient along with you.         Sincerely,        Julio Cesar Draper MD        CC: MD Tamela Murillo CRNP  6/18/2024 12:36 PM  Attested  Consultation - Cardiothoracic Surgery   Ashley Eastman 88 y.o. female MRN: [id number]    Physician Requesting Consult: Dr. Holm    Reason for Consult / Principal Problem: Aortic stenosis, Rheumatic    History of Present Illness: Ashley Eastman is a 88 y.o. year old female who presents for initial outpatient surgical consultation for symptomatic severe aortic stenosis.  Patient's PMHx is notable for AS, rheumatic fever in childhood, HTN, HLD, IRBBB, colon adenocarcinoma s/p R hemicolectomy (8/2023), osteoarthritis and shingles (2/2024) w/ post herpetic neuralgia.    Upon interview today patient reports rheumatic fever in childhood but denies having a murmur until adulthood. She was followed by Cardiology at [facility] for several years but lost to follow-up when her cardiologist retired. She was diagnosed with colon cancer July 2023 and was sent to [facility] for cardiac clearance prior to colon surgery.   Patient was seen for cardiology follow up March 2024 and sent for repeat echo which was performed in April and demonstrated severe aortic stenosis. Patient denies chest pain, SOB, lightheadedness, fatigue, change in activity tolerance, weight gain or edema.   Patient is , lives alone in a single story home and is independent with ADL's, household chores, grocery shopping and driving. She walks 1 mile everyday.    Patient is a former smoker, quitting 1990. She denies alcohol or  drug use.    Patient obtains routine dental care, last visit 1 year ago.    Past Medical History:  Past Medical History:   Diagnosis Date   • Aortic valve regurgitation    • Aortic valve stenosis    • Elevated alkaline phosphatase level 2023   • Hypertension    • Left ventricular hypertrophy    • Mitral valve regurgitation    • Osteoarthritis    • Pure hypercholesterolemia    • Shingles outbreak     back and left side         Past Surgical History:   Past Surgical History:   Procedure Laterality Date   • BUNIONECTOMY Left 2014   • COLONOSCOPY  2023   • CORRECTION HAMMER TOE Left 2014   • CT COLECTOMY PARTIAL W/ANASTOMOSIS N/A 08/15/2023    Procedure: RIGHT HEMICOLECTOMY;  Surgeon: Rosario Rios MD;  Location: [facility];  Service: Surgical Oncology         Family History:  Family History   Problem Relation Age of Onset   • No Known Problems Mother    • Coronary artery disease Father    • Throat cancer Father    • Diabetes Sister    • Cancer Sister    • Pancreatic cancer Sister 60   • No Known Problems Daughter    • No Known Problems Daughter    • No Known Problems Maternal Grandmother    • No Known Problems Maternal Grandfather    • No Known Problems Paternal Grandmother    • No Known Problems Paternal Grandfather    • No Known Problems Brother    • Cancer Brother    • No Known Problems Son    • No Known Problems Son    • No Known Problems Son          Social History:    Social History     Substance and Sexual Activity   Alcohol Use Not Currently     Social History     Substance and Sexual Activity   Drug Use Not Currently     Social History     Tobacco Use   Smoking Status Former   • Current packs/day: 0.00   • Average packs/day: 1 pack/day for 25.0 years (25.0 ttl pk-yrs)   • Types: Cigarettes   • Start date:    • Quit date:    • Years since quittin.4   • Passive exposure: Past   Smokeless Tobacco Never         Home Medications:   Prior to Admission medications     Medication Sig Start Date End Date Taking? Authorizing Provider   amLODIPine (NORVASC) 5 mg tablet TAKE 1 TABLET BY MOUTH ONCE DAILY AT BEDTIME 4/16/24  Yes Merced Holm MD   atorvastatin (LIPITOR) 10 mg tablet Take 1 tablet by mouth once daily 1/10/24  Yes John Greenwood, DO   Multiple Vitamins-Minerals (PRESERVISION AREDS 2 PO) Take by mouth   Yes Historical Provider, MD   saccharomyces boulardii (FLORASTOR) 250 mg capsule Take 1 capsule (250 mg total) by mouth 2 (two) times a day  Patient not taking: Reported on 6/18/2024 4/15/24   Rosario Rios MD   valACYclovir (VALTREX) 1,000 mg tablet Take 1 tablet (1,000 mg total) by mouth 3 (three) times a day for 7 days  Patient not taking: Reported on 3/27/2024 2/4/24 2/14/24  Jordi Schilling PA-C       Allergies:  Allergies   Allergen Reactions   • Acetaminophen Hives       Review of Systems:  Review of Systems - History obtained from chart review and the patient  General ROS: negative  Psychological ROS: negative  Ophthalmic ROS: negative  ENT ROS: negative  Allergy and Immunology ROS: negative  Hematological and Lymphatic ROS: negative  Endocrine ROS: negative  Breast ROS: negative  Respiratory ROS: no cough, shortness of breath, or wheezing  Cardiovascular ROS: no chest pain or dyspnea on exertion  Gastrointestinal ROS: no abdominal pain, change in bowel habits, or black or bloody stools  Genito-Urinary ROS: no dysuria, trouble voiding, or hematuria  Musculoskeletal ROS: negative  Neurological ROS: post herpetic neuralgia left back, lateral chest wall (prior shingles out-break)  Dermatological ROS: negative    Vital Signs:     Vitals:    06/18/24 1131 06/18/24 1132   BP: 140/63 140/63   BP Location: Left arm Right arm   Patient Position: Sitting Sitting   Cuff Size: Standard Standard   Pulse: 72    SpO2: 98%    Weight: 56.3 kg (124 lb 3.2 oz)    Height: 5' (1.524 m)        Physical Exam:    General: Alert, oriented, well developed, appears younger than stated age,  no acute distress  HEENT/NECK:  PERRLA.  No jugular venous distention.    Cardiac:Regular rate and rhythm, II-III/VI harsh systolic murmur RUSB   Carotid arteries: 1+ pulses; bruits vs radiation of heart murmur to neck bilaterally.  Pulmonary:  Breath sounds clear bilaterally.  Abdomen:  Non-tender, Non-distended.  Positive bowel sounds.  Upper extremities: 2+ radial pulses; brisk capillary refill; RIGHT hand dominant  Lower extremities: Extremities warm/dry.   PT/DP pulses 2+ bilaterally.  No edema B/L  Neuro: Alert and oriented X 3.  Sensation is grossly intact.  No focal deficits.  Musculoskeletal: MAEE, stable gait  Skin: Warm/Dry, without rashes or lesions.      Lab Results:   Lab Results   Component Value Date    WBC 8.11 02/19/2024    HGB 11.7 02/19/2024    HCT 34.8 02/19/2024    MCV 92 02/19/2024     02/19/2024     Lab Results   Component Value Date    SODIUM 136 05/02/2024    K 4.7 05/02/2024     05/02/2024    CO2 26 05/02/2024    AGAP 7 05/02/2024    BUN 12 05/02/2024    CREATININE 0.78 05/02/2024    GLUC 82 08/20/2023    GLUF 95 05/02/2024    CALCIUM 9.2 05/02/2024    AST 19 05/02/2024    ALT 19 05/02/2024    ALKPHOS 93 05/02/2024    TP 7.5 05/02/2024    TBILI 0.62 05/02/2024    EGFR 68 05/02/2024     Lab Results   Component Value Date    CHOLESTEROL 135 05/02/2024    CHOLESTEROL 102 02/23/2023    CHOLESTEROL 154 11/30/2021     Lab Results   Component Value Date    HDL 55 05/02/2024    HDL 48 (L) 02/23/2023    HDL 68 11/30/2021     Lab Results   Component Value Date    TRIG 67 05/02/2024    TRIG 80 02/23/2023    TRIG 55 11/30/2021     Lab Results   Component Value Date    NONHDLC 86 11/30/2021     Lab Results   Component Value Date    HGBA1C 5.5 05/09/2024         Imaging Studies:     Echocardiogram: 4/18/23    •  Left Ventricle: Left ventricular cavity size is normal. Wall thickness is normal. The left ventricular ejection fraction is %. Systolic function is normal. Wall motion is normal.  Diastolic function is normal for age.  •  Right Ventricle: Right ventricular cavity size is normal. Systolic function is normal.  •  Left Atrium: The atrium is moderately dilated.  •  Aortic Valve: The aortic valve is trileaflet. The leaflets are severely thickened. The leaflets are severely calcified. There is severely reduced mobility. There is moderate regurgitation. There is moderate to severe stenosis. The aortic valve peak velocity is 3.67 m/s. The aortic valve peak gradient is 54 mmHg. The aortic valve mean gradient is 34 mmHg. The dimensionless velocity index is 0.29. The aortic valve area is 0.83 cm2.  •  Mitral Valve: There is annular calcification. There is mild regurgitation.  •  Tricuspid Valve: There is mild regurgitation.     Findings    Left Ventricle Left ventricular cavity size is normal. Wall thickness is normal. The left ventricular ejection fraction is %. Systolic function is normal. Wall motion is normal. Diastolic function is normal for age.   Right Ventricle Right ventricular cavity size is normal. Systolic function is normal. Wall thickness is normal.   Left Atrium The atrium is moderately dilated.   Right Atrium The atrium is normal in size.   Aortic Valve The aortic valve is trileaflet. The leaflets are severely thickened. The leaflets are severely calcified. There is severely reduced mobility. There is moderate regurgitation. There is moderate to severe stenosis. The aortic valve peak velocity is 3.67 m/s. The aortic valve peak gradient is 54 mmHg. The aortic valve mean gradient is 34 mmHg. The dimensionless velocity index is 0.29. The aortic valve area is 0.83 cm2.   Mitral Valve There is annular calcification.  There is mild regurgitation. There is no evidence of stenosis.   Tricuspid Valve Tricuspid valve structure is normal. There is mild regurgitation. There is no evidence of stenosis. The right ventricular systolic pressure is normal. The estimated right ventricular systolic  pressure is 32.00 mmHg.   Pulmonic Valve Pulmonic valve structure is normal. There is trace regurgitation. There is no evidence of stenosis.   Ascending Aorta The aortic root is normal in size.   IVC/SVC The right atrial pressure is estimated at 5.0 mmHg. The inferior vena cava is normal in size. Respirophasic changes were normal.   Pericardium There is no pericardial effusion. The pericardium is normal in appearance.     Left Ventricle Measurements    Function/Volumes   A4C EF 68 %         LVOT stroke volume 80.82         LVOT stroke volume index 51 ml/m2         Left ventricular stroke volume (2D) 66 mL         LVOT Cardiac Output 5.05 l/min         LVOT Cardiac Index 3.3 l/min/m2         Dimensions   LVIDd 4.6 cm         LVIDS 2.8 cm         IVSd 0.9 cm         LVPWd 0.9 cm         LVOT area 3.14 cm2         FS 39         Diastolic Filling   MV E' Tissue Velocity Septal 9 cm/s         MV E' Tissue Velocity Lateral 12 cm/s         LA Volume Index (BP) 41.2 mL/m2         E/A ratio 0.7         E wave deceleration time 207 ms         MV Peak E Armani 96 cm/s         MV Peak A Armani 1.37 m/s          Report Measurements   AV LVOT peak gradient 5 mmHg              Interventricular Septum Measurements    Shunt Ratio   LVOT peak VTI 25.74 cm         LVOT peak armani 1.08 m/s              Right Ventricle Measurements    Dimensions   RVID d 3.5 cm         Tricuspid annular plane systolic excursion 1.9 cm               Left Atrium Measurements    Dimensions   LA size 4.4 cm         LA length (A2C) 5.4 cm         Volumes   LA volume (BP) 63 mL         LA Volume Index (BP) 41.2 mL/m2               Right Atrium Measurements    Dimensions   RAA A4C 10 cm2               Atrial Septum Measurements    Shunt Ratio   LVOT peak VTI 25.74 cm         LVOT peak armani 1.08 m/s               Aortic Valve Measurements    Stenosis   Aortic valve peak velocity 3.67 m/s         LVOT peak armani 1.08 m/s         Ao VTI 97.91 cm         LVOT peak VTI 25.74  cm         AV mean gradient 34 mmHg         LVOT mn grad 3 mmHg         AV peak gradient 54 mmHg         AV LVOT peak gradient 5 mmHg         Regurgitation   AV peak gradient 79 mmHg         AV Deceleration Time 1,304 ms         AV regurgitation pressure 1/2 time 378 ms         Area/Dimensions   DVI 0.29         AV valve area 0.83 cm2         AV area by cont VTI 0.8 cm2         AV area peak armani 0.9 cm2         LVOT diameter 2 cm         LVOT area 3.14 cm2          Other Measurements   Aortic Valve Area by Planimetry 1.2 cm2              Mitral Valve Measurements    Stenosis   MV stenosis pressure 1/2 time 60 ms         MV valve area p 1/2 method 3.67               Tricuspid Valve Measurements    RVSP Parameters   TR Peak Armani 2.6 m/s         Est. RA pres 5 mmHg         Triscuspid Valve Regurgitation Peak Gradient 27 mmHg         Right Ventricular Peak Systolic Pressure 32 mmHg               Aorta Measurements    Aortic Dimensions   Ao root 3.4 cm         Asc Ao 3.1 cm               IVC/SVC Measurements    IVC/SVC   Est. RA pres   5 mmHg                EC23    Normal sinus rhythm  Possible Left atrial enlargement  Incomplete right bundle branch block      I have personally reviewed pertinent films in PACS    CARDIOLOGY NOTES REVIEWED    TAVR evaluation Assessment:     [facility]: I    Aortic Stenosis Stage: D3    5 Meter Walk: 6 sec, 7 sec, 8 sec    STS risk score (preliminary): 6.6%    KCCQ-12 completed    Assessment:  Patient Active Problem List    Diagnosis Date Noted   • H/O: rheumatic fever 2024   • Encounter for follow-up surveillance of colon cancer 2024   • Skin lesion of back 2023   • S/P right colectomy 08/15/2023   • Mitral valve regurgitation    • Pre-operative cardiovascular examination, valvular heart disease 2023   • Personal history of colon cancer 2023   • Pancreas cyst 2023   • Lower abdominal pain 2023   • Hepatic steatosis 2023   • Pure  hypercholesterolemia    • Osteoarthritis    • Left ventricular hypertrophy    • Hypertension    • Severe aortic stenosis          Impression/Plan:    Ashley Eastman has severe aortic stenosis. She will undergo the following testing for transcatheter aortic valve replacement: Gated CTA of the chest/abdomen/pelvis, cardiac catheterization, dental clearance and carotid artery ultrasound.      Once these studies have been completed, Ashley Eastman will follow up in our office to review the results and to be evaluated to confirm the suitability of proceeding with transcatheter aortic valve replacment.     Ashley Eastman was comfortable with our recommendations, and their questions were answered to their satisfaction.    Thank you for allowing us to participate in the care of this patient.     The patient recently had a screening colonoscopy in 7/13/23.  Therefore GI referral is not indicated at this time.     SIGNATURE: ALEXANDER Acosta  DATE: June 18, 2024  TIME: 12:17 PM  Attestation signed by Julio Cesar Draper MD at 6/18/2024  1:49 PM:  Attending Attestation:    I supervised the Advanced Practitioner.? In addition to personally performing portions of the history and physical exam, I performed, in its entirety, the assessment/plan/medical decision making/counseling/care coordination component of the visit.  I reviewed the Advanced Practitioner's note,  medications prescribed and orders placed.    Medical decision making is detailed below:  Ms. Eastman is an 88yF with minimal PMH, who is seen today in the office for evaluation of severe aortic stenosis. She is quite active and actually does not note any significant limitation to her activity. She does not have as much energy as she did 15 years ago, but attributes that to aging. I personally reviewed her TTE, which shows calcified valve leaflets and valve area of 0.83cm. We had a long discussion about her AS and likely progression. Given her excellent functional  status and good health, I recommended moving forward with TAVR. She was hesitant but agreeable. She was discussed with the multidisciplinary team and felt to be a tentative candidate for TAVR.    We will move forward with TAVR workup, to include cath, CTA, carotids, dental clearance.    Julio Cesar Draper MD  06/18/24  1:19 PM

## 2024-06-19 ENCOUNTER — TELEPHONE (OUTPATIENT)
Dept: CARDIOLOGY CLINIC | Facility: CLINIC | Age: 89
End: 2024-06-19

## 2024-06-19 DIAGNOSIS — I34.0 NONRHEUMATIC MITRAL VALVE REGURGITATION: Primary | ICD-10-CM

## 2024-06-19 NOTE — TELEPHONE ENCOUNTER
Patient scheduled for RIGHT & LEFT  Heart Cath on 7/9/24 at [facility] with Dr. PLUMMER.      6/19/24 Mailed patient instructions.     Patient aware of all general instructions.    Medication holds:  N/A    Labs to be done NO LATER THAN 7/2/24  CMP / CBC (fasting 8 hours)                Dr. Holm can you please place prep for procedure.       Thank you,   Renetta

## 2024-06-19 NOTE — TELEPHONE ENCOUNTER
----- Message from Poornima OSUNA sent at 6/18/2024  3:10 PM EDT -----  Regarding: Cath  Please schedule patient for:     Pre TAVR Cardiac Cath: to be scheduled in the next few weeks. Patient has Medicare as primary insurance and had recent bloodwork.     Please schedule with either Dr. Berger, Dr. Sousa, Dr. Henson or Dr. Burks     To be done at: [facility]     To be done by:     Please address any questions regarding this request to Poornima Tan or Nevaeh Vinson,     Thank you.

## 2024-06-19 NOTE — LETTER
2024       Ashley Eastman              : 1935        MRN: [id number]  [address] [place] [address]       Procedure Name: RIGHT & LEFT  HEART CATHETERIZATION    Procedure date: 2024    Location: [facility]      The hospital will contact you the day prior to your procedure, usually between 4PM - 6PM to instruct you on the time and place to report. If you do not hear from a [facility]  by 6PM the evening prior to your procedure, please contact the campus that you are scheduled at.      [facility]: [address] [place] [id number] - [place] [phone]    You may have NOTHING to eat or drink from midnight the night prior to your procedure. You may have a minimal amount of water with your morning medications.     DO NOT stop taking Plavix or Aspirin unless advised otherwise.    Arrange for a responsible person to drive you to and from the hospital.    Please shower/bathe the night before your procedure and do not use powders or lotions.    Please notify us if you have been admitted to the hospital within the past 30 days.    Bring a list of daily medications, vitamins, minerals, herbals and nutritional supplements you take. Include dosage and time you take them each day.    If packing an overnight bag, pack minimal clothing, you will be given hospital sleepwear. Do not bring money, valuables or jewelry. Wedding band is OK.    If you use CPAP machine, bring it to the hospital.      Have your Photo ID and Insurance cards with you.    DO NOT take any diabetic medication, including insulin, the morning of the procedure. Oral diabetic medications may include: Glucophage, Prandin, Glyburide, Micronase, Avandia, Glocovance, Precose, Glynase y Starlix.              You should continue to take your morning dose of heart and/or blood pressure medications with a sip of water UNLESS ADVISED OTHERWISE.    Special Instructions:    Medication hold:    N/A      Blood work to be done NO LATER THAN 6/24/24    (FASTING)      Thank you,  Renetta Carter  Surgery Coordinator  [facility]   [address] [place] [address]  [place] [address]  Teams: [phone]

## 2024-06-21 ENCOUNTER — TELEPHONE (OUTPATIENT)
Age: 89
End: 2024-06-21

## 2024-06-21 NOTE — TELEPHONE ENCOUNTER
Patient is in the process of scheduling a Pre TAVR Cardio Cath. She has follow-up questions regarding the process of a cardiac cath and is requesting a call back to discuss.

## 2024-06-24 ENCOUNTER — PREP FOR PROCEDURE (OUTPATIENT)
Dept: CARDIOLOGY CLINIC | Facility: CLINIC | Age: 89
End: 2024-06-24

## 2024-06-24 DIAGNOSIS — I35.0 SEVERE AORTIC STENOSIS: Primary | ICD-10-CM

## 2024-06-25 ENCOUNTER — APPOINTMENT (OUTPATIENT)
Dept: LAB | Facility: MEDICAL CENTER | Age: 89
End: 2024-06-25
Payer: MEDICARE

## 2024-06-25 DIAGNOSIS — Z08 ENCOUNTER FOR FOLLOW-UP SURVEILLANCE OF COLON CANCER: ICD-10-CM

## 2024-06-25 DIAGNOSIS — Z85.038 PERSONAL HISTORY OF COLON CANCER: ICD-10-CM

## 2024-06-25 DIAGNOSIS — Z85.038 ENCOUNTER FOR FOLLOW-UP SURVEILLANCE OF COLON CANCER: ICD-10-CM

## 2024-06-25 DIAGNOSIS — I34.0 NONRHEUMATIC MITRAL VALVE REGURGITATION: ICD-10-CM

## 2024-06-25 DIAGNOSIS — Z86.79 H/O: RHEUMATIC FEVER: ICD-10-CM

## 2024-06-25 DIAGNOSIS — I35.0 SEVERE AORTIC STENOSIS: ICD-10-CM

## 2024-06-25 LAB
ALBUMIN SERPL BCG-MCNC: 4.1 G/DL (ref 3.5–5)
ALP SERPL-CCNC: 91 U/L (ref 34–104)
ALT SERPL W P-5'-P-CCNC: 18 U/L (ref 7–52)
ANION GAP SERPL CALCULATED.3IONS-SCNC: 10 MMOL/L (ref 4–13)
AST SERPL W P-5'-P-CCNC: 20 U/L (ref 13–39)
BASOPHILS # BLD AUTO: 0.05 THOUSANDS/ÂΜL (ref 0–0.1)
BASOPHILS NFR BLD AUTO: 1 % (ref 0–1)
BILIRUB SERPL-MCNC: 0.78 MG/DL (ref 0.2–1)
BUN SERPL-MCNC: 9 MG/DL (ref 5–25)
CALCIUM SERPL-MCNC: 9.2 MG/DL (ref 8.4–10.2)
CHLORIDE SERPL-SCNC: 101 MMOL/L (ref 96–108)
CO2 SERPL-SCNC: 26 MMOL/L (ref 21–32)
CREAT SERPL-MCNC: 0.71 MG/DL (ref 0.6–1.3)
EOSINOPHIL # BLD AUTO: 0.19 THOUSAND/ÂΜL (ref 0–0.61)
EOSINOPHIL NFR BLD AUTO: 2 % (ref 0–6)
ERYTHROCYTE [DISTWIDTH] IN BLOOD BY AUTOMATED COUNT: 13.4 % (ref 11.6–15.1)
GFR SERPL CREATININE-BSD FRML MDRD: 76 ML/MIN/1.73SQ M
GLUCOSE P FAST SERPL-MCNC: 102 MG/DL (ref 65–99)
HCT VFR BLD AUTO: 38.5 % (ref 34.8–46.1)
HGB BLD-MCNC: 12.9 G/DL (ref 11.5–15.4)
IMM GRANULOCYTES # BLD AUTO: 0.02 THOUSAND/UL (ref 0–0.2)
IMM GRANULOCYTES NFR BLD AUTO: 0 % (ref 0–2)
LYMPHOCYTES # BLD AUTO: 2.11 THOUSANDS/ÂΜL (ref 0.6–4.47)
LYMPHOCYTES NFR BLD AUTO: 26 % (ref 14–44)
MCH RBC QN AUTO: 31.8 PG (ref 26.8–34.3)
MCHC RBC AUTO-ENTMCNC: 33.5 G/DL (ref 31.4–37.4)
MCV RBC AUTO: 95 FL (ref 82–98)
MONOCYTES # BLD AUTO: 0.71 THOUSAND/ÂΜL (ref 0.17–1.22)
MONOCYTES NFR BLD AUTO: 9 % (ref 4–12)
NEUTROPHILS # BLD AUTO: 4.93 THOUSANDS/ÂΜL (ref 1.85–7.62)
NEUTS SEG NFR BLD AUTO: 62 % (ref 43–75)
NRBC BLD AUTO-RTO: 0 /100 WBCS
PLATELET # BLD AUTO: 255 THOUSANDS/UL (ref 149–390)
PMV BLD AUTO: 9.7 FL (ref 8.9–12.7)
POTASSIUM SERPL-SCNC: 4.2 MMOL/L (ref 3.5–5.3)
PROT SERPL-MCNC: 7.5 G/DL (ref 6.4–8.4)
RBC # BLD AUTO: 4.06 MILLION/UL (ref 3.81–5.12)
SODIUM SERPL-SCNC: 137 MMOL/L (ref 135–147)
WBC # BLD AUTO: 8.01 THOUSAND/UL (ref 4.31–10.16)

## 2024-06-25 PROCEDURE — 85025 COMPLETE CBC W/AUTO DIFF WBC: CPT

## 2024-06-25 PROCEDURE — 80053 COMPREHEN METABOLIC PANEL: CPT

## 2024-06-25 PROCEDURE — 36415 COLL VENOUS BLD VENIPUNCTURE: CPT

## 2024-06-26 ENCOUNTER — OFFICE VISIT (OUTPATIENT)
Dept: GASTROENTEROLOGY | Facility: CLINIC | Age: 89
End: 2024-06-26
Payer: MEDICARE

## 2024-06-26 VITALS
DIASTOLIC BLOOD PRESSURE: 77 MMHG | BODY MASS INDEX: 23.95 KG/M2 | HEIGHT: 60 IN | WEIGHT: 122 LBS | TEMPERATURE: 98.9 F | SYSTOLIC BLOOD PRESSURE: 129 MMHG

## 2024-06-26 DIAGNOSIS — Z85.038 HX OF COLON CANCER, STAGE II: Primary | ICD-10-CM

## 2024-06-26 DIAGNOSIS — I35.0 SEVERE AORTIC STENOSIS: ICD-10-CM

## 2024-06-26 PROCEDURE — 99213 OFFICE O/P EST LOW 20 MIN: CPT | Performed by: INTERNAL MEDICINE

## 2024-06-26 NOTE — PROGRESS NOTES
Outpatient Consultation -  [facility]  Ashley Eastman 88 y.o. female MRN: [id number]  Encounter: [id number]    ASSESSMENT AND PLAN:    1. Hx of colon cancer, stage II    2. Severe aortic stenosis      No orders of the defined types were placed in this encounter.    She is s/p resection 08/2023 w/ surg-onc. Per path report, she was staged as pT3 with no extracolonic spread (0/16 LN). Now she's due for 1y f/u surveillance colonoscopy. Severe AS seen on several echos, most recently 04/18/24. She is seeing CT surgery, who is working her up for potential TAVR. She is currently not an ideal sedation candidate for surveillance colonoscopy. We will d/w CTS regarding timing/order of colonoscopy vs TAVR.    RTC 6 months to discuss surveillance colonoscopy, or 1 month post-TAVR, whichever comes first.    HPI:    88 y.o. 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    Last colon 07/13/23 during which a large ascending mass was seen and Bxed. 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 0/16 LN involved.    TTE 04/18/24: AV peak velocity 3.67, gradient 54mmHg, area 0.83 cm^2. Meets criteria for severe AS. LVEF wnl.    Saw CT surgery on 06/18/24. TAVR candidate.  Scheduled for [facility] 07/09/24 for [facility].    Doing well. Daily to bid BMs. No pain. No alternating BM habits.    REVIEW OF SYSTEMS:  Otherwise pt denies any fevers, chills, lightheadedness, dizziness, CP, palpitations, SOB, N/V/D/C, abdom pain, urinary sxs, weakness/numbness/tingling, skin changes/rashes, weight loss.    HISTORY  Past Medical History:   Diagnosis Date    Aortic valve regurgitation     Aortic valve stenosis     Elevated alkaline phosphatase level 06/16/2023    Hypertension     Left ventricular  hypertrophy     Mitral valve regurgitation     Osteoarthritis     Pure hypercholesterolemia     Shingles outbreak     back and left side     Past Surgical History:   Procedure Laterality Date    BUNIONECTOMY Left 2014    COLONOSCOPY  2023    CORRECTION HAMMER TOE Left 2014    [place] COLECTOMY PARTIAL W/ANASTOMOSIS N/A 08/15/2023    Procedure: RIGHT HEMICOLECTOMY;  Surgeon: Rosario Rios MD;  Location: [facility];  Service: Surgical Oncology     Family History   Problem Relation Age of Onset    No Known Problems Mother     Coronary artery disease Father     Throat cancer Father     Diabetes Sister     Cancer Sister     Pancreatic cancer Sister 60    No Known Problems Daughter     No Known Problems Daughter     No Known Problems Maternal Grandmother     No Known Problems Maternal Grandfather     No Known Problems Paternal Grandmother     No Known Problems Paternal Grandfather     No Known Problems Brother     Cancer Brother     No Known Problems Son     No Known Problems Son     No Known Problems Son      Social History     Tobacco Use    Smoking status: Former     Current packs/day: 0.00     Average packs/day: 1 pack/day for 25.0 years (25.0 ttl pk-yrs)     Types: Cigarettes     Start date:      Quit date:      Years since quittin.5     Passive exposure: Past    Smokeless tobacco: Never   Vaping Use    Vaping status: Never Used   Substance Use Topics    Alcohol use: Not Currently    Drug use: Not Currently       MEDS & ALLERGIES    Current Outpatient Medications:     amLODIPine (NORVASC) 5 mg tablet    atorvastatin (LIPITOR) 10 mg tablet    Multiple Vitamins-Minerals (PRESERVISION AREDS 2 PO)    saccharomyces boulardii (FLORASTOR) 250 mg capsule    valACYclovir (VALTREX) 1,000 mg tablet  Allergies   Allergen Reactions    Acetaminophen Hives       OBJECTIVE  /77 (BP Location: Right arm, Patient Position: Sitting, Cuff Size: Large)   Temp 98.9 °F (37.2 °C) (Tympanic)   Ht 5'  (1.524 m)   Wt 55.3 kg (122 lb)   BMI 23.83 kg/m²  Body mass index is 23.83 kg/m².    PHYSICAL EXAM:    General Appearance:   Alert, cooperative, no distress   HEENT:   Normocephalic, atraumatic, anicteric.     Neck:  Supple, symmetrical, trachea midline   Lungs:   Clear to auscultation bilaterally; no rales, rhonchi or wheezing; respirations unlabored    Heart:   Regular rate and rhythm; no murmur, rub, or gallop.   Abdomen:   Soft, non-tender, non-distended; normal bowel sounds; no masses, no organomegaly    Genitalia:   Deferred    Rectal:   Deferred    Extremities:  No cyanosis, clubbing or edema    Pulses:  2+ and symmetric    Skin:  No jaundice, rashes, or lesions    Lymph nodes:  No palpable cervical lymphadenopathy      Lab Results:   No visits with results within 1 Day(s) from this visit.   Latest known visit with results is:   Appointment on 06/25/2024   Component Date Value    WBC 06/25/2024 8.01     RBC 06/25/2024 4.06     Hemoglobin 06/25/2024 12.9     Hematocrit 06/25/2024 38.5     MCV 06/25/2024 95     MCH 06/25/2024 31.8     MCHC 06/25/2024 33.5     RDW 06/25/2024 13.4     MPV 06/25/2024 9.7     Platelets 06/25/2024 255     nRBC 06/25/2024 0     Segmented % 06/25/2024 62     Immature Grans % 06/25/2024 0     Lymphocytes % 06/25/2024 26     Monocytes % 06/25/2024 9     Eosinophils Relative 06/25/2024 2     Basophils Relative 06/25/2024 1     Absolute Neutrophils 06/25/2024 4.93     Absolute Immature Grans 06/25/2024 0.02     Absolute Lymphocytes 06/25/2024 2.11     Absolute Monocytes 06/25/2024 0.71     Eosinophils Absolute 06/25/2024 0.19     Basophils Absolute 06/25/2024 0.05     Sodium 06/25/2024 137     Potassium 06/25/2024 4.2     Chloride 06/25/2024 101     CO2 06/25/2024 26     ANION GAP 06/25/2024 10     BUN 06/25/2024 9     Creatinine 06/25/2024 0.71     Glucose, Fasting 06/25/2024 102 (H)     Calcium 06/25/2024 9.2     AST 06/25/2024 20     ALT 06/25/2024 18     Alkaline Phosphatase  06/25/2024 91     Total Protein 06/25/2024 7.5     Albumin 06/25/2024 4.1     Total Bilirubin 06/25/2024 0.78     eGFR 06/25/2024 76        Radiology Results:   No results found.

## 2024-06-27 ENCOUNTER — HOSPITAL ENCOUNTER (OUTPATIENT)
Dept: VASCULAR ULTRASOUND | Facility: HOSPITAL | Age: 89
Discharge: HOME | End: 2024-06-27
Payer: MEDICARE

## 2024-06-27 ENCOUNTER — HOSPITAL ENCOUNTER (OUTPATIENT)
Dept: CT IMAGING | Facility: HOSPITAL | Age: 89
End: 2024-06-27
Payer: MEDICARE

## 2024-06-27 DIAGNOSIS — Z86.79 H/O: RHEUMATIC FEVER: ICD-10-CM

## 2024-06-27 DIAGNOSIS — Z13.6 ENCOUNTER FOR SCREENING FOR STENOSIS OF CAROTID ARTERY: ICD-10-CM

## 2024-06-27 DIAGNOSIS — I35.0 SEVERE AORTIC STENOSIS: ICD-10-CM

## 2024-06-27 PROCEDURE — 93880 EXTRACRANIAL BILAT STUDY: CPT

## 2024-06-27 PROCEDURE — 75572 CT HRT W/3D IMAGE: CPT

## 2024-06-27 PROCEDURE — 74174 CTA ABD&PLVS W/CONTRAST: CPT

## 2024-06-27 PROCEDURE — 93880 EXTRACRANIAL BILAT STUDY: CPT | Performed by: SURGERY

## 2024-06-27 RX ADMIN — IOHEXOL 85 ML: 350 INJECTION, SOLUTION INTRAVENOUS at 11:43

## 2024-07-09 ENCOUNTER — HOSPITAL ENCOUNTER (OUTPATIENT)
Facility: HOSPITAL | Age: 89
Setting detail: OUTPATIENT SURGERY
Discharge: HOME | End: 2024-07-09
Attending: INTERNAL MEDICINE | Admitting: INTERNAL MEDICINE
Payer: MEDICARE

## 2024-07-09 VITALS
SYSTOLIC BLOOD PRESSURE: 142 MMHG | OXYGEN SATURATION: 100 % | BODY MASS INDEX: 24.35 KG/M2 | HEIGHT: 60 IN | TEMPERATURE: 97.8 F | RESPIRATION RATE: 16 BRPM | WEIGHT: 124 LBS | DIASTOLIC BLOOD PRESSURE: 63 MMHG | HEART RATE: 66 BPM

## 2024-07-09 DIAGNOSIS — I35.0 SEVERE AORTIC STENOSIS: ICD-10-CM

## 2024-07-09 LAB
ATRIAL RATE: 79 BPM
P AXIS: 54 DEGREES
PR INTERVAL: 130 MS
QRS AXIS: 36 DEGREES
QRSD INTERVAL: 118 MS
QT INTERVAL: 428 MS
QTC INTERVAL: 490 MS
T WAVE AXIS: 30 DEGREES
VENTRICULAR RATE: 79 BPM

## 2024-07-09 PROCEDURE — 99152 MOD SED SAME PHYS/QHP 5/>YRS: CPT | Performed by: INTERNAL MEDICINE

## 2024-07-09 PROCEDURE — 93454 CORONARY ARTERY ANGIO S&I: CPT | Performed by: INTERNAL MEDICINE

## 2024-07-09 PROCEDURE — C1894 INTRO/SHEATH, NON-LASER: HCPCS | Performed by: INTERNAL MEDICINE

## 2024-07-09 PROCEDURE — C1769 GUIDE WIRE: HCPCS | Performed by: INTERNAL MEDICINE

## 2024-07-09 PROCEDURE — 93005 ELECTROCARDIOGRAM TRACING: CPT

## 2024-07-09 PROCEDURE — 93010 ELECTROCARDIOGRAM REPORT: CPT | Performed by: INTERNAL MEDICINE

## 2024-07-09 RX ORDER — HEPARIN SODIUM 1000 [USP'U]/ML
INJECTION, SOLUTION INTRAVENOUS; SUBCUTANEOUS CODE/TRAUMA/SEDATION MEDICATION
Status: DISCONTINUED | OUTPATIENT
Start: 2024-07-09 | End: 2024-07-09 | Stop reason: HOSPADM

## 2024-07-09 RX ORDER — SODIUM CHLORIDE 9 MG/ML
75 INJECTION, SOLUTION INTRAVENOUS CONTINUOUS
Status: DISCONTINUED | OUTPATIENT
Start: 2024-07-09 | End: 2024-07-09 | Stop reason: HOSPADM

## 2024-07-09 RX ORDER — SODIUM CHLORIDE 9 MG/ML
125 INJECTION, SOLUTION INTRAVENOUS CONTINUOUS
Status: DISCONTINUED | OUTPATIENT
Start: 2024-07-09 | End: 2024-07-09 | Stop reason: HOSPADM

## 2024-07-09 RX ORDER — MIDAZOLAM HYDROCHLORIDE 2 MG/2ML
INJECTION, SOLUTION INTRAMUSCULAR; INTRAVENOUS CODE/TRAUMA/SEDATION MEDICATION
Status: DISCONTINUED | OUTPATIENT
Start: 2024-07-09 | End: 2024-07-09 | Stop reason: HOSPADM

## 2024-07-09 RX ORDER — FENTANYL CITRATE 50 UG/ML
INJECTION, SOLUTION INTRAMUSCULAR; INTRAVENOUS CODE/TRAUMA/SEDATION MEDICATION
Status: DISCONTINUED | OUTPATIENT
Start: 2024-07-09 | End: 2024-07-09 | Stop reason: HOSPADM

## 2024-07-09 RX ORDER — LIDOCAINE HYDROCHLORIDE 10 MG/ML
INJECTION, SOLUTION EPIDURAL; INFILTRATION; INTRACAUDAL; PERINEURAL CODE/TRAUMA/SEDATION MEDICATION
Status: DISCONTINUED | OUTPATIENT
Start: 2024-07-09 | End: 2024-07-09 | Stop reason: HOSPADM

## 2024-07-09 RX ORDER — VERAPAMIL HYDROCHLORIDE 2.5 MG/ML
INJECTION, SOLUTION INTRAVENOUS CODE/TRAUMA/SEDATION MEDICATION
Status: DISCONTINUED | OUTPATIENT
Start: 2024-07-09 | End: 2024-07-09 | Stop reason: HOSPADM

## 2024-07-09 RX ORDER — NITROGLYCERIN 20 MG/100ML
INJECTION INTRAVENOUS CODE/TRAUMA/SEDATION MEDICATION
Status: DISCONTINUED | OUTPATIENT
Start: 2024-07-09 | End: 2024-07-09 | Stop reason: HOSPADM

## 2024-07-09 RX ORDER — ASPIRIN 81 MG/1
324 TABLET, CHEWABLE ORAL ONCE
Status: COMPLETED | OUTPATIENT
Start: 2024-07-09 | End: 2024-07-09

## 2024-07-09 RX ADMIN — ASPIRIN 81 MG CHEWABLE TABLET 324 MG: 81 TABLET CHEWABLE at 07:49

## 2024-07-09 RX ADMIN — SODIUM CHLORIDE 75 ML/HR: 0.9 INJECTION, SOLUTION INTRAVENOUS at 09:30

## 2024-07-09 RX ADMIN — SODIUM CHLORIDE 125 ML/HR: 0.9 INJECTION, SOLUTION INTRAVENOUS at 07:51

## 2024-07-09 NOTE — INTERVAL H&P NOTE
H&P reviewed. After examining the patient I find no changes in the patients condition since the H&P had been written.    Vitals:    07/09/24 0745   BP: 163/72   Pulse: 79   Resp: 16   Temp: 98.6 °F (37 °C)   SpO2: 98%       Patient seen and examined at bedside.   No new complaints  Risks, benefits and alternatives discussed with patient/daughter who both verbalized understanding.   All questions answered.    Consents completed.     Kieran Jaimes      difficulty with transfers and ambulation due to LE weakness and right ankle pain and swelling

## 2024-07-09 NOTE — DISCHARGE INSTR - AVS FIRST PAGE
1. Please see the post cardiac catheterization dishcarge instructions.   No heavy lifting, greater than 10 lbs. or strenuous  activity for 48 hrs.    2.Remove band aid tomorrow.  Shower and wash area- wrist gently with soap and water- beginning tomorrow. Rinse and pat dry.  Apply new water seal band aid.  Repeat this process for 5 days. No powders, creams lotions or antibiotic ointments  for 5 days.  No tub baths, hot tubs or swimming for 5 days.     3. Please call our office ([phone]) if you have any fever, redness, swelling, discharge from your wrist access site.    4.No driving for 1 day

## 2024-07-15 DIAGNOSIS — E78.00 PURE HYPERCHOLESTEROLEMIA: ICD-10-CM

## 2024-07-16 ENCOUNTER — OFFICE VISIT (OUTPATIENT)
Dept: CARDIAC SURGERY | Facility: CLINIC | Age: 89
End: 2024-07-16
Payer: MEDICARE

## 2024-07-16 VITALS
SYSTOLIC BLOOD PRESSURE: 161 MMHG | BODY MASS INDEX: 24.15 KG/M2 | TEMPERATURE: 97.9 F | HEART RATE: 75 BPM | DIASTOLIC BLOOD PRESSURE: 67 MMHG | OXYGEN SATURATION: 98 % | WEIGHT: 123 LBS | HEIGHT: 60 IN

## 2024-07-16 DIAGNOSIS — I35.0 SEVERE AORTIC STENOSIS: Primary | ICD-10-CM

## 2024-07-16 DIAGNOSIS — Z86.79 H/O: RHEUMATIC FEVER: ICD-10-CM

## 2024-07-16 PROCEDURE — 99215 OFFICE O/P EST HI 40 MIN: CPT | Performed by: STUDENT IN AN ORGANIZED HEALTH CARE EDUCATION/TRAINING PROGRAM

## 2024-07-16 RX ORDER — CEFAZOLIN SODIUM 2 G/50ML
2000 SOLUTION INTRAVENOUS ONCE
Status: CANCELLED | OUTPATIENT
Start: 2024-07-16 | End: 2024-07-16

## 2024-07-16 RX ORDER — ATORVASTATIN CALCIUM 10 MG/1
10 TABLET, FILM COATED ORAL DAILY
Qty: 90 TABLET | Refills: 1 | Status: SHIPPED | OUTPATIENT
Start: 2024-07-16 | End: 2024-07-27

## 2024-07-16 RX ORDER — CHLORHEXIDINE GLUCONATE ORAL RINSE 1.2 MG/ML
15 SOLUTION DENTAL ONCE
Status: CANCELLED | OUTPATIENT
Start: 2024-07-16 | End: 2024-07-16

## 2024-07-16 NOTE — LETTER
July 16, 2024     John Greenwood DO  [address] [place] [address]  [address] [place] [id number]    Patient: Ashley Eastman   YOB: 1935   Date of Visit: 7/16/2024       Dear Dr. Greenwood:    Thank you for referring Ashley Eastman to me for evaluation. Below are my notes for this consultation.    If you have questions, please do not hesitate to call me. I look forward to following your patient along with you.         Sincerely,        Julio Cesar Draper MD        CC: No Recipients    ALEXANDER Acosta  7/16/2024 11:34 AM  Attested  Pre-Op History & Physical - Cardiothoracic Surgery   Ashley Eastman 89 y.o. female MRN: [id number]    Physician Requesting Consult: Dr. Holm    Reason for Consult / Principal Problem: Aortic stenosis, Rheumatic    History of Present Illness: Ashley Eastman is a 89 y.o. year old female who was previously evaluated in our office by Julio Cesar Draper for transcatheter aortic valve replacement.  During this initial consultation, arrangements were made for the following studies to be completed: Gated CTA of the chest/abdomen/pelvis, cardiac catheterization, dental clearance and carotid artery ultrasound.    Ashley Eastman now presents to review the results of these tests and obtain a second surgeon to confirm the suitability of proceeding with transcatheter aortic valve replacment.    In review, patient's PMHx is notable for AS, rheumatic fever in childhood, HTN, HLD, IRBBB, colon adenocarcinoma s/p R hemicolectomy (8/2023), osteoarthritis and shingles (2/2024) w/ post herpetic neuralgia.     Upon interview today patient reports rheumatic fever in childhood but denies having a murmur until adulthood. She was followed by Cardiology at [facility] for several years but lost to follow-up when her cardiologist retired. She was diagnosed with colon cancer July 2023 and was sent to [facility] for cardiac clearance prior to colon surgery.   Patient was seen for cardiology follow up March 2024 and sent for  repeat echo which was performed in April and demonstrated severe aortic stenosis. Patient denies chest pain, SOB, lightheadedness, fatigue, change in activity tolerance, weight gain or edema.   Patient is , lives alone in a single story home and is independent with ADL's, household chores, grocery shopping and driving. She normally walks 1 mile everyday but currently, due to high temperatures outdoors, she is staying indoors. She reports residual post-herpetic neuralgia- left lateral chest and under left breast.      Patient is a former smoker, quitting 1990. She denies alcohol or drug use.     Patient was seen by her dentist on 6/25/24 and letter of clearance obtained (scanned into media).        Past Medical History:  Past Medical History:   Diagnosis Date   • Aortic valve regurgitation    • Aortic valve stenosis    • Elevated alkaline phosphatase level 06/16/2023   • Hypertension    • Left ventricular hypertrophy    • Mitral valve regurgitation    • Osteoarthritis    • Pure hypercholesterolemia    • Shingles outbreak     back and left side         Past Surgical History:   Past Surgical History:   Procedure Laterality Date   • BUNIONECTOMY Left 04/16/2014   • CARDIAC CATHETERIZATION Left 7/9/2024    Procedure: Cardiac Left Heart Cath;  Surgeon: Fred Berger DO;  Location: [facility];  Service: Cardiology   • CARDIAC CATHETERIZATION N/A 7/9/2024    Procedure: Cardiac Coronary Angiogram;  Surgeon: Fred Berger DO;  Location: [facility];  Service: Cardiology   • CARDIAC CATHETERIZATION N/A 7/9/2024    Procedure: Cardiac catheterization;  Surgeon: Fred Berger DO;  Location: [facility];  Service: Cardiology   • COLONOSCOPY  07/13/2023   • CORRECTION HAMMER TOE Left 04/16/2014   • MS COLECTOMY PARTIAL W/ANASTOMOSIS N/A 08/15/2023    Procedure: RIGHT HEMICOLECTOMY;  Surgeon: Rosario Rios MD;  Location: [facility];  Service: Surgical Oncology          Family History:  Family History   Problem Relation Age of Onset   • No Known Problems Mother    • Coronary artery disease Father    • Throat cancer Father    • Diabetes Sister    • Cancer Sister    • Pancreatic cancer Sister 60   • No Known Problems Daughter    • No Known Problems Daughter    • No Known Problems Maternal Grandmother    • No Known Problems Maternal Grandfather    • No Known Problems Paternal Grandmother    • No Known Problems Paternal Grandfather    • No Known Problems Brother    • Cancer Brother    • No Known Problems Son    • No Known Problems Son    • No Known Problems Son          Social History:    Social History     Substance and Sexual Activity   Alcohol Use Not Currently     Social History     Substance and Sexual Activity   Drug Use Not Currently     Social History     Tobacco Use   Smoking Status Former   • Current packs/day: 0.00   • Average packs/day: 1 pack/day for 25.0 years (25.0 ttl pk-yrs)   • Types: Cigarettes   • Start date:    • Quit date:    • Years since quittin.5   • Passive exposure: Past   Smokeless Tobacco Never       Home Medications:   Prior to Admission medications    Medication Sig Start Date End Date Taking? Authorizing Provider   amLODIPine (NORVASC) 5 mg tablet TAKE 1 TABLET BY MOUTH ONCE DAILY AT BEDTIME 24   Merced Holm MD   atorvastatin (LIPITOR) 10 mg tablet Take 1 tablet (10 mg total) by mouth daily 24   John Greenwood,    Multiple Vitamins-Minerals (PRESERVISION AREDS 2 PO) Take by mouth    Historical Provider, MD       Allergies:  Allergies   Allergen Reactions   • Acetaminophen Hives       Review of Systems:  Review of Systems - History obtained from chart review and the patient  General ROS: negative  Psychological ROS: negative  Ophthalmic ROS: negative  ENT ROS: negative  Allergy and Immunology ROS: negative  Hematological and Lymphatic ROS: negative  Endocrine ROS: negative  Breast ROS: negative  Respiratory ROS: no  cough, shortness of breath, or wheezing  Cardiovascular ROS: positive for - murmur  negative for - chest pain, dyspnea on exertion, edema, irregular heartbeat, loss of consciousness, orthopnea, palpitations, paroxysmal nocturnal dyspnea, or rapid heart rate  Gastrointestinal ROS: no abdominal pain, change in bowel habits, or black or bloody stools  Genito-Urinary ROS: no dysuria, trouble voiding, or hematuria  Musculoskeletal ROS: negative  Neurological ROS: positive for - tingling left lateral chest to under left breast (post-herpetic neuralgia)  Dermatological ROS: negative    Vital Signs:     Vitals:    07/16/24 1050 07/16/24 1056   BP: 142/63 161/67   BP Location: Right arm Left arm   Patient Position: Sitting Sitting   Cuff Size: Standard Standard   Pulse: 75    Temp: 97.9 °F (36.6 °C)    TempSrc: Tympanic    SpO2: 98%    Weight: 55.8 kg (123 lb)    Height: 5' (1.524 m)        Physical Exam:    General: Alert, oriented, well developed, no acute distress  HEENT/NECK:  PERRLA.  No jugular venous distention.    Cardiac:Regular rate and rhythm, II-III/VI systolic murmur RUSB.  Carotids: 1+ pulses, cardiac murmur radiates to neck   Pulmonary:  Breath sounds clear bilaterally.  Abdomen:  Non-tender, Non-distended.  Positive bowel sounds.  Upper extremities: 2+ radial pulses; brisk capillary refill  Lower extremities: Extremities warm/dry.  PT/DP pulses 2+ bilaterally.  No edema B/L  Neuro: Alert and oriented X 3.  Sensation is grossly intact.  No focal deficits.  Musculoskeletal: MAEE, stable gait  Skin: Warm/Dry, without rashes or lesions.    Lab Results:     Lab Results   Component Value Date    WBC 8.01 06/25/2024    HGB 12.9 06/25/2024    HCT 38.5 06/25/2024    MCV 95 06/25/2024     06/25/2024     Lab Results   Component Value Date    SODIUM 137 06/25/2024    K 4.2 06/25/2024     06/25/2024    CO2 26 06/25/2024    AGAP 10 06/25/2024    BUN 9 06/25/2024    CREATININE 0.71 06/25/2024    GLUC 82  08/20/2023    GLUF 102 (H) 06/25/2024    CALCIUM 9.2 06/25/2024    AST 20 06/25/2024    ALT 18 06/25/2024    ALKPHOS 91 06/25/2024    TP 7.5 06/25/2024    TBILI 0.78 06/25/2024    EGFR 76 06/25/2024     Lab Results   Component Value Date    CHOLESTEROL 135 05/02/2024    CHOLESTEROL 102 02/23/2023    CHOLESTEROL 154 11/30/2021     Lab Results   Component Value Date    HDL 55 05/02/2024    HDL 48 (L) 02/23/2023    HDL 68 11/30/2021     Lab Results   Component Value Date    TRIG 67 05/02/2024    TRIG 80 02/23/2023    TRIG 55 11/30/2021     Lab Results   Component Value Date    NONHDLC 86 11/30/2021     Lab Results   Component Value Date    HGBA1C 5.5 05/09/2024     Lab Results   Component Value Date    BQP7ZYIVIFQI 1.633 02/19/2024    TSH 1.31 10/01/2020       Imaging Studies:     Echocardiogram: 4/18/24    •  Left Ventricle: Left ventricular cavity size is normal. Wall thickness is normal. The left ventricular ejection fraction is %. Systolic function is normal. Wall motion is normal. Diastolic function is normal for age.  •  Right Ventricle: Right ventricular cavity size is normal. Systolic function is normal.  •  Left Atrium: The atrium is moderately dilated.  •  Aortic Valve: The aortic valve is trileaflet. The leaflets are severely thickened. The leaflets are severely calcified. There is severely reduced mobility. There is moderate regurgitation. There is moderate to severe stenosis. The aortic valve peak velocity is 3.67 m/s. The aortic valve peak gradient is 54 mmHg. The aortic valve mean gradient is 34 mmHg. The dimensionless velocity index is 0.29. The aortic valve area is 0.83 cm2.  •  Mitral Valve: There is annular calcification. There is mild regurgitation.  •  Tricuspid Valve: There is mild regurgitation.     Findings    Left Ventricle Left ventricular cavity size is normal. Wall thickness is normal. The left ventricular ejection fraction is %. Systolic function is normal. Wall motion is normal. Diastolic  function is normal for age.   Right Ventricle Right ventricular cavity size is normal. Systolic function is normal. Wall thickness is normal.   Left Atrium The atrium is moderately dilated.   Right Atrium The atrium is normal in size.   Aortic Valve The aortic valve is trileaflet. The leaflets are severely thickened. The leaflets are severely calcified. There is severely reduced mobility. There is moderate regurgitation. There is moderate to severe stenosis. The aortic valve peak velocity is 3.67 m/s. The aortic valve peak gradient is 54 mmHg. The aortic valve mean gradient is 34 mmHg. The dimensionless velocity index is 0.29. The aortic valve area is 0.83 cm2.   Mitral Valve There is annular calcification.  There is mild regurgitation. There is no evidence of stenosis.   Tricuspid Valve Tricuspid valve structure is normal. There is mild regurgitation. There is no evidence of stenosis. The right ventricular systolic pressure is normal. The estimated right ventricular systolic pressure is 32.00 mmHg.   Pulmonic Valve Pulmonic valve structure is normal. There is trace regurgitation. There is no evidence of stenosis.   Ascending Aorta The aortic root is normal in size.   IVC/SVC The right atrial pressure is estimated at 5.0 mmHg. The inferior vena cava is normal in size. Respirophasic changes were normal.   Pericardium There is no pericardial effusion. The pericardium is normal in appearance.     Left Ventricle Measurements    Function/Volumes   A4C EF 68 %         LVOT stroke volume 80.82         LVOT stroke volume index 51 ml/m2         Left ventricular stroke volume (2D) 66 mL         LVOT Cardiac Output 5.05 l/min         LVOT Cardiac Index 3.3 l/min/m2         Dimensions   LVIDd 4.6 cm         LVIDS 2.8 cm         IVSd 0.9 cm         LVPWd 0.9 cm         LVOT area 3.14 cm2         FS 39         Diastolic Filling   MV E' Tissue Velocity Septal 9 cm/s         MV E' Tissue Velocity Lateral 12 cm/s         LA Volume  Index (BP) 41.2 mL/m2         E/A ratio 0.7         E wave deceleration time 207 ms         MV Peak E Armani 96 cm/s         MV Peak A Armani 1.37 m/s          Report Measurements   AV LVOT peak gradient 5 mmHg              Interventricular Septum Measurements    Shunt Ratio   LVOT peak VTI 25.74 cm         LVOT peak armani 1.08 m/s              Right Ventricle Measurements    Dimensions   RVID d 3.5 cm         Tricuspid annular plane systolic excursion 1.9 cm               Left Atrium Measurements    Dimensions   LA size 4.4 cm         LA length (A2C) 5.4 cm         Volumes   LA volume (BP) 63 mL         LA Volume Index (BP) 41.2 mL/m2               Right Atrium Measurements    Dimensions   RAA A4C 10 cm2               Atrial Septum Measurements    Shunt Ratio   LVOT peak VTI 25.74 cm         LVOT peak armani 1.08 m/s               Aortic Valve Measurements    Stenosis   Aortic valve peak velocity 3.67 m/s         LVOT peak armani 1.08 m/s         Ao VTI 97.91 cm         LVOT peak VTI 25.74 cm         AV mean gradient 34 mmHg         LVOT mn grad 3 mmHg         AV peak gradient 54 mmHg         AV LVOT peak gradient 5 mmHg         Regurgitation   AV peak gradient 79 mmHg         AV Deceleration Time 1,304 ms         AV regurgitation pressure 1/2 time 378 ms         Area/Dimensions   DVI 0.29         AV valve area 0.83 cm2         AV area by cont VTI 0.8 cm2         AV area peak armani 0.9 cm2         LVOT diameter 2 cm         LVOT area 3.14 cm2          Other Measurements   Aortic Valve Area by Planimetry 1.2 cm2              Mitral Valve Measurements    Stenosis   MV stenosis pressure 1/2 time 60 ms         MV valve area p 1/2 method 3.67               Tricuspid Valve Measurements    RVSP Parameters   TR Peak Armani 2.6 m/s         Est. RA pres 5 mmHg         Triscuspid Valve Regurgitation Peak Gradient 27 mmHg         Right Ventricular Peak Systolic Pressure 32 mmHg               Aorta Measurements    Aortic Dimensions   Ao root 3.4  cm         Asc Ao 3.1 cm               IVC/SVC Measurements    IVC/SVC   Est. RA pres 5 mmHg           Gated CTA of the chest/abdomen/pelvis: 24    VASCULAR FINDINGS:     Central pulmonary artery is not abnormally enlarged.  Right atrium and right ventricle are normal in size.  Left atrial cavity is not abnormally dilated.  Left ventricular myocardium is normal.  Mild mitral annular calcification.     Coronary artery origins are in typical position.  Mild coronary artery calcification.     Annulus, LVOT and aorta analysis:     Typical trileaflet aortic valve morphology.        Optimal CT aortic valve plane angles:  3 cusp view: AYESHA 1, cranial 7  Cusp overlap view LOYA 23, cranial 33     Measurements:     Annulus diameter (max x min): 25 x 20 mm.  Annulus Area 376 mm2.  Annulus Perimeter 70 mm.     Annulus to left main coronary ostium: 13 mm.  Annulus to right main coronary ostium: 16 mm.  Sinus of Valsalva height: 22 mm.     Aortic sinus of Valsalva diameter (max x min): 31 x 29 mm.  LVOT minimal diameter: 21 mm.     Sino-tubular junction minimal diameter: 27 mm.  Ascending thoracic aorta maximal diameter: 33 mm.     Valve Calcification:     Aortic valve calcium score is 961.  Volume of 318 mm3.     Thoracic Aorta:     Porcelain aorta = no.  Aortic root and ascending thoracic aorta free of significant calcification beyond the sino-tubular junction.  Aortic arch is normal in course and caliber with insignificant calcification.  Descending thoracic aorta is normal in course, caliber, and contour.     Abdominal aorta and pelvic artery measurements:     Abdominal aorta:  Minimal diameter above aortic bifurcation: 14 mm  Calcification: mild  Tortuosity: mild     Right iliofemoral segment:  Minimal diameter: 7 mm  Calcification: mild  Tortuosity: mild     Left iliofemoral segment:  Minimal diameter: 6 mm  Calcification: mild  Tortuosity: none    EC24    Sinus rhythm with occasional Premature ventricular  complexes  Incomplete right bundle branch block    Cardiac catheterization: 7/9/24    Mild nonobstructive epicardial CAD     Carotid duplex: 6/27/24  RIGHT:  There is <50% stenosis noted in the internal carotid artery. Plaque is  heterogenous and irregular.  Vertebral artery flow is antegrade.  There is no significant subclavian artery disease.     LEFT:  There is no evidence of arterial disease throughout the extracranial carotid  system.  Vertebral artery flow is antegrade.  There is no significant subclavian artery disease.      I have personally reviewed pertinent films in PACS    TAVR evaluation Assessment:     5 Meter Walk Test:      Attempt 1: 6 sec   Attempt 2: 7 sec   Attempt 3: 8 sec    STS Risk Score: 6.6%    Aortic Stenosis Stage: D3    [facility]: I    KCCQ-12 completed        Impression/Plan:    Ashley Eastman has symptomatic severe aortic stenosis. She has undergone testing for transcatheter aortic valve replacement.  The results of these studies have been interpreted by the multidisciplinary TAVR team who have determined the patient to be Intermediate risk for open aortic valve replacement based on other pre-existing comobidities not reflected in the STS risk score.     The risks, benefits, and alternatives to TAVR were discussed in detail with the patient today. She understands and wishes to proceed with transfemoral transcatheter aortic valve replacement.  Informed consent was obtained and a date for the intervention has been set.    Pre-op instructions reviewed with patient and they were instructed to stop Multivitamins now.    Ashley Eastman was comfortable with our recommendations, and their questions were answered to their satisfaction.       SIGNATURE: ALEXANDER Acosta  DATE: July 16, 2024  TIME: 11:31 AM  Attestation signed by Julio Cesar Draper MD at 7/16/2024 11:44 AM:  Attending Attestation:    I supervised the Advanced Practitioner.? In addition to personally performing portions of the  history and physical exam, I performed, in its entirety, the assessment/plan/medical decision making/counseling/care coordination component of the visit.  I reviewed the Advanced Practitioner's note,  medications prescribed and orders placed.    Medical decision making is detailed below:  Ms. Eastman is an 89yF in extremely good health, who is seen today in the office for severe aortic stenosis after completing preop testing. I personally reviewed her CTA, which shows femoral vessels and aortic root adequate for TF TAVR. She had no significant CAD on cath. She reports feeling well with no new symptoms. We discussed risks and benefits of the procedure (including PPM, stroke, sternotomy) and she voiced understanding and was ready to proceed. She was discussed with the multidisciplinary team and was deemed a final candidate. We will plan for TF TAVR next week.     Julio Cesar Draper MD  07/16/24  11:41 AM

## 2024-07-16 NOTE — PROGRESS NOTES
Pre-Op History & Physical - Cardiothoracic Surgery   Ashley Eastman 89 y.o. female MRN: [id number]    Physician Requesting Consult: Dr. Holm    Reason for Consult / Principal Problem: Aortic stenosis, Rheumatic    History of Present Illness: Ashley Eastman is a 89 y.o. year old female who was previously evaluated in our office by Julio Cesar Draper for transcatheter aortic valve replacement.  During this initial consultation, arrangements were made for the following studies to be completed: Gated CTA of the chest/abdomen/pelvis, cardiac catheterization, dental clearance and carotid artery ultrasound.    Ashley Eastman now presents to review the results of these tests and obtain a second surgeon to confirm the suitability of proceeding with transcatheter aortic valve replacment.    In review, patient's PMHx is notable for AS, rheumatic fever in childhood, HTN, HLD, IRBBB, colon adenocarcinoma s/p R hemicolectomy (8/2023), osteoarthritis and shingles (2/2024) w/ post herpetic neuralgia.     Upon interview today patient reports rheumatic fever in childhood but denies having a murmur until adulthood. She was followed by Cardiology at [facility] for several years but lost to follow-up when her cardiologist retired. She was diagnosed with colon cancer July 2023 and was sent to [facility] for cardiac clearance prior to colon surgery.   Patient was seen for cardiology follow up March 2024 and sent for repeat echo which was performed in April and demonstrated severe aortic stenosis. Patient denies chest pain, SOB, lightheadedness, fatigue, change in activity tolerance, weight gain or edema.   Patient is , lives alone in a single story home and is independent with ADL's, household chores, grocery shopping and driving. She normally walks 1 mile everyday but currently, due to high temperatures outdoors, she is staying indoors. She reports residual post-herpetic neuralgia- left lateral chest and under left breast.      Patient is a former  smoker, quitting 1990. She denies alcohol or drug use.     Patient was seen by her dentist on 6/25/24 and letter of clearance obtained (scanned into media).        Past Medical History:  Past Medical History:   Diagnosis Date    Aortic valve regurgitation     Aortic valve stenosis     Elevated alkaline phosphatase level 06/16/2023    Hypertension     Left ventricular hypertrophy     Mitral valve regurgitation     Osteoarthritis     Pure hypercholesterolemia     Shingles outbreak     back and left side         Past Surgical History:   Past Surgical History:   Procedure Laterality Date    BUNIONECTOMY Left 04/16/2014    CARDIAC CATHETERIZATION Left 7/9/2024    Procedure: Cardiac Left Heart Cath;  Surgeon: Fred Berger DO;  Location: [facility];  Service: Cardiology    CARDIAC CATHETERIZATION N/A 7/9/2024    Procedure: Cardiac Coronary Angiogram;  Surgeon: Fred Berger DO;  Location: [facility];  Service: Cardiology    CARDIAC CATHETERIZATION N/A 7/9/2024    Procedure: Cardiac catheterization;  Surgeon: Fred Berger DO;  Location: [facility];  Service: Cardiology    COLONOSCOPY  07/13/2023    CORRECTION HAMMER TOE Left 04/16/2014    MO COLECTOMY PARTIAL W/ANASTOMOSIS N/A 08/15/2023    Procedure: RIGHT HEMICOLECTOMY;  Surgeon: Rosario Rios MD;  Location: [facility];  Service: Surgical Oncology         Family History:  Family History   Problem Relation Age of Onset    No Known Problems Mother     Coronary artery disease Father     Throat cancer Father     Diabetes Sister     Cancer Sister     Pancreatic cancer Sister 60    No Known Problems Daughter     No Known Problems Daughter     No Known Problems Maternal Grandmother     No Known Problems Maternal Grandfather     No Known Problems Paternal Grandmother     No Known Problems Paternal Grandfather     No Known Problems Brother     Cancer Brother     No Known Problems Son     No Known Problems Son     No Known  Problems Son          Social History:    Social History     Substance and Sexual Activity   Alcohol Use Not Currently     Social History     Substance and Sexual Activity   Drug Use Not Currently     Social History     Tobacco Use   Smoking Status Former    Current packs/day: 0.00    Average packs/day: 1 pack/day for 25.0 years (25.0 ttl pk-yrs)    Types: Cigarettes    Start date:     Quit date:     Years since quittin.5    Passive exposure: Past   Smokeless Tobacco Never       Home Medications:   Prior to Admission medications    Medication Sig Start Date End Date Taking? Authorizing Provider   amLODIPine (NORVASC) 5 mg tablet TAKE 1 TABLET BY MOUTH ONCE DAILY AT BEDTIME 24   Merced Holm MD   atorvastatin (LIPITOR) 10 mg tablet Take 1 tablet (10 mg total) by mouth daily 24   John Greenwood DO   Multiple Vitamins-Minerals (PRESERVISION AREDS 2 PO) Take by mouth    Historical Provider, MD       Allergies:  Allergies   Allergen Reactions    Acetaminophen Hives       Review of Systems:  Review of Systems - History obtained from chart review and the patient  General ROS: negative  Psychological ROS: negative  Ophthalmic ROS: negative  ENT ROS: negative  Allergy and Immunology ROS: negative  Hematological and Lymphatic ROS: negative  Endocrine ROS: negative  Breast ROS: negative  Respiratory ROS: no cough, shortness of breath, or wheezing  Cardiovascular ROS: positive for - murmur  negative for - chest pain, dyspnea on exertion, edema, irregular heartbeat, loss of consciousness, orthopnea, palpitations, paroxysmal nocturnal dyspnea, or rapid heart rate  Gastrointestinal ROS: no abdominal pain, change in bowel habits, or black or bloody stools  Genito-Urinary ROS: no dysuria, trouble voiding, or hematuria  Musculoskeletal ROS: negative  Neurological ROS: positive for - tingling left lateral chest to under left breast (post-herpetic neuralgia)  Dermatological ROS: negative    Vital Signs:      Vitals:    07/16/24 1050 07/16/24 1056   BP: 142/63 161/67   BP Location: Right arm Left arm   Patient Position: Sitting Sitting   Cuff Size: Standard Standard   Pulse: 75    Temp: 97.9 °F (36.6 °C)    TempSrc: Tympanic    SpO2: 98%    Weight: 55.8 kg (123 lb)    Height: 5' (1.524 m)        Physical Exam:    General: Alert, oriented, well developed, no acute distress  HEENT/NECK:  PERRLA.  No jugular venous distention.    Cardiac:Regular rate and rhythm, II-III/VI systolic murmur RUSB.  Carotids: 1+ pulses, cardiac murmur radiates to neck   Pulmonary:  Breath sounds clear bilaterally.  Abdomen:  Non-tender, Non-distended.  Positive bowel sounds.  Upper extremities: 2+ radial pulses; brisk capillary refill  Lower extremities: Extremities warm/dry.  PT/DP pulses 2+ bilaterally.  No edema B/L  Neuro: Alert and oriented X 3.  Sensation is grossly intact.  No focal deficits.  Musculoskeletal: MAEE, stable gait  Skin: Warm/Dry, without rashes or lesions.    Lab Results:     Lab Results   Component Value Date    WBC 8.01 06/25/2024    HGB 12.9 06/25/2024    HCT 38.5 06/25/2024    MCV 95 06/25/2024     06/25/2024     Lab Results   Component Value Date    SODIUM 137 06/25/2024    K 4.2 06/25/2024     06/25/2024    CO2 26 06/25/2024    AGAP 10 06/25/2024    BUN 9 06/25/2024    CREATININE 0.71 06/25/2024    GLUC 82 08/20/2023    GLUF 102 (H) 06/25/2024    CALCIUM 9.2 06/25/2024    AST 20 06/25/2024    ALT 18 06/25/2024    ALKPHOS 91 06/25/2024    TP 7.5 06/25/2024    TBILI 0.78 06/25/2024    EGFR 76 06/25/2024     Lab Results   Component Value Date    CHOLESTEROL 135 05/02/2024    CHOLESTEROL 102 02/23/2023    CHOLESTEROL 154 11/30/2021     Lab Results   Component Value Date    HDL 55 05/02/2024    HDL 48 (L) 02/23/2023    HDL 68 11/30/2021     Lab Results   Component Value Date    TRIG 67 05/02/2024    TRIG 80 02/23/2023    TRIG 55 11/30/2021     Lab Results   Component Value Date    NONHDLC 86 11/30/2021      Lab Results   Component Value Date    HGBA1C 5.5 05/09/2024     Lab Results   Component Value Date    VSU5JXRLYBKO 1.633 02/19/2024    TSH 1.31 10/01/2020       Imaging Studies:     Echocardiogram: 4/18/24      Left Ventricle: Left ventricular cavity size is normal. Wall thickness is normal. The left ventricular ejection fraction is %. Systolic function is normal. Wall motion is normal. Diastolic function is normal for age.    Right Ventricle: Right ventricular cavity size is normal. Systolic function is normal.    Left Atrium: The atrium is moderately dilated.    Aortic Valve: The aortic valve is trileaflet. The leaflets are severely thickened. The leaflets are severely calcified. There is severely reduced mobility. There is moderate regurgitation. There is moderate to severe stenosis. The aortic valve peak velocity is 3.67 m/s. The aortic valve peak gradient is 54 mmHg. The aortic valve mean gradient is 34 mmHg. The dimensionless velocity index is 0.29. The aortic valve area is 0.83 cm2.    Mitral Valve: There is annular calcification. There is mild regurgitation.    Tricuspid Valve: There is mild regurgitation.     Findings    Left Ventricle Left ventricular cavity size is normal. Wall thickness is normal. The left ventricular ejection fraction is %. Systolic function is normal. Wall motion is normal. Diastolic function is normal for age.   Right Ventricle Right ventricular cavity size is normal. Systolic function is normal. Wall thickness is normal.   Left Atrium The atrium is moderately dilated.   Right Atrium The atrium is normal in size.   Aortic Valve The aortic valve is trileaflet. The leaflets are severely thickened. The leaflets are severely calcified. There is severely reduced mobility. There is moderate regurgitation. There is moderate to severe stenosis. The aortic valve peak velocity is 3.67 m/s. The aortic valve peak gradient is 54 mmHg. The aortic valve mean gradient is 34 mmHg. The  dimensionless velocity index is 0.29. The aortic valve area is 0.83 cm2.   Mitral Valve There is annular calcification.  There is mild regurgitation. There is no evidence of stenosis.   Tricuspid Valve Tricuspid valve structure is normal. There is mild regurgitation. There is no evidence of stenosis. The right ventricular systolic pressure is normal. The estimated right ventricular systolic pressure is 32.00 mmHg.   Pulmonic Valve Pulmonic valve structure is normal. There is trace regurgitation. There is no evidence of stenosis.   Ascending Aorta The aortic root is normal in size.   IVC/SVC The right atrial pressure is estimated at 5.0 mmHg. The inferior vena cava is normal in size. Respirophasic changes were normal.   Pericardium There is no pericardial effusion. The pericardium is normal in appearance.     Left Ventricle Measurements    Function/Volumes   A4C EF 68 %         LVOT stroke volume 80.82         LVOT stroke volume index 51 ml/m2         Left ventricular stroke volume (2D) 66 mL         LVOT Cardiac Output 5.05 l/min         LVOT Cardiac Index 3.3 l/min/m2         Dimensions   LVIDd 4.6 cm         LVIDS 2.8 cm         IVSd 0.9 cm         LVPWd 0.9 cm         LVOT area 3.14 cm2         FS 39         Diastolic Filling   MV E' Tissue Velocity Septal 9 cm/s         MV E' Tissue Velocity Lateral 12 cm/s         LA Volume Index (BP) 41.2 mL/m2         E/A ratio 0.7         E wave deceleration time 207 ms         MV Peak E Armani 96 cm/s         MV Peak A Armani 1.37 m/s          Report Measurements   AV LVOT peak gradient 5 mmHg              Interventricular Septum Measurements    Shunt Ratio   LVOT peak VTI 25.74 cm         LVOT peak armani 1.08 m/s              Right Ventricle Measurements    Dimensions   RVID d 3.5 cm         Tricuspid annular plane systolic excursion 1.9 cm               Left Atrium Measurements    Dimensions   LA size 4.4 cm         LA length (A2C) 5.4 cm         Volumes   LA volume (BP) 63 mL          LA Volume Index (BP) 41.2 mL/m2               Right Atrium Measurements    Dimensions   RAA A4C 10 cm2               Atrial Septum Measurements    Shunt Ratio   LVOT peak VTI 25.74 cm         LVOT peak armani 1.08 m/s               Aortic Valve Measurements    Stenosis   Aortic valve peak velocity 3.67 m/s         LVOT peak armani 1.08 m/s         Ao VTI 97.91 cm         LVOT peak VTI 25.74 cm         AV mean gradient 34 mmHg         LVOT mn grad 3 mmHg         AV peak gradient 54 mmHg         AV LVOT peak gradient 5 mmHg         Regurgitation   AV peak gradient 79 mmHg         AV Deceleration Time 1,304 ms         AV regurgitation pressure 1/2 time 378 ms         Area/Dimensions   DVI 0.29         AV valve area 0.83 cm2         AV area by cont VTI 0.8 cm2         AV area peak armani 0.9 cm2         LVOT diameter 2 cm         LVOT area 3.14 cm2          Other Measurements   Aortic Valve Area by Planimetry 1.2 cm2              Mitral Valve Measurements    Stenosis   MV stenosis pressure 1/2 time 60 ms         MV valve area p 1/2 method 3.67               Tricuspid Valve Measurements    RVSP Parameters   TR Peak Armani 2.6 m/s         Est. RA pres 5 mmHg         Triscuspid Valve Regurgitation Peak Gradient 27 mmHg         Right Ventricular Peak Systolic Pressure 32 mmHg               Aorta Measurements    Aortic Dimensions   Ao root 3.4 cm         Asc Ao 3.1 cm               IVC/SVC Measurements    IVC/SVC   Est. RA pres 5 mmHg           Gated CTA of the chest/abdomen/pelvis: 6/27/24    VASCULAR FINDINGS:     Central pulmonary artery is not abnormally enlarged.  Right atrium and right ventricle are normal in size.  Left atrial cavity is not abnormally dilated.  Left ventricular myocardium is normal.  Mild mitral annular calcification.     Coronary artery origins are in typical position.  Mild coronary artery calcification.     Annulus, LVOT and aorta analysis:     Typical trileaflet aortic valve morphology.        Optimal CT  aortic valve plane angles:  3 cusp view: AYESHA 1, cranial 7  Cusp overlap view LOYA 23, cranial 33     Measurements:     Annulus diameter (max x min): 25 x 20 mm.  Annulus Area 376 mm2.  Annulus Perimeter 70 mm.     Annulus to left main coronary ostium: 13 mm.  Annulus to right main coronary ostium: 16 mm.  Sinus of Valsalva height: 22 mm.     Aortic sinus of Valsalva diameter (max x min): 31 x 29 mm.  LVOT minimal diameter: 21 mm.     Sino-tubular junction minimal diameter: 27 mm.  Ascending thoracic aorta maximal diameter: 33 mm.     Valve Calcification:     Aortic valve calcium score is 961.  Volume of 318 mm3.     Thoracic Aorta:     Porcelain aorta = no.  Aortic root and ascending thoracic aorta free of significant calcification beyond the sino-tubular junction.  Aortic arch is normal in course and caliber with insignificant calcification.  Descending thoracic aorta is normal in course, caliber, and contour.     Abdominal aorta and pelvic artery measurements:     Abdominal aorta:  Minimal diameter above aortic bifurcation: 14 mm  Calcification: mild  Tortuosity: mild     Right iliofemoral segment:  Minimal diameter: 7 mm  Calcification: mild  Tortuosity: mild     Left iliofemoral segment:  Minimal diameter: 6 mm  Calcification: mild  Tortuosity: none    EC24    Sinus rhythm with occasional Premature ventricular complexes  Incomplete right bundle branch block    Cardiac catheterization: 24    Mild nonobstructive epicardial CAD     Carotid duplex: 24  RIGHT:  There is <50% stenosis noted in the internal carotid artery. Plaque is  heterogenous and irregular.  Vertebral artery flow is antegrade.  There is no significant subclavian artery disease.     LEFT:  There is no evidence of arterial disease throughout the extracranial carotid  system.  Vertebral artery flow is antegrade.  There is no significant subclavian artery disease.      I have personally reviewed pertinent films in PACS    TAVR evaluation  Assessment:     5 Meter Walk Test:      Attempt 1: 6 sec   Attempt 2: 7 sec   Attempt 3: 8 sec    STS Risk Score: 6.6%    Aortic Stenosis Stage: D3    [facility]: I    KCCQ-12 completed        Impression/Plan:    Ashley Eastman has symptomatic severe aortic stenosis. She has undergone testing for transcatheter aortic valve replacement.  The results of these studies have been interpreted by the multidisciplinary TAVR team who have determined the patient to be Intermediate risk for open aortic valve replacement based on other pre-existing comobidities not reflected in the STS risk score.     The risks, benefits, and alternatives to TAVR were discussed in detail with the patient today. She understands and wishes to proceed with transfemoral transcatheter aortic valve replacement.  Informed consent was obtained and a date for the intervention has been set.    Pre-op instructions reviewed with patient and they were instructed to stop Multivitamins now.    Ashley Eastman was comfortable with our recommendations, and their questions were answered to their satisfaction.       SIGNATURE: ALEXANDER Acosta  DATE: July 16, 2024  TIME: 11:31 AM

## 2024-07-16 NOTE — H&P
Pre-Op History & Physical - Cardiothoracic Surgery   Ashley Eastman 89 y.o. female MRN: [id number]    Physician Requesting Consult: Dr. Holm    Reason for Consult / Principal Problem: Aortic stenosis, Rheumatic    History of Present Illness: Ashley Eastman is a 89 y.o. year old female who was previously evaluated in our office by Julio Cesar Draper for transcatheter aortic valve replacement.  During this initial consultation, arrangements were made for the following studies to be completed: Gated CTA of the chest/abdomen/pelvis, cardiac catheterization, dental clearance and carotid artery ultrasound.    Ashley Eastman now presents to review the results of these tests and obtain a second surgeon to confirm the suitability of proceeding with transcatheter aortic valve replacment.    In review, patient's PMHx is notable for AS, rheumatic fever in childhood, HTN, HLD, IRBBB, colon adenocarcinoma s/p R hemicolectomy (8/2023), osteoarthritis and shingles (2/2024) w/ post herpetic neuralgia.     Upon interview today patient reports rheumatic fever in childhood but denies having a murmur until adulthood. She was followed by Cardiology at [facility] for several years but lost to follow-up when her cardiologist retired. She was diagnosed with colon cancer July 2023 and was sent to [facility] for cardiac clearance prior to colon surgery.   Patient was seen for cardiology follow up March 2024 and sent for repeat echo which was performed in April and demonstrated severe aortic stenosis. Patient denies chest pain, SOB, lightheadedness, fatigue, change in activity tolerance, weight gain or edema.   Patient is , lives alone in a single story home and is independent with ADL's, household chores, grocery shopping and driving. She normally walks 1 mile everyday but currently, due to high temperatures outdoors, she is staying indoors. She reports residual post-herpetic neuralgia- left lateral chest and under left breast.      Patient is a former  smoker, quitting 1990. She denies alcohol or drug use.     Patient was seen by her dentist on 6/25/24 and letter of clearance obtained (scanned into media).        Past Medical History:  Past Medical History:   Diagnosis Date    Aortic valve regurgitation     Aortic valve stenosis     Elevated alkaline phosphatase level 06/16/2023    Hypertension     Left ventricular hypertrophy     Mitral valve regurgitation     Osteoarthritis     Pure hypercholesterolemia     Shingles outbreak     back and left side         Past Surgical History:   Past Surgical History:   Procedure Laterality Date    BUNIONECTOMY Left 04/16/2014    CARDIAC CATHETERIZATION Left 7/9/2024    Procedure: Cardiac Left Heart Cath;  Surgeon: Fred Berger DO;  Location: [facility];  Service: Cardiology    CARDIAC CATHETERIZATION N/A 7/9/2024    Procedure: Cardiac Coronary Angiogram;  Surgeon: Fred Berger DO;  Location: [facility];  Service: Cardiology    CARDIAC CATHETERIZATION N/A 7/9/2024    Procedure: Cardiac catheterization;  Surgeon: Fred Berger DO;  Location: [facility];  Service: Cardiology    COLONOSCOPY  07/13/2023    CORRECTION HAMMER TOE Left 04/16/2014    MO COLECTOMY PARTIAL W/ANASTOMOSIS N/A 08/15/2023    Procedure: RIGHT HEMICOLECTOMY;  Surgeon: Rosario Rios MD;  Location: [facility];  Service: Surgical Oncology         Family History:  Family History   Problem Relation Age of Onset    No Known Problems Mother     Coronary artery disease Father     Throat cancer Father     Diabetes Sister     Cancer Sister     Pancreatic cancer Sister 60    No Known Problems Daughter     No Known Problems Daughter     No Known Problems Maternal Grandmother     No Known Problems Maternal Grandfather     No Known Problems Paternal Grandmother     No Known Problems Paternal Grandfather     No Known Problems Brother     Cancer Brother     No Known Problems Son     No Known Problems Son     No Known  Problems Son          Social History:    Social History     Substance and Sexual Activity   Alcohol Use Not Currently     Social History     Substance and Sexual Activity   Drug Use Not Currently     Social History     Tobacco Use   Smoking Status Former    Current packs/day: 0.00    Average packs/day: 1 pack/day for 25.0 years (25.0 ttl pk-yrs)    Types: Cigarettes    Start date:     Quit date:     Years since quittin.5    Passive exposure: Past   Smokeless Tobacco Never       Home Medications:   Prior to Admission medications    Medication Sig Start Date End Date Taking? Authorizing Provider   amLODIPine (NORVASC) 5 mg tablet TAKE 1 TABLET BY MOUTH ONCE DAILY AT BEDTIME 24   Merced Holm MD   atorvastatin (LIPITOR) 10 mg tablet Take 1 tablet (10 mg total) by mouth daily 24   John Greenwood DO   Multiple Vitamins-Minerals (PRESERVISION AREDS 2 PO) Take by mouth    Historical Provider, MD       Allergies:  Allergies   Allergen Reactions    Acetaminophen Hives       Review of Systems:  Review of Systems - History obtained from chart review and the patient  General ROS: negative  Psychological ROS: negative  Ophthalmic ROS: negative  ENT ROS: negative  Allergy and Immunology ROS: negative  Hematological and Lymphatic ROS: negative  Endocrine ROS: negative  Breast ROS: negative  Respiratory ROS: no cough, shortness of breath, or wheezing  Cardiovascular ROS: positive for - murmur  negative for - chest pain, dyspnea on exertion, edema, irregular heartbeat, loss of consciousness, orthopnea, palpitations, paroxysmal nocturnal dyspnea, or rapid heart rate  Gastrointestinal ROS: no abdominal pain, change in bowel habits, or black or bloody stools  Genito-Urinary ROS: no dysuria, trouble voiding, or hematuria  Musculoskeletal ROS: negative  Neurological ROS: positive for - tingling left lateral chest to under left breast (post-herpetic neuralgia)  Dermatological ROS: negative    Vital Signs:      Vitals:    07/16/24 1050 07/16/24 1056   BP: 142/63 161/67   BP Location: Right arm Left arm   Patient Position: Sitting Sitting   Cuff Size: Standard Standard   Pulse: 75    Temp: 97.9 °F (36.6 °C)    TempSrc: Tympanic    SpO2: 98%    Weight: 55.8 kg (123 lb)    Height: 5' (1.524 m)        Physical Exam:    General: Alert, oriented, well developed, no acute distress  HEENT/NECK:  PERRLA.  No jugular venous distention.    Cardiac:Regular rate and rhythm, II-III/VI systolic murmur RUSB.  Carotids: 1+ pulses, cardiac murmur radiates to neck   Pulmonary:  Breath sounds clear bilaterally.  Abdomen:  Non-tender, Non-distended.  Positive bowel sounds.  Upper extremities: 2+ radial pulses; brisk capillary refill  Lower extremities: Extremities warm/dry.  PT/DP pulses 2+ bilaterally.  No edema B/L  Neuro: Alert and oriented X 3.  Sensation is grossly intact.  No focal deficits.  Musculoskeletal: MAEE, stable gait  Skin: Warm/Dry, without rashes or lesions.    Lab Results:     Lab Results   Component Value Date    WBC 8.01 06/25/2024    HGB 12.9 06/25/2024    HCT 38.5 06/25/2024    MCV 95 06/25/2024     06/25/2024     Lab Results   Component Value Date    SODIUM 137 06/25/2024    K 4.2 06/25/2024     06/25/2024    CO2 26 06/25/2024    AGAP 10 06/25/2024    BUN 9 06/25/2024    CREATININE 0.71 06/25/2024    GLUC 82 08/20/2023    GLUF 102 (H) 06/25/2024    CALCIUM 9.2 06/25/2024    AST 20 06/25/2024    ALT 18 06/25/2024    ALKPHOS 91 06/25/2024    TP 7.5 06/25/2024    TBILI 0.78 06/25/2024    EGFR 76 06/25/2024     Lab Results   Component Value Date    CHOLESTEROL 135 05/02/2024    CHOLESTEROL 102 02/23/2023    CHOLESTEROL 154 11/30/2021     Lab Results   Component Value Date    HDL 55 05/02/2024    HDL 48 (L) 02/23/2023    HDL 68 11/30/2021     Lab Results   Component Value Date    TRIG 67 05/02/2024    TRIG 80 02/23/2023    TRIG 55 11/30/2021     Lab Results   Component Value Date    NONHDLC 86 11/30/2021      Lab Results   Component Value Date    HGBA1C 5.5 05/09/2024     Lab Results   Component Value Date    VYC7MLUBIJWR 1.633 02/19/2024    TSH 1.31 10/01/2020       Imaging Studies:     Echocardiogram: 4/18/24      Left Ventricle: Left ventricular cavity size is normal. Wall thickness is normal. The left ventricular ejection fraction is %. Systolic function is normal. Wall motion is normal. Diastolic function is normal for age.    Right Ventricle: Right ventricular cavity size is normal. Systolic function is normal.    Left Atrium: The atrium is moderately dilated.    Aortic Valve: The aortic valve is trileaflet. The leaflets are severely thickened. The leaflets are severely calcified. There is severely reduced mobility. There is moderate regurgitation. There is moderate to severe stenosis. The aortic valve peak velocity is 3.67 m/s. The aortic valve peak gradient is 54 mmHg. The aortic valve mean gradient is 34 mmHg. The dimensionless velocity index is 0.29. The aortic valve area is 0.83 cm2.    Mitral Valve: There is annular calcification. There is mild regurgitation.    Tricuspid Valve: There is mild regurgitation.     Findings    Left Ventricle Left ventricular cavity size is normal. Wall thickness is normal. The left ventricular ejection fraction is %. Systolic function is normal. Wall motion is normal. Diastolic function is normal for age.   Right Ventricle Right ventricular cavity size is normal. Systolic function is normal. Wall thickness is normal.   Left Atrium The atrium is moderately dilated.   Right Atrium The atrium is normal in size.   Aortic Valve The aortic valve is trileaflet. The leaflets are severely thickened. The leaflets are severely calcified. There is severely reduced mobility. There is moderate regurgitation. There is moderate to severe stenosis. The aortic valve peak velocity is 3.67 m/s. The aortic valve peak gradient is 54 mmHg. The aortic valve mean gradient is 34 mmHg. The  dimensionless velocity index is 0.29. The aortic valve area is 0.83 cm2.   Mitral Valve There is annular calcification.  There is mild regurgitation. There is no evidence of stenosis.   Tricuspid Valve Tricuspid valve structure is normal. There is mild regurgitation. There is no evidence of stenosis. The right ventricular systolic pressure is normal. The estimated right ventricular systolic pressure is 32.00 mmHg.   Pulmonic Valve Pulmonic valve structure is normal. There is trace regurgitation. There is no evidence of stenosis.   Ascending Aorta The aortic root is normal in size.   IVC/SVC The right atrial pressure is estimated at 5.0 mmHg. The inferior vena cava is normal in size. Respirophasic changes were normal.   Pericardium There is no pericardial effusion. The pericardium is normal in appearance.     Left Ventricle Measurements    Function/Volumes   A4C EF 68 %         LVOT stroke volume 80.82         LVOT stroke volume index 51 ml/m2         Left ventricular stroke volume (2D) 66 mL         LVOT Cardiac Output 5.05 l/min         LVOT Cardiac Index 3.3 l/min/m2         Dimensions   LVIDd 4.6 cm         LVIDS 2.8 cm         IVSd 0.9 cm         LVPWd 0.9 cm         LVOT area 3.14 cm2         FS 39         Diastolic Filling   MV E' Tissue Velocity Septal 9 cm/s         MV E' Tissue Velocity Lateral 12 cm/s         LA Volume Index (BP) 41.2 mL/m2         E/A ratio 0.7         E wave deceleration time 207 ms         MV Peak E Armani 96 cm/s         MV Peak A Armani 1.37 m/s          Report Measurements   AV LVOT peak gradient 5 mmHg              Interventricular Septum Measurements    Shunt Ratio   LVOT peak VTI 25.74 cm         LVOT peak armani 1.08 m/s              Right Ventricle Measurements    Dimensions   RVID d 3.5 cm         Tricuspid annular plane systolic excursion 1.9 cm               Left Atrium Measurements    Dimensions   LA size 4.4 cm         LA length (A2C) 5.4 cm         Volumes   LA volume (BP) 63 mL          LA Volume Index (BP) 41.2 mL/m2               Right Atrium Measurements    Dimensions   RAA A4C 10 cm2               Atrial Septum Measurements    Shunt Ratio   LVOT peak VTI 25.74 cm         LVOT peak armani 1.08 m/s               Aortic Valve Measurements    Stenosis   Aortic valve peak velocity 3.67 m/s         LVOT peak armani 1.08 m/s         Ao VTI 97.91 cm         LVOT peak VTI 25.74 cm         AV mean gradient 34 mmHg         LVOT mn grad 3 mmHg         AV peak gradient 54 mmHg         AV LVOT peak gradient 5 mmHg         Regurgitation   AV peak gradient 79 mmHg         AV Deceleration Time 1,304 ms         AV regurgitation pressure 1/2 time 378 ms         Area/Dimensions   DVI 0.29         AV valve area 0.83 cm2         AV area by cont VTI 0.8 cm2         AV area peak armani 0.9 cm2         LVOT diameter 2 cm         LVOT area 3.14 cm2          Other Measurements   Aortic Valve Area by Planimetry 1.2 cm2              Mitral Valve Measurements    Stenosis   MV stenosis pressure 1/2 time 60 ms         MV valve area p 1/2 method 3.67               Tricuspid Valve Measurements    RVSP Parameters   TR Peak Armani 2.6 m/s         Est. RA pres 5 mmHg         Triscuspid Valve Regurgitation Peak Gradient 27 mmHg         Right Ventricular Peak Systolic Pressure 32 mmHg               Aorta Measurements    Aortic Dimensions   Ao root 3.4 cm         Asc Ao 3.1 cm               IVC/SVC Measurements    IVC/SVC   Est. RA pres 5 mmHg           Gated CTA of the chest/abdomen/pelvis: 6/27/24    VASCULAR FINDINGS:     Central pulmonary artery is not abnormally enlarged.  Right atrium and right ventricle are normal in size.  Left atrial cavity is not abnormally dilated.  Left ventricular myocardium is normal.  Mild mitral annular calcification.     Coronary artery origins are in typical position.  Mild coronary artery calcification.     Annulus, LVOT and aorta analysis:     Typical trileaflet aortic valve morphology.        Optimal CT  aortic valve plane angles:  3 cusp view: AYESHA 1, cranial 7  Cusp overlap view LOYA 23, cranial 33     Measurements:     Annulus diameter (max x min): 25 x 20 mm.  Annulus Area 376 mm2.  Annulus Perimeter 70 mm.     Annulus to left main coronary ostium: 13 mm.  Annulus to right main coronary ostium: 16 mm.  Sinus of Valsalva height: 22 mm.     Aortic sinus of Valsalva diameter (max x min): 31 x 29 mm.  LVOT minimal diameter: 21 mm.     Sino-tubular junction minimal diameter: 27 mm.  Ascending thoracic aorta maximal diameter: 33 mm.     Valve Calcification:     Aortic valve calcium score is 961.  Volume of 318 mm3.     Thoracic Aorta:     Porcelain aorta = no.  Aortic root and ascending thoracic aorta free of significant calcification beyond the sino-tubular junction.  Aortic arch is normal in course and caliber with insignificant calcification.  Descending thoracic aorta is normal in course, caliber, and contour.     Abdominal aorta and pelvic artery measurements:     Abdominal aorta:  Minimal diameter above aortic bifurcation: 14 mm  Calcification: mild  Tortuosity: mild     Right iliofemoral segment:  Minimal diameter: 7 mm  Calcification: mild  Tortuosity: mild     Left iliofemoral segment:  Minimal diameter: 6 mm  Calcification: mild  Tortuosity: none    EC24    Sinus rhythm with occasional Premature ventricular complexes  Incomplete right bundle branch block    Cardiac catheterization: 24    Mild nonobstructive epicardial CAD     Carotid duplex: 24  RIGHT:  There is <50% stenosis noted in the internal carotid artery. Plaque is  heterogenous and irregular.  Vertebral artery flow is antegrade.  There is no significant subclavian artery disease.     LEFT:  There is no evidence of arterial disease throughout the extracranial carotid  system.  Vertebral artery flow is antegrade.  There is no significant subclavian artery disease.      I have personally reviewed pertinent films in PACS    TAVR evaluation  Assessment:     5 Meter Walk Test:      Attempt 1: 6 sec   Attempt 2: 7 sec   Attempt 3: 8 sec    STS Risk Score: 6.6%    Aortic Stenosis Stage: D3    [facility]: I    KCCQ-12 completed        Impression/Plan:    Ashley Eastman has symptomatic severe aortic stenosis. She has undergone testing for transcatheter aortic valve replacement.  The results of these studies have been interpreted by the multidisciplinary TAVR team who have determined the patient to be Intermediate risk for open aortic valve replacement based on other pre-existing comobidities not reflected in the STS risk score.     The risks, benefits, and alternatives to TAVR were discussed in detail with the patient today. She understands and wishes to proceed with transfemoral transcatheter aortic valve replacement.  Informed consent was obtained and a date for the intervention has been set.    Pre-op instructions reviewed with patient and they were instructed to stop Multivitamins now.    Ashley Eastman was comfortable with our recommendations, and their questions were answered to their satisfaction.       SIGNATURE: ALEXANDER Acosta  DATE: July 16, 2024  TIME: 11:31 AM

## 2024-07-16 NOTE — H&P (VIEW-ONLY)
Pre-Op History & Physical - Cardiothoracic Surgery   Ashley Eastman 89 y.o. female MRN: [id number]    Physician Requesting Consult: Dr. Holm    Reason for Consult / Principal Problem: Aortic stenosis, Rheumatic    History of Present Illness: Ashley Eastman is a 89 y.o. year old female who was previously evaluated in our office by Julio Cesar Draper for transcatheter aortic valve replacement.  During this initial consultation, arrangements were made for the following studies to be completed: Gated CTA of the chest/abdomen/pelvis, cardiac catheterization, dental clearance and carotid artery ultrasound.    Ashley Eastman now presents to review the results of these tests and obtain a second surgeon to confirm the suitability of proceeding with transcatheter aortic valve replacment.    In review, patient's PMHx is notable for AS, rheumatic fever in childhood, HTN, HLD, IRBBB, colon adenocarcinoma s/p R hemicolectomy (8/2023), osteoarthritis and shingles (2/2024) w/ post herpetic neuralgia.     Upon interview today patient reports rheumatic fever in childhood but denies having a murmur until adulthood. She was followed by Cardiology at [facility] for several years but lost to follow-up when her cardiologist retired. She was diagnosed with colon cancer July 2023 and was sent to [facility] for cardiac clearance prior to colon surgery.   Patient was seen for cardiology follow up March 2024 and sent for repeat echo which was performed in April and demonstrated severe aortic stenosis. Patient denies chest pain, SOB, lightheadedness, fatigue, change in activity tolerance, weight gain or edema.   Patient is , lives alone in a single story home and is independent with ADL's, household chores, grocery shopping and driving. She normally walks 1 mile everyday but currently, due to high temperatures outdoors, she is staying indoors. She reports residual post-herpetic neuralgia- left lateral chest and under left breast.      Patient is a former  smoker, quitting 1990. She denies alcohol or drug use.     Patient was seen by her dentist on 6/25/24 and letter of clearance obtained (scanned into media).        Past Medical History:  Past Medical History:   Diagnosis Date    Aortic valve regurgitation     Aortic valve stenosis     Elevated alkaline phosphatase level 06/16/2023    Hypertension     Left ventricular hypertrophy     Mitral valve regurgitation     Osteoarthritis     Pure hypercholesterolemia     Shingles outbreak     back and left side         Past Surgical History:   Past Surgical History:   Procedure Laterality Date    BUNIONECTOMY Left 04/16/2014    CARDIAC CATHETERIZATION Left 7/9/2024    Procedure: Cardiac Left Heart Cath;  Surgeon: Fred Berger DO;  Location: [facility];  Service: Cardiology    CARDIAC CATHETERIZATION N/A 7/9/2024    Procedure: Cardiac Coronary Angiogram;  Surgeon: Fred Berger DO;  Location: [facility];  Service: Cardiology    CARDIAC CATHETERIZATION N/A 7/9/2024    Procedure: Cardiac catheterization;  Surgeon: Fred Berger DO;  Location: [facility];  Service: Cardiology    COLONOSCOPY  07/13/2023    CORRECTION HAMMER TOE Left 04/16/2014    MT COLECTOMY PARTIAL W/ANASTOMOSIS N/A 08/15/2023    Procedure: RIGHT HEMICOLECTOMY;  Surgeon: Rosario Rios MD;  Location: [facility];  Service: Surgical Oncology         Family History:  Family History   Problem Relation Age of Onset    No Known Problems Mother     Coronary artery disease Father     Throat cancer Father     Diabetes Sister     Cancer Sister     Pancreatic cancer Sister 60    No Known Problems Daughter     No Known Problems Daughter     No Known Problems Maternal Grandmother     No Known Problems Maternal Grandfather     No Known Problems Paternal Grandmother     No Known Problems Paternal Grandfather     No Known Problems Brother     Cancer Brother     No Known Problems Son     No Known Problems Son     No Known  Problems Son          Social History:    Social History     Substance and Sexual Activity   Alcohol Use Not Currently     Social History     Substance and Sexual Activity   Drug Use Not Currently     Social History     Tobacco Use   Smoking Status Former    Current packs/day: 0.00    Average packs/day: 1 pack/day for 25.0 years (25.0 ttl pk-yrs)    Types: Cigarettes    Start date:     Quit date:     Years since quittin.5    Passive exposure: Past   Smokeless Tobacco Never       Home Medications:   Prior to Admission medications    Medication Sig Start Date End Date Taking? Authorizing Provider   amLODIPine (NORVASC) 5 mg tablet TAKE 1 TABLET BY MOUTH ONCE DAILY AT BEDTIME 24   Merced Holm MD   atorvastatin (LIPITOR) 10 mg tablet Take 1 tablet (10 mg total) by mouth daily 24   John Greenwood DO   Multiple Vitamins-Minerals (PRESERVISION AREDS 2 PO) Take by mouth    Historical Provider, MD       Allergies:  Allergies   Allergen Reactions    Acetaminophen Hives       Review of Systems:  Review of Systems - History obtained from chart review and the patient  General ROS: negative  Psychological ROS: negative  Ophthalmic ROS: negative  ENT ROS: negative  Allergy and Immunology ROS: negative  Hematological and Lymphatic ROS: negative  Endocrine ROS: negative  Breast ROS: negative  Respiratory ROS: no cough, shortness of breath, or wheezing  Cardiovascular ROS: positive for - murmur  negative for - chest pain, dyspnea on exertion, edema, irregular heartbeat, loss of consciousness, orthopnea, palpitations, paroxysmal nocturnal dyspnea, or rapid heart rate  Gastrointestinal ROS: no abdominal pain, change in bowel habits, or black or bloody stools  Genito-Urinary ROS: no dysuria, trouble voiding, or hematuria  Musculoskeletal ROS: negative  Neurological ROS: positive for - tingling left lateral chest to under left breast (post-herpetic neuralgia)  Dermatological ROS: negative    Vital Signs:      Vitals:    07/16/24 1050 07/16/24 1056   BP: 142/63 161/67   BP Location: Right arm Left arm   Patient Position: Sitting Sitting   Cuff Size: Standard Standard   Pulse: 75    Temp: 97.9 °F (36.6 °C)    TempSrc: Tympanic    SpO2: 98%    Weight: 55.8 kg (123 lb)    Height: 5' (1.524 m)        Physical Exam:    General: Alert, oriented, well developed, no acute distress  HEENT/NECK:  PERRLA.  No jugular venous distention.    Cardiac:Regular rate and rhythm, II-III/VI systolic murmur RUSB.  Carotids: 1+ pulses, cardiac murmur radiates to neck   Pulmonary:  Breath sounds clear bilaterally.  Abdomen:  Non-tender, Non-distended.  Positive bowel sounds.  Upper extremities: 2+ radial pulses; brisk capillary refill  Lower extremities: Extremities warm/dry.  PT/DP pulses 2+ bilaterally.  No edema B/L  Neuro: Alert and oriented X 3.  Sensation is grossly intact.  No focal deficits.  Musculoskeletal: MAEE, stable gait  Skin: Warm/Dry, without rashes or lesions.    Lab Results:     Lab Results   Component Value Date    WBC 8.01 06/25/2024    HGB 12.9 06/25/2024    HCT 38.5 06/25/2024    MCV 95 06/25/2024     06/25/2024     Lab Results   Component Value Date    SODIUM 137 06/25/2024    K 4.2 06/25/2024     06/25/2024    CO2 26 06/25/2024    AGAP 10 06/25/2024    BUN 9 06/25/2024    CREATININE 0.71 06/25/2024    GLUC 82 08/20/2023    GLUF 102 (H) 06/25/2024    CALCIUM 9.2 06/25/2024    AST 20 06/25/2024    ALT 18 06/25/2024    ALKPHOS 91 06/25/2024    TP 7.5 06/25/2024    TBILI 0.78 06/25/2024    EGFR 76 06/25/2024     Lab Results   Component Value Date    CHOLESTEROL 135 05/02/2024    CHOLESTEROL 102 02/23/2023    CHOLESTEROL 154 11/30/2021     Lab Results   Component Value Date    HDL 55 05/02/2024    HDL 48 (L) 02/23/2023    HDL 68 11/30/2021     Lab Results   Component Value Date    TRIG 67 05/02/2024    TRIG 80 02/23/2023    TRIG 55 11/30/2021     Lab Results   Component Value Date    NONHDLC 86 11/30/2021      Lab Results   Component Value Date    HGBA1C 5.5 05/09/2024     Lab Results   Component Value Date    BNK3TVCLOVAH 1.633 02/19/2024    TSH 1.31 10/01/2020       Imaging Studies:     Echocardiogram: 4/18/24      Left Ventricle: Left ventricular cavity size is normal. Wall thickness is normal. The left ventricular ejection fraction is %. Systolic function is normal. Wall motion is normal. Diastolic function is normal for age.    Right Ventricle: Right ventricular cavity size is normal. Systolic function is normal.    Left Atrium: The atrium is moderately dilated.    Aortic Valve: The aortic valve is trileaflet. The leaflets are severely thickened. The leaflets are severely calcified. There is severely reduced mobility. There is moderate regurgitation. There is moderate to severe stenosis. The aortic valve peak velocity is 3.67 m/s. The aortic valve peak gradient is 54 mmHg. The aortic valve mean gradient is 34 mmHg. The dimensionless velocity index is 0.29. The aortic valve area is 0.83 cm2.    Mitral Valve: There is annular calcification. There is mild regurgitation.    Tricuspid Valve: There is mild regurgitation.     Findings    Left Ventricle Left ventricular cavity size is normal. Wall thickness is normal. The left ventricular ejection fraction is %. Systolic function is normal. Wall motion is normal. Diastolic function is normal for age.   Right Ventricle Right ventricular cavity size is normal. Systolic function is normal. Wall thickness is normal.   Left Atrium The atrium is moderately dilated.   Right Atrium The atrium is normal in size.   Aortic Valve The aortic valve is trileaflet. The leaflets are severely thickened. The leaflets are severely calcified. There is severely reduced mobility. There is moderate regurgitation. There is moderate to severe stenosis. The aortic valve peak velocity is 3.67 m/s. The aortic valve peak gradient is 54 mmHg. The aortic valve mean gradient is 34 mmHg. The  dimensionless velocity index is 0.29. The aortic valve area is 0.83 cm2.   Mitral Valve There is annular calcification.  There is mild regurgitation. There is no evidence of stenosis.   Tricuspid Valve Tricuspid valve structure is normal. There is mild regurgitation. There is no evidence of stenosis. The right ventricular systolic pressure is normal. The estimated right ventricular systolic pressure is 32.00 mmHg.   Pulmonic Valve Pulmonic valve structure is normal. There is trace regurgitation. There is no evidence of stenosis.   Ascending Aorta The aortic root is normal in size.   IVC/SVC The right atrial pressure is estimated at 5.0 mmHg. The inferior vena cava is normal in size. Respirophasic changes were normal.   Pericardium There is no pericardial effusion. The pericardium is normal in appearance.     Left Ventricle Measurements    Function/Volumes   A4C EF 68 %         LVOT stroke volume 80.82         LVOT stroke volume index 51 ml/m2         Left ventricular stroke volume (2D) 66 mL         LVOT Cardiac Output 5.05 l/min         LVOT Cardiac Index 3.3 l/min/m2         Dimensions   LVIDd 4.6 cm         LVIDS 2.8 cm         IVSd 0.9 cm         LVPWd 0.9 cm         LVOT area 3.14 cm2         FS 39         Diastolic Filling   MV E' Tissue Velocity Septal 9 cm/s         MV E' Tissue Velocity Lateral 12 cm/s         LA Volume Index (BP) 41.2 mL/m2         E/A ratio 0.7         E wave deceleration time 207 ms         MV Peak E Armani 96 cm/s         MV Peak A Armani 1.37 m/s          Report Measurements   AV LVOT peak gradient 5 mmHg              Interventricular Septum Measurements    Shunt Ratio   LVOT peak VTI 25.74 cm         LVOT peak armani 1.08 m/s              Right Ventricle Measurements    Dimensions   RVID d 3.5 cm         Tricuspid annular plane systolic excursion 1.9 cm               Left Atrium Measurements    Dimensions   LA size 4.4 cm         LA length (A2C) 5.4 cm         Volumes   LA volume (BP) 63 mL          LA Volume Index (BP) 41.2 mL/m2               Right Atrium Measurements    Dimensions   RAA A4C 10 cm2               Atrial Septum Measurements    Shunt Ratio   LVOT peak VTI 25.74 cm         LVOT peak armani 1.08 m/s               Aortic Valve Measurements    Stenosis   Aortic valve peak velocity 3.67 m/s         LVOT peak armani 1.08 m/s         Ao VTI 97.91 cm         LVOT peak VTI 25.74 cm         AV mean gradient 34 mmHg         LVOT mn grad 3 mmHg         AV peak gradient 54 mmHg         AV LVOT peak gradient 5 mmHg         Regurgitation   AV peak gradient 79 mmHg         AV Deceleration Time 1,304 ms         AV regurgitation pressure 1/2 time 378 ms         Area/Dimensions   DVI 0.29         AV valve area 0.83 cm2         AV area by cont VTI 0.8 cm2         AV area peak armani 0.9 cm2         LVOT diameter 2 cm         LVOT area 3.14 cm2          Other Measurements   Aortic Valve Area by Planimetry 1.2 cm2              Mitral Valve Measurements    Stenosis   MV stenosis pressure 1/2 time 60 ms         MV valve area p 1/2 method 3.67               Tricuspid Valve Measurements    RVSP Parameters   TR Peak Armani 2.6 m/s         Est. RA pres 5 mmHg         Triscuspid Valve Regurgitation Peak Gradient 27 mmHg         Right Ventricular Peak Systolic Pressure 32 mmHg               Aorta Measurements    Aortic Dimensions   Ao root 3.4 cm         Asc Ao 3.1 cm               IVC/SVC Measurements    IVC/SVC   Est. RA pres 5 mmHg           Gated CTA of the chest/abdomen/pelvis: 6/27/24    VASCULAR FINDINGS:     Central pulmonary artery is not abnormally enlarged.  Right atrium and right ventricle are normal in size.  Left atrial cavity is not abnormally dilated.  Left ventricular myocardium is normal.  Mild mitral annular calcification.     Coronary artery origins are in typical position.  Mild coronary artery calcification.     Annulus, LVOT and aorta analysis:     Typical trileaflet aortic valve morphology.        Optimal CT  aortic valve plane angles:  3 cusp view: AYESHA 1, cranial 7  Cusp overlap view LOYA 23, cranial 33     Measurements:     Annulus diameter (max x min): 25 x 20 mm.  Annulus Area 376 mm2.  Annulus Perimeter 70 mm.     Annulus to left main coronary ostium: 13 mm.  Annulus to right main coronary ostium: 16 mm.  Sinus of Valsalva height: 22 mm.     Aortic sinus of Valsalva diameter (max x min): 31 x 29 mm.  LVOT minimal diameter: 21 mm.     Sino-tubular junction minimal diameter: 27 mm.  Ascending thoracic aorta maximal diameter: 33 mm.     Valve Calcification:     Aortic valve calcium score is 961.  Volume of 318 mm3.     Thoracic Aorta:     Porcelain aorta = no.  Aortic root and ascending thoracic aorta free of significant calcification beyond the sino-tubular junction.  Aortic arch is normal in course and caliber with insignificant calcification.  Descending thoracic aorta is normal in course, caliber, and contour.     Abdominal aorta and pelvic artery measurements:     Abdominal aorta:  Minimal diameter above aortic bifurcation: 14 mm  Calcification: mild  Tortuosity: mild     Right iliofemoral segment:  Minimal diameter: 7 mm  Calcification: mild  Tortuosity: mild     Left iliofemoral segment:  Minimal diameter: 6 mm  Calcification: mild  Tortuosity: none    EC24    Sinus rhythm with occasional Premature ventricular complexes  Incomplete right bundle branch block    Cardiac catheterization: 24    Mild nonobstructive epicardial CAD     Carotid duplex: 24  RIGHT:  There is <50% stenosis noted in the internal carotid artery. Plaque is  heterogenous and irregular.  Vertebral artery flow is antegrade.  There is no significant subclavian artery disease.     LEFT:  There is no evidence of arterial disease throughout the extracranial carotid  system.  Vertebral artery flow is antegrade.  There is no significant subclavian artery disease.      I have personally reviewed pertinent films in PACS    TAVR evaluation  Assessment:     5 Meter Walk Test:      Attempt 1: 6 sec   Attempt 2: 7 sec   Attempt 3: 8 sec    STS Risk Score: 6.6%    Aortic Stenosis Stage: D3    [facility]: I    KCCQ-12 completed        Impression/Plan:    Ashley Eastman has symptomatic severe aortic stenosis. She has undergone testing for transcatheter aortic valve replacement.  The results of these studies have been interpreted by the multidisciplinary TAVR team who have determined the patient to be Intermediate risk for open aortic valve replacement based on other pre-existing comobidities not reflected in the STS risk score.     The risks, benefits, and alternatives to TAVR were discussed in detail with the patient today. She understands and wishes to proceed with transfemoral transcatheter aortic valve replacement.  Informed consent was obtained and a date for the intervention has been set.    Pre-op instructions reviewed with patient and they were instructed to stop Multivitamins now.    Ashley Eastman was comfortable with our recommendations, and their questions were answered to their satisfaction.       SIGNATURE: ALEXANDER Acosta  DATE: July 16, 2024  TIME: 11:31 AM

## 2024-07-18 ENCOUNTER — LAB REQUISITION (OUTPATIENT)
Dept: LAB | Facility: HOSPITAL | Age: 89
End: 2024-07-18
Payer: MEDICARE

## 2024-07-18 ENCOUNTER — APPOINTMENT (OUTPATIENT)
Dept: LAB | Facility: MEDICAL CENTER | Age: 89
End: 2024-07-18
Payer: MEDICARE

## 2024-07-18 DIAGNOSIS — Z86.79 H/O: RHEUMATIC FEVER: ICD-10-CM

## 2024-07-18 DIAGNOSIS — I35.0 SEVERE AORTIC STENOSIS: ICD-10-CM

## 2024-07-18 DIAGNOSIS — Z86.79 PERSONAL HISTORY OF OTHER DISEASES OF THE CIRCULATORY SYSTEM: ICD-10-CM

## 2024-07-18 DIAGNOSIS — I35.0 NONRHEUMATIC AORTIC (VALVE) STENOSIS: ICD-10-CM

## 2024-07-18 LAB
ABO GROUP BLD: NORMAL
BLD GP AB SCN SERPL QL: NEGATIVE
INR PPP: 1.08 (ref 0.84–1.19)
PROTHROMBIN TIME: 13.9 SECONDS (ref 11.6–14.5)
RH BLD: POSITIVE
SPECIMEN EXPIRATION DATE: NORMAL

## 2024-07-18 PROCEDURE — 36415 COLL VENOUS BLD VENIPUNCTURE: CPT

## 2024-07-18 PROCEDURE — 86901 BLOOD TYPING SEROLOGIC RH(D): CPT | Performed by: NURSE PRACTITIONER

## 2024-07-18 PROCEDURE — 85610 PROTHROMBIN TIME: CPT

## 2024-07-18 PROCEDURE — 86900 BLOOD TYPING SEROLOGIC ABO: CPT | Performed by: NURSE PRACTITIONER

## 2024-07-18 PROCEDURE — 87081 CULTURE SCREEN ONLY: CPT

## 2024-07-18 PROCEDURE — 86850 RBC ANTIBODY SCREEN: CPT | Performed by: NURSE PRACTITIONER

## 2024-07-19 LAB — MRSA NOSE QL CULT: NORMAL

## 2024-07-24 ENCOUNTER — ANESTHESIA EVENT (OUTPATIENT)
Dept: PERIOP | Facility: HOSPITAL | Age: 89
DRG: 267 | End: 2024-07-24
Payer: MEDICARE

## 2024-07-24 NOTE — ANESTHESIA PREPROCEDURE EVALUATION
Procedure:  REPLACEMENT AORTIC VALVE TRANSCATHETER (TAVR) TRANSFEMORAL W/ 23MM VALVE(ACCESS ON RIGHT) BINU (Chest)  Cardiac tavr (Chest)    Relevant Problems   ANESTHESIA (within normal limits)      CARDIO   (+) Hypertension   (+) Mitral valve regurgitation   (+) Pure hypercholesterolemia   (+) Severe aortic stenosis      GI/HEPATIC   (+) Hepatic steatosis   (+) Pancreas cyst      MUSCULOSKELETAL   (+) Osteoarthritis      Surgery/Wound/Pain   (+) S/P right colectomy      Cardiovascular/Peripheral Vascular   (+) Left ventricular hypertrophy      Other   (+) H/O: rheumatic fever   Findings    Left Ventricle Left ventricular cavity size is normal. Wall thickness is normal. The left ventricular ejection fraction is %. Systolic function is normal. Wall motion is normal. Diastolic function is normal for age.   Right Ventricle Right ventricular cavity size is normal. Systolic function is normal. Wall thickness is normal.   Left Atrium The atrium is moderately dilated.   Right Atrium The atrium is normal in size.   Aortic Valve The aortic valve is trileaflet. The leaflets are severely thickened. The leaflets are severely calcified. There is severely reduced mobility. There is moderate regurgitation. There is moderate to severe stenosis. The aortic valve peak velocity is 3.67 m/s. The aortic valve peak gradient is 54 mmHg. The aortic valve mean gradient is 34 mmHg. The dimensionless velocity index is 0.29. The aortic valve area is 0.83 cm2.   Mitral Valve There is annular calcification.  There is mild regurgitation. There is no evidence of stenosis.   Tricuspid Valve Tricuspid valve structure is normal. There is mild regurgitation. There is no evidence of stenosis. The right ventricular systolic pressure is normal. The estimated right ventricular systolic pressure is 32.00 mmHg.   Pulmonic Valve Pulmonic valve structure is normal. There is trace regurgitation. There is no evidence of stenosis.   Ascending Aorta The aortic  root is normal in size.   IVC/SVC The right atrial pressure is estimated at 5.0 mmHg. The inferior vena cava is normal in size. Respirophasic changes were normal.   Pericardium There is no pericardial effusion. The pericardium is normal in appearance.       Sinus rhythm with occasional Premature ventricular complexes  Incomplete right bundle branch block  When compared with ECG of 02-AUG-2023 12:49,  Premature ventricular complexes are now Present    Impression         Mild nonobstructive epicardial CAD     Recommendation    Recommendation 1) follow up TAVR planning  Patient was seen and independently examined by me.  I agree with assessment by cardiac surgery regarding patient's candidacy for transcatheter aortic valve surgery/TAVR based on patient's preference, age        Physical Exam    Airway    Mallampati score: III  TM Distance: >3 FB  Neck ROM: full     Dental   Comment: Multiple chipped teeth. No loose     Cardiovascular  Rhythm: regular, Rate: normal, Murmur    Pulmonary  Pulmonary exam normal Breath sounds clear to auscultation    Other Findings  post-pubertal.      Anesthesia Plan  ASA Score- 4     Anesthesia Type- general with ASA Monitors.         Additional Monitors: arterial line and central venous line.    Airway Plan: ETT.    Comment: BINU.       Plan Factors-Exercise tolerance (METS): <4 METS.    Chart reviewed.   Existing labs reviewed. Patient summary reviewed.    Patient is not a current smoker.              Induction- intravenous.    Postoperative Plan- Plan for postoperative opioid use. Planned trial extubation        Informed Consent- Anesthetic plan and risks discussed with patient.  I personally reviewed this patient with the CRNA. Discussed and agreed on the Anesthesia Plan with the CRNA..

## 2024-07-25 ENCOUNTER — APPOINTMENT (OUTPATIENT)
Dept: NON INVASIVE DIAGNOSTICS | Facility: HOSPITAL | Age: 89
DRG: 267 | End: 2024-07-25
Attending: STUDENT IN AN ORGANIZED HEALTH CARE EDUCATION/TRAINING PROGRAM
Payer: MEDICARE

## 2024-07-25 ENCOUNTER — APPOINTMENT (INPATIENT)
Dept: NON INVASIVE DIAGNOSTICS | Facility: HOSPITAL | Age: 89
DRG: 267 | End: 2024-07-25
Payer: MEDICARE

## 2024-07-25 ENCOUNTER — HOSPITAL ENCOUNTER (INPATIENT)
Facility: HOSPITAL | Age: 89
LOS: 2 days | Discharge: HOME | DRG: 267 | End: 2024-07-27
Attending: STUDENT IN AN ORGANIZED HEALTH CARE EDUCATION/TRAINING PROGRAM | Admitting: STUDENT IN AN ORGANIZED HEALTH CARE EDUCATION/TRAINING PROGRAM
Payer: MEDICARE

## 2024-07-25 ENCOUNTER — APPOINTMENT (INPATIENT)
Dept: RADIOLOGY | Facility: HOSPITAL | Age: 89
DRG: 267 | End: 2024-07-25
Payer: MEDICARE

## 2024-07-25 ENCOUNTER — ANESTHESIA (OUTPATIENT)
Dept: PERIOP | Facility: HOSPITAL | Age: 89
DRG: 267 | End: 2024-07-25
Payer: MEDICARE

## 2024-07-25 DIAGNOSIS — I35.0 SEVERE AORTIC STENOSIS: Primary | ICD-10-CM

## 2024-07-25 DIAGNOSIS — Z95.2 S/P TAVR (TRANSCATHETER AORTIC VALVE REPLACEMENT): ICD-10-CM

## 2024-07-25 DIAGNOSIS — I35.0 SEVERE AORTIC STENOSIS: ICD-10-CM

## 2024-07-25 DIAGNOSIS — I45.9 HEART BLOCK: ICD-10-CM

## 2024-07-25 DIAGNOSIS — I44.1 HEART BLOCK AV SECOND DEGREE: ICD-10-CM

## 2024-07-25 DIAGNOSIS — Z95.3 S/P TAVR (TRANSCATHETER AORTIC VALVE REPLACEMENT), BIOPROSTHETIC: Primary | ICD-10-CM

## 2024-07-25 DIAGNOSIS — I35.0 NONRHEUMATIC AORTIC (VALVE) STENOSIS: ICD-10-CM

## 2024-07-25 DIAGNOSIS — Z86.79 H/O: RHEUMATIC FEVER: ICD-10-CM

## 2024-07-25 LAB
ANION GAP SERPL CALCULATED.3IONS-SCNC: 9 MMOL/L (ref 4–13)
ATRIAL RATE: 78 BPM
ATRIAL RATE: 79 BPM
ATRIAL RATE: 80 BPM
BASE EXCESS BLDA CALC-SCNC: -1 MMOL/L (ref -2–3)
BASE EXCESS BLDA CALC-SCNC: -1 MMOL/L (ref -2–3)
BUN SERPL-MCNC: 12 MG/DL (ref 5–25)
CA-I BLD-SCNC: 1.15 MMOL/L (ref 1.12–1.32)
CA-I BLD-SCNC: 1.2 MMOL/L (ref 1.12–1.32)
CALCIUM SERPL-MCNC: 8.2 MG/DL (ref 8.4–10.2)
CHLORIDE SERPL-SCNC: 105 MMOL/L (ref 96–108)
CO2 SERPL-SCNC: 21 MMOL/L (ref 21–32)
CREAT SERPL-MCNC: 0.73 MG/DL (ref 0.6–1.3)
GFR SERPL CREATININE-BSD FRML MDRD: 73 ML/MIN/1.73SQ M
GLUCOSE SERPL-MCNC: 101 MG/DL (ref 65–140)
GLUCOSE SERPL-MCNC: 114 MG/DL (ref 65–140)
GLUCOSE SERPL-MCNC: 117 MG/DL (ref 65–140)
GLUCOSE SERPL-MCNC: 130 MG/DL (ref 65–140)
GLUCOSE SERPL-MCNC: 138 MG/DL (ref 65–140)
GLUCOSE SERPL-MCNC: 177 MG/DL (ref 65–140)
HCO3 BLDA-SCNC: 23.7 MMOL/L (ref 22–28)
HCO3 BLDA-SCNC: 24.2 MMOL/L (ref 24–30)
HCT VFR BLD AUTO: 33 % (ref 34.8–46.1)
HCT VFR BLD CALC: 29 % (ref 34.8–46.1)
HCT VFR BLD CALC: 31 % (ref 34.8–46.1)
HGB BLD-MCNC: 11.3 G/DL (ref 11.5–15.4)
HGB BLDA-MCNC: 10.5 G/DL (ref 11.5–15.4)
HGB BLDA-MCNC: 9.9 G/DL (ref 11.5–15.4)
KCT BLD-ACNC: 123 SEC (ref 89–137)
KCT BLD-ACNC: 154 SEC (ref 89–137)
KCT BLD-ACNC: 248 SEC (ref 89–137)
P AXIS: 13 DEGREES
P AXIS: 13 DEGREES
P AXIS: 68 DEGREES
PCO2 BLD: 25 MMOL/L (ref 21–32)
PCO2 BLD: 25 MMOL/L (ref 21–32)
PCO2 BLD: 37.9 MM HG (ref 36–44)
PCO2 BLD: 41.6 MM HG (ref 42–50)
PH BLD: 7.37 [PH] (ref 7.3–7.4)
PH BLD: 7.41 [PH] (ref 7.35–7.45)
PLATELET # BLD AUTO: 169 THOUSANDS/UL (ref 149–390)
PMV BLD AUTO: 9.2 FL (ref 8.9–12.7)
PO2 BLD: 208 MM HG (ref 75–129)
PO2 BLD: 41 MM HG (ref 35–45)
POTASSIUM BLD-SCNC: 3.8 MMOL/L (ref 3.5–5.3)
POTASSIUM BLD-SCNC: 4 MMOL/L (ref 3.5–5.3)
POTASSIUM SERPL-SCNC: 3.7 MMOL/L (ref 3.5–5.3)
PR INTERVAL: 130 MS
PR INTERVAL: 132 MS
PR INTERVAL: 162 MS
QRS AXIS: 57 DEGREES
QRS AXIS: 63 DEGREES
QRS AXIS: 69 DEGREES
QRSD INTERVAL: 112 MS
QRSD INTERVAL: 116 MS
QRSD INTERVAL: 128 MS
QT INTERVAL: 430 MS
QT INTERVAL: 456 MS
QT INTERVAL: 542 MS
QTC INTERVAL: 457 MS
QTC INTERVAL: 493 MS
QTC INTERVAL: 525 MS
SAO2 % BLD FROM PO2: 100 % (ref 60–85)
SAO2 % BLD FROM PO2: 75 % (ref 60–85)
SODIUM BLD-SCNC: 136 MMOL/L (ref 136–145)
SODIUM BLD-SCNC: 137 MMOL/L (ref 136–145)
SODIUM SERPL-SCNC: 135 MMOL/L (ref 135–147)
SPECIMEN SOURCE: ABNORMAL
SPECIMEN SOURCE: NORMAL
T WAVE AXIS: 13 DEGREES
T WAVE AXIS: 254 DEGREES
T WAVE AXIS: 9 DEGREES
VENTRICULAR RATE: 43 BPM
VENTRICULAR RATE: 79 BPM
VENTRICULAR RATE: 80 BPM

## 2024-07-25 PROCEDURE — 93010 ELECTROCARDIOGRAM REPORT: CPT | Performed by: INTERNAL MEDICINE

## 2024-07-25 PROCEDURE — 82330 ASSAY OF CALCIUM: CPT

## 2024-07-25 PROCEDURE — 84295 ASSAY OF SERUM SODIUM: CPT

## 2024-07-25 PROCEDURE — 85018 HEMOGLOBIN: CPT | Performed by: PHYSICIAN ASSISTANT

## 2024-07-25 PROCEDURE — 02RF38Z REPLACEMENT OF AORTIC VALVE WITH ZOOPLASTIC TISSUE, PERCUTANEOUS APPROACH: ICD-10-PCS | Performed by: STUDENT IN AN ORGANIZED HEALTH CARE EDUCATION/TRAINING PROGRAM

## 2024-07-25 PROCEDURE — 93355 ECHO TRANSESOPHAGEAL (TEE): CPT

## 2024-07-25 PROCEDURE — 86923 COMPATIBILITY TEST ELECTRIC: CPT

## 2024-07-25 PROCEDURE — 84132 ASSAY OF SERUM POTASSIUM: CPT

## 2024-07-25 PROCEDURE — 85014 HEMATOCRIT: CPT | Performed by: PHYSICIAN ASSISTANT

## 2024-07-25 PROCEDURE — 80048 BASIC METABOLIC PNL TOTAL CA: CPT | Performed by: PHYSICIAN ASSISTANT

## 2024-07-25 PROCEDURE — C1894 INTRO/SHEATH, NON-LASER: HCPCS | Performed by: STUDENT IN AN ORGANIZED HEALTH CARE EDUCATION/TRAINING PROGRAM

## 2024-07-25 PROCEDURE — C1781 MESH (IMPLANTABLE): HCPCS | Performed by: STUDENT IN AN ORGANIZED HEALTH CARE EDUCATION/TRAINING PROGRAM

## 2024-07-25 PROCEDURE — 33361 REPLACE AORTIC VALVE PERQ: CPT | Performed by: INTERNAL MEDICINE

## 2024-07-25 PROCEDURE — 02HV33Z INSERTION OF INFUSION DEVICE INTO SUPERIOR VENA CAVA, PERCUTANEOUS APPROACH: ICD-10-PCS | Performed by: ANESTHESIOLOGY

## 2024-07-25 PROCEDURE — 93005 ELECTROCARDIOGRAM TRACING: CPT

## 2024-07-25 PROCEDURE — 82948 REAGENT STRIP/BLOOD GLUCOSE: CPT

## 2024-07-25 PROCEDURE — 82947 ASSAY GLUCOSE BLOOD QUANT: CPT

## 2024-07-25 PROCEDURE — C1751 CATH, INF, PER/CENT/MIDLINE: HCPCS | Performed by: STUDENT IN AN ORGANIZED HEALTH CARE EDUCATION/TRAINING PROGRAM

## 2024-07-25 PROCEDURE — 82803 BLOOD GASES ANY COMBINATION: CPT

## 2024-07-25 PROCEDURE — 85049 AUTOMATED PLATELET COUNT: CPT | Performed by: PHYSICIAN ASSISTANT

## 2024-07-25 PROCEDURE — C1769 GUIDE WIRE: HCPCS | Performed by: STUDENT IN AN ORGANIZED HEALTH CARE EDUCATION/TRAINING PROGRAM

## 2024-07-25 PROCEDURE — 33361 REPLACE AORTIC VALVE PERQ: CPT | Performed by: STUDENT IN AN ORGANIZED HEALTH CARE EDUCATION/TRAINING PROGRAM

## 2024-07-25 PROCEDURE — 85014 HEMATOCRIT: CPT

## 2024-07-25 PROCEDURE — 85347 COAGULATION TIME ACTIVATED: CPT

## 2024-07-25 PROCEDURE — C1760 CLOSURE DEV, VASC: HCPCS | Performed by: STUDENT IN AN ORGANIZED HEALTH CARE EDUCATION/TRAINING PROGRAM

## 2024-07-25 PROCEDURE — 99223 1ST HOSP IP/OBS HIGH 75: CPT | Performed by: STUDENT IN AN ORGANIZED HEALTH CARE EDUCATION/TRAINING PROGRAM

## 2024-07-25 PROCEDURE — 71045 X-RAY EXAM CHEST 1 VIEW: CPT

## 2024-07-25 PROCEDURE — 76377 3D RENDER W/INTRP POSTPROCES: CPT

## 2024-07-25 PROCEDURE — NC001 PR NO CHARGE: Performed by: PHYSICIAN ASSISTANT

## 2024-07-25 DEVICE — PERCLOSE™ PROSTYLE™ SUTURE-MEDIATED CLOSURE AND REPAIR SYSTEM
Type: IMPLANTABLE DEVICE | Site: GROIN | Status: FUNCTIONAL
Brand: PERCLOSE™ PROSTYLE™

## 2024-07-25 DEVICE — SAPIEN 3 ULTRA RESILIA TRANSCATHETER HEART VALVE, 23MM
Type: IMPLANTABLE DEVICE | Site: HEART | Status: FUNCTIONAL
Brand: SAPIEN 3 ULTRA RESILIA

## 2024-07-25 RX ORDER — HYDRALAZINE HYDROCHLORIDE 20 MG/ML
5 INJECTION INTRAMUSCULAR; INTRAVENOUS
Status: DISCONTINUED | OUTPATIENT
Start: 2024-07-25 | End: 2024-07-25 | Stop reason: HOSPADM

## 2024-07-25 RX ORDER — ALBUTEROL SULFATE 2.5 MG/3ML
2.5 SOLUTION RESPIRATORY (INHALATION) ONCE AS NEEDED
Status: DISCONTINUED | OUTPATIENT
Start: 2024-07-25 | End: 2024-07-25 | Stop reason: HOSPADM

## 2024-07-25 RX ORDER — PROPOFOL 10 MG/ML
INJECTION, EMULSION INTRAVENOUS AS NEEDED
Status: DISCONTINUED | OUTPATIENT
Start: 2024-07-25 | End: 2024-07-25

## 2024-07-25 RX ORDER — PHENYLEPHRINE HCL IN 0.9% NACL 1 MG/10 ML
200-2000 SYRINGE (ML) INTRAVENOUS ONCE
Status: DISCONTINUED | OUTPATIENT
Start: 2024-07-25 | End: 2024-07-25 | Stop reason: HOSPADM

## 2024-07-25 RX ORDER — TRAMADOL HYDROCHLORIDE 50 MG/1
50 TABLET ORAL EVERY 6 HOURS PRN
Status: DISCONTINUED | OUTPATIENT
Start: 2024-07-25 | End: 2024-07-27 | Stop reason: HOSPADM

## 2024-07-25 RX ORDER — HYDROMORPHONE HCL/PF 1 MG/ML
0.5 SYRINGE (ML) INJECTION
Status: DISCONTINUED | OUTPATIENT
Start: 2024-07-25 | End: 2024-07-25 | Stop reason: HOSPADM

## 2024-07-25 RX ORDER — PHENYLEPHRINE HCL IN 0.9% NACL 1 MG/10 ML
SYRINGE (ML) INTRAVENOUS AS NEEDED
Status: DISCONTINUED | OUTPATIENT
Start: 2024-07-25 | End: 2024-07-25

## 2024-07-25 RX ORDER — LIDOCAINE HYDROCHLORIDE 10 MG/ML
INJECTION, SOLUTION EPIDURAL; INFILTRATION; INTRACAUDAL; PERINEURAL
Status: COMPLETED | OUTPATIENT
Start: 2024-07-25 | End: 2024-07-25

## 2024-07-25 RX ORDER — HEPARIN SODIUM 1000 [USP'U]/ML
400 INJECTION, SOLUTION INTRAVENOUS; SUBCUTANEOUS ONCE
Status: DISCONTINUED | OUTPATIENT
Start: 2024-07-25 | End: 2024-07-25 | Stop reason: HOSPADM

## 2024-07-25 RX ORDER — ROCURONIUM BROMIDE 10 MG/ML
INJECTION, SOLUTION INTRAVENOUS AS NEEDED
Status: DISCONTINUED | OUTPATIENT
Start: 2024-07-25 | End: 2024-07-25

## 2024-07-25 RX ORDER — METOCLOPRAMIDE HYDROCHLORIDE 5 MG/ML
10 INJECTION INTRAMUSCULAR; INTRAVENOUS ONCE AS NEEDED
Status: DISCONTINUED | OUTPATIENT
Start: 2024-07-25 | End: 2024-07-25 | Stop reason: HOSPADM

## 2024-07-25 RX ORDER — SODIUM CHLORIDE, SODIUM LACTATE, POTASSIUM CHLORIDE, CALCIUM CHLORIDE 600; 310; 30; 20 MG/100ML; MG/100ML; MG/100ML; MG/100ML
125 INJECTION, SOLUTION INTRAVENOUS CONTINUOUS
Status: DISCONTINUED | OUTPATIENT
Start: 2024-07-25 | End: 2024-07-26

## 2024-07-25 RX ORDER — SODIUM CHLORIDE, SODIUM LACTATE, POTASSIUM CHLORIDE, CALCIUM CHLORIDE 600; 310; 30; 20 MG/100ML; MG/100ML; MG/100ML; MG/100ML
INJECTION, SOLUTION INTRAVENOUS CONTINUOUS PRN
Status: DISCONTINUED | OUTPATIENT
Start: 2024-07-25 | End: 2024-07-25

## 2024-07-25 RX ORDER — PROTAMINE SULFATE 10 MG/ML
INJECTION, SOLUTION INTRAVENOUS AS NEEDED
Status: DISCONTINUED | OUTPATIENT
Start: 2024-07-25 | End: 2024-07-25

## 2024-07-25 RX ORDER — BISACODYL 10 MG
10 SUPPOSITORY, RECTAL RECTAL DAILY PRN
Status: DISCONTINUED | OUTPATIENT
Start: 2024-07-25 | End: 2024-07-27 | Stop reason: HOSPADM

## 2024-07-25 RX ORDER — CEFAZOLIN SODIUM 2 G/50ML
2000 SOLUTION INTRAVENOUS ONCE
Status: COMPLETED | OUTPATIENT
Start: 2024-07-25 | End: 2024-07-25

## 2024-07-25 RX ORDER — ACETAMINOPHEN 325 MG/1
650 TABLET ORAL EVERY 4 HOURS PRN
Status: CANCELLED | OUTPATIENT
Start: 2024-07-25

## 2024-07-25 RX ORDER — CEFAZOLIN SODIUM 2 G/50ML
2000 SOLUTION INTRAVENOUS EVERY 8 HOURS
Status: COMPLETED | OUTPATIENT
Start: 2024-07-25 | End: 2024-07-26

## 2024-07-25 RX ORDER — LIDOCAINE HYDROCHLORIDE 10 MG/ML
0.5 INJECTION, SOLUTION EPIDURAL; INFILTRATION; INTRACAUDAL; PERINEURAL ONCE AS NEEDED
Status: DISCONTINUED | OUTPATIENT
Start: 2024-07-25 | End: 2024-07-25

## 2024-07-25 RX ORDER — ASPIRIN 81 MG/1
81 TABLET, CHEWABLE ORAL DAILY
Status: DISCONTINUED | OUTPATIENT
Start: 2024-07-25 | End: 2024-07-27 | Stop reason: HOSPADM

## 2024-07-25 RX ORDER — SODIUM CHLORIDE, SODIUM LACTATE, POTASSIUM CHLORIDE, CALCIUM CHLORIDE 600; 310; 30; 20 MG/100ML; MG/100ML; MG/100ML; MG/100ML
125 INJECTION, SOLUTION INTRAVENOUS CONTINUOUS
Status: DISCONTINUED | OUTPATIENT
Start: 2024-07-25 | End: 2024-07-25

## 2024-07-25 RX ORDER — HEPARIN SODIUM 5000 [USP'U]/ML
5000 INJECTION, SOLUTION INTRAVENOUS; SUBCUTANEOUS EVERY 8 HOURS SCHEDULED
Status: DISCONTINUED | OUTPATIENT
Start: 2024-07-26 | End: 2024-07-26

## 2024-07-25 RX ORDER — POLYETHYLENE GLYCOL 3350 17 G/17G
17 POWDER, FOR SOLUTION ORAL DAILY
Status: DISCONTINUED | OUTPATIENT
Start: 2024-07-26 | End: 2024-07-27 | Stop reason: HOSPADM

## 2024-07-25 RX ORDER — ONDANSETRON 2 MG/ML
4 INJECTION INTRAMUSCULAR; INTRAVENOUS ONCE AS NEEDED
Status: DISCONTINUED | OUTPATIENT
Start: 2024-07-25 | End: 2024-07-25 | Stop reason: HOSPADM

## 2024-07-25 RX ORDER — FENTANYL CITRATE/PF 50 MCG/ML
25 SYRINGE (ML) INJECTION
Status: DISCONTINUED | OUTPATIENT
Start: 2024-07-25 | End: 2024-07-25 | Stop reason: HOSPADM

## 2024-07-25 RX ORDER — HEPARIN SODIUM 1000 [USP'U]/ML
INJECTION, SOLUTION INTRAVENOUS; SUBCUTANEOUS AS NEEDED
Status: DISCONTINUED | OUTPATIENT
Start: 2024-07-25 | End: 2024-07-25

## 2024-07-25 RX ORDER — SODIUM CHLORIDE 9 MG/ML
INJECTION, SOLUTION INTRAVENOUS CONTINUOUS PRN
Status: DISCONTINUED | OUTPATIENT
Start: 2024-07-25 | End: 2024-07-25

## 2024-07-25 RX ORDER — PROMETHAZINE HYDROCHLORIDE 25 MG/ML
12.5 INJECTION, SOLUTION INTRAMUSCULAR; INTRAVENOUS ONCE AS NEEDED
Status: DISCONTINUED | OUTPATIENT
Start: 2024-07-25 | End: 2024-07-25 | Stop reason: HOSPADM

## 2024-07-25 RX ORDER — ATORVASTATIN CALCIUM 20 MG/1
20 TABLET, FILM COATED ORAL
Status: DISCONTINUED | OUTPATIENT
Start: 2024-07-25 | End: 2024-07-27 | Stop reason: HOSPADM

## 2024-07-25 RX ORDER — CLOPIDOGREL BISULFATE 75 MG/1
75 TABLET ORAL DAILY
Status: DISCONTINUED | OUTPATIENT
Start: 2024-07-25 | End: 2024-07-27 | Stop reason: HOSPADM

## 2024-07-25 RX ORDER — LIDOCAINE HYDROCHLORIDE 10 MG/ML
INJECTION, SOLUTION INFILTRATION; PERINEURAL AS NEEDED
Status: DISCONTINUED | OUTPATIENT
Start: 2024-07-25 | End: 2024-07-25

## 2024-07-25 RX ORDER — HYDROMORPHONE HCL IN WATER/PF 6 MG/30 ML
0.2 PATIENT CONTROLLED ANALGESIA SYRINGE INTRAVENOUS
Status: DISCONTINUED | OUTPATIENT
Start: 2024-07-25 | End: 2024-07-25 | Stop reason: HOSPADM

## 2024-07-25 RX ORDER — CHLORHEXIDINE GLUCONATE ORAL RINSE 1.2 MG/ML
15 SOLUTION DENTAL 2 TIMES DAILY
Status: DISCONTINUED | OUTPATIENT
Start: 2024-07-25 | End: 2024-07-27 | Stop reason: HOSPADM

## 2024-07-25 RX ORDER — HYDRALAZINE HYDROCHLORIDE 20 MG/ML
10 INJECTION INTRAMUSCULAR; INTRAVENOUS EVERY 6 HOURS PRN
Status: DISCONTINUED | OUTPATIENT
Start: 2024-07-25 | End: 2024-07-27 | Stop reason: HOSPADM

## 2024-07-25 RX ORDER — AMLODIPINE BESYLATE 5 MG/1
5 TABLET ORAL
Status: DISCONTINUED | OUTPATIENT
Start: 2024-07-25 | End: 2024-07-27 | Stop reason: HOSPADM

## 2024-07-25 RX ORDER — FENTANYL CITRATE 50 UG/ML
INJECTION, SOLUTION INTRAMUSCULAR; INTRAVENOUS AS NEEDED
Status: DISCONTINUED | OUTPATIENT
Start: 2024-07-25 | End: 2024-07-25

## 2024-07-25 RX ORDER — CHLORHEXIDINE GLUCONATE ORAL RINSE 1.2 MG/ML
15 SOLUTION DENTAL ONCE
Status: COMPLETED | OUTPATIENT
Start: 2024-07-25 | End: 2024-07-25

## 2024-07-25 RX ORDER — ONDANSETRON 2 MG/ML
4 INJECTION INTRAMUSCULAR; INTRAVENOUS EVERY 6 HOURS PRN
Status: DISCONTINUED | OUTPATIENT
Start: 2024-07-25 | End: 2024-07-27 | Stop reason: HOSPADM

## 2024-07-25 RX ORDER — PANTOPRAZOLE SODIUM 40 MG/1
40 TABLET, DELAYED RELEASE ORAL
Status: DISCONTINUED | OUTPATIENT
Start: 2024-07-26 | End: 2024-07-27 | Stop reason: HOSPADM

## 2024-07-25 RX ORDER — INSULIN LISPRO 100 [IU]/ML
1-5 INJECTION, SOLUTION INTRAVENOUS; SUBCUTANEOUS
Status: DISCONTINUED | OUTPATIENT
Start: 2024-07-25 | End: 2024-07-27 | Stop reason: HOSPADM

## 2024-07-25 RX ORDER — LABETALOL HYDROCHLORIDE 5 MG/ML
10 INJECTION, SOLUTION INTRAVENOUS
Status: DISCONTINUED | OUTPATIENT
Start: 2024-07-25 | End: 2024-07-25 | Stop reason: HOSPADM

## 2024-07-25 RX ORDER — CALCIUM GLUCONATE 20 MG/ML
2 INJECTION, SOLUTION INTRAVENOUS ONCE AS NEEDED
Status: DISCONTINUED | OUTPATIENT
Start: 2024-07-25 | End: 2024-07-27 | Stop reason: HOSPADM

## 2024-07-25 RX ADMIN — FENTANYL CITRATE 100 MCG: 50 INJECTION INTRAMUSCULAR; INTRAVENOUS at 09:08

## 2024-07-25 RX ADMIN — HEPARIN SODIUM 7000 UNITS: 1000 INJECTION INTRAVENOUS; SUBCUTANEOUS at 09:49

## 2024-07-25 RX ADMIN — MUPIROCIN 1 APPLICATION: 20 OINTMENT TOPICAL at 21:21

## 2024-07-25 RX ADMIN — ATORVASTATIN CALCIUM 20 MG: 20 TABLET, FILM COATED ORAL at 16:32

## 2024-07-25 RX ADMIN — CHLORHEXIDINE GLUCONATE 15 ML: 1.2 SOLUTION ORAL at 08:09

## 2024-07-25 RX ADMIN — SODIUM CHLORIDE 5 MG/HR: 0.9 INJECTION, SOLUTION INTRAVENOUS at 18:37

## 2024-07-25 RX ADMIN — Medication 100 MCG: at 09:41

## 2024-07-25 RX ADMIN — Medication 1 TABLET: at 12:29

## 2024-07-25 RX ADMIN — PROTAMINE SULFATE 50 MG: 10 INJECTION, SOLUTION INTRAVENOUS at 10:04

## 2024-07-25 RX ADMIN — CEFAZOLIN SODIUM 2000 MG: 2 SOLUTION INTRAVENOUS at 09:30

## 2024-07-25 RX ADMIN — CEFAZOLIN SODIUM 2000 MG: 2 SOLUTION INTRAVENOUS at 16:32

## 2024-07-25 RX ADMIN — LIDOCAINE HYDROCHLORIDE 0.5 ML: 10 INJECTION, SOLUTION EPIDURAL; INFILTRATION; INTRACAUDAL; PERINEURAL at 09:04

## 2024-07-25 RX ADMIN — AMLODIPINE BESYLATE 5 MG: 5 TABLET ORAL at 21:21

## 2024-07-25 RX ADMIN — SODIUM CHLORIDE, SODIUM LACTATE, POTASSIUM CHLORIDE, AND CALCIUM CHLORIDE 125 ML/HR: .6; .31; .03; .02 INJECTION, SOLUTION INTRAVENOUS at 09:02

## 2024-07-25 RX ADMIN — PROPOFOL 50 MG: 10 INJECTION, EMULSION INTRAVENOUS at 09:08

## 2024-07-25 RX ADMIN — CLOPIDOGREL BISULFATE 75 MG: 75 TABLET ORAL at 12:29

## 2024-07-25 RX ADMIN — Medication 100 MCG: at 09:08

## 2024-07-25 RX ADMIN — SODIUM CHLORIDE: 9 INJECTION, SOLUTION INTRAVENOUS at 08:58

## 2024-07-25 RX ADMIN — SUGAMMADEX 200 MG: 100 INJECTION, SOLUTION INTRAVENOUS at 10:05

## 2024-07-25 RX ADMIN — LIDOCAINE HYDROCHLORIDE 5 ML: 10 INJECTION, SOLUTION INFILTRATION; PERINEURAL at 09:08

## 2024-07-25 RX ADMIN — MUPIROCIN 1 APPLICATION: 20 OINTMENT TOPICAL at 08:09

## 2024-07-25 RX ADMIN — ROCURONIUM BROMIDE 50 MG: 10 INJECTION, SOLUTION INTRAVENOUS at 09:08

## 2024-07-25 RX ADMIN — NICARDIPINE HYDROCHLORIDE 5 MG/HR: 2.5 INJECTION, SOLUTION INTRAVENOUS at 09:33

## 2024-07-25 RX ADMIN — NICARDIPINE HYDROCHLORIDE 100 MCG: 2.5 INJECTION, SOLUTION INTRAVENOUS at 09:59

## 2024-07-25 RX ADMIN — CHLORHEXIDINE GLUCONATE 0.12% ORAL RINSE 15 ML: 1.2 LIQUID ORAL at 17:44

## 2024-07-25 RX ADMIN — NICARDIPINE HYDROCHLORIDE 200 MCG: 2.5 INJECTION, SOLUTION INTRAVENOUS at 09:31

## 2024-07-25 RX ADMIN — SODIUM CHLORIDE, SODIUM LACTATE, POTASSIUM CHLORIDE, AND CALCIUM CHLORIDE: .6; .31; .03; .02 INJECTION, SOLUTION INTRAVENOUS at 09:15

## 2024-07-25 RX ADMIN — ASPIRIN 81 MG CHEWABLE TABLET 81 MG: 81 TABLET CHEWABLE at 12:29

## 2024-07-25 NOTE — RESPIRATORY THERAPY NOTE
RT Protocol Note  Ashley Eastman 89 y.o. female MRN: [id number]  Unit/Bed#: [facility] 412-01 Encounter: [id number]    Assessment    Principal Problem:    S/p TAVR (transcatheter aortic valve replacement), bioprosthetic  Active Problems:    Pure hypercholesterolemia    Osteoarthritis    Left ventricular hypertrophy    Hypertension    Severe aortic stenosis    Pancreas cyst    Lower abdominal pain    S/P right colectomy    H/O: rheumatic fever    Heart block AV second degree      Home Pulmonary Medications:    Home Devices/Therapy: (P) Other (Comment)    Past Medical History:   Diagnosis Date    Aortic valve regurgitation     Aortic valve stenosis     Elevated alkaline phosphatase level 2023    Hypertension     Left ventricular hypertrophy     Mitral valve regurgitation     Osteoarthritis     Pure hypercholesterolemia     Shingles outbreak     back and left side     Social History     Socioeconomic History    Marital status:      Spouse name: None    Number of children: None    Years of education: None    Highest education level: None   Occupational History    None   Tobacco Use    Smoking status: Former     Current packs/day: 0.00     Average packs/day: 1 pack/day for 25.0 years (25.0 ttl pk-yrs)     Types: Cigarettes     Start date:      Quit date:      Years since quittin.5     Passive exposure: Past    Smokeless tobacco: Never   Vaping Use    Vaping status: Never Used   Substance and Sexual Activity    Alcohol use: Not Currently    Drug use: Not Currently    Sexual activity: None   Other Topics Concern    None   Social History Narrative    · Most recent tobacco use screenin2019      · Do you currently or have you served in the [place] [employer]:   No      Social Determinants of Health     Financial Resource Strain: Low Risk  (2023)    Overall Financial Resource Strain (CARDIA)     Difficulty of Paying Living Expenses: Not hard at all   Food Insecurity: No Food Insecurity  "(8/16/2023)    Hunger Vital Sign     Worried About Running Out of Food in the Last Year: Never true     Ran Out of Food in the Last Year: Never true   Transportation Needs: No Transportation Needs (11/2/2023)    PRAPARE - Transportation     Lack of Transportation (Medical): No     Lack of Transportation (Non-Medical): No   Physical Activity: Not on file   Stress: Not on file   Social Connections: Not on file   Intimate Partner Violence: Not on file   Housing Stability: Low Risk  (8/16/2023)    Housing Stability Vital Sign     Unable to Pay for Housing in the Last Year: No     Number of Times Moved in the Last Year: 1     Homeless in the Last Year: No       Subjective         Objective    Physical Exam:   Assessment Type: (P) Assess only  General Appearance: (P) Awake, Alert  Chest Assessment: (P) Chest expansion symmetrical    Vitals:  Blood pressure 145/63, pulse 91, temperature 98.3 °F (36.8 °C), resp. rate 22, height 4' 11\" (1.499 m), weight 54.9 kg (121 lb 0.5 oz), SpO2 96%.          Imaging and other studies: I have personally reviewed pertinent reports.            Plan    Respiratory Plan: (P) Discontinue Protocol  Airway Clearance Plan: (P) Incentive Spirometer     Resp Comments: (P) Pt admitted for severe aortic stenosis.  H/O: rheumatic fever. Pt is a S/P TAVR( transcatheter aortic valve replacement). Pt assess per respiratory and ACP at this time. Per chart, pt has no pulmonary disease nor takes any bronchodilators at home. Pt is unable to perform IS at this time. X-Ray finding: Right IJ catheter with catheter tip projecting over the superior cavoatrial junctions. TAVR.  Clear lungs. No pneumothorax or pleural effusion. Will D/C respiratory protocol and continue to monitor per ACP   "

## 2024-07-25 NOTE — ANESTHESIA PROCEDURE NOTES
Procedure Performed: BINU Anesthesia  Start Time:  7/25/2024 9:30 AM        Preanesthesia Checklist    Patient identified, IV assessed, risks and benefits discussed, monitors and equipment assessed, procedure being performed at surgeon's request and anesthesia consent obtained.      Procedure    Diagnostic Indications for BINU:  assessment of ascending aorta, assessment of surgical repair and hemodynamic monitoring. Type of BINU: interventional BINU with real time guidance of intracardiac procedure. Images Saved: ultrasound permanent image saved. Physician Requesting Echo: Julio Cesar Draper MD.  Location performed: OR. Intubated.  Heart visualized. Insertion of BINU Probe:  Easy. Probe Type:  Epiaortic and multiplane. Modalities:  Color flow mapping, 3D, continuous wave Doppler and pulse wave Doppler.      Echocardiographic and Doppler Measurements    PREPROCEDURE    LEFT VENTRICLE:  Systolic Function: normal. Ejection Fraction: 60-65%. Cavity size: normal.   Regional Wall Motion Abnormalities: none.    RIGHT VENTRICLE:  Systolic Function: normal.  Cavity size normal. No hypertrophy              AORTIC VALVE:  Leaflets: trileaflet. Leaflet motions restricted. Stenosis: severe. Mean Gradient: 29 mmHg. Peak Gradient: 46 mmHg.  Area: 0.9 cm². Regurgitation: moderate. Pressure Half-time: 318 ms.     MITRAL VALVE:  Leaflets: calcified and normal. Leaflet Motions: normal. Regurgitation: mild.   Stenosis: none.       TRICUSPID VALVE:  Leaflets: normal. Leaflet Motions: normal. Stenosis: none. Regurgitation: mild.      PULMONIC VALVE:  Leaflets: normal. Regurgitation: none. Stenosis: none.        ASCENDING AORTA:  Size:  normal.  Dissection not present.      AORTIC ARCH:  Size:  normal.  dissection not present. Grade 3: atheroma protruding < 0.5 cm into lumen.    DESCENDING AORTA:  Size: normal.  Dissection not present. Grade 3: atheroma protruding < 0.5 cm into lumen.        RIGHT ATRIUM:  Size:  normal. No spontaneous  echo contrast.    LEFT ATRIUM:  Size: normal. No spontaneous echo contrast.    LEFT ATRIAL APPENDAGE:  Size: normal. No spontaneous echo contrast         ATRIAL SEPTUM:  Intra-atrial septal morphology: normal.          VENTRICULAR SEPTUM:  Intra-ventricular septum morphology: normal.             OTHER FINDINGS:  Pericardium:  normal. Pleural Effusion:  none.        POSTPROCEDURE    LEFT VENTRICLE: Unchanged .         RIGHT VENTRICLE: Unchanged .           AORTIC VALVE:   Leaflets: bioprosthetic. Stenosis: none. Mean Gradient: 3 mmHg. Regurgitation: 2 trace PVLs seen and trace.  Valve Size: 23 mm.    MITRAL VALVE: Unchanged .         TRICUSPID VALVE: Unchanged .        PULMONIC VALVE: Unchanged             ATRIA: Unchanged .          AORTA: Unchanged .        REMOVAL:  Probe Removal: atraumatic.

## 2024-07-25 NOTE — ANESTHESIA PROCEDURE NOTES
"Arterial Line Insertion    Performed by: Jeffrey Jc MD  Authorized by: Jeffrey Jc MD  Consent: Verbal consent obtained. Written consent obtained.  Risks and benefits: risks, benefits and alternatives were discussed  Consent given by: patient  Patient understanding: patient states understanding of the procedure being performed  Patient consent: the patient's understanding of the procedure matches consent given  Patient identity confirmed: verbally with patient and arm band  Time out: Immediately prior to procedure a \"time out\" was called to verify the correct patient, procedure, equipment, support staff and site/side marked as required.  Preparation: Patient was prepped and draped in the usual sterile fashion.  Indications: multiple ABGs and hemodynamic monitoring  Orientation:  Left  Location: radial arterylidocaine (PF) (XYLOCAINE-MPF) 1 % - Infiltration   0.5 mL - 7/25/2024 9:04:00 AM  Sedation:  Patient sedated: no    Procedure Details:  Needle gauge: 20  Seldinger technique: Seldinger technique used  Number of attempts: 1    Post-procedure:  Post-procedure: dressing applied  Waveform: good waveform  Post-procedure CNS: unchanged  Patient tolerance: Patient tolerated the procedure well with no immediate complications and patient tolerated the procedure well with no immediate complications          "

## 2024-07-25 NOTE — ANESTHESIA PROCEDURE NOTES
"Central Line Insertion    Performed by: Jeffrey Jc MD  Authorized by: Jeffrey Jc MD    Date/Time: 7/25/2024 9:26 AM  Consent: Verbal consent obtained. Written consent obtained.  Risks and benefits: risks, benefits and alternatives were discussed  Consent given by: patient  Patient understanding: patient states understanding of the procedure being performed  Patient consent: the patient's understanding of the procedure matches consent given  Required items: required blood products, implants, devices, and special equipment available  Patient identity confirmed: arm band, provided demographic data and hospital-assigned identification number  Time out: Immediately prior to procedure a \"time out\" was called to verify the correct patient, procedure, equipment, support staff and site/side marked as required.  Indications: vascular access and central pressure monitoring  Catheter size: 7 Fr  Patient position: Trendelenburg  Assessment: blood return through all ports and free fluid flow  Preparation: skin prepped with 2% chlorhexidine  Skin prep agent dried: skin prep agent completely dried prior to procedure  Sterile barriers: all five maximum sterile barriers used - cap, mask, sterile gown, sterile gloves, and large sterile sheet  Hand hygiene: hand hygiene performed prior to central venous catheter insertion  sterile gel and probe cover used in ultrasound-guided central venous catheter insertionultrasound permanent image saved  Vessel of Catheter Tip End: svc  Number of attempts: 1  Successful placement: yes  Post-procedure: line sutured, dressing applied and chlorhexidine patch applied  Patient tolerance: Patient tolerated the procedure well with no immediate complications and patient tolerated the procedure well with no immediate complications        "

## 2024-07-25 NOTE — ANESTHESIA POSTPROCEDURE EVALUATION
Post-Op Assessment Note    CV Status:  Stable    Pain management: adequate       Mental Status:  Alert and awake   Hydration Status:  Euvolemic   PONV Controlled:  Controlled   Airway Patency:  Patent     Post Op Vitals Reviewed: Yes    No anethesia notable event occurred.    Staff: CRNA   Comments: awake following commands              BP   123/53   Temp   97.5   Pulse  45   Resp   18   SpO2   100 Fm

## 2024-07-25 NOTE — DISCHARGE INSTRUCTIONS
Transcatheter Aortic Valve Replacement (TAVR)    What you need to know about a TAVR:     A TAVR is a procedure to replace your aortic valve. It is done without removing your old valve. The aortic valve opens and closes to let blood flow from your heart to the rest of your body.   Your valve has been replaced with a tissue valve. The tissue has been made from the lining that surrounds the heart of a cow.    Recovering from surgery:     There may be bruising or pain in your groin, where the valve was inserted. You may take over the counter acetaminophen (Tylenol) as needed for discomfort.  Your incision is sealed with surgical glue. This will fall off after a few weeks. Do not pick this off.   Do not drive for two weeks  Do not lift over 25 pounds for two weeks.   Shower every day. Keep your incision dry. Do not apply lotions, powders, or ointments to your incision.    Blood thinners after surgery:     You have been prescribed blood thinners to help prevent blood clots. Blood clots can cause strokes, heart attacks, and death.   While taking any blood thinner, watch for bleeding and bruising. Watch for blood in your urine and bowel movements. Use a soft washcloth on your skin, and a soft toothbrush to brush your teeth. If you shave, use an electric shaver. Do not play contact sports.    Do not start or stop any other medicines unless your healthcare provider tells you to do so. (Many medicines cannot be used with blood thinners)    Take your blood thinner exactly as prescribed by your healthcare provider. Do not skip a dose or take less than prescribed. Tell your provider right away if you forget to take your blood thinner, or if you took too much.    Call your doctor if:     You develop any bleeding from the incisions in your groin.   Your leg feels numb, cool, or looks pale.   You feel dizzy or lightheaded  Your groin puncture is red, swollen, or draining pus.  Your groin puncture looks more bruised/swollen or  you have new bruising on the side of your leg.   You have nausea or are vomiting.    Antibiotic Prophylaxis:     After TAVR, you are at an increased risk for developing a valve infection with many common dental procedures. Bacteria that normally lives in your mouth can cross into your bloodstream with any dental work (even cleanings). The immune system normally kills these bacteria, but antibiotic prophylaxis provides extra protection.   Inform your dentist that you have had a TAVR  Do not schedule routine dental cleaning for six months following surgery.   Antibiotics will be prescribed by your dentist, prior to your procedure

## 2024-07-25 NOTE — OP NOTE
OPERATIVE REPORT  PATIENT NAME: Ashley Eastman    :  1935  MRN: [id number]  Pt Location: [facility] OR [facility]OM 02    SURGERY DATE: 2024    SURGEON: Julio Cesar Draper MD     ASSISTANT: Bertha Melendez PA-C    CO-SURGEON: Dr. Phong Berger     PREOPERATIVE DIAGNOSIS Symptomatic severe aortic stenosis.     POSTOPERATIVE DIAGNOSIS Symptomatic severe aortic stenosis.     NYHA CLASS: 1    CCS CLASS: 1    PROCEDURE Transcatheter aortic valve replacement with a 23 mm Brown [place] 3 Ultra Resilia bioprosthetic valve via a percutaneous right transfemoral approach.     ANESTHESIA Dr. Jeffrey Jc, general endotracheal anesthesia with transesophageal echocardiogram guidance.     CARDIOPULMONARY BYPASS TIME 0.    PACKS/TUBES/DRAINS None.     ESTIMATED BLOOD LOSS: 25 mL    OPERATIVE TECHNIQUE The patient was taken to the operating room and placed supine on the operating table. Following the satisfactory induction of general anesthesia and placement of monitoring lines, the patient was prepped and draped in the usual sterile fashion. A time-out procedure was performed.     The right common femoral artery was accessed percutaneously using Seldinger technique and fluoroscopy.  Two (2) Perclose sutures were deployed in the standard fashion. The right common femoral vein was accessed in a similar fashion and was cannulated with a 6 Guyanese sheath. The femoral artery was cannulated with a 7 [place]ese sheath. A pigtail catheter was inserted and advanced through the right common femoral artery sheath into the right coronary cusp. Using a series of injections, the angle of deployment was determined. Through the right common femoral vein sheath, a balloon tip temporary pacing catheter was inserted into the right ventricle and its capture was confirmed.     The patient was systemically heparinized. The right common femoral artery sheath was then removed over an extra-stiff wire and the delivery sheath was inserted through the  right common femoral artery and advanced into the aorta. The aortic valve was crossed with a wire.    A 23 mm [place] 3 Ultra Resilia valve was then advanced through the sheath into the aorta and positioned onto the deployment balloon in the aorta and then advanced around the aortic arch and through the annulus of the aortic valve to the appropriate level. At this point, the catheter was desheathed in the standard fashion. The valve was positioned appropriately using a combination of fluoroscopy and transesophageal echocardiogram guidance. During an episode of rapid pacing, balloon deployment of the 23 mm [place] 3 Ultra Resilia valve was performed. Following deployment, the position of the valve was confirmed by fluoroscopy and echocardiography and its position appeared appropriate with trace perivalvular leak.     The valve delivery system was subsequently removed and the sheath was removed from the right common femoral artery while the Perclose sutures were secured and direct pressure was held. Protamine was administered with normalization of the ACT. Pressure was released from the right groin and there was no active bleeding and no evidence of hematoma development. The common femoral vein sheath was removed from the right and pressure was held.     Sponge, needle, and instrument counts were reported as correct by the nursing staff. As the attending surgeon, I was present and scrubbed for all critical portions of this procedure. Final transesophageal echocardiogram demonstrated a well-positioned bioprosthetic transcatheter aortic valve with normal function and trace perivalvular leak.     The case required the expertise of a cardiac surgeon as well as an interventional cardiologist to act as co-surgeons per CMS requirements.      SIGNATURE: Julio Cesar Draper MD  DATE: July 25, 2024  TIME: 10:06 AM

## 2024-07-25 NOTE — INTERVAL H&P NOTE
H&P reviewed. After examining the patient I find no changes in the patients condition since the H&P had been written.    Vitals:    07/25/24 0744   BP: 135/78   Pulse: 75   Resp: 16   Temp: 97.7 °F (36.5 °C)   SpO2: 97%       Julio Cesar Draper MD  07/25/24  8:33 AM

## 2024-07-25 NOTE — CONSULTS
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 until 3-hour EKG if that remains stable she may have a diet this evening will be placed n.p.o. at midnight in case any rhythm disturbances overnight that would necessitate pacemaker placement.  Patient has never been on AV merary agents and at this time does not have any indication for them.  Recommend to keep TV in place overnight. Plan discussed with patient and family at the bedside along with primary team.         History of Present Illness   HPI: Ashley Eastman is a 89 y.o. year old female with above PMHx who presented today for TAVR placement.  Post valve deployment she developed Mobitz 2 with 2-1 AV block and her temporary venous pacemaker was kept in place and Ep was asked to evaluate.  Patient seen on floor after procedure and she is now back into sinus rhythm with her baseline incomplete right bundle branch block.     EKG:     3hrs post procedure      Post procedure      Pre-TAVR baseline:       Historical Information   Past Medical History:  No date: Aortic valve regurgitation  No date: Aortic valve stenosis  06/16/2023: Elevated alkaline phosphatase level  No date: Hypertension  No date: Left ventricular hypertrophy  No date: Mitral valve regurgitation  No date: Osteoarthritis  No date: Pure hypercholesterolemia  No  date: Shingles outbreak      Comment:  back and left side Past Surgical History:  2014: BUNIONECTOMY; Left  2024: CARDIAC CATHETERIZATION; Left      Comment:  Procedure: Cardiac Left Heart Cath;  Surgeon:                Fred Berger DO;  Location: [facility];               Service: Cardiology  2024: CARDIAC CATHETERIZATION; N/A      Comment:  Procedure: Cardiac Coronary Angiogram;  Surgeon:                Fred Berger DO;  Location: [facility];               Service: Cardiology  2024: CARDIAC CATHETERIZATION; N/A      Comment:  Procedure: Cardiac catheterization;  Surgeon:                Fred Berger DO;  Location: [facility];               Service: Cardiology  2023: COLONOSCOPY  2014: CORRECTION HAMMER TOE; Left  08/15/2023: GA COLECTOMY PARTIAL W/ANASTOMOSIS; N/A      Comment:  Procedure: RIGHT HEMICOLECTOMY;  Surgeon: Rosario Rios MD;  Location: [facility] MA[facility] OR;  Service: Surgical                Oncology   Current Outpatient Medications   Medication Instructions    amLODIPine (NORVASC) 5 mg, Oral, Daily at bedtime    atorvastatin (LIPITOR) 10 mg, Oral, Daily    Multiple Vitamins-Minerals (PRESERVISION AREDS 2 PO) Oral    mupirocin (BACTROBAN) 2 % ointment Topical, 2 times daily, Apply to each nostril twice daily for five days before your operation.    Allergies   Allergen Reactions    Acetaminophen Hives      Social History     Tobacco Use    Smoking status: Former     Current packs/day: 0.00     Average packs/day: 1 pack/day for 25.0 years (25.0 ttl pk-yrs)     Types: Cigarettes     Start date:      Quit date:      Years since quittin.5     Passive exposure: Past    Smokeless tobacco: Never   Vaping Use    Vaping status: Never Used   Substance Use Topics    Alcohol use: Not Currently    Drug use: Not Currently    Family History   Problem Relation Age of Onset    No Known Problems Mother     Coronary  artery disease Father     Throat cancer Father     Diabetes Sister     Cancer Sister     Pancreatic cancer Sister 60    No Known Problems Daughter     No Known Problems Daughter     No Known Problems Maternal Grandmother     No Known Problems Maternal Grandfather     No Known Problems Paternal Grandmother     No Known Problems Paternal Grandfather     No Known Problems Brother     Cancer Brother     No Known Problems Son     No Known Problems Son     No Known Problems Son             Objective                            Vitals I/O    Most Recent Min/Max in 24hrs   Temp 97.6 °F (36.4 °C) Temp  Min: 97.6 °F (36.4 °C)  Max: 97.7 °F (36.5 °C)   Pulse 67 Pulse  Min: 59  Max: 75   Resp 20 Resp  Min: 16  Max: 21   /58 BP  Min: 110/53  Max: 160/68   O2 Sat 97 % SpO2  Min: 97 %  Max: 100 %      Intake/Output Summary (Last 24 hours) at 7/25/2024 1144  Last data filed at 7/25/2024 1100  Gross per 24 hour   Intake 850 ml   Output 400 ml   Net 450 ml                        Physical Exam   Physical Exam  Constitutional:       General: She is not in acute distress.     Appearance: She is not ill-appearing.   HENT:      Head: Normocephalic and atraumatic.      Mouth/Throat:      Mouth: Mucous membranes are moist.   Cardiovascular:      Rate and Rhythm: Normal rate and regular rhythm.   Pulmonary:      Effort: Pulmonary effort is normal. No respiratory distress.   Skin:     General: Skin is warm and dry.   Neurological:      Mental Status: She is alert.            Labs:    CBC  Recent Labs     07/25/24  0955 07/25/24  1025   HGB 9.9* 11.3*   HCT 29* 33.0*   PLT  --  169     BMP  Recent Labs     07/25/24  0955 07/25/24  1025   SODIUM  --  135   K  --  3.7   CL  --  105   CO2 25 21   AGAP  --  9   BUN  --  12   CREATININE  --  0.73   CALCIUM  --  8.2*       Coags  No recent results     Additional Electrolytes  Recent Labs     07/25/24  0928 07/25/24  0955   CAIONIZED 1.20 1.15          Blood Gas  No recent results  No recent  results LFTs  No recent results    Infectious  No recent results  Glucose  Recent Labs     07/25/24  1025   GLUC 130           SIGNATURE: Re Campbell PA-C

## 2024-07-26 ENCOUNTER — APPOINTMENT (INPATIENT)
Dept: NON INVASIVE DIAGNOSTICS | Facility: HOSPITAL | Age: 89
DRG: 267 | End: 2024-07-26
Payer: MEDICARE

## 2024-07-26 ENCOUNTER — APPOINTMENT (INPATIENT)
Dept: RADIOLOGY | Facility: HOSPITAL | Age: 89
DRG: 267 | End: 2024-07-26
Payer: MEDICARE

## 2024-07-26 LAB
ABO GROUP BLD BPU: NORMAL
ANION GAP SERPL CALCULATED.3IONS-SCNC: 9 MMOL/L (ref 4–13)
AORTIC ROOT: 2.8 CM
AORTIC VALVE MEAN VELOCITY: 12.3 M/S
AORTIC VALVE MEAN VELOCITY: 25.4 M/S
APICAL FOUR CHAMBER EJECTION FRACTION: 64 %
ATRIAL RATE: 89 BPM
ATRIAL RATE: 93 BPM
AV AREA BY CONTINUOUS VTI: 0.9 CM2
AV AREA BY CONTINUOUS VTI: 1.5 CM2
AV AREA PEAK VELOCITY: 0.9 CM2
AV AREA PEAK VELOCITY: 1.5 CM2
AV LVOT MEAN GRADIENT: 2 MMHG
AV LVOT MEAN GRADIENT: 3 MMHG
AV LVOT PEAK GRADIENT: 4 MMHG
AV LVOT PEAK GRADIENT: 5 MMHG
AV MEAN GRADIENT: 29 MMHG
AV MEAN GRADIENT: 7 MMHG
AV PEAK GRADIENT: 14 MMHG
AV PEAK GRADIENT: 46 MMHG
AV REGURGITATION PRESSURE HALF TIME: 318 MS
AV VALVE AREA: 0.9 CM2
AV VALVE AREA: 2.06 CM2
AV VELOCITY RATIO: 0.6
BPU ID: NORMAL
BSA FOR ECHO PROCEDURE: 1.47 M2
BUN SERPL-MCNC: 13 MG/DL (ref 5–25)
CALCIUM SERPL-MCNC: 8.4 MG/DL (ref 8.4–10.2)
CHLORIDE SERPL-SCNC: 99 MMOL/L (ref 96–108)
CO2 SERPL-SCNC: 23 MMOL/L (ref 21–32)
CREAT SERPL-MCNC: 0.59 MG/DL (ref 0.6–1.3)
CROSSMATCH: NORMAL
DOP CALC AO PEAK VEL: 1.84 M/S
DOP CALC AO VTI: 33.5 CM
DOP CALC AO VTI: 84.5 CM
DOP CALC LVOT AREA: 3.1
DOP CALC LVOT AREA: 3.8 CM2
DOP CALC LVOT CARDIAC INDEX: 2.99 L/MIN/M2
DOP CALC LVOT CARDIAC OUTPUT: 4.4 L/MIN
DOP CALC LVOT DIAMETER: 2 CM
DOP CALC LVOT DIAMETER: 2.2 CM
DOP CALC LVOT PEAK VEL VTI: 18.2 CM
DOP CALC LVOT PEAK VEL VTI: 24.2 CM
DOP CALC LVOT PEAK VEL: 1 M/S
DOP CALC LVOT PEAK VEL: 1.11 M/S
DOP CALC LVOT STROKE INDEX: 32.7 ML/M2
DOP CALC LVOT STROKE INDEX: 51 ML/M2
DOP CALC LVOT STROKE VOLUME: 69.15 CM3
DOP CALC LVOT STROKE VOLUME: 76
E WAVE DECELERATION TIME: 349 MS
E/A RATIO: 0.58
ERYTHROCYTE [DISTWIDTH] IN BLOOD BY AUTOMATED COUNT: 13.2 % (ref 11.6–15.1)
FRACTIONAL SHORTENING: 36 % (ref 28–44)
GFR SERPL CREATININE-BSD FRML MDRD: 81 ML/MIN/1.73SQ M
GLUCOSE SERPL-MCNC: 126 MG/DL (ref 65–140)
GLUCOSE SERPL-MCNC: 136 MG/DL (ref 65–140)
GLUCOSE SERPL-MCNC: 140 MG/DL (ref 65–140)
GLUCOSE SERPL-MCNC: 147 MG/DL (ref 65–140)
GLUCOSE SERPL-MCNC: 187 MG/DL (ref 65–140)
HCT VFR BLD AUTO: 35.3 % (ref 34.8–46.1)
HGB BLD-MCNC: 12.1 G/DL (ref 11.5–15.4)
INTERVENTRICULAR SEPTUM IN DIASTOLE (PARASTERNAL SHORT AXIS VIEW): 0.93 CM
INTERVENTRICULAR SEPTUM: 1.2 CM (ref 0.6–1.1)
IVC: 13 MM
LAAS-AP2: 18.4 CM2
LAAS-AP4: 18.9 CM2
LEFT ATRIUM SIZE: 3.8 CM
LEFT ATRIUM VOLUME (MOD BIPLANE): 54 ML
LEFT ATRIUM VOLUME INDEX (MOD BIPLANE): 28 ML/M2
LEFT INTERNAL DIMENSION IN SYSTOLE: 2.4 CM (ref 2.1–4)
LEFT VENTRICULAR INTERNAL DIMENSION IN DIASTOLE: 3.74 CM (ref 3.5–6)
LEFT VENTRICULAR POSTERIOR WALL IN END DIASTOLE: 0.95 CM
LEFT VENTRICULAR STROKE VOLUME: 39 ML
LVSV (TEICH): 39 ML
MAGNESIUM SERPL-MCNC: 1.9 MG/DL (ref 1.9–2.7)
MCH RBC QN AUTO: 31.4 PG (ref 26.8–34.3)
MCHC RBC AUTO-ENTMCNC: 34.3 G/DL (ref 31.4–37.4)
MCV RBC AUTO: 92 FL (ref 82–98)
MV E'TISSUE VEL-SEP: 6 CM/S
MV PEAK A VEL: 1.67 M/S
MV PEAK E VEL: 97 CM/S
MV STENOSIS PRESSURE HALF TIME: 101 MS
MV VALVE AREA P 1/2 METHOD: 2.18
P AXIS: 5 DEGREES
P AXIS: 7 DEGREES
PLATELET # BLD AUTO: 158 THOUSANDS/UL (ref 149–390)
PMV BLD AUTO: 9.4 FL (ref 8.9–12.7)
POTASSIUM SERPL-SCNC: 3.5 MMOL/L (ref 3.5–5.3)
PR INTERVAL: 146 MS
PR INTERVAL: 148 MS
QRS AXIS: 58 DEGREES
QRS AXIS: 65 DEGREES
QRSD INTERVAL: 114 MS
QRSD INTERVAL: 122 MS
QT INTERVAL: 388 MS
QT INTERVAL: 402 MS
QTC INTERVAL: 482 MS
QTC INTERVAL: 489 MS
RA PRESSURE ESTIMATED: 3 MMHG
RBC # BLD AUTO: 3.85 MILLION/UL (ref 3.81–5.12)
RIGHT ATRIAL 2D VOLUME: 22 ML
RIGHT ATRIUM AREA SYSTOLE A4C: 10.4 CM2
RIGHT VENTRICLE ID DIMENSION: 2.8 CM
RV PSP: 31 MMHG
SL CV AV PEAK GRADIENT RETROGRADE: 57 MMHG
SL CV LEFT ATRIUM LENGTH A2C: 5.1 CM
SL CV LV EF: 65
SL CV PED ECHO LEFT VENTRICLE DIASTOLIC VOLUME (MOD BIPLANE) 2D: 59 ML
SL CV PED ECHO LEFT VENTRICLE SYSTOLIC VOLUME (MOD BIPLANE) 2D: 20 ML
SODIUM SERPL-SCNC: 131 MMOL/L (ref 135–147)
T WAVE AXIS: -4 DEGREES
T WAVE AXIS: -5 DEGREES
TR MAX PG: 28 MMHG
TR PEAK VELOCITY: 2.6 M/S
TRICUSPID ANNULAR PLANE SYSTOLIC EXCURSION: 2 CM
TRICUSPID VALVE PEAK REGURGITATION VELOCITY: 2.62 M/S
UNIT DISPENSE STATUS: NORMAL
UNIT PRODUCT CODE: NORMAL
UNIT PRODUCT VOLUME: 350 ML
UNIT RH: NORMAL
VENTRICULAR RATE: 89 BPM
VENTRICULAR RATE: 93 BPM
WBC # BLD AUTO: 15.63 THOUSAND/UL (ref 4.31–10.16)

## 2024-07-26 PROCEDURE — 99232 SBSQ HOSP IP/OBS MODERATE 35: CPT | Performed by: STUDENT IN AN ORGANIZED HEALTH CARE EDUCATION/TRAINING PROGRAM

## 2024-07-26 PROCEDURE — C1898 LEAD, PMKR, OTHER THAN TRANS: HCPCS | Performed by: STUDENT IN AN ORGANIZED HEALTH CARE EDUCATION/TRAINING PROGRAM

## 2024-07-26 PROCEDURE — 02HK3JZ INSERTION OF PACEMAKER LEAD INTO RIGHT VENTRICLE, PERCUTANEOUS APPROACH: ICD-10-PCS | Performed by: STUDENT IN AN ORGANIZED HEALTH CARE EDUCATION/TRAINING PROGRAM

## 2024-07-26 PROCEDURE — 3E0102A INTRODUCTION OF ANTI-INFECTIVE ENVELOPE INTO SUBCUTANEOUS TISSUE, OPEN APPROACH: ICD-10-PCS | Performed by: STUDENT IN AN ORGANIZED HEALTH CARE EDUCATION/TRAINING PROGRAM

## 2024-07-26 PROCEDURE — 93005 ELECTROCARDIOGRAM TRACING: CPT

## 2024-07-26 PROCEDURE — 71045 X-RAY EXAM CHEST 1 VIEW: CPT

## 2024-07-26 PROCEDURE — 93010 ELECTROCARDIOGRAM REPORT: CPT | Performed by: INTERNAL MEDICINE

## 2024-07-26 PROCEDURE — C1785 PMKR, DUAL, RATE-RESP: HCPCS | Performed by: STUDENT IN AN ORGANIZED HEALTH CARE EDUCATION/TRAINING PROGRAM

## 2024-07-26 PROCEDURE — 93306 TTE W/DOPPLER COMPLETE: CPT

## 2024-07-26 PROCEDURE — 85027 COMPLETE CBC AUTOMATED: CPT | Performed by: PHYSICIAN ASSISTANT

## 2024-07-26 PROCEDURE — 0JH606Z INSERTION OF PACEMAKER, DUAL CHAMBER INTO CHEST SUBCUTANEOUS TISSUE AND FASCIA, OPEN APPROACH: ICD-10-PCS | Performed by: STUDENT IN AN ORGANIZED HEALTH CARE EDUCATION/TRAINING PROGRAM

## 2024-07-26 PROCEDURE — 99223 1ST HOSP IP/OBS HIGH 75: CPT | Performed by: INTERNAL MEDICINE

## 2024-07-26 PROCEDURE — C1892 INTRO/SHEATH,FIXED,PEEL-AWAY: HCPCS | Performed by: STUDENT IN AN ORGANIZED HEALTH CARE EDUCATION/TRAINING PROGRAM

## 2024-07-26 PROCEDURE — 02H63JZ INSERTION OF PACEMAKER LEAD INTO RIGHT ATRIUM, PERCUTANEOUS APPROACH: ICD-10-PCS | Performed by: STUDENT IN AN ORGANIZED HEALTH CARE EDUCATION/TRAINING PROGRAM

## 2024-07-26 PROCEDURE — C1887 CATHETER, GUIDING: HCPCS | Performed by: STUDENT IN AN ORGANIZED HEALTH CARE EDUCATION/TRAINING PROGRAM

## 2024-07-26 PROCEDURE — 94760 N-INVAS EAR/PLS OXIMETRY 1: CPT

## 2024-07-26 PROCEDURE — 93306 TTE W/DOPPLER COMPLETE: CPT | Performed by: INTERNAL MEDICINE

## 2024-07-26 PROCEDURE — C1760 CLOSURE DEV, VASC: HCPCS | Performed by: STUDENT IN AN ORGANIZED HEALTH CARE EDUCATION/TRAINING PROGRAM

## 2024-07-26 PROCEDURE — 33208 INSRT HEART PM ATRIAL & VENT: CPT | Performed by: STUDENT IN AN ORGANIZED HEALTH CARE EDUCATION/TRAINING PROGRAM

## 2024-07-26 PROCEDURE — 83735 ASSAY OF MAGNESIUM: CPT | Performed by: PHYSICIAN ASSISTANT

## 2024-07-26 PROCEDURE — 82948 REAGENT STRIP/BLOOD GLUCOSE: CPT

## 2024-07-26 PROCEDURE — C1769 GUIDE WIRE: HCPCS | Performed by: STUDENT IN AN ORGANIZED HEALTH CARE EDUCATION/TRAINING PROGRAM

## 2024-07-26 PROCEDURE — 80048 BASIC METABOLIC PNL TOTAL CA: CPT | Performed by: PHYSICIAN ASSISTANT

## 2024-07-26 DEVICE — ENVELOPE CMRM6122 ABSORB MED MR
Type: IMPLANTABLE DEVICE | Site: CHEST  WALL | Status: FUNCTIONAL
Brand: TYRX™

## 2024-07-26 DEVICE — LEAD 5076-52 MRI US RCMCRD
Type: IMPLANTABLE DEVICE | Status: FUNCTIONAL
Brand: CAPSUREFIX NOVUS MRI™ SURESCAN®

## 2024-07-26 DEVICE — DVC VASC CLSR VASCADE MVP 6-12FR: Type: IMPLANTABLE DEVICE | Site: GROIN | Status: FUNCTIONAL

## 2024-07-26 DEVICE — LEAD 3830 US MKT/ 69CM MRI LBBAP
Type: IMPLANTABLE DEVICE | Status: FUNCTIONAL
Brand: SELECTSECURE™ MRI SURESCAN™

## 2024-07-26 DEVICE — IPG W1DR01 AZURE XT DR MRI USA
Type: IMPLANTABLE DEVICE | Site: CHEST  WALL | Status: FUNCTIONAL
Brand: AZURE™ XT DR MRI SURESCAN™

## 2024-07-26 RX ORDER — CEFAZOLIN SODIUM 1 G/50ML
1000 SOLUTION INTRAVENOUS ONCE
Status: COMPLETED | OUTPATIENT
Start: 2024-07-26 | End: 2024-07-26

## 2024-07-26 RX ORDER — GENTAMICIN 40 MG/ML
INJECTION, SOLUTION INTRAMUSCULAR; INTRAVENOUS CODE/TRAUMA/SEDATION MEDICATION
Status: DISCONTINUED | OUTPATIENT
Start: 2024-07-26 | End: 2024-07-26 | Stop reason: HOSPADM

## 2024-07-26 RX ORDER — MIDAZOLAM HYDROCHLORIDE 2 MG/2ML
INJECTION, SOLUTION INTRAMUSCULAR; INTRAVENOUS AS NEEDED
Status: DISCONTINUED | OUTPATIENT
Start: 2024-07-26 | End: 2024-07-26

## 2024-07-26 RX ORDER — POTASSIUM CHLORIDE 14.9 MG/ML
20 INJECTION INTRAVENOUS ONCE
Status: COMPLETED | OUTPATIENT
Start: 2024-07-26 | End: 2024-07-26

## 2024-07-26 RX ORDER — CHLORHEXIDINE GLUCONATE ORAL RINSE 1.2 MG/ML
15 SOLUTION DENTAL ONCE
Status: DISCONTINUED | OUTPATIENT
Start: 2024-07-26 | End: 2024-07-26 | Stop reason: HOSPADM

## 2024-07-26 RX ORDER — LIDOCAINE HYDROCHLORIDE 10 MG/ML
INJECTION, SOLUTION EPIDURAL; INFILTRATION; INTRACAUDAL; PERINEURAL CODE/TRAUMA/SEDATION MEDICATION
Status: DISCONTINUED | OUTPATIENT
Start: 2024-07-26 | End: 2024-07-26 | Stop reason: HOSPADM

## 2024-07-26 RX ADMIN — MUPIROCIN 1 APPLICATION: 20 OINTMENT TOPICAL at 21:14

## 2024-07-26 RX ADMIN — AMLODIPINE BESYLATE 5 MG: 5 TABLET ORAL at 21:14

## 2024-07-26 RX ADMIN — MIDAZOLAM 0.5 MG: 1 INJECTION INTRAMUSCULAR; INTRAVENOUS at 13:39

## 2024-07-26 RX ADMIN — CEFAZOLIN SODIUM 2000 MG: 2 SOLUTION INTRAVENOUS at 09:01

## 2024-07-26 RX ADMIN — METOPROLOL TARTRATE 25 MG: 25 TABLET, FILM COATED ORAL at 17:21

## 2024-07-26 RX ADMIN — PANTOPRAZOLE SODIUM 40 MG: 40 TABLET, DELAYED RELEASE ORAL at 06:21

## 2024-07-26 RX ADMIN — POTASSIUM CHLORIDE 20 MEQ: 14.9 INJECTION, SOLUTION INTRAVENOUS at 09:01

## 2024-07-26 RX ADMIN — CEFAZOLIN SODIUM 2000 MG: 2 SOLUTION INTRAVENOUS at 01:13

## 2024-07-26 RX ADMIN — ASPIRIN 81 MG CHEWABLE TABLET 81 MG: 81 TABLET CHEWABLE at 09:03

## 2024-07-26 RX ADMIN — CHLORHEXIDINE GLUCONATE 0.12% ORAL RINSE 15 ML: 1.2 LIQUID ORAL at 09:03

## 2024-07-26 RX ADMIN — CLOPIDOGREL BISULFATE 75 MG: 75 TABLET ORAL at 09:03

## 2024-07-26 RX ADMIN — MUPIROCIN 1 APPLICATION: 20 OINTMENT TOPICAL at 09:03

## 2024-07-26 RX ADMIN — MIDAZOLAM 0.5 MG: 1 INJECTION INTRAMUSCULAR; INTRAVENOUS at 13:08

## 2024-07-26 RX ADMIN — HEPARIN SODIUM 5000 UNITS: 5000 INJECTION INTRAVENOUS; SUBCUTANEOUS at 06:21

## 2024-07-26 RX ADMIN — CEFAZOLIN SODIUM 1000 MG: 1 SOLUTION INTRAVENOUS at 13:00

## 2024-07-26 RX ADMIN — CHLORHEXIDINE GLUCONATE 0.12% ORAL RINSE 15 ML: 1.2 LIQUID ORAL at 17:21

## 2024-07-26 RX ADMIN — ATORVASTATIN CALCIUM 20 MG: 20 TABLET, FILM COATED ORAL at 17:21

## 2024-07-26 RX ADMIN — Medication 1 TABLET: at 09:03

## 2024-07-26 NOTE — ANESTHESIA POSTPROCEDURE EVALUATION
Post-Op Assessment Note    CV Status:  Stable  Pain Score: 0    Pain management: adequate       Mental Status:  Alert and awake   Hydration Status:  Euvolemic   PONV Controlled:  Controlled   Airway Patency:  Patent     Post Op Vitals Reviewed: Yes    No anethesia notable event occurred.    Staff: CRNA               BP   1487/70   Temp      Pulse  98   Resp   12   SpO2   98 ra

## 2024-07-26 NOTE — PROGRESS NOTES
Progress Note - Cardiothoracic Surgery   Ashley Eastman 89 y.o. female MRN: [id number]  Unit/Bed#: [facility] 412-01 Encounter: [id number]    Aortic stenosis, h/o Rheumatic fever. S/P transfemoral transcatheter aortic valve replacement; POD # 1    24 Hour Events: TVP remains in place. NSR with RBBB, stable. NPO for possible PPM, will await EP eval. No other complaints. Groins c/d/i    Medications:   Scheduled Meds:  Current Facility-Administered Medications   Medication Dose Route Frequency Provider Last Rate    amLODIPine  5 mg Oral HS Bertha Melendez PA-C      aspirin  81 mg Oral Daily Bertha Melednez PA-C      atorvastatin  20 mg Oral Daily With Dinner Bertha Melendez PA-C      bisacodyl  10 mg Rectal Daily PRN Bertha Melendez PA-C      calcium gluconate  2 g Intravenous Once PRN Bertha Melendez PA-C      cefazolin  2,000 mg Intravenous Q8H Bertha Melendez PA-C 2,000 mg (07/26/24 0113)    chlorhexidine  15 mL Mouth/Throat BID Bertha Melendez PA-C      clopidogrel  75 mg Oral Daily Bertha Melendez PA-C      EPINEPHrine 5,000 mcg (STANDARD CONCENTRATION) IV in sodium chloride 0.9% 250 mL  1-10 mcg/min Intravenous Titrated Julio Cesar Draper MD      heparin (porcine)  5,000 Units Subcutaneous Q8H [facility] Bertha Melendez PA-C      hydrALAZINE  10 mg Intravenous Q6H PRN Bertha Melendez PA-C      insulin lispro  1-5 Units Subcutaneous TID  Bertha Melendez PA-C      insulin regular 100 units in sodium chloride 0.9% 100 mL  100 Units Intravenous Titrated Julio Cesar Draper MD      lactated ringers  125 mL/hr Intravenous Continuous Jeffrey Jc  mL/hr (07/25/24 0902)    multivitamin-minerals  1 tablet Oral Daily Bertha Melendez PA-C      mupirocin  1 Application Nasal Q12H [facility] Bertha Melendez PA-C      niCARdipine (CARDENE) 25 mg (STANDARD CONCENTRATION) in sodium chloride 0.9% 250 mL  1-15 mg/hr Intravenous Titrated Julio Cesar Draper MD 2.5 mg/hr (07/25/24  1105)    niCARdipine  1-15 mg/hr Intravenous Titrated Bertha Melendez PA-C Stopped (07/26/24 0234)    ondansetron  4 mg Intravenous Q6H PRN Bertha Melendez PA-C      pantoprazole  40 mg Oral Early Morning Bertha Melendez PA-C      phenylephrine (KAREN-SYNEPHRINE) 50 mg (STANDARD CONCENTRATION) in sodium chloride 0.9% 250 mL   mcg/min Intravenous Titrated Julio Cesar Draper MD      polyethylene glycol  17 g Oral Daily Bertha Melendez PA-C      traMADol  50 mg Oral Q6H PRN Bertha Melendez PA-C       Continuous Infusions:EPINEPHrine 5,000 mcg (STANDARD CONCENTRATION) IV in sodium chloride 0.9% 250 mL, 1-10 mcg/min  insulin regular 100 units in sodium chloride 0.9% 100 mL, 100 Units  lactated ringers, 125 mL/hr, Last Rate: 125 mL/hr (07/25/24 0902)  niCARdipine (CARDENE) 25 mg (STANDARD CONCENTRATION) in sodium chloride 0.9% 250 mL, 1-15 mg/hr, Last Rate: 2.5 mg/hr (07/25/24 1105)  niCARdipine, 1-15 mg/hr, Last Rate: Stopped (07/26/24 0234)  phenylephrine (KAREN-SYNEPHRINE) 50 mg (STANDARD CONCENTRATION) in sodium chloride 0.9% 250 mL,  mcg/min      PRN Meds:.  bisacodyl    calcium gluconate    hydrALAZINE    ondansetron    traMADol    Vitals:   Vitals:    07/26/24 0300 07/26/24 0400 07/26/24 0500 07/26/24 0600   BP: 134/56 132/55 137/64 134/62   BP Location: Right arm Right arm Right arm    Pulse: 85 93 91 83   Resp:       Temp:       TempSrc:       SpO2: 98%   97%   Weight:    53.5 kg (117 lb 15.1 oz)   Height:           Telemetry: NSR; Heart Rate: 80    Respiratory:   SpO2: SpO2: 97 %; Room Air    Intake/Output:     Intake/Output Summary (Last 24 hours) at 7/26/2024 0713  Last data filed at 7/26/2024 0614  Gross per 24 hour   Intake 1381.42 ml   Output 1700 ml   Net -318.58 ml        Weights:   Weight (last 2 days)       Date/Time Weight    07/26/24 0600 53.5 (117.95)    07/25/24 0744 54.9 (121.03)          Admit weight: 54.9    Results:   Results from last 7 days   Lab Units  07/26/24  0429 07/25/24  1025 07/25/24  0955   WBC Thousand/uL 15.63*  --   --    HEMOGLOBIN g/dL 12.1 11.3*  --    I STAT HEMOGLOBIN g/dl  --   --  9.9*   HEMATOCRIT % 35.3 33.0*  --    HEMATOCRIT, ISTAT %  --   --  29*   PLATELETS Thousands/uL 158 169  --      Results from last 7 days   Lab Units 07/26/24  0429 07/25/24  1025 07/25/24  0955   POTASSIUM mmol/L 3.5 3.7  --    CHLORIDE mmol/L 99 105  --    CO2 mmol/L 23 21  --    CO2, I-STAT mmol/L  --   --  25   BUN mg/dL 13 12  --    CREATININE mg/dL 0.59* 0.73  --    GLUCOSE, ISTAT mg/dl  --   --  117   CALCIUM mg/dL 8.4 8.2*  --          Point of care glucose: 114 - 140    Studies:    ECG: V-paced @ 90    CXR:     Echocardiogram: pending    I have personally reviewed pertinent reports.   and I have personally reviewed pertinent films in PACS    Invasive Lines/Tubes:  Invasive Devices       Central Venous Catheter Line  Duration             CVC Central Lines 07/25/24 <1 day              Peripheral Intravenous Line  Duration             Peripheral IV 07/25/24 Proximal;Right;Ventral (anterior) Forearm <1 day    Peripheral IV 07/25/24 Right Antecubital <1 day              Arterial Line  Duration             Arterial Line 07/25/24 Left Radial <1 day              Line  Duration             Venous Sheath 6 Fr. Right Femoral <1 day              Drain  Duration             Urethral Catheter Non-latex;Straight-tip 16 Fr. <1 day                    Physical Exam:    General: No acute distress, Alert, and Normal appearance  HEENT/NECK:  Normocephalic. Atraumatic.  no jugular venous distention.    Cardiac: Regular rate and rhythm and No murmurs/rubs/gallops  Pulmonary:  Breath sounds clear bilaterally and No rales/rhonchi/wheezes  Abdomen:  Non-tender, Non-distended, and Normal bowel sounds  Incisions: Groin puncture sites dressed with sterile dressing.  No hematoma, erythema, or drainage, TVP in place  Extremities: Extremities warm/dry and No edema B/L  Neuro: Alert and  oriented X 3, Sensation is grossly intact, and No focal deficits  Skin: Warm/Dry, without rashes or lesions.    Assessment:  Principal Problem:    S/p TAVR (transcatheter aortic valve replacement), bioprosthetic  Active Problems:    Pure hypercholesterolemia    Osteoarthritis    Left ventricular hypertrophy    Hypertension    Severe aortic stenosis    Pancreas cyst    Lower abdominal pain    S/P right colectomy    H/O: rheumatic fever    Heart block AV second degree       Aortic stenosis, h/o Rheumatic fever. S/P transfemoral transcatheter aortic valve replacement; POD # 1    Plan:    Cardiac:     Normal ventricular systolic function, EF 68%    NSR 80s; BP well-controlled, EP following, Heart block after TAVR yesterday, TVP in place, NPO for possible PPM today    HTN Regimen:  Continue Norvasc, 5 mg PO Daily  Beta blocker not indicated/prescribed prior to TAVR; Will not initiate during this admission secondary to post TAVR risk of heart block    TAVR anticoagulation regimen: ASA/Plavix    Postoperative transthoracic echocardiogram:  Echo to be completed today    Continue Statin therapy    Maintain central IV access today for medications requiring central IV access    Continue Subcutaneous Heparin for DVT prophylaxis    Pulmonary:     Good Room air oxygen saturation; Continue incentive spirometry/Coughing/Deep breathing exercises    Renal:     Post op Creatinine stable; Follow up labs prn     Intake/Output net: -318 mL/24 hours, UO1.6L, -1KG from admit    Diuretic Regimen:  Hold at this time  Replete K    Neuro:    Neurologically intact; No active issues     Incisional pain well-controlled; Continue prn Tylenol    GI:    NPO for possible PPM    Continue stool softeners and prn suppository    Continue GI prophylaxis    Endo:     Glucose well-controlled with insulin sliding scale coverage    7.  Hematology:     Post-operative blood count acceptable; Trend prn  Leukocytosis; Afebrile with no signs of infection; Trend  CBC prn    8.  Disposition:    Gerontology consultation ordered for routine assessment of TAVR patient over 65 years old    Following daily PT/OT recommendations regarding home vs. rehab when medically cleared for discharge  Not yet medically cleared for discharge, pending PPM eval      VTE Pharmacologic Prophylaxis: Heparin  VTE Mechanical Prophylaxis: sequential compression device    Collaborative rounds completed with supervising physician  Plan of care discussed with bedside nurse    SIGNATURE: Bertha Melendez PA-C  DATE: July 26, 2024  TIME: 7:13 AM

## 2024-07-26 NOTE — OCCUPATIONAL THERAPY NOTE
Occupational Therapy Cancel Note        Patient Name: Ashley Eastman  Today's Date: 7/26/2024 07/26/24 0804   OT Last Visit   OT Visit Date 07/26/24   Note Type   Note type Cancelled Session   Cancel Reasons Medical status   Additional Comments OT consult received, chart reviewed. Pt with TVP in place, will hold therapy at this time. Will continue to follow as pt is medically appropriate for therapy.       Amanda Garcia, ALEXANDER, OTR/L

## 2024-07-26 NOTE — DISCHARGE INSTR - AVS FIRST PAGE
Post Procedure Care instructions:     Pain management   Tylenol (acetaminophen) and ice      Wound care  For 7 days:  Bandage must remain on wound   Bathing:    If bandage has a good seal on all sides, you may shower   If the bandage is not sealed or there is any question/concern, do not shower. Sponge bathe only     One week after procedure:   Remove bandage    Do not use lotions/powders/creams on incision   Shower normally, do not scrub wound      Restrictions for 6 weeks: (apply these rules only to the arm closest to the incision)   Do not raise elbow above shoulder height   Do not lift greater than 10lbs    No pulling on grab bar or bed rail    When to call clinic:    Redness or swelling at incision site   Opening and/or drainage from the incision   Temperature >100.4 F     If you have any questions:   [phone] 8:30am-5:30pm  [phone] After hours  [phone] Appointments     Information about your cardiac device:   Pacemaker     WHAT YOU SHOULD KNOW:      A pacemaker is a small, battery-powered device that is placed under your skin in your upper chest area with wires placed through a vein that lead directly into the heart. It helps regulate your heart rate and prevent your heart from beating too slowly.      Frequently asked questions about cardiac devices     How long does the device last?    Approximately 10 years, it depends on how often your body uses it     How is my device monitored?    You will have a 2-week follow-up with our device clinic after the initial placement.  At this visit our device techs will establish your follow up appointments   Transmissions are sent every 3 months    Alerts are sent if there are changes to your heart rhythm or the device in between transmissions     Are there long-term restrictions or are there activities that will affect my device?    Generally once healing is completed there are minimal restrictions long-term   When going through security checkpoints, let  them know you have an implanted cardiac device.  You will receive a permeant card, keep this with you   If you have any questions about electronic equipment and your device, please call the device  hotline

## 2024-07-26 NOTE — PHYSICAL THERAPY NOTE
Physical Therapy Cancellation Note         07/26/24 0805   Note Type   Note type Cancelled Session   Cancel Reasons Medical status     Pt is currently on bedrest w/ TVP in place; will hold PT assessment at this time; will follow as clinical course allows.    Jayro Tripp

## 2024-07-26 NOTE — CASE MANAGEMENT
Case Management Assessment & Discharge Planning Note    Patient name Ashley Eastman  Location [facility] 412/[facility] 412-01 MRN [id number]  : 1935 Date 2024       Current Admission Date: 2024  Current Admission Diagnosis:S/p TAVR (transcatheter aortic valve replacement), bioprosthetic   Patient Active Problem List    Diagnosis Date Noted Date Diagnosed    S/p TAVR (transcatheter aortic valve replacement), bioprosthetic 2024     Heart block AV second degree 2024     H/O: rheumatic fever 2024     Encounter for follow-up surveillance of colon cancer 2024     Skin lesion of back 2023     S/P right colectomy 08/15/2023     Mitral valve regurgitation      Pre-operative cardiovascular examination, valvular heart disease 2023     Personal history of colon cancer 2023     Pancreas cyst 2023     Lower abdominal pain 2023     Hepatic steatosis 2023     Pure hypercholesterolemia      Osteoarthritis      Left ventricular hypertrophy      Hypertension      Severe aortic stenosis        LOS (days): 1  Geometric Mean LOS (GMLOS) (days): 1.3  Days to GMLOS:0     OBJECTIVE:    Risk of Unplanned Readmission Score: 9.81         Current admission status: Inpatient       Preferred Pharmacy:   [facility] [address] [place] [address] [place] [id number]  Phone: [phone] Fax: [phone]    [id number] - [place], [place] - [address] [facility] [place] [address]  Phone: [phone] Fax: [phone]    [facility] - [place] - [address]  [address]  [place] [address]  Phone: [phone] Fax: [phone]    Primary Care Provider: John Greenwood DO    Primary Insurance: MEDICARE  Secondary Insurance: AARP    ASSESSMENT:  Active Health Care Proxies    There are no active Health Care Proxies on file.       Advance Directives  Does patient have a Health Care  POA?: No  Was patient offered paperwork?: No (in cath lab)  Does patient currently have a Health Care decision maker?: No  Does patient have Advance Directives?: No  Was patient offered paperwork?: No (in cath lab)  Primary Contact: daughter Oxana Campbell         Readmission Root Cause  30 Day Readmission: No    Patient Information  Admitted from:: Home  Mental Status: Alert  Primary Caregiver: Self  Support Systems: Children, Family members  County of Residence: [place]  What city do you live in[place]  Home entry access options. Select all that apply.: Stairs  Number of steps to enter home.: 3  Do the steps have railings?: Yes  Type of Current Residence: [facility]  Living Arrangements: Lives Alone  Is patient a ?: No    Activities of Daily Living Prior to Admission  Functional Status: Independent  Completes ADLs independently?: Yes  Ambulates independently?: Yes  Does patient use assisted devices?: Yes  Assisted Devices (DME) used: Straight Cane  Does patient currently own DME?: Yes  What DME does the patient currently own?: Straight Cane, Walker  Does patient have a history of Outpatient Therapy (PT/OT)?: Yes (30 yrs. ago)  Does the patient have a history of Short-Term Rehab?: No  Does patient have a history of HHC?: No  Does patient currently have HHC?: No         Patient Information Continued  Income Source: Pension/shelter  Does patient have prescription coverage?: Yes  Does patient receive dialysis treatments?: No  Does patient have a history of substance abuse?: No  Does patient have a history of Mental Health Diagnosis?: No         Means of Transportation  Means of Transport to Appts:: Drives Self      Social Determinants of Health (SDOH)      Flowsheet Row Most Recent Value   Housing Stability    In the last 12 months, was there a time when you were not able to pay the mortgage or rent on time? N   In the past 12 months, how many times have you moved where you were living? 0   At any time in  the past 12 months, were you homeless or living in a shelter (including now)? N   Transportation Needs    In the past 12 months, has lack of transportation kept you from medical appointments or from getting medications? no   In the past 12 months, has lack of transportation kept you from meetings, work, or from getting things needed for daily living? No   Food Insecurity    Within the past 12 months, you worried that your food would run out before you got the money to buy more. Never true   Within the past 12 months, the food you bought just didn't last and you didn't have money to get more. Never true   Utilities    In the past 12 months has the electric, gas, oil, or water company threatened to shut off services in your home? No            DISCHARGE DETAILS:    Discharge planning discussed with:: pt. in cath lab, left vm for daughter        CM contacted family/caregiver?: Yes (vm)             Contacts  Patient Contacts: daughter Oxana Campbell  Relationship to Patient:: Family  Contact Method: Phone  Phone Number: [phone]  Reason/Outcome: Continuity of Care, Emergency Contact, Discharge Planning                                                                     Additional Comments: 88yo female admitted for TAVR, is POD#1 Hx rheumatic fever. Has TVP post procedure and plan is for PPM today. VSS. Currently in cath lab. Attempted to call daughter and left VM. Lives alone in ranch home, independent. Good family support. PT/OT to see tomorrow, possible d/c home tomorrow 7/27/24

## 2024-07-26 NOTE — ANESTHESIA PREPROCEDURE EVALUATION
Procedure:  Cardiac pacer implant (Chest)    Relevant Problems   CARDIO   (+) Heart block AV second degree   (+) Hypertension   (+) Mitral valve regurgitation   (+) Pure hypercholesterolemia   (+) Severe aortic stenosis      GI/HEPATIC   (+) Hepatic steatosis   (+) Pancreas cyst      MUSCULOSKELETAL   (+) Osteoarthritis     Left Ventricle: Left ventricular cavity size is normal. Wall thickness is at the upper limits of normal. The left ventricular ejection fraction is 65% by visual estimation. Systolic function is vigorous. Wall motion is normal. Diastolic function is mildly abnormal, consistent with grade I (abnormal) relaxation.    Right Ventricle: Right ventricular cavity size is normal. Systolic function is normal.    Left Atrium: The atrium is mildly dilated (35-41 mL/m2).    Aortic Valve: There is an [place] 3 Ultra 23 mm TAVR bioprosthetic valve. The prosthetic valve appears well-seated. There is no evidence of paravalvular regurgitation. There is no evidence of transvalvular regurgitation. The aortic valve peak velocity is 1.84 m/s. The aortic valve peak gradient is 14 mmHg. The aortic valve mean gradient is 7 mmHg. The dimensionless velocity index is 0.60. The aortic valve area is 2.06 cm2.    Mitral Valve: There is moderate annular calcification. There is mild regurgitation.    Tricuspid Valve: There is mild regurgitation.        Physical Exam    Airway    Mallampati score: II         Dental       Cardiovascular  Rhythm: irregular, No weak pulses    Pulmonary   No stridor    Other Findings  post-pubertal.      Anesthesia Plan  ASA Score- 3     Anesthesia Type- IV sedation with anesthesia with ASA Monitors.         Additional Monitors:     Airway Plan:            Plan Factors-    Chart reviewed. EKG reviewed.  Existing labs reviewed. Patient summary reviewed.                  Induction- intravenous.    Postoperative Plan-     Perioperative Resuscitation Plan - Level 1 - Full Code.       Informed  Consent- Anesthetic plan and risks discussed with patient.  I personally reviewed this patient with the CRNA. Discussed and agreed on the Anesthesia Plan with the CRNA..

## 2024-07-26 NOTE — PROGRESS NOTES
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 Incision is clean, dry, intact without swelling, hematoma, redness, bleeding, drainage, or signs of infection. Chest xray this morning shows appropriate lead placement without pneumothorax.     All post implantation discharge instructions and restrictions were reviewed in detail, all questions were answered. Follow up has been arranged.     OK to discharge from EP standpoint, please contact us with questions or concerns.      Subjective     Patient reports mild soreness with movement in left shoulder     Objective                            Vitals I/O    Most Recent Min/Max in 24hrs   Temp 98.6 °F (37 °C) Temp  Min: 97.9 °F (36.6 °C)  Max: 98.6 °F (37 °C)   Pulse 89 Pulse  Min: 83  Max: 105   Resp 16 Resp  Min: 14  Max: 18   /63 BP  Min: 102/72  Max: 157/72   O2 Sat 97 % SpO2  Min: 96 %  Max: 98 %      Intake/Output Summary (Last 24 hours) at 7/27/2024 1019  Last data filed at 7/27/2024 0800  Gross per 24 hour   Intake 200 ml   Output 1150 ml   Net -950 ml                Physical Exam   Physical Exam  Constitutional:       General: She is not in acute distress.     Appearance: She is not ill-appearing.   HENT:      Head: Normocephalic and atraumatic.      Mouth/Throat:      Mouth: Mucous membranes are moist.   Cardiovascular:      Rate and Rhythm: Normal rate and regular rhythm.      Comments: Aquacell C/D/I without evidence of hematoma   Pulmonary:      Effort: Pulmonary effort is normal.  No respiratory distress.   Skin:     General: Skin is warm and dry.   Neurological:      Mental Status: She is alert.          Labs:    CBC  Recent Labs     07/26/24  0429 07/27/24  0441   WBC 15.63* 12.41*   HGB 12.1 12.1   HCT 35.3 35.5    122*     BMP  Recent Labs     07/26/24  0429 07/27/24  0441   SODIUM 131* 134*   K 3.5 3.9   CL 99 102   CO2 23 24   AGAP 9 8   BUN 13 15   CREATININE 0.59* 0.58*   CALCIUM 8.4 8.5       Coags  No recent results     Additional Electrolytes  Recent Labs     07/26/24  0429   MG 1.9          Blood Gas  No recent results  No recent results LFTs  No recent results    Infectious  No recent results  Glucose  Recent Labs     07/25/24  1025 07/26/24  0429 07/27/24  0441   GLUC 130 147* 118           SIGNATURE: Re Campbell PA-C

## 2024-07-27 ENCOUNTER — APPOINTMENT (INPATIENT)
Dept: RADIOLOGY | Facility: HOSPITAL | Age: 89
DRG: 267 | End: 2024-07-27
Payer: MEDICARE

## 2024-07-27 VITALS
DIASTOLIC BLOOD PRESSURE: 63 MMHG | SYSTOLIC BLOOD PRESSURE: 134 MMHG | HEIGHT: 59 IN | WEIGHT: 120.81 LBS | OXYGEN SATURATION: 97 % | HEART RATE: 89 BPM | BODY MASS INDEX: 24.36 KG/M2 | RESPIRATION RATE: 16 BRPM | TEMPERATURE: 98.6 F

## 2024-07-27 PROBLEM — D69.6 THROMBOCYTOPENIA (HCC): Status: ACTIVE | Noted: 2024-07-27

## 2024-07-27 LAB
ANION GAP SERPL CALCULATED.3IONS-SCNC: 8 MMOL/L (ref 4–13)
BUN SERPL-MCNC: 15 MG/DL (ref 5–25)
CALCIUM SERPL-MCNC: 8.5 MG/DL (ref 8.4–10.2)
CHLORIDE SERPL-SCNC: 102 MMOL/L (ref 96–108)
CO2 SERPL-SCNC: 24 MMOL/L (ref 21–32)
CREAT SERPL-MCNC: 0.58 MG/DL (ref 0.6–1.3)
ERYTHROCYTE [DISTWIDTH] IN BLOOD BY AUTOMATED COUNT: 13.5 % (ref 11.6–15.1)
GFR SERPL CREATININE-BSD FRML MDRD: 81 ML/MIN/1.73SQ M
GLUCOSE SERPL-MCNC: 118 MG/DL (ref 65–140)
GLUCOSE SERPL-MCNC: 137 MG/DL (ref 65–140)
HCT VFR BLD AUTO: 35.5 % (ref 34.8–46.1)
HGB BLD-MCNC: 12.1 G/DL (ref 11.5–15.4)
MCH RBC QN AUTO: 31.9 PG (ref 26.8–34.3)
MCHC RBC AUTO-ENTMCNC: 34.1 G/DL (ref 31.4–37.4)
MCV RBC AUTO: 94 FL (ref 82–98)
PLATELET # BLD AUTO: 122 THOUSANDS/UL (ref 149–390)
PMV BLD AUTO: 9.7 FL (ref 8.9–12.7)
POTASSIUM SERPL-SCNC: 3.9 MMOL/L (ref 3.5–5.3)
RBC # BLD AUTO: 3.79 MILLION/UL (ref 3.81–5.12)
SODIUM SERPL-SCNC: 134 MMOL/L (ref 135–147)
WBC # BLD AUTO: 12.41 THOUSAND/UL (ref 4.31–10.16)

## 2024-07-27 PROCEDURE — 82948 REAGENT STRIP/BLOOD GLUCOSE: CPT

## 2024-07-27 PROCEDURE — 71046 X-RAY EXAM CHEST 2 VIEWS: CPT

## 2024-07-27 PROCEDURE — 99238 HOSP IP/OBS DSCHRG MGMT 30/<: CPT | Performed by: PHYSICIAN ASSISTANT

## 2024-07-27 PROCEDURE — 99024 POSTOP FOLLOW-UP VISIT: CPT | Performed by: PHYSICIAN ASSISTANT

## 2024-07-27 PROCEDURE — 97163 PT EVAL HIGH COMPLEX 45 MIN: CPT

## 2024-07-27 PROCEDURE — 97166 OT EVAL MOD COMPLEX 45 MIN: CPT

## 2024-07-27 PROCEDURE — NC001 PR NO CHARGE: Performed by: THORACIC SURGERY (CARDIOTHORACIC VASCULAR SURGERY)

## 2024-07-27 PROCEDURE — 85027 COMPLETE CBC AUTOMATED: CPT | Performed by: PHYSICIAN ASSISTANT

## 2024-07-27 PROCEDURE — 80048 BASIC METABOLIC PNL TOTAL CA: CPT | Performed by: PHYSICIAN ASSISTANT

## 2024-07-27 PROCEDURE — 99024 POSTOP FOLLOW-UP VISIT: CPT | Performed by: INTERNAL MEDICINE

## 2024-07-27 RX ORDER — PANTOPRAZOLE SODIUM 40 MG/1
40 TABLET, DELAYED RELEASE ORAL
Qty: 30 TABLET | Refills: 0 | Status: SHIPPED | OUTPATIENT
Start: 2024-07-28 | End: 2024-08-27

## 2024-07-27 RX ORDER — ATORVASTATIN CALCIUM 20 MG/1
20 TABLET, FILM COATED ORAL
Qty: 30 TABLET | Refills: 2 | Status: SHIPPED | OUTPATIENT
Start: 2024-07-27 | End: 2024-10-25

## 2024-07-27 RX ORDER — POTASSIUM CHLORIDE 20 MEQ/1
20 TABLET, EXTENDED RELEASE ORAL ONCE
Status: COMPLETED | OUTPATIENT
Start: 2024-07-27 | End: 2024-07-27

## 2024-07-27 RX ORDER — CLOPIDOGREL BISULFATE 75 MG/1
75 TABLET ORAL DAILY
Qty: 90 TABLET | Refills: 0 | Status: SHIPPED | OUTPATIENT
Start: 2024-07-28 | End: 2024-10-26

## 2024-07-27 RX ORDER — HEPARIN SODIUM 5000 [USP'U]/ML
5000 INJECTION, SOLUTION INTRAVENOUS; SUBCUTANEOUS EVERY 8 HOURS SCHEDULED
Status: DISCONTINUED | OUTPATIENT
Start: 2024-07-27 | End: 2024-07-27 | Stop reason: HOSPADM

## 2024-07-27 RX ORDER — DOCUSATE SODIUM 100 MG/1
100 CAPSULE, LIQUID FILLED ORAL 2 TIMES DAILY
Qty: 60 CAPSULE | Refills: 0 | Status: SHIPPED | OUTPATIENT
Start: 2024-07-27 | End: 2024-08-26

## 2024-07-27 RX ORDER — ASPIRIN 81 MG/1
81 TABLET, CHEWABLE ORAL DAILY
Qty: 30 TABLET | Refills: 2 | Status: SHIPPED | OUTPATIENT
Start: 2024-07-28 | End: 2024-10-26

## 2024-07-27 RX ADMIN — POTASSIUM CHLORIDE 20 MEQ: 1500 TABLET, EXTENDED RELEASE ORAL at 06:41

## 2024-07-27 RX ADMIN — METOPROLOL TARTRATE 25 MG: 25 TABLET, FILM COATED ORAL at 08:17

## 2024-07-27 RX ADMIN — PANTOPRAZOLE SODIUM 40 MG: 40 TABLET, DELAYED RELEASE ORAL at 05:48

## 2024-07-27 RX ADMIN — CHLORHEXIDINE GLUCONATE 0.12% ORAL RINSE 15 ML: 1.2 LIQUID ORAL at 08:17

## 2024-07-27 RX ADMIN — CLOPIDOGREL BISULFATE 75 MG: 75 TABLET ORAL at 08:17

## 2024-07-27 RX ADMIN — ASPIRIN 81 MG CHEWABLE TABLET 81 MG: 81 TABLET CHEWABLE at 08:17

## 2024-07-27 RX ADMIN — MUPIROCIN 1 APPLICATION: 20 OINTMENT TOPICAL at 08:17

## 2024-07-27 RX ADMIN — Medication 1 TABLET: at 08:17

## 2024-07-27 RX ADMIN — HEPARIN SODIUM 5000 UNITS: 5000 INJECTION INTRAVENOUS; SUBCUTANEOUS at 06:41

## 2024-07-27 NOTE — PHYSICAL THERAPY NOTE
Physical Therapy Evaluation     Patient's Name: Ashley Eastman    Admitting Diagnosis  Severe aortic stenosis [I35.0]  H/O: rheumatic fever [Z86.79]    Problem List  Patient Active Problem List   Diagnosis    Pure hypercholesterolemia    Osteoarthritis    Left ventricular hypertrophy    Hypertension    Severe aortic stenosis    Pancreas cyst    Lower abdominal pain    Hepatic steatosis    Personal history of colon cancer    Pre-operative cardiovascular examination, valvular heart disease    Mitral valve regurgitation    S/P right colectomy    Skin lesion of back    Encounter for follow-up surveillance of colon cancer    H/O: rheumatic fever    S/p TAVR (transcatheter aortic valve replacement), bioprosthetic    Heart block AV second degree    Thrombocytopenia (HCC)       Past Medical History  Past Medical History:   Diagnosis Date    Aortic valve regurgitation     Aortic valve stenosis     Elevated alkaline phosphatase level 06/16/2023    Hypertension     Left ventricular hypertrophy     Mitral valve regurgitation     Osteoarthritis     Pure hypercholesterolemia     Shingles outbreak     back and left side       Past Surgical History  Past Surgical History:   Procedure Laterality Date    BUNIONECTOMY Left 04/16/2014    CARDIAC CATHETERIZATION Left 7/9/2024    Procedure: Cardiac Left Heart Cath;  Surgeon: Fred Berger DO;  Location: [facility];  Service: Cardiology    CARDIAC CATHETERIZATION N/A 7/9/2024    Procedure: Cardiac Coronary Angiogram;  Surgeon: Fred Berger DO;  Location: [facility];  Service: Cardiology    CARDIAC CATHETERIZATION N/A 7/9/2024    Procedure: Cardiac catheterization;  Surgeon: Fred Berger DO;  [facility]: [facility];  Service: Cardiology    CARDIAC CATHETERIZATION N/A 7/25/2024    Procedure: Cardiac tavr;  Surgeon: Fred Berger DO;  Location: [facility];  Service: Cardiology    COLONOSCOPY  07/13/2023    CORRECTION HAMMER TOE Left  04/16/2014    IL COLECTOMY PARTIAL W/ANASTOMOSIS N/A 08/15/2023    Procedure: RIGHT HEMICOLECTOMY;  Surgeon: Rosario Rios MD;  Location: [facility];  Service: Surgical Oncology    IL REPLACE AORTIC VALVE OPENFEMORAL ARTERY APPROACH N/A 7/25/2024    Procedure: REPLACEMENT AORTIC VALVE TRANSCATHETER (TAVR) TRANSFEMORAL W/ 23MM VALVE(ACCESS ON RIGHT) BINU;  Surgeon: Julio Cesar Draper MD;  Location: [facility];  Service: Cardiac Surgery            07/27/24 0839   PT Last Visit   PT Visit Date 07/27/24   Note Type   Note type Evaluation   Pain Assessment   Pain Assessment Tool 0-10   Pain Score No Pain   Restrictions/Precautions   Weight Bearing Precautions Per Order No   Other Precautions Multiple lines;Telemetry  (TVAR and PPM precautions)   Home Living   Type of Home House   Home Layout One level  (1+1 BAYLEE)   Bathroom Shower/Tub Tub/shower unit   Bathroom Toilet Standard   Bathroom Equipment Grab bars in shower   Home Equipment Walker;Cane   Additional Comments PTA pt denies the use of DME   Prior Function   Level of [place] Independent with ADLs;Independent with functional mobility   Lives With Alone   Receives Help From Family   IADLs Independent with driving;Independent with meal prep;Independent with medication management   Falls in the last 6 months 0   Cognition   Overall Cognitive Status WFL   Attention Within functional limits   Orientation Level Oriented X4   Memory Within functional limits   Following Commands Follows all commands and directions without difficulty   RLE Assessment   RLE Assessment WFL   LLE Assessment   LLE Assessment WFL   Bed Mobility   Additional Comments Pt found OOB in bedside recliner, returned to bedside recliner at end of session   Transfers   Sit to Stand 5  Supervision   Stand to Sit 5  Supervision   Additional Comments no AD   Ambulation/Elevation   Gait pattern WNL;Step through pattern   Gait Assistance 5  Supervision   Assistive Device None   Distance 200'    Balance   Static Sitting Good   Dynamic Sitting Good   Static Standing Fair +   Dynamic Standing Fair   Activity Tolerance   Activity Tolerance Patient tolerated treatment well   Medical Staff Made Aware OT   Nurse Made Aware RN cleared and updated   Assessment   Prognosis Good   Assessment Pt seen for PT evaluation. Pt with active PT eval/treat orders. Pt is a 89 y.o. female who was admitted to [facility]. Pt's current dx/ problem list include: S/p TAVR. Comorbidities affecting pt's physical performance at time of assessment are as follows:  has a past medical history of Aortic valve regurgitation, Aortic valve stenosis, Elevated alkaline phosphatase level, Hypertension, Left ventricular hypertrophy, Mitral valve regurgitation, Osteoarthritis, Pure hypercholesterolemia, and Shingles outbreak. Personal factors affecting pt at time of initial examination include: steps to enter environment, limited home support, past experience, inability to perform IADLs, inability to perform ADLs. Due to acute medical issues, ongoing medical workup for primary dx; decreased activity tolerance compared to baseline, increased assistance needed from caregiver at current time, multiple lines, decline in overall functional mobility status; health management issues. At baseline, pt resides alone in a 1  with 1+1 BAYLEE and was independent with ADLs/ IADLs. Currently, upon initial examination, pt is requiring supervision for sit to stand and ambulation. No further acute PT needs at this time. Recommend continued mobilization with nursing and restorative staff. Recommend no post acute rehabilitation needs.  Please also refer to the recommendation of the Physical Therapist for safe discharge planning.   Barriers to Discharge None   Goals   Patient Goals to go home today   Plan   PT Frequency Other (Comment)  (PT EVAL ONLY)   Discharge Recommendation   Rehab Resource Intensity Level, PT No post-acute rehabilitation needs   AM-PAC  Basic Mobility Inpatient   Turning in Flat Bed Without Bedrails 4   Lying on Back to Sitting on Edge of Flat Bed Without Bedrails 4   Moving Bed to Chair 4   Standing Up From Chair Using Arms 4   Walk in Room 4   Climb 3-5 Stairs With Railing 3   Basic Mobility Inpatient Raw Score 23   Basic Mobility Standardized Score 50.88   [place] Highest Level Of Mobility   -HL Goal 7: Walk 25 feet or more   -HLM Achieved 7: Walk 25 feet or more         Cindy Murillo, PT

## 2024-07-27 NOTE — DISCHARGE SUMMARY
Discharge Summary - Cardiothoracic Surgery   Ashley Eastman 89 y.o. female MRN: [id number]  Unit/Bed#: [facility] 412-01 Encounter: [id number]    Admission Date: 7/25/2024     Discharge Date: 07/27/24    Admitting Diagnosis: Severe aortic stenosis [I35.0]  H/O: rheumatic fever [Z86.79]    Primary Discharge Diagnosis:   Aortic stenosis, Rheumatic. S/P transfemoral transcatheter aortic valve replacement;    Secondary Discharge Diagnosis:   RBBB, nonobstructive CAD, HTN, HLD, OA, Shingles, colon adenocarcinoma s/p R hemicolectomy (8/2023)      Attending: Julio Cesar Draper    Consulting Physician(s):   Electrophysiology  Gerontology    Procedures Performed:   Procedure(s):  Cardiac pacer implant     Hospital Course:   The patient was seen in consultation prior to this admission for evaluation of Aortic stenosis, Rheumatic.  Risks and benefits of transfemoral transcatheter aortic valve replacement were discussed in detail, and patient was agreeable.  Routine preoperative evaluation was completed and informed consent was obtained prior to admission.    7/25: Elective admission for TF TAVR # 23mm via R approach. Extubated and transferred to PACU supported with cardene on/off. DP pulses +2 palpable b/l. Restarted on home norvasc, lipitor. Allergic to tylenol (hives), tramadol ordered. ECG shows shows NSR with Mobitz II in 30s-40s, TVP in place, EP consulted. Gerontology and cardiology consulted. PM: Repeat EKG w/ NSR w/ stable BBB, per EP able to eat now then NPO at midnight w/ TVP to remain in place.     7/26: TVP remains in place in AM. NSR in 80s with stable RBBB currently. NPO, Await EP eval. Plan for PPM today.  Cont norvasc. No BB for now. Sats good on RA. Echo with normal LV fnx, Well functioning TAVR without AI/PVL and expected gradients. ASA/plavix. Euvolemic, no need for diuresis. SSI. PM: dual chamber PPM placed successfully. Post CXR without PTX. d/c a-line, d/c brumfield. Tachycardic 100-110s, start lopressor 25mg BID.       7/27: No complaints, Pain controlled, denies SOB, using IS. Tolerating diet, (+) flatus, urinating without issues. Ambulating at baseline. NSR, HR/BP well controlled, on home norvasc, HR improved with addition of BB, continue. Echo without concerns. Net neg 1.4L, autodiuresing well. Patient in good condition for discharge, CXR without ptx or effusion. Cleared by EP for dischage. Patient in agreement with plan and follow-up appointments.      Condition at Discharge:   good     Discharge Physical Exam:    Please see the documented physical exam from this morning's progress note for details.    Discharge Data:  Results from last 7 days   Lab Units 07/27/24 0441 07/26/24  0429 07/25/24  1025   WBC Thousand/uL 12.41* 15.63*  --    HEMOGLOBIN g/dL 12.1 12.1 11.3*   HEMATOCRIT % 35.5 35.3 33.0*   PLATELETS Thousands/uL 122* 158 169     Results from last 7 days   Lab Units 07/27/24 0441 07/26/24  0429 07/25/24  1025 07/25/24  0955   POTASSIUM mmol/L 3.9 3.5 3.7  --    CHLORIDE mmol/L 102 99 105  --    CO2 mmol/L 24 23 21  --    CO2, I-STAT mmol/L  --   --   --  25   BUN mg/dL 15 13 12  --    CREATININE mg/dL 0.58* 0.59* 0.73  --    GLUCOSE, ISTAT mg/dl  --   --   --  117   CALCIUM mg/dL 8.5 8.4 8.2*  --            Discharge instructions/Information to patient and family:   See after visit summary for information provided to patient and family.      Ashley Eastman was educated on restrictions regarding driving and lifting, and techniques of proper incisional care.  They were specifically counselled on signs and symptoms of an incisional infection, and advised to contact our service immediately should they develop fevers, sweats, chill, redness or drainage at the site of any incisions.    Provisions for Follow-Up Care:  See after visit summary for information related to follow-up care and any pertinent home health orders.      Disposition:  See After Visit Summary for discharge disposition information.    Planned  Readmission:   No    Discharge Medications:  See after visit summary for reconciled discharge medications provided to patient and family.      Ashley Eastman was provided contact information and scheduled a follow up appointment with Julio Cesar Draper  Additionally, follow up appointments have been scheduled for their primary care physician and primary cardiologist.  Contact information was provided.    Ashley Eastman was counseled on the importance of avoiding tobacco products.  As with all patients whom have undergone open heart surgery, tobacco cessation medication was contraindicated at the time of discharge.     Beta Blocker was Prescribed at discharge    Aspirin was Prescribed at discharge    Statin was Prescribed at discharge      The patient was not discharged on ongoing diuretic therapy she is at her admission weight with good UOP.     The patient was informed that following their postoperative surgical evaluation, they will be referred to outpatient cardiac rehabilitation.  They were counseled that this program is run by specialists who will help them safely strengthen their heart and prevent more heart disease.  Cardiac rehabilitation will include exercise, relaxation, stress management, and heart-healthy nutrition.  Caregivers will also check to make sure their medication regimen is working.    During this admission, the patient was questioned on their use of tobacco, alcohol, and illicit/non-prescription drug use in the  previous 24 months. Ashley Eastman states that they have not used any of these substances in this time frame.    I spent 30 minutes discharging the patient. This time was spent on the day of discharge. I had direct contact with the patient on the day of discharge. Additional documentation is required if more than 30 minutes were spent on discharge.     SIGNATURE: Seda Bonner PA-C  DATE: July 27, 2024  TIME: 10:23 AM

## 2024-07-27 NOTE — CONSULTS
Consultation - Cardiology   Ashley Eastman 89 y.o. female MRN: [id number]  Unit/Bed#: [facility] 412-01 Encounter: [id number]    Inpatient consult to Cardiology  Consult performed by: Peg Tian PA-C  Consult ordered by: Bertha Melendez PA-C            Physician Requesting Consult: Julio Cesar Draper MD  Reason for Consult / Principal Problem: s/p TAVR    Assessment/Plan:  Severe aortic stenosis s/p TAVR POD #2  - patient presented for an elective TAVR 7/25/24  - besides #2, patient without complications and has no complaints this AM, ambulated around the halls without symptoms   - labs show stable renal function and normal electrolytes   - VSS.  - patient is being discharged home today by CT surgery  - continue aspirin, statin, beta-blocker  - has follow up arranged 8/5/24 with ALEXANDER Lopez @ 11:00 a.m.  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 year old female with severe aortic stenosis s/p TAVR 7/25/24 with post op 2:1 AV block s/p dual chamber pacemaker implantation yesterday 7/26/24, hypertension, dyslipidemia, colon adenocarcinoma s/p right hemicolectomy     The patient presented on 7/25/2024 for an elective TAVR for severe aortic stenosis.  Post TAVR she developed Mobitz 2 AV block and ultimately underwent dual-chamber Medtronic pacemaker implantation yesterday.  Chest x-ray today showing pacemaker is appropriately placed.  No evidence of pneumothorax.    Preop TAVR are showed minimal non-obstructive coronary artery disease.    At bedside today the patient reports feeling well.  She has  family members visiting.  She states she ambulated in the [place] today.  Denies any shortness of breath or chest pain/pressure/discomfort.  Denies lower extremity edema, with apnea, or PND.  Denies lightness, dizziness, or syncope.  Denies palpitations. Had BM this AM. No urinary complaints. Tolerated breakfast without nausea/vomiting.     VSS. Labs showing stable renal function and electrolytes.     Review of Systems   All other systems reviewed and are negative.    Historical Information   Past Medical History:   Diagnosis Date    Aortic valve regurgitation     Aortic valve stenosis     Elevated alkaline phosphatase level 06/16/2023    Hypertension     Left ventricular hypertrophy     Mitral valve regurgitation     Osteoarthritis     Pure hypercholesterolemia     Shingles outbreak     back and left side     Past Surgical History:   Procedure Laterality Date    BUNIONECTOMY Left 04/16/2014    CARDIAC CATHETERIZATION Left 7/9/2024    Procedure: Cardiac Left Heart Cath;  Surgeon: Fred Berger DO;  [facility]: [facility];  Service: Cardiology    CARDIAC CATHETERIZATION N/A 7/9/2024    Procedure: Cardiac Coronary Angiogram;  Surgeon: Fred Berger DO;  [facility]: [facility];  Service: Cardiology    CARDIAC CATHETERIZATION N/A 7/9/2024    Procedure: Cardiac catheterization;  Surgeon: Fred Berger DO;  [facility];  Service: Cardiology    CARDIAC CATHETERIZATION N/A 7/25/2024    Procedure: Cardiac tavr;  Surgeon: Fred Berger DO;  Location: [facility];  Service: Cardiology    COLONOSCOPY  07/13/2023    CORRECTION HAMMER TOE Left 04/16/2014    HI COLECTOMY PARTIAL W/ANASTOMOSIS N/A 08/15/2023    Procedure: RIGHT HEMICOLECTOMY;  Surgeon: Rosario Riso MD;  Location: [facility];  Service: Surgical Oncology    HI REPLACE AORTIC VALVE OPENFEMORAL ARTERY APPROACH N/A 7/25/2024    Procedure: REPLACEMENT AORTIC VALVE TRANSCATHETER (TAVR) TRANSFEMORAL W/ 23MM  "VALVE(ACCESS ON RIGHT) BINU;  Surgeon: Julio Cesar Draper MD;  Location: [facility];  Service: Cardiac Surgery     Social History     Substance and Sexual Activity   Alcohol Use Not Currently     Social History     Substance and Sexual Activity   Drug Use Not Currently     Social History     Tobacco Use   Smoking Status Former    Current packs/day: 0.00    Average packs/day: 1 pack/day for 25.0 years (25.0 ttl pk-yrs)    Types: Cigarettes    Start date:     Quit date:     Years since quittin.5    Passive exposure: Past   Smokeless Tobacco Never     Family History: non-contributory    Meds/Allergies   all current active meds have been reviewed       Allergies   Allergen Reactions    Acetaminophen Hives       Objective   Vitals: Blood pressure 134/63, pulse 89, temperature 98.6 °F (37 °C), temperature source Oral, resp. rate 16, height 4' 11\" (1.499 m), weight 54.8 kg (120 lb 13 oz), SpO2 97%., Body mass index is 24.4 kg/m².,   Orthostatic Blood Pressures      Flowsheet Row Most Recent Value   Blood Pressure 134/63 filed at 2024 0700   Patient Position - Orthostatic VS Sitting filed at 2024 0700          Systolic (24hrs), Av , Min:102 , Max:157     Diastolic (24hrs), Av, Min:63, Max:78      Intake/Output Summary (Last 24 hours) at 2024 0844  Last data filed at 2024 0454  Gross per 24 hour   Intake 200 ml   Output 1600 ml   Net -1400 ml       Weight (last 2 days)       Date/Time Weight    24 0533 54.8 (120.81)    24 0739 53.1 (117)    24 0600 53.5 (117.95)    24 0744 54.9 (121.03)            Invasive Devices       Peripheral Intravenous Line  Duration             Peripheral IV 24 Proximal;Right;Ventral (anterior) Forearm 1 day    Peripheral IV 24 Right Antecubital 1 day    Peripheral IV 24 Left;Proximal;Ventral (anterior) Forearm <1 day                      Physical Exam  Vitals reviewed.   Constitutional:       General: She is not " in acute distress.     Appearance: She is not diaphoretic.   HENT:      Head: Normocephalic and atraumatic.   Eyes:      Pupils: Pupils are equal, round, and reactive to light.   Neck:      Vascular: No carotid bruit.   Cardiovascular:      Rate and Rhythm: Normal rate and regular rhythm.      Pulses:           Radial pulses are 2+ on the right side and 2+ on the left side.      Heart sounds: S1 normal and S2 normal. No murmur heard.  Pulmonary:      Effort: Pulmonary effort is normal. No respiratory distress.      Breath sounds: Normal breath sounds. No wheezing or rales.   Abdominal:      General: There is no distension.      Palpations: Abdomen is soft.      Tenderness: There is no abdominal tenderness.   Musculoskeletal:         General: No deformity. Normal range of motion.      Cervical back: Normal range of motion.      Right lower leg: No edema.      Left lower leg: No edema.   Skin:     General: Skin is warm and dry.      Findings: No erythema.   Neurological:      General: No focal deficit present.      Mental Status: She is alert and oriented to person, place, and time.      Gait: Gait normal.   Psychiatric:         Mood and Affect: Mood normal.         Behavior: Behavior normal.             Laboratory Results:        CBC with diff:   Results from last 7 days   Lab Units 07/27/24 0441 07/26/24 0429 07/25/24  1025 07/25/24  0955 07/25/24  0928   WBC Thousand/uL 12.41* 15.63*  --   --   --    HEMOGLOBIN g/dL 12.1 12.1 11.3*  --   --    I STAT HEMOGLOBIN g/dl  --   --   --  9.9* 10.5*   HEMATOCRIT % 35.5 35.3 33.0*  --   --    HEMATOCRIT, ISTAT %  --   --   --  29* 31*   MCV fL 94 92  --   --   --    PLATELETS Thousands/uL 122* 158 169  --   --    RBC Million/uL 3.79* 3.85  --   --   --    MCH pg 31.9 31.4  --   --   --    MCHC g/dL 34.1 34.3  --   --   --    RDW % 13.5 13.2  --   --   --    MPV fL 9.7 9.4 9.2  --   --          CMP:  Results from last 7 days   Lab Units 07/27/24 0441 07/26/24  0429  "07/25/24  1025 07/25/24  0955 07/25/24  0928   POTASSIUM mmol/L 3.9 3.5 3.7  --   --    CHLORIDE mmol/L 102 99 105  --   --    CO2 mmol/L 24 23 21  --   --    CO2, I-STAT mmol/L  --   --   --  25 25   BUN mg/dL 15 13 12  --   --    CREATININE mg/dL 0.58* 0.59* 0.73  --   --    GLUCOSE, ISTAT mg/dl  --   --   --  117 101   CALCIUM mg/dL 8.5 8.4 8.2*  --   --    EGFR ml/min/1.73sq m 81 81 73  --   --          BMP:  Results from last 7 days   Lab Units 07/27/24  0441 07/26/24  0429 07/25/24  1025 07/25/24  0955 07/25/24  0928   POTASSIUM mmol/L 3.9 3.5 3.7  --   --    CHLORIDE mmol/L 102 99 105  --   --    CO2 mmol/L 24 23 21  --   --    CO2, I-STAT mmol/L  --   --   --  25 25   BUN mg/dL 15 13 12  --   --    CREATININE mg/dL 0.58* 0.59* 0.73  --   --    GLUCOSE, ISTAT mg/dl  --   --   --  117 101   CALCIUM mg/dL 8.5 8.4 8.2*  --   --        BNP:  No results for input(s): \"BNP\" in the last 72 hours.    Magnesium:   Results from last 7 days   Lab Units 07/26/24  0429   MAGNESIUM mg/dL 1.9       Coags:       TSH:       Hemoglobin A1C       Lipid Profile:         Cardiac testing:   No results found for this or any previous visit.    No results found for this or any previous visit.    No results found for this or any previous visit.    No results found for this or any previous visit.        Imaging: I have personally reviewed pertinent reports.    XR chest portable    Result Date: 7/26/2024  Narrative: XR CHEST PORTABLE INDICATION: Patient s/p Pacemaker/ICD Insertion. COMPARISON: 7/26/2024 FINDINGS: There is a left-sided pacemaker with right atrial and ventricular leads. The right-sided IJ line has been removed. Clear lungs. No pneumothorax or pleural effusion. Status post transcatheter aortic valve replacement. Normal cardiomediastinal silhouette. Bones are unremarkable for age. Normal upper abdomen.     Impression: Left-sided pacemaker with right atrial and ventricular leads. No pneumothorax. Workstation performed: " [id number]     Cardiac ep lab eps/ablations    Result Date: 2024  Narrative: Images from the original result were not included. ELECTROPHYSIOLOGY OPERATIVE REPORT PATIENT NAME: Ashley Eastman :  1935 MRN: [id number] Date of surgery: 24 Surgeon: Aleksander Messer MD Pt Location: Cath Lab PROCEDURE PERFORMED: 1)Dual Chamber Permanent Pacemaker Implantation- HIS/Left bundle pacing w deep septal lead to preserve narrow QRS Preoperative Medications: Ancef ANESTHESIA: Conscious sedation managed by anesthesia PREOPERATIVE DIAGNOSIS: S/p TAVR (transcatheter aortic valve replacement), bioprosthetic [Z95.3]Heart block AV second degree [I44.1] POSTOPERATIVE DIAGNOSIS: Successful Dual Chamber Permanent Pacemaker implant.  Same as Preop. Informed Consent: Risks, benefits, and alternatives discussed with patient and any family present. The patient understands risks, which include but are not limited to life threatening  bleeding, infection, air around lungs, blood around the heart and reoperation dislodged or malfunctioning device. Procedure Description: After informed consent was obtained, the patient was brought to the electrophysiology laboratory NPO. A time out was called and the patient was properly identified. The patient was pre-medicated as above. The left infraclavicular area was prepped and draped in the usual sterile fashion. After local anesthetic infiltration with 1% lidocaine, an incision was made below clavicle. The incision was extended down to the level of the pectoral fascia. A pocket was formed above the pectoral fascia using cautery and blunt dissection. A fluoroscopic guided approach was done. A safe sheath introducer was advanced over a wire into the axillary vein, the wire was confirmed to be in the right atrium on flouroscopy, the wire was removed. Under fluoroscopic guidance the right ventricular lead was positioned on the right ventricular septum basal septum, delivered with Jalousier  sheath and screwed in deeply to to capture the left bunlde with narrow QRS with right bundle branch block pattern in V1,  we confirmed it to be intraseptal pacing with relatively high impedence and good threshold and sensing with unipolar pacing. After satisfactory ventricular sensing and pacing thresholds were confirmed, the lead was sewn to the pectoral fascia/muscle using 2-0 Ethibond sutures. Under fluoroscopic guidance the right atrial lead was placed in the right atrial appendage using an active atrial lead, tissue contact was confirmed and good lead parameters were seen, the lead helix was extended, the Safe-sheath was removed and the lead was tied down with 2-0 Ethibond sutures to the muscle. The leads were attached to the generator. The pocket was flushed with a gentamicin solution. The lead and pulse generator were placed in the pre-pectoral pocket. The device was sutured to the muscle with 2-0 Ethibond.  A TopTen[employer]VIEWS Absorbable Antibiotic pouch was used.  The pocket was then closed with 3 layers of 2-0, 3-0, 4-0 -Vicryl suture was used to close the skin.  Surgical glue was applied over the closed incision. EBL: Minimal Complications: None Contrast:10cc  for venogram Findings: . The patient tolerated the procedure well. Plan: Routine postoperative care, device interrogation and CXR tomorrow morning. Ice pack over the implant site. Follow-up in 2 weeks with incision check and interrogation.      Echo complete w/ contrast if indicated    Result Date: 7/26/2024  Narrative:   Left Ventricle: Left ventricular cavity size is normal. Wall thickness is at the upper limits of normal. The left ventricular ejection fraction is 65% by visual estimation. Systolic function is vigorous. Wall motion is normal. Diastolic function is mildly abnormal, consistent with grade I (abnormal) relaxation.   Right Ventricle: Right ventricular cavity size is normal. Systolic function is normal.   Left Atrium: The atrium is mildly  dilated (35-41 mL/m2).   Aortic Valve: There is an Brown VIRGINIA 3 Ultra 23 mm TAVR bioprosthetic valve. The prosthetic valve appears well-seated. There is no evidence of paravalvular regurgitation. There is no evidence of transvalvular regurgitation. The aortic valve peak velocity is 1.84 m/s. The aortic valve peak gradient is 14 mmHg. The aortic valve mean gradient is 7 mmHg. The dimensionless velocity index is 0.60. The aortic valve area is 2.06 cm2.   Mitral Valve: There is moderate annular calcification. There is mild regurgitation.   Tricuspid Valve: There is mild regurgitation.     XR chest portable    Result Date: 7/26/2024  Narrative: XR CHEST PORTABLE INDICATION: Post Open Heart Surgery. COMPARISON: None FINDINGS: Right IJ central venous catheter tip in the lower SVC. TAVR is present. Transvenous pacer lead projects over the heart. Clear lungs. No pneumothorax or pleural effusion. Normal cardiomediastinal silhouette. Bones are unremarkable for age. Normal upper abdomen.     Impression: No acute cardiopulmonary disease. Workstation performed: [id number]     BINU intraop interventional w/realtime guidance of cardiac procedures    Result Date: 7/26/2024  Narrative: This order contains the linked images for the BINU that was performed by the Anesthesiologist.  Please see the  CARDIAC BINU ANESTHESIA procedure for results.    XR chest portable ICU    Result Date: 7/25/2024  Narrative: XR CHEST PORTABLE ICU INDICATION: S/P Transcatheter aortic valve replacement. COMPARISON: CT chest 2/29/2024. FINDINGS: Right IJ catheter with catheter tip projecting over the superior cavoatrial junction. TAVR. Clear lungs. No pneumothorax or pleural effusion. Normal cardiomediastinal silhouette. Bones are unremarkable for age. Normal upper abdomen.     Impression: No acute cardiopulmonary disease. Workstation performed: [id number]     Cardiac catheterization    Result Date: 7/25/2024  Narrative: OPERATIVE REPORT PATIENT NAME:  Ashley Eastman  :  1935 MRN: [id number] Pt Location: [facility] 02 SURGERY DATE: 2024 Surgeons and Role: Panel 1:    * Julio Cesar Draper MD - Primary    * Bertha Melendez PA-C - Assisting Panel 2:    * Fred Berger DO - Primary Co-Surgeons: Fred Berger DO; Julio Cesar Draper MD PREOPERATIVE DIAGNOSIS Symptomatic severe aortic stenosis. POSTOPERATIVE DIAGNOSIS Symptomatic severe aortic stenosis. PROCEDURE Transcatheter aortic valve replacement with a 23 mm Brown VRIGINIA 3 ULTRA RESILIA bioprosthetic valve via a percutaneous right transfemoral approach. ANESTHESIA Dr. Jeffrey Jc, general endotracheal anesthesia with transesophageal echocardiogram guidance. After informed consent was obtained, patient brought to hybrid operating room in fasting state. Time out was performed. Using modified seldinger technique sheaths were placed in the right  common  femoral artery and vein respectively. The right  common  femoral artery was also used for placement of delivery sheath. The vessel was accessed using modified seldinger technique.  Using fluoroscopy a temporary wire was advanced into RV apex through transfemoral sheath.  The coplanar view was obtained using pigtail catheter placed in ascending aorta with subsequent ascending aortography. The TAVR delivery sheath was ulltimately advanced over a stiff wire.  Next the 23 mm Brown VIRGINIA 3 ULTRA RESILIA valve was implanted using rapid pacing with fluoro and BINU guidance.  There was no significant paravalvular leak appreciated post implant. There was evidence of advanced heart block post procedure, pacemaker sewn in place. .  The TAVR delivery sheath was removed and percutaneous closure was obtained using Preclose technique with two Proglide devices deployed pre sheath insertion.  A third was used for further hemostasis. Patient left the hybrid operating room in stable condition. Impression.  Successful 23mm Brown VIRGINIA 3  ULTRA RESILIA transcatheter aortic valve replacement. SIGNATURE: Jacintoizaiah DO Celine DATE: July 25, 2024 TIME: 11:51 AM NOTE: A cardiac surgeon as co-surgeon was required as is a CMS requirement as well as needed for conventional open (non catheter based) surgical skills should become necessary.    BINU Anesthesia    Result Date: 7/25/2024  Narrative: Jeffrey Jc MD     7/25/2024 10:04 AM Procedure Performed: BINU Anesthesia Start Time:  7/25/2024 9:30 AM Preanesthesia Checklist Patient identified, IV assessed, risks and benefits discussed, monitors and equipment assessed, procedure being performed at surgeon's request and anesthesia consent obtained. Procedure Diagnostic Indications for BINU:  assessment of ascending aorta, assessment of surgical repair and hemodynamic monitoring. Type of BINU: interventional BINU with real time guidance of intracardiac procedure. Images Saved: ultrasound permanent image saved. Physician Requesting Echo: Julio Cesar Draper MD.  Location performed: OR. Intubated.  Heart visualized. Insertion of BINU Probe:  Easy. Probe Type:  Epiaortic and multiplane. Modalities:  Color flow mapping, 3D, continuous wave Doppler and pulse wave Doppler. Echocardiographic and Doppler Measurements PREPROCEDURE LEFT VENTRICLE: Systolic Function: normal. Ejection Fraction: 60-65%. Cavity size: normal.   Regional Wall Motion Abnormalities: none. RIGHT VENTRICLE: Systolic Function: normal.  Cavity size normal. No hypertrophy  AORTIC VALVE: Leaflets: trileaflet. Leaflet motions restricted. Stenosis: severe. Mean Gradient: 29 mmHg. Peak Gradient: 46 mmHg.  Area: 0.9 cm². Regurgitation: moderate. Pressure Half-time: 318 ms. MITRAL VALVE: Leaflets: calcified and normal. Leaflet Motions: normal. Regurgitation: mild.   Stenosis: none.   TRICUSPID VALVE: Leaflets: normal. Leaflet Motions: normal. Stenosis: none. Regurgitation: mild. PULMONIC VALVE: Leaflets: normal. Regurgitation: none. Stenosis: none.  ASCENDING AORTA: Size:  normal.  Dissection not present.  AORTIC ARCH: Size:  normal.  dissection not present. Grade 3: atheroma protruding < 0.5 cm into lumen. DESCENDING AORTA: Size: normal.  Dissection not present. Grade 3: atheroma protruding < 0.5 cm into lumen. RIGHT ATRIUM: Size:  normal. No spontaneous echo contrast. LEFT ATRIUM: Size: normal. No spontaneous echo contrast. LEFT ATRIAL APPENDAGE: Size: normal. No spontaneous echo contrast ATRIAL SEPTUM: Intra-atrial septal morphology: normal.  VENTRICULAR SEPTUM: Intra-ventricular septum morphology: normal. OTHER FINDINGS: Pericardium:  normal. Pleural Effusion:  none. POSTPROCEDURE LEFT VENTRICLE: Unchanged .    RIGHT VENTRICLE: Unchanged .  AORTIC VALVE: Leaflets: bioprosthetic. Stenosis: none. Mean Gradient: 3 mmHg. Regurgitation: 2 trace PVLs seen and trace.  Valve Size: 23 mm. MITRAL VALVE: Unchanged .    TRICUSPID VALVE: Unchanged .   PULMONIC VALVE: Unchanged   ATRIA: Unchanged .   AORTA: Unchanged . REMOVAL: Probe Removal: atraumatic.      Cardiac catheterization    Result Date: 7/9/2024  Narrative:   Mild nonobstructive epicardial CAD     CTA chest abdomen pelvis w wo contrast TAVR    Result Date: 7/2/2024  Narrative: CT ANGIOGRAM OF THE CHEST, ABDOMEN AND PELVIS WITH [AND WITHOUT] IV CONTRAST INDICATION:  Preoperative evaluation for TAVR COMPARISON: Echo performed April 18, 2024. TECHNIQUE:  CT angiogram examination of the chest, abdomen and pelvis was performed according to standard protocol with cardiac gating. Axial, sagittal, and coronal reformatted images were submitted for interpretation. 3D reconstructions were performed an independent workstation, and are supplied for review. This examination, like all CT scans performed in the [facility] beds at the [facility], was performed utilizing techniques to minimize radiation dose exposure, including the use of iterative reconstruction and automated exposure control. Radiation dose length product (DLP)  for this visit: 861.98 mGy-cm . IV Contrast:  85 mL of iohexol (OMNIPAQUE) Enteric Contrast: Not given. Image Quality: Excellent. Dataset used for reformattin% VASCULAR FINDINGS: Central pulmonary artery is not abnormally enlarged. Right atrium and right ventricle are normal in size. Left atrial cavity is not abnormally dilated. Left ventricular myocardium is normal. Mild mitral annular calcification. Coronary artery origins are in typical position. Mild coronary artery calcification. Annulus, LVOT and aorta analysis: Typical trileaflet aortic valve morphology. Optimal CT aortic valve plane angles: 3 cusp view: AYESHA 1, cranial 7 Cusp overlap view LOYA 23, cranial 33 Measurements: Annulus diameter (max x min): 25 x 20 mm. Annulus Area 376 mm2. Annulus Perimeter 70 mm. Annulus to left main coronary ostium: 13 mm. Annulus to right main coronary ostium: 16 mm. Sinus of Valsalva height: 22 mm. Aortic sinus of Valsalva diameter (max x min): 31 x 29 mm. LVOT minimal diameter: 21 mm. Sino-tubular junction minimal diameter: 27 mm. Ascending thoracic aorta maximal diameter: 33 mm. Valve Calcification: Aortic valve calcium score is 961. Volume of 318 mm3. Thoracic Aorta: Porcelain aorta = no. Aortic root and ascending thoracic aorta free of significant calcification beyond the sino-tubular junction. Aortic arch is normal in course and caliber with insignificant calcification. Descending thoracic aorta is normal in course, caliber, and contour. Abdominal aorta and pelvic artery measurements: Abdominal aorta: Minimal diameter above aortic bifurcation: 14 mm Calcification: mild Tortuosity: mild Right iliofemoral segment: Minimal diameter: 7 mm Calcification: mild Tortuosity: mild Left iliofemoral segment: Minimal diameter: 6 mm Calcification: mild Tortuosity: none ADDITIONAL FINDINGS: Chest: No clinically significant findings in the lungs, pleura, mediastinum or chest wall. Abdomen: Pancreatic cysts are reidentified, largest  measuring 2.1 cm on image 119 of series 302. When allowing for differences in measuring technique, these are unchanged from previous examinations and more most recently evaluated by MR in May 2023. No solid pancreatic mass or main pancreatic ductal enlargement. Atypical enhancement of the spleen probably due to early phase of postcontrast administration. There are gallstone(s) within the gallbladder, without pericholecystic inflammatory changes.  No biliary dilatation.  No significant findings involving liver, spleen, adrenal glands, or kidneys. Pelvis: No significant findings involving urinary bladder, reproductive structures or pelvic cavity. Abdominopelvic Cavity: No ascites.  No pneumoperitoneum.  No lymphadenopathy. Mild eventration of supraumbilical midline abdominal wall. Osseous Structures: No acute fracture or destructive osseous lesion. Spinal degenerative changes.     Impression: Measurements and analysis to allow for planning of Transcatheter Aortic Valve Repair as above. 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 Patient presents for a general health evaluation secondary to future TAVR. Patient is asymptomatic at this time. Risk Factors The patient has history of HTN and Hyperlipidemia. Clinical Right Pressure:  140/70 mm Hg, Left Pressure:  138/66 mm Hg.  FINDINGS:  Right        Impression  PSV  EDV (cm/s)  Direction of Flow  Ratio  Dist. ICA                 86          15                      0.86  Mid. ICA                  58          10                      0.58  Prox. ICA    1 - 49%      84          10                      0.84  Dist CCA                  75          11                            Mid CCA                   99          16                      1.11  Prox CCA                  90           0                            Ext Carotid                67           5                      0.67  Prox Vert                 65          10  Antegrade                 Subclavian               127           9                             Left         PSV  EDV (cm/s)  Direction of Flow  Ratio  Dist. ICA     73          15                      0.81  Mid. ICA      59          13                      0.66  Prox. ICA     94          14                      1.05  Dist CCA      99          11                            Mid CCA       90           6                      0.89  Prox CCA     101          13                            Ext Carotid   69           0                      0.77  Prox Vert     46          11  Antegrade                 Subclavian   124           5                               CONCLUSION:  Impression RIGHT: There is <50% stenosis noted in the internal carotid artery. Plaque is heterogenous and irregular. Vertebral artery flow is antegrade. There is no significant subclavian artery disease.  LEFT: There is no evidence of arterial disease throughout the extracranial carotid system. Vertebral artery flow is antegrade. There is no significant subclavian artery disease.  SIGNATURE: Electronically Signed by: PEARL HOWARD MD on 2024-06-27 02:20:54 PM      EKG reviewed personally: normal sinus rhythm, RBBB  Telemetry reviewed personally: no events    Assessment:  Principal Problem:    S/p TAVR (transcatheter aortic valve replacement), bioprosthetic  Active Problems:    Pure hypercholesterolemia    Osteoarthritis    Left ventricular hypertrophy    Hypertension    Severe aortic stenosis    Pancreas cyst    Lower abdominal pain    S/P right colectomy    H/O: rheumatic fever    Heart block AV second degree        Code Status: Level 1 - Full Code

## 2024-07-27 NOTE — PLAN OF CARE
Problem: INFECTION - ADULT  Goal: Absence or prevention of progression during hospitalization  Description: INTERVENTIONS:  - Assess and monitor for signs and symptoms of infection  - Monitor lab/diagnostic results  - Monitor all insertion sites, i.e. indwelling lines, tubes, and drains  - Monitor endotracheal if appropriate and nasal secretions for changes in amount and color  - [place] appropriate cooling/warming therapies per order  - Administer medications as ordered  - Instruct and encourage patient and family to use good hand hygiene technique  - Identify and instruct in appropriate isolation precautions for identified infection/condition  Outcome: Progressing

## 2024-07-27 NOTE — OCCUPATIONAL THERAPY NOTE
Occupational Therapy Evaluation     Patient Name: Ashley Eastman  Today's Date: 7/27/2024  Problem List  Principal Problem:    S/p TAVR (transcatheter aortic valve replacement), bioprosthetic  Active Problems:    Pure hypercholesterolemia    Osteoarthritis    Left ventricular hypertrophy    Hypertension    Severe aortic stenosis    Pancreas cyst    Lower abdominal pain    S/P right colectomy    H/O: rheumatic fever    Heart block AV second degree    Thrombocytopenia (HCC)    Past Medical History  Past Medical History:   Diagnosis Date    Aortic valve regurgitation     Aortic valve stenosis     Elevated alkaline phosphatase level 06/16/2023    Hypertension     Left ventricular hypertrophy     Mitral valve regurgitation     Osteoarthritis     Pure hypercholesterolemia     Shingles outbreak     back and left side     Past Surgical History  Past Surgical History:   Procedure Laterality Date    BUNIONECTOMY Left 04/16/2014    CARDIAC CATHETERIZATION Left 7/9/2024    Procedure: Cardiac Left Heart Cath;  Surgeon: Fred Berger DO;  [facility];  Service: Cardiology    CARDIAC CATHETERIZATION N/A 7/9/2024    Procedure: Cardiac Coronary Angiogram;  Surgeon: Fred Berger DO;  [facility];  Service: Cardiology    CARDIAC CATHETERIZATION N/A 7/9/2024    Procedure: Cardiac catheterization;  Surgeon: Fred Berger DO;  [facility];  Service: Cardiology    CARDIAC CATHETERIZATION N/A 7/25/2024    Procedure: Cardiac tavr;  Surgeon: Fred Berger DO;  [facility];  Service: Cardiology    COLONOSCOPY  07/13/2023    CORRECTION HAMMER TOE Left 04/16/2014    DE COLECTOMY PARTIAL W/ANASTOMOSIS N/A 08/15/2023    Procedure: RIGHT HEMICOLECTOMY;  Surgeon: Rosario Rios MD;  [facility];  Service: Surgical Oncology    DE REPLACE AORTIC VALVE OPENFEMORAL ARTERY APPROACH N/A 7/25/2024    Procedure: REPLACEMENT AORTIC VALVE  TRANSCATHETER (TAVR) TRANSFEMORAL W/ 23MM VALVE(ACCESS ON RIGHT) BINU;  Surgeon: Julio Cesar Draper MD;  [facility];  Service: Cardiac Surgery           07/27/24 0840   OT Last Visit   OT Visit Date 07/27/24   Note Type   Note type Evaluation   Pain Assessment   Pain Assessment Tool 0-10   Pain Score No Pain   Restrictions/Precautions   Weight Bearing Precautions Per Order No   Other Precautions Multiple lines;Telemetry  (TAVR & PPM precautions)   Home Living   Type of Home House   Home Layout One level  (1+1 BAYLEE)   Bathroom Shower/Tub Tub/shower unit   Bathroom Toilet Standard   Bathroom Equipment Grab bars in shower   Home Equipment Walker;Cane  (denies use PTA)   Prior Function   Level of [place] Independent with ADLs;Independent with functional mobility   Lives With Alone   Receives Help From Family   IADLs Independent with driving;Independent with meal prep;Independent with medication management   Falls in the last 6 months 0   Comments local dtr plans to assist with laundry/check in as needed   Lifestyle   Autonomy At baseline pt was completing all ADLs/IADLs IND, ambulating IND w/o AD, (+) driving.   Reciprocal Relationships supportive local son and dtr   Service to Others retired   Intrinsic Gratification takes a 1.25 mile walk daily   ADL   Eating Assistance 7  Independent   Grooming Assistance 7  Independent   UB Bathing Assistance 7  Independent   LB Bathing Assistance 5  Supervision/Setup   UB Dressing Assistance 7  Independent   LB Dressing Assistance 5  Supervision/Setup   Toileting Assistance  5  Supervision/Setup   Transfers   Sit to Stand 5  Supervision   Stand to Sit 5  Supervision   Additional Comments no AD   Functional Mobility   Functional Mobility 5  Supervision   Additional Comments household distances   Balance   Static Sitting Good   Dynamic Sitting Good   Static Standing Fair +   Dynamic Standing Fair   Activity Tolerance   Activity Tolerance Patient tolerated treatment  well   Medical Staff Made Aware PT   Nurse Made Aware Yes   RUE Assessment   RUE Assessment WFL   LUE Assessment   LUE Assessment WFL   Hand Function   Gross Motor Coordination Functional   Fine Motor Coordination Functional   Psychosocial   Psychosocial (WDL) WDL   Cognition   Overall Cognitive Status WFL   Arousal/Participation Alert;Cooperative   Attention Within functional limits   Orientation Level Oriented X4   Memory Within functional limits   Following Commands Follows all commands and directions without difficulty   Assessment   Assessment Pt is a 89 y.o. female who was admitted to [facility] on 7/25/2024 with S/p TAVR (transcatheter aortic valve replacement), bioprosthetic 7/25 and s/p PPM 7/26. Patient  has a past medical history of Aortic valve regurgitation, Aortic valve stenosis, Elevated alkaline phosphatase level (06/16/2023), Hypertension, Left ventricular hypertrophy, Mitral valve regurgitation, Osteoarthritis, Pure hypercholesterolemia, and Shingles outbreak. At baseline pt was completing all ADLs/IADLs IND, ambulating IND w/o AD, (+) driving. Pt lives alone in a 1 SH. Currently pt requires SUP-IND for overall ADLS and for functional mobility/transfers. OT provided Pt w/ Recovering After Cardiac Surgery Packet and educated Pt regarding: precautions, safe activity engagement, energy conservation, lifestyle modifications, stress management and cardiac rehabilitation programs. Pt's questions were addressed after discussion of the packet. Provided Pt w/ contact information regarding OT department if questions arise. Additionally educated on PPM precautions. Pt with Good understanding and adherence to all precautions. From OT standpoint, recommend HOME upon D/C with family assist for IADLs. No further acute OT services recommended at this time - OT to sign off.   Goals   Patient Goals to go home today   Plan   OT Frequency Eval only   Discharge Recommendation   Rehab Resource Intensity Level,  OT No post-acute rehabilitation needs   AM-PAC Daily Activity Inpatient   Lower Body Dressing 3   Bathing 3   Toileting 3   Upper Body Dressing 4   Grooming 4   Eating 4   Daily Activity Raw Score 21   Daily Activity Standardized Score (Calc for Raw Score >=11) 44.27   AM-PAC Applied Cognition Inpatient   Following a Speech/Presentation 4   Understanding Ordinary Conversation 4   Taking Medications 4   Remembering Where Things Are Placed or Put Away 4   Remembering List of 4-5 Errands 4   Taking Care of Complicated Tasks 4   Applied Cognition Raw Score 24   Applied Cognition Standardized Score 62.21   End of Consult   Patient Position at End of Consult Bedside chair;All needs within reach   Nurse Communication Nurse aware of consult          The patient's raw score on the AM-PAC Daily Activity Inpatient Short Form is 21. A raw score of greater than or equal to 19 suggests the patient may benefit from discharge to home. Please refer to the recommendation of the Occupational Therapist for safe discharge planning.        Piedad Rios, OTR/L

## 2024-07-29 ENCOUNTER — TRANSITIONAL CARE MANAGEMENT (OUTPATIENT)
Dept: FAMILY MEDICINE CLINIC | Facility: MEDICAL CENTER | Age: 89
End: 2024-07-29

## 2024-07-29 ENCOUNTER — TELEPHONE (OUTPATIENT)
Dept: FAMILY MEDICINE CLINIC | Facility: MEDICAL CENTER | Age: 89
End: 2024-07-29

## 2024-07-29 NOTE — TELEPHONE ENCOUNTER
Atorvastatin 20 mg p.o. nightly on recent after visit summary from discharge from hospital, will CC cardiology for input as well.

## 2024-07-30 ENCOUNTER — OFFICE VISIT (OUTPATIENT)
Dept: FAMILY MEDICINE CLINIC | Facility: MEDICAL CENTER | Age: 89
End: 2024-07-30
Payer: MEDICARE

## 2024-07-30 VITALS
WEIGHT: 124 LBS | DIASTOLIC BLOOD PRESSURE: 64 MMHG | BODY MASS INDEX: 25 KG/M2 | HEART RATE: 88 BPM | HEIGHT: 59 IN | SYSTOLIC BLOOD PRESSURE: 130 MMHG | OXYGEN SATURATION: 96 % | TEMPERATURE: 97.5 F | RESPIRATION RATE: 18 BRPM

## 2024-07-30 DIAGNOSIS — Z76.89 ENCOUNTER FOR SUPPORT AND COORDINATION OF TRANSITION OF CARE: Primary | ICD-10-CM

## 2024-07-30 PROCEDURE — 99496 TRANSJ CARE MGMT HIGH F2F 7D: CPT | Performed by: STUDENT IN AN ORGANIZED HEALTH CARE EDUCATION/TRAINING PROGRAM

## 2024-07-30 NOTE — PROGRESS NOTES
[facility] - Clinic Note  John Greenwood, DO, 24     Ashley Eastman MRN: [id number] : 1935 Age: 89 y.o.     Assessment/Plan     1. Encounter for support and coordination of transition of care    -Status post TAVR  -Dressing removed today in office successfully per patient request  -Has follow-up with Cardiology 24    Ashley Eastman acknowledged understanding of treatment plan, all questions answered.    Subjective      Ashley Eastman is a 89 y.o. female who presents for transition of care appointment.  She was hospitalized at [facility] from 2024 to 2024, status post TAVR.  Has follow-up Cardiology scheduled 2024.  Patient states she feels tired, otherwise no complaints.  She reports no pain.  Patient would like her incision site dressing removed.    Hospital course per discharge summary:    The patient was seen in consultation prior to this admission for evaluation of Aortic stenosis, Rheumatic.  Risks and benefits of transfemoral transcatheter aortic valve replacement were discussed in detail, and patient was agreeable.  Routine preoperative evaluation was completed and informed consent was obtained prior to admission.     : Elective admission for TF TAVR # 23mm via R approach. Extubated and transferred to PACU supported with cardene on/off. DP pulses +2 palpable b/l. Restarted on home norvasc, lipitor. Allergic to tylenol (hives), tramadol ordered. ECG shows shows NSR with Mobitz II in 30s-40s, TVP in place, EP consulted. Gerontology and cardiology consulted. PM: Repeat EKG w/ NSR w/ stable BBB, per EP able to eat now then NPO at midnight w/ TVP to remain in place.      : TVP remains in place in AM. NSR in 80s with stable RBBB currently. NPO, Await EP eval. Plan for PPM today.  Cont norvasc. No BB for now. Sats good on RA. Echo with normal LV fnx, Well functioning TAVR without AI/PVL and expected gradients.  ASA/plavix. Euvolemic, no need for diuresis. SSI. PM: dual chamber PPM placed successfully. Post CXR without PTX. d/c a-line, d/c brumfield. Tachycardic 100-110s, start lopressor 25mg BID.       7/27: No complaints, Pain controlled, denies SOB, using IS. Tolerating diet, (+) flatus, urinating without issues. Ambulating at baseline. NSR, HR/BP well controlled, on home norvasc, HR improved with addition of BB, continue. Echo without concerns. Net neg 1.4L, autodiuresing well. Patient in good condition for discharge, CXR without ptx or effusion. Cleared by EP for dischage. Patient in agreement with plan and follow-up appointments.      TCM Call       Date and time call was made  7/29/2024  9:23 AM    Hospital care reviewed  Records reviewed    Patient was hospitialized at  [facility]    Date of Admission  07/25/24    Date of discharge  07/27/24    Diagnosis  S/p TAVR (transcatheter aortic valve replacement), bioprosthetic    Disposition  Home    Were the patients medications reviewed and updated  Yes    Current Symptoms  None          TCM Call       Post hospital issues  Reduced activity    Should patient be enrolled in anticoag monitoring?  No    Scheduled for follow up?  Yes    Patients specialists  Other (comment)    Other specialists names  Surgical Oncology    Did you obtain your prescribed medications  Yes    Do you need help managing your prescriptions or medications  No    Is transportation to your appointment needed  No    I have advised the patient to call PCP with any new or worsening symptoms  Juani Myers MA    Living Arrangements  Children    Support System  Family    The type of support provided  Emotional    Are you recieving any outpatient services  Yes    What type of services  Physical Therpay    Are you recieving home care services  No    Are you using any community resources  No    Current waiver services  No    Have you fallen in the last 12 months  No    Interperter language line needed   No    Counseling  Patient    Counseling topics  Importance of RX compliance                The following portions of the patient's history were reviewed and updated as appropriate: allergies, current medications, past family history, past medical history, past social history, past surgical history and problem list.     Past Medical History:   Diagnosis Date    Aortic valve regurgitation     Aortic valve stenosis     Elevated alkaline phosphatase level 06/16/2023    Hypertension     Left ventricular hypertrophy     Mitral valve regurgitation     Osteoarthritis     Pure hypercholesterolemia     Shingles outbreak     back and left side       Allergies   Allergen Reactions    Acetaminophen Hives       Past Surgical History:   Procedure Laterality Date    BUNIONECTOMY Left 04/16/2014    CARDIAC CATHETERIZATION Left 7/9/2024    Procedure: Cardiac Left Heart Cath;  Surgeon: Fred Berger DO;  [facility];  Service: Cardiology    CARDIAC CATHETERIZATION N/A 7/9/2024    Procedure: Cardiac Coronary Angiogram;  Surgeon: Fred Berger DO;  [facility];  Service: Cardiology    CARDIAC CATHETERIZATION N/A 7/9/2024    Procedure: Cardiac catheterization;  Surgeon: Fred Berger DO;  [facility];  Service: Cardiology    CARDIAC CATHETERIZATION N/A 7/25/2024    Procedure: Cardiac tavr;  Surgeon: Fred Berger DO;  [facility];  Service: Cardiology    CARDIAC ELECTROPHYSIOLOGY PROCEDURE N/A 7/26/2024    Procedure: Cardiac pacer implant;  Surgeon: Aleksander Messer MD;  [facility];  Service: Cardiology    COLONOSCOPY  07/13/2023    CORRECTION HAMMER TOE Left 04/16/2014    OR COLECTOMY PARTIAL W/ANASTOMOSIS N/A 08/15/2023    Procedure: RIGHT HEMICOLECTOMY;  Surgeon: Rosario Rios MD;  [facility];  Service: Surgical Oncology    OR REPLACE AORTIC VALVE OPENFEMORAL ARTERY APPROACH N/A 7/25/2024    Procedure:  REPLACEMENT AORTIC VALVE TRANSCATHETER (TAVR) TRANSFEMORAL W/ 23MM VALVE(ACCESS ON RIGHT) BINU;  Surgeon: Julio Cesar Draper MD;  [facility];  Service: Cardiac Surgery       Family History   Problem Relation Age of Onset    No Known Problems Mother     Coronary artery disease Father     Throat cancer Father     Diabetes Sister     Cancer Sister     Pancreatic cancer Sister 60    No Known Problems Daughter     No Known Problems Daughter     No Known Problems Maternal Grandmother     No Known Problems Maternal Grandfather     No Known Problems Paternal Grandmother     No Known Problems Paternal Grandfather     No Known Problems Brother     Cancer Brother     No Known Problems Son     No Known Problems Son     No Known Problems Son        Social History     Socioeconomic History    Marital status:      Spouse name: None    Number of children: None    Years of education: None    Highest education level: None   Occupational History    None   Tobacco Use    Smoking status: Former     Current packs/day: 0.00     Average packs/day: 1 pack/day for 25.0 years (25.0 ttl pk-yrs)     Types: Cigarettes     Start date:      Quit date:      Years since quittin.6     Passive exposure: Past    Smokeless tobacco: Never   Vaping Use    Vaping status: Never Used   Substance and Sexual Activity    Alcohol use: Not Currently    Drug use: Not Currently    Sexual activity: None   Other Topics Concern    None   Social History Narrative    · Most recent tobacco use screenin2019      · Do you currently or have you served in the [employer]:   No      Social Determinants of Health     Financial Resource Strain: Low Risk  (2023)    Overall Financial Resource Strain (CARDIA)     Difficulty of Paying Living Expenses: Not hard at all   Food Insecurity: No Food Insecurity (2024)    Hunger Vital Sign     Worried About Running Out of Food in the Last Year: Never true     Ran Out of Food in the  "Last Year: Never true   Transportation Needs: No Transportation Needs (7/26/2024)    PRAPARE - Transportation     Lack of Transportation (Medical): No     Lack of Transportation (Non-Medical): No   Physical Activity: Not on file   Stress: Not on file   Social Connections: Not on file   Intimate Partner Violence: Not on file   Housing Stability: Low Risk  (7/26/2024)    Housing Stability Vital Sign     Unable to Pay for Housing in the Last Year: No     Number of Times Moved in the Last Year: 0     Homeless in the Last Year: No       Current Outpatient Medications   Medication Sig Dispense Refill    amLODIPine (NORVASC) 5 mg tablet TAKE 1 TABLET BY MOUTH ONCE DAILY AT BEDTIME 90 tablet 1    aspirin 81 mg chewable tablet Chew 1 tablet (81 mg total) daily 30 tablet 2    atorvastatin (LIPITOR) 20 mg tablet Take 1 tablet (20 mg total) by mouth daily with dinner 30 tablet 2    clopidogrel (PLAVIX) 75 mg tablet Take 1 tablet (75 mg total) by mouth daily 90 tablet 0    metoprolol tartrate (LOPRESSOR) 25 mg tablet Take 1 tablet (25 mg total) by mouth every 12 (twelve) hours 60 tablet 2    docusate sodium (COLACE) 100 mg capsule Take 1 capsule (100 mg total) by mouth 2 (two) times a day (Patient not taking: Reported on 7/30/2024) 60 capsule 0    pantoprazole (PROTONIX) 40 mg tablet Take 1 tablet (40 mg total) by mouth daily in the early morning (Patient not taking: Reported on 7/30/2024) 30 tablet 0     No current facility-administered medications for this visit.       Review of Systems     As noted in HPI    Objective      /64 (BP Location: Right arm, Patient Position: Sitting, Cuff Size: Standard)   Pulse 88   Temp 97.5 °F (36.4 °C) (Temporal)   Resp 18   Ht 4' 11\" (1.499 m)   Wt 56.2 kg (124 lb)   SpO2 96%   BMI 25.04 kg/m²     Physical Exam  Vitals reviewed.   Constitutional:       General: She is not in acute distress.     Appearance: Normal appearance.   HENT:      Head: Normocephalic and atraumatic.   Eyes: " "     Conjunctiva/sclera: Conjunctivae normal.   Cardiovascular:      Rate and Rhythm: Normal rate and regular rhythm.      Pulses: Normal pulses.   Pulmonary:      Effort: Pulmonary effort is normal. No respiratory distress.      Breath sounds: Normal breath sounds. No wheezing or rales.   Chest:          Comments: Incision site healing, no drainage, no bleeding, no extensive surrounding erythema or tactile warmth  Musculoskeletal:      Right lower leg: No edema.      Left lower leg: No edema.   Skin:     General: Skin is warm and dry.   Neurological:      Mental Status: She is alert and oriented to person, place, and time.   Psychiatric:         Mood and Affect: Mood normal.         Behavior: Behavior normal.         Thought Content: Thought content normal.         Judgment: Judgment normal.             Some portions of this record may have been generated with voice recognition software. There may be translation, syntax, or grammatical errors. Occasional wrong word or \"sound-a-like\" substitutions may have occurred due to the inherent limitations of the voice recognition software. Read the chart carefully and recognize, using context, where substations may have occurred. If you have any questions, please contact the dictating provider for clarification or correction, as needed.  "

## 2024-08-01 NOTE — TELEPHONE ENCOUNTER
LVM on son's phone,relayed  & Seda Bonner messages re:Atorvastatin 20mg p.o nightly 1 month then switch back to 10mg.

## 2024-08-02 NOTE — PROGRESS NOTES
Cardiology   Hospital Follow Up   Office Visit Note  Ashley Eastman   89 y.o.   female   MRN: [id number]  [facility] [place]  [address]  [address]  [place] [address] [phone]  PCP: John Greenwood DO  Cardiologist: Dr. Holm          Summary of Plan:  Cardiac rehab has been prescribed and recommended.  She does not think she will be participating  CBC, with next labs  continue DAPT x 3 months then monotherapy with aspirin  Once complete her current BB prescription, switch to Toprol 50 mg daily  F/U with CTS 24  Follow-up with Dr. Holm in Dec  Colon Ca screenin2023 , up to date        Assessment/Plan  Postop Mobitz 2 heart block; S/P DC PPM 24  Follow-up with device clinic today/continue post pacer instructions  AS; S/P  TF TAVR 24  (# 23mm via R approach)   Lifelong SBE prophylaxis  On ASA, Plavix 75 mg/d x 3 mos, then monotherapy  Mitral regurgitation, mild by echo 2024  Hx rheumatic fever  HTN. BP  142/78 on amlodipine 5 mg daily, after her pacer-- now on metoprolol tartrate 25 mg every 12-will switch to Toprol 50 mg daily once her current prescription is complete.  HL. On atorvastatin 20 mg/d.   Latest Reference Range & Units 21 11:49 23 09:03 24 08:10   Cholesterol See Comment mg/dL 154 102 135   Triglycerides See Comment mg/dL 55 80 67   HDL >=50 mg/dL 68 48 (L) 55   Non-HDL Cholesterol mg/dl 86     LDL Calculated 0 - 100 mg/dL 75 38 67   Thrombocytopenia, postop.  Platelet count 122 K.  Recheck CBC.  Colon CA, S/P colectomy  Cardiac testing  LHC 24. Mild nonobstructive epicardial CAD   TTE 24. EF 65%.  Wall motion normal.  Grade 1 DD.  RV normal size and function.  Mild LAE.There is an [place] 3 Ultra 23 mm TAVR bioprosthetic valve. The prosthetic valve appears well-seated. There is no evidence of paravalvular regurgitation. There is no evidence of transvalvular regurgitation. The aortic valve peak  velocity is 1.84 m/s. The aortic valve peak gradient is 14 mmHg. The aortic valve mean gradient is 7 mmHg. The dimensionless velocity index is 0.60. The aortic valve area is 2.06 cm2.  Moderate mitral annular calcification.  Mild MR.  Mild TR                   HPI  Ashley Eastman is a 89 y.o.year old with severe aortic stenosis,hypertension, dyslipidemia, colon adenocarcinoma s/p right hemicolectomy .  She follows with Dr Holm, and was last seen 3/27/24.  He was noted she had moderate to severe aortic stenosis with a mean gradient of 25 peak gradient 42 SE 0.83 cm² by echo March 2023.  She also had moderate AI and moderate ankle calcification of the mitral valve with mild mitral vegetation, normal LV size and function with grade 1 DD.  She was referred to CT surgery to consider TAVR    [facility], pre op- minimal non-obstructive coronary artery disease.    Adm 7/25-7/27/24  Elective  TF TAVR ( #23)  Postop echo: Normal LV function well-functioning TAVR without AI/PVL  Post op Mobitz type II- underwent DC PPM implant per EP  Placed in DAPT with aspirin Plavix-no need for diuretic given euvolemia  started on metoprolol tartrate 25 mg twice daily after pacer implant--- not previously had 1 prior to her surgery      8/5/24  Hospital follow-up after TAVR and pacemaker.  She is accompanied by her daughter.  She is feeling well; she denies chest pain, shortness of breath, lightheadedness or dizziness.  She is adherent to her medical therapy.  She however did not feel she needed the Protonix so she has not been taking it  She understands the need for lifelong SBE prophylaxis and to defer her first dental appointment by 6 months  She is abiding by the post pacemaker instructions.  She has mild ecchymosis at the site otherwise is healing satisfactorily  She is anxious to go to [place] next week for a family vacation.  She reports CT surgery cleared her to do this she walks regularly.  She does not believe she will be partaking in  cardiac rehab              Review of Systems   Constitutional: Negative for chills.   Cardiovascular:  Negative for chest pain, claudication, cyanosis, dyspnea on exertion, irregular heartbeat, leg swelling, near-syncope, orthopnea, palpitations, paroxysmal nocturnal dyspnea and syncope.   Respiratory:  Negative for cough and shortness of breath.    Gastrointestinal:  Negative for heartburn and nausea.   Neurological:  Negative for dizziness, focal weakness, headaches, light-headedness and weakness.   All other systems reviewed and are negative.            Assessment  Diagnoses and all orders for this visit:    Hospital discharge follow-up    S/p TAVR (transcatheter aortic valve replacement), bioprosthetic  -     metoprolol succinate (TOPROL-XL) 50 mg 24 hr tablet; Take 1 tablet (50 mg total) by mouth daily    Primary hypertension  -     metoprolol succinate (TOPROL-XL) 50 mg 24 hr tablet; Take 1 tablet (50 mg total) by mouth daily    Thrombocytopenia (HCC)  -     CBC and Platelet; Future    Pacemaker    Pure hypercholesterolemia        Past Medical History:   Diagnosis Date    Aortic valve regurgitation     Aortic valve stenosis     Elevated alkaline phosphatase level 06/16/2023    Hypertension     Left ventricular hypertrophy     Mitral valve regurgitation     Osteoarthritis     Pure hypercholesterolemia     Shingles outbreak     back and left side       Past Surgical History:   Procedure Laterality Date    BUNIONECTOMY Left 04/16/2014    CARDIAC CATHETERIZATION Left 7/9/2024    Procedure: Cardiac Left Heart Cath;  Surgeon: Fred Berger DO;  Location: [facility];  Service: Cardiology    CARDIAC CATHETERIZATION N/A 7/9/2024    Procedure: Cardiac Coronary Angiogram;  Surgeon: Fred Berger DO;  Location: [facility];  Service: Cardiology    CARDIAC CATHETERIZATION N/A 7/9/2024    Procedure: Cardiac catheterization;  Surgeon: Fred Berger DO;  [facility]: [facility];   Service: Cardiology    CARDIAC CATHETERIZATION N/A 7/25/2024    Procedure: Cardiac tavr;  Surgeon: Fred Berger DO;  Location: [facility];  Service: Cardiology    CARDIAC ELECTROPHYSIOLOGY PROCEDURE N/A 7/26/2024    Procedure: Cardiac pacer implant;  Surgeon: Aleksander Messer MD;  Location: [facility];  Service: Cardiology    COLONOSCOPY  07/13/2023    CORRECTION HAMMER TOE Left 04/16/2014    RI COLECTOMY PARTIAL W/ANASTOMOSIS N/A 08/15/2023    Procedure: RIGHT HEMICOLECTOMY;  Surgeon: Rosario Rios MD;  Location: [facility];  Service: Surgical Oncology    RI REPLACE AORTIC VALVE OPENFEMORAL ARTERY APPROACH N/A 7/25/2024    Procedure: REPLACEMENT AORTIC VALVE TRANSCATHETER (TAVR) TRANSFEMORAL W/ 23MM VALVE(ACCESS ON RIGHT) BINU;  Surgeon: Julio Cesar Draper MD;  Location: [facility];  Service: Cardiac Surgery           Allergies  Allergies   Allergen Reactions    Acetaminophen Hives         Medications    Current Outpatient Medications:     amLODIPine (NORVASC) 5 mg tablet, TAKE 1 TABLET BY MOUTH ONCE DAILY AT BEDTIME, Disp: 90 tablet, Rfl: 1    aspirin 81 mg chewable tablet, Chew 1 tablet (81 mg total) daily, Disp: 30 tablet, Rfl: 2    atorvastatin (LIPITOR) 20 mg tablet, Take 1 tablet (20 mg total) by mouth daily with dinner, Disp: 30 tablet, Rfl: 2    clopidogrel (PLAVIX) 75 mg tablet, Take 1 tablet (75 mg total) by mouth daily, Disp: 90 tablet, Rfl: 0    metoprolol succinate (TOPROL-XL) 50 mg 24 hr tablet, Take 1 tablet (50 mg total) by mouth daily, Disp: 90 tablet, Rfl: 3    docusate sodium (COLACE) 100 mg capsule, Take 1 capsule (100 mg total) by mouth 2 (two) times a day (Patient not taking: Reported on 7/30/2024), Disp: 60 capsule, Rfl: 0      Social History     Socioeconomic History    Marital status:      Spouse name: Not on file    Number of children: Not on file    Years of education: Not on file    Highest education level: Not on file   Occupational History    Not on file    Tobacco Use    Smoking status: Former     Current packs/day: 0.00     Average packs/day: 1 pack/day for 25.0 years (25.0 ttl pk-yrs)     Types: Cigarettes     Start date:      Quit date:      Years since quittin.6     Passive exposure: Past    Smokeless tobacco: Never   Vaping Use    Vaping status: Never Used   Substance and Sexual Activity    Alcohol use: Not Currently    Drug use: Not Currently    Sexual activity: Not on file   Other Topics Concern    Not on file   Social History Narrative    · Most recent tobacco use screenin2019      · Do you currently or have you served in the [email or web address]:   No      Social Determinants of Health     Financial Resource Strain: Low Risk  (2023)    Overall Financial Resource Strain (CARDIA)     Difficulty of Paying Living Expenses: Not hard at all   Food Insecurity: No Food Insecurity (2024)    Hunger Vital Sign     Worried About Running Out of Food in the Last Year: Never true     Ran Out of Food in the Last Year: Never true   Transportation Needs: No Transportation Needs (2024)    PRAPARE - Transportation     Lack of Transportation (Medical): No     Lack of Transportation (Non-Medical): No   Physical Activity: Not on file   Stress: Not on file   Social Connections: Not on file   Intimate Partner Violence: Not on file   Housing Stability: Low Risk  (2024)    Housing Stability Vital Sign     Unable to Pay for Housing in the Last Year: No     Number of Times Moved in the Last Year: 0     Homeless in the Last Year: No       Family History   Problem Relation Age of Onset    No Known Problems Mother     Coronary artery disease Father     Throat cancer Father     Diabetes Sister     Cancer Sister     Pancreatic cancer Sister 60    No Known Problems Daughter     No Known Problems Daughter     No Known Problems Maternal Grandmother     No Known Problems Maternal Grandfather     No Known Problems Paternal Grandmother     No Known Problems  "Paternal Grandfather     No Known Problems Brother     Cancer Brother     No Known Problems Son     No Known Problems Son     No Known Problems Son        Physical Exam  Vitals and nursing note reviewed.   Constitutional:       General: She is not in acute distress.     Appearance: She is not diaphoretic.   HENT:      Head: Normocephalic and atraumatic.   Eyes:      Conjunctiva/sclera: Conjunctivae normal.   Cardiovascular:      Rate and Rhythm: Normal rate and regular rhythm.      Pulses: Intact distal pulses.      Heart sounds: Normal heart sounds.   Pulmonary:      Effort: Pulmonary effort is normal.      Breath sounds: Normal breath sounds.   Chest:      Comments: Left-sided pacer site satisfactory.  No erythema.  No induration or drainage.  Mild ecchymosis.  Skin edges well-approximated.  Abdominal:      General: Bowel sounds are normal.      Palpations: Abdomen is soft.   Musculoskeletal:         General: Normal range of motion.      Cervical back: Normal range of motion and neck supple.   Skin:     General: Skin is warm and dry.   Neurological:      Mental Status: She is alert and oriented to person, place, and time.     Hospital follow-up after TAVR and left-sided pacemaker.  She is abiding by the post pacemaker instructions.    Vitals: Blood pressure 142/78, pulse 68, height 4' 11\" (1.499 m), weight 55.3 kg (122 lb), SpO2 99%.   Wt Readings from Last 3 Encounters:   08/05/24 55.3 kg (122 lb)   07/30/24 56.2 kg (124 lb)   07/27/24 54.8 kg (120 lb 13 oz)           Labs & Results:  Lab Results   Component Value Date    WBC 12.41 (H) 07/27/2024    HGB 12.1 07/27/2024    HCT 35.5 07/27/2024    MCV 94 07/27/2024     (L) 07/27/2024     No results found for: \"BNP\"  No components found for: \"CHEM\"  No results found for: \"CKTOTAL\", \"TROPONINI\", \"TROPONINT\", \"CKMBINDEX\"  No results found for this or any previous visit.    No results found for this or any previous visit.    No valid procedures specified.  No " results found for this or any previous visit.                This note was completed in part utilizing [employer] direct voice recognition software.   Grammatical errors, random word insertion, spelling mistakes, and incomplete sentences may be an occasional consequence of the system secondary to software limitations, ambient noise and hardware issues. At the time of dictation, efforts were made to edit, clarify and /or correct errors.  Please read the chart carefully and recognize, using context, where substitutions have occurred.  If you have any questions or concerns about the context, text or information contained within the body of this dictation, please contact myself, the provider, for further clarification

## 2024-08-05 ENCOUNTER — IN-CLINIC DEVICE VISIT (OUTPATIENT)
Dept: CARDIOLOGY CLINIC | Facility: CLINIC | Age: 89
End: 2024-08-05
Payer: MEDICARE

## 2024-08-05 ENCOUNTER — TELEPHONE (OUTPATIENT)
Dept: CARDIAC SURGERY | Facility: CLINIC | Age: 89
End: 2024-08-05

## 2024-08-05 ENCOUNTER — OFFICE VISIT (OUTPATIENT)
Dept: CARDIOLOGY CLINIC | Facility: CLINIC | Age: 89
End: 2024-08-05
Payer: MEDICARE

## 2024-08-05 VITALS
DIASTOLIC BLOOD PRESSURE: 78 MMHG | WEIGHT: 122 LBS | BODY MASS INDEX: 24.6 KG/M2 | HEART RATE: 68 BPM | HEIGHT: 59 IN | OXYGEN SATURATION: 99 % | SYSTOLIC BLOOD PRESSURE: 142 MMHG

## 2024-08-05 DIAGNOSIS — Z09 HOSPITAL DISCHARGE FOLLOW-UP: Primary | ICD-10-CM

## 2024-08-05 DIAGNOSIS — E78.00 PURE HYPERCHOLESTEROLEMIA: ICD-10-CM

## 2024-08-05 DIAGNOSIS — Z95.3 S/P TAVR (TRANSCATHETER AORTIC VALVE REPLACEMENT), BIOPROSTHETIC: ICD-10-CM

## 2024-08-05 DIAGNOSIS — Z95.0 PACEMAKER: Primary | ICD-10-CM

## 2024-08-05 DIAGNOSIS — Z95.0 PACEMAKER: ICD-10-CM

## 2024-08-05 DIAGNOSIS — D69.6 THROMBOCYTOPENIA (HCC): ICD-10-CM

## 2024-08-05 DIAGNOSIS — I10 PRIMARY HYPERTENSION: ICD-10-CM

## 2024-08-05 PROCEDURE — 99024 POSTOP FOLLOW-UP VISIT: CPT | Performed by: STUDENT IN AN ORGANIZED HEALTH CARE EDUCATION/TRAINING PROGRAM

## 2024-08-05 PROCEDURE — 99214 OFFICE O/P EST MOD 30 MIN: CPT | Performed by: NURSE PRACTITIONER

## 2024-08-05 RX ORDER — METOPROLOL SUCCINATE 50 MG/1
50 TABLET, EXTENDED RELEASE ORAL DAILY
Qty: 90 TABLET | Refills: 3 | Status: SHIPPED | OUTPATIENT
Start: 2024-08-05

## 2024-08-05 NOTE — TELEPHONE ENCOUNTER
Called patient for postop check in following TAVR.   No answer, unable to leave message.   If patient calls back, please transfer call to the office.

## 2024-08-05 NOTE — LETTER
2024     John Greenwood DO  [address] [place] [address]  [address]  [facility] [id number]    Patient: Ashley Eastman   YOB: 1935   Date of Visit: 2024       Dear Dr. Greenwood:    Thank you for referring Ashley Eastman to me for evaluation. Below are my notes for this consultation.    If you have questions, please do not hesitate to call me. I look forward to following your patient along with you.         Sincerely,        ALEXANDER Lopez        CC: MD Cathi Murillo CRNP  2024 11:37 AM  Sign when Signing Visit  Cardiology   Hospital Follow Up   Office Visit Note  Ashley Eastman   89 y.o.   female   MRN: [id number]  [facility] [place]  [address] [facility]  [address]  [facility] [address]  [phone]  PCP: John Greenwood DO  Cardiologist: Dr. Holm          Summary of Plan:  Cardiac rehab has been prescribed and recommended.  She does not think she will be participating  CBC, with next labs  continue DAPT x 3 months then monotherapy with aspirin  F/U with CTS 24  Follow-up with Dr. Holm in Dec  Colon Ca screenin2023 , up to date        Assessment/Plan  Postop Mobitz 2 heart block; S/P DC PPM 24  Follow-up with device clinic today/continue post pacer instructions  AS; S/P  TF TAVR 24  (# 23mm via R approach)   Lifelong SBE prophylaxis  On ASA, Plavix 75 mg/d x 3 mos, then monotherapy  Mitral regurgitation, mild by echo 2024  Hx rheumatic fever  HTN. BP  142/78 on amlodipine 5 mg daily, after her pacer-- now on metoprolol tartrate 25 mg every 12-will switch to Toprol 50 mg daily once her current prescription is complete.  HL. On atorvastatin 20 mg/d.   Latest Reference Range & Units 21 11:49 23 09:03 24 08:10   Cholesterol See Comment mg/dL 154 102 135   Triglycerides See Comment mg/dL 55 80 67   HDL >=50 mg/dL 68 48 (L) 55   Non-HDL Cholesterol mg/dl 86     LDL Calculated 0 - 100 mg/dL 75 38 67    Thrombocytopenia, postop.  Platelet count 122 K.  Recheck CBC.  Colon CA, S/P colectomy  Cardiac testing  [facility] 7/9/24. Mild nonobstructive epicardial CAD   TTE 7/26/24. EF 65%.  Wall motion normal.  Grade 1 DD.  RV normal size and function.  Mild LAE.There is an Brown [place] 3 Ultra 23 mm TAVR bioprosthetic valve. The prosthetic valve appears well-seated. There is no evidence of paravalvular regurgitation. There is no evidence of transvalvular regurgitation. The aortic valve peak velocity is 1.84 m/s. The aortic valve peak gradient is 14 mmHg. The aortic valve mean gradient is 7 mmHg. The dimensionless velocity index is 0.60. The aortic valve area is 2.06 cm2.  Moderate mitral annular calcification.  Mild MR.  Mild TR                   HPI  Ashley Eastman is a 89 y.o.year old with severe aortic stenosis,hypertension, dyslipidemia, colon adenocarcinoma s/p right hemicolectomy .  She follows with Dr Holm, and was last seen 3/27/24.  He was noted she had moderate to severe aortic stenosis with a mean gradient of 25 peak gradient 42 SE 0.83 cm² by echo March 2023.  She also had moderate AI and moderate ankle calcification of the mitral valve with mild mitral vegetation, normal LV size and function with grade 1 DD.  She was referred to CT surgery to consider TAVR    [facility], pre op- minimal non-obstructive coronary artery disease.    Adm 7/25-7/27/24  Elective  TF TAVR ( #23)  Postop echo: Normal LV function well-functioning TAVR without AI/PVL  Post op Mobitz type II- underwent DC PPM implant per EP  Placed in DAPT with aspirin Plavix-no need for diuretic given euvolemia  started on metoprolol tartrate 25 mg twice daily after pacer implant--- not previously had 1 prior to her surgery      8/5/24  Hospital follow-up after TAVR and pacemaker.  She is accompanied by her daughter.  She is feeling well; she denies chest pain, shortness of breath, lightheadedness or dizziness.  She is adherent to her medical therapy.  She  however did not feel she needed the Protonix so she has not been taking it  She understands the need for lifelong SBE prophylaxis and to defer her first dental appointment by 6 months  She is abiding by the post pacemaker instructions.  She has mild ecchymosis at the site otherwise is healing satisfactorily  She is anxious to go to [place] next week for a family vacation.  She reports CT surgery cleared her to do this she walks regularly.  She does not believe she will be partaking in cardiac rehab              Review of Systems   Constitutional: Negative for chills.   Cardiovascular:  Negative for chest pain, claudication, cyanosis, dyspnea on exertion, irregular heartbeat, leg swelling, near-syncope, orthopnea, palpitations, paroxysmal nocturnal dyspnea and syncope.   Respiratory:  Negative for cough and shortness of breath.    Gastrointestinal:  Negative for heartburn and nausea.   Neurological:  Negative for dizziness, focal weakness, headaches, light-headedness and weakness.   All other systems reviewed and are negative.            Assessment  Diagnoses and all orders for this visit:    Hospital discharge follow-up    S/p TAVR (transcatheter aortic valve replacement), bioprosthetic  -     metoprolol succinate (TOPROL-XL) 50 mg 24 hr tablet; Take 1 tablet (50 mg total) by mouth daily    Primary hypertension  -     metoprolol succinate (TOPROL-XL) 50 mg 24 hr tablet; Take 1 tablet (50 mg total) by mouth daily    Thrombocytopenia (HCC)  -     CBC and Platelet; Future    Pacemaker    Pure hypercholesterolemia        Past Medical History:   Diagnosis Date   • Aortic valve regurgitation    • Aortic valve stenosis    • Elevated alkaline phosphatase level 06/16/2023   • Hypertension    • Left ventricular hypertrophy    • Mitral valve regurgitation    • Osteoarthritis    • Pure hypercholesterolemia    • Shingles outbreak     back and left side       Past Surgical History:   Procedure Laterality Date   • BUNIONECTOMY  Left 04/16/2014   • CARDIAC CATHETERIZATION Left 7/9/2024    Procedure: Cardiac Left Heart Cath;  Surgeon: Fred Berger DO;  [facility]: [facility];  Service: Cardiology   • CARDIAC CATHETERIZATION N/A 7/9/2024    Procedure: Cardiac Coronary Angiogram;  Surgeon: Fred Berger DO;  [facility];  Service: Cardiology   • CARDIAC CATHETERIZATION N/A 7/9/2024    Procedure: Cardiac catheterization;  Surgeon: Fred Berger DO;  [facility];  Service: Cardiology   • CARDIAC CATHETERIZATION N/A 7/25/2024    Procedure: Cardiac tavr;  Surgeon: Fred Berger DO;  Location: [facility];  Service: Cardiology   • CARDIAC ELECTROPHYSIOLOGY PROCEDURE N/A 7/26/2024    Procedure: Cardiac pacer implant;  Surgeon: Aleksander Messer MD;  [facility];  Service: Cardiology   • COLONOSCOPY  07/13/2023   • CORRECTION HAMMER TOE Left 04/16/2014   • NV COLECTOMY PARTIAL W/ANASTOMOSIS N/A 08/15/2023    Procedure: RIGHT HEMICOLECTOMY;  Surgeon: Rosario Rios MD;  Location: [facility];  Service: Surgical Oncology   • NV REPLACE AORTIC VALVE OPENFEMORAL ARTERY APPROACH N/A 7/25/2024    Procedure: REPLACEMENT AORTIC VALVE TRANSCATHETER (TAVR) TRANSFEMORAL W/ 23MM VALVE(ACCESS ON RIGHT) BINU;  Surgeon: Julio Cesar Draper MD;  Location: [facility];  Service: Cardiac Surgery           Allergies  Allergies   Allergen Reactions   • Acetaminophen Hives         Medications    Current Outpatient Medications:   •  amLODIPine (NORVASC) 5 mg tablet, TAKE 1 TABLET BY MOUTH ONCE DAILY AT BEDTIME, Disp: 90 tablet, Rfl: 1  •  aspirin 81 mg chewable tablet, Chew 1 tablet (81 mg total) daily, Disp: 30 tablet, Rfl: 2  •  atorvastatin (LIPITOR) 20 mg tablet, Take 1 tablet (20 mg total) by mouth daily with dinner, Disp: 30 tablet, Rfl: 2  •  clopidogrel (PLAVIX) 75 mg tablet, Take 1 tablet (75 mg total) by mouth daily, Disp: 90 tablet, Rfl: 0  •  metoprolol succinate  (TOPROL-XL) 50 mg 24 hr tablet, Take 1 tablet (50 mg total) by mouth daily, Disp: 90 tablet, Rfl: 3  •  docusate sodium (COLACE) 100 mg capsule, Take 1 capsule (100 mg total) by mouth 2 (two) times a day (Patient not taking: Reported on 2024), Disp: 60 capsule, Rfl: 0      Social History     Socioeconomic History   • Marital status:      Spouse name: Not on file   • Number of children: Not on file   • Years of education: Not on file   • Highest education level: Not on file   Occupational History   • Not on file   Tobacco Use   • Smoking status: Former     Current packs/day: 0.00     Average packs/day: 1 pack/day for 25.0 years (25.0 ttl pk-yrs)     Types: Cigarettes     Start date:      Quit date:      Years since quittin.6     Passive exposure: Past   • Smokeless tobacco: Never   Vaping Use   • Vaping status: Never Used   Substance and Sexual Activity   • Alcohol use: Not Currently   • Drug use: Not Currently   • Sexual activity: Not on file   Other Topics Concern   • Not on file   Social History Narrative    · Most recent tobacco use screenin2019      · Do you currently or have you served in the [employer]:   No      Social Determinants of Health     Financial Resource Strain: Low Risk  (2023)    Overall Financial Resource Strain (CARDIA)    • Difficulty of Paying Living Expenses: Not hard at all   Food Insecurity: No Food Insecurity (2024)    Hunger Vital Sign    • Worried About Running Out of Food in the Last Year: Never true    • Ran Out of Food in the Last Year: Never true   Transportation Needs: No Transportation Needs (2024)    PRAPARE - Transportation    • Lack of Transportation (Medical): No    • Lack of Transportation (Non-Medical): No   Physical Activity: Not on file   Stress: Not on file   Social Connections: Not on file   Intimate Partner Violence: Not on file   Housing Stability: Low Risk  (2024)    Housing Stability Vital Sign    • Unable  "to Pay for Housing in the Last Year: No    • Number of Times Moved in the Last Year: 0    • Homeless in the Last Year: No       Family History   Problem Relation Age of Onset   • No Known Problems Mother    • Coronary artery disease Father    • Throat cancer Father    • Diabetes Sister    • Cancer Sister    • Pancreatic cancer Sister 60   • No Known Problems Daughter    • No Known Problems Daughter    • No Known Problems Maternal Grandmother    • No Known Problems Maternal Grandfather    • No Known Problems Paternal Grandmother    • No Known Problems Paternal Grandfather    • No Known Problems Brother    • Cancer Brother    • No Known Problems Son    • No Known Problems Son    • No Known Problems Son        Physical Exam  Vitals and nursing note reviewed.   Constitutional:       General: She is not in acute distress.     Appearance: She is not diaphoretic.   HENT:      Head: Normocephalic and atraumatic.   Eyes:      Conjunctiva/sclera: Conjunctivae normal.   Cardiovascular:      Rate and Rhythm: Normal rate and regular rhythm.      Pulses: Intact distal pulses.      Heart sounds: Normal heart sounds.   Pulmonary:      Effort: Pulmonary effort is normal.      Breath sounds: Normal breath sounds.   Chest:      Comments: Left-sided pacer site satisfactory.  No erythema.  No induration or drainage.  Mild ecchymosis.  Skin edges well-approximated.  Abdominal:      General: Bowel sounds are normal.      Palpations: Abdomen is soft.   Musculoskeletal:         General: Normal range of motion.      Cervical back: Normal range of motion and neck supple.   Skin:     General: Skin is warm and dry.   Neurological:      Mental Status: She is alert and oriented to person, place, and time.     Hospital follow-up after TAVR and left-sided pacemaker.  She is abiding by the post pacemaker instructions.    Vitals: Blood pressure 142/78, pulse 68, height 4' 11\" (1.499 m), weight 55.3 kg (122 lb), SpO2 99%.   Wt Readings from Last 3 " "Encounters:   08/05/24 55.3 kg (122 lb)   07/30/24 56.2 kg (124 lb)   07/27/24 54.8 kg (120 lb 13 oz)           Labs & Results:  Lab Results   Component Value Date    WBC 12.41 (H) 07/27/2024    HGB 12.1 07/27/2024    HCT 35.5 07/27/2024    MCV 94 07/27/2024     (L) 07/27/2024     No results found for: \"BNP\"  No components found for: \"CHEM\"  No results found for: \"CKTOTAL\", \"TROPONINI\", \"TROPONINT\", \"CKMBINDEX\"  No results found for this or any previous visit.    No results found for this or any previous visit.    No valid procedures specified.  No results found for this or any previous visit.                This note was completed in part utilizing [employer] direct voice recognition software.   Grammatical errors, random word insertion, spelling mistakes, and incomplete sentences may be an occasional consequence of the system secondary to software limitations, ambient noise and hardware issues. At the time of dictation, efforts were made to edit, clarify and /or correct errors.  Please read the chart carefully and recognize, using context, where substitutions have occurred.  If you have any questions or concerns about the context, text or information contained within the body of this dictation, please contact myself, the provider, for further clarification      "

## 2024-08-05 NOTE — PROGRESS NOTES
Results for orders placed or performed in visit on 08/05/24   Cardiac EP device report    Narrative    MDT DC PPM (MVP ON) - ACTIVE SYSTEM IS MRI CONDITIONAL  DEVICE INTERROGATED IN THE [facility] OFFICE: WOUND CHECK: INCISION CLEAN AND DRY WITH EDGES APPROXIMATED; WOUND CARE AND RESTRICTIONS REVIEWED WITH PATIENT (PIC-MEDIA TAB). BATTERY VOLTAGE ADEQUATE (15.3 YRS). AP 3.4%  <0.1% (AAI-DDD 60PPM). ALL LEAD PARAMETERS WITHIN NORMAL LIMITS/STABLE. NO SIGNIFICANT HIGH RATE EPISODES. NO PROGRAMMING CHANGES MADE TO DEVICE PARAMETERS. HOME REMOTE MONITORING ACTIVE. NORMAL DEVICE FUNCTION.  ES

## 2024-08-22 ENCOUNTER — TELEPHONE (OUTPATIENT)
Age: 89
End: 2024-08-22

## 2024-08-22 NOTE — TELEPHONE ENCOUNTER
"Pt called stating when she went to  metoprolol at [facility] in [place] she refused medication, it was 50mg and she takes 25mg bid.      Advised per ov note from JANIE MUNOZ her medication was changed on 8/5/24  \"now on metoprolol tartrate 25 mg every 12-will switch to Toprol 50 mg daily once her current prescription is complete. \"    Pt verbalized understanding and ? If medication needed to be called in again since she refused.  Advised I would call [facility] to let them know she will be returning to pick it up.    Called [facility] and s/w Vicki, she will have metoprolol succinate 50mg once daily rx ready for pt to .   "

## 2024-08-26 ENCOUNTER — APPOINTMENT (OUTPATIENT)
Dept: LAB | Facility: MEDICAL CENTER | Age: 89
End: 2024-08-26
Payer: MEDICARE

## 2024-08-26 DIAGNOSIS — D69.6 THROMBOCYTOPENIA (HCC): ICD-10-CM

## 2024-08-26 DIAGNOSIS — I35.0 SEVERE AORTIC STENOSIS: ICD-10-CM

## 2024-08-26 DIAGNOSIS — Z95.2 S/P TAVR (TRANSCATHETER AORTIC VALVE REPLACEMENT): ICD-10-CM

## 2024-08-26 LAB
ANION GAP SERPL CALCULATED.3IONS-SCNC: 8 MMOL/L (ref 4–13)
BASOPHILS # BLD AUTO: 0.04 THOUSANDS/ÂΜL (ref 0–0.1)
BASOPHILS NFR BLD AUTO: 1 % (ref 0–1)
BUN SERPL-MCNC: 13 MG/DL (ref 5–25)
CALCIUM SERPL-MCNC: 9 MG/DL (ref 8.4–10.2)
CHLORIDE SERPL-SCNC: 102 MMOL/L (ref 96–108)
CO2 SERPL-SCNC: 26 MMOL/L (ref 21–32)
CREAT SERPL-MCNC: 0.82 MG/DL (ref 0.6–1.3)
EOSINOPHIL # BLD AUTO: 0.26 THOUSAND/ÂΜL (ref 0–0.61)
EOSINOPHIL NFR BLD AUTO: 4 % (ref 0–6)
ERYTHROCYTE [DISTWIDTH] IN BLOOD BY AUTOMATED COUNT: 14.1 % (ref 11.6–15.1)
GFR SERPL CREATININE-BSD FRML MDRD: 63 ML/MIN/1.73SQ M
GLUCOSE P FAST SERPL-MCNC: 103 MG/DL (ref 65–99)
HCT VFR BLD AUTO: 37.3 % (ref 34.8–46.1)
HGB BLD-MCNC: 12.2 G/DL (ref 11.5–15.4)
IMM GRANULOCYTES # BLD AUTO: 0.04 THOUSAND/UL (ref 0–0.2)
IMM GRANULOCYTES NFR BLD AUTO: 1 % (ref 0–2)
LYMPHOCYTES # BLD AUTO: 1.74 THOUSANDS/ÂΜL (ref 0.6–4.47)
LYMPHOCYTES NFR BLD AUTO: 26 % (ref 14–44)
MCH RBC QN AUTO: 31 PG (ref 26.8–34.3)
MCHC RBC AUTO-ENTMCNC: 32.7 G/DL (ref 31.4–37.4)
MCV RBC AUTO: 95 FL (ref 82–98)
MONOCYTES # BLD AUTO: 0.6 THOUSAND/ÂΜL (ref 0.17–1.22)
MONOCYTES NFR BLD AUTO: 9 % (ref 4–12)
NEUTROPHILS # BLD AUTO: 4.14 THOUSANDS/ÂΜL (ref 1.85–7.62)
NEUTS SEG NFR BLD AUTO: 59 % (ref 43–75)
NRBC BLD AUTO-RTO: 0 /100 WBCS
PLATELET # BLD AUTO: 228 THOUSANDS/UL (ref 149–390)
PMV BLD AUTO: 9.4 FL (ref 8.9–12.7)
POTASSIUM SERPL-SCNC: 4.1 MMOL/L (ref 3.5–5.3)
RBC # BLD AUTO: 3.93 MILLION/UL (ref 3.81–5.12)
SODIUM SERPL-SCNC: 136 MMOL/L (ref 135–147)
WBC # BLD AUTO: 6.82 THOUSAND/UL (ref 4.31–10.16)

## 2024-08-26 PROCEDURE — 36415 COLL VENOUS BLD VENIPUNCTURE: CPT

## 2024-08-26 PROCEDURE — 85025 COMPLETE CBC W/AUTO DIFF WBC: CPT

## 2024-08-26 PROCEDURE — 80048 BASIC METABOLIC PNL TOTAL CA: CPT

## 2024-08-27 ENCOUNTER — HOSPITAL ENCOUNTER (OUTPATIENT)
Dept: NON INVASIVE DIAGNOSTICS | Facility: HOSPITAL | Age: 89
Discharge: HOME | End: 2024-08-27
Payer: MEDICARE

## 2024-08-27 ENCOUNTER — OFFICE VISIT (OUTPATIENT)
Dept: CARDIAC SURGERY | Facility: CLINIC | Age: 89
End: 2024-08-27
Payer: MEDICARE

## 2024-08-27 VITALS
HEART RATE: 70 BPM | DIASTOLIC BLOOD PRESSURE: 60 MMHG | HEIGHT: 59 IN | WEIGHT: 123 LBS | SYSTOLIC BLOOD PRESSURE: 129 MMHG | OXYGEN SATURATION: 97 % | TEMPERATURE: 97.3 F | BODY MASS INDEX: 24.8 KG/M2

## 2024-08-27 VITALS
HEART RATE: 60 BPM | DIASTOLIC BLOOD PRESSURE: 78 MMHG | BODY MASS INDEX: 24.6 KG/M2 | HEIGHT: 59 IN | SYSTOLIC BLOOD PRESSURE: 142 MMHG | WEIGHT: 122 LBS

## 2024-08-27 DIAGNOSIS — Z95.2 S/P TAVR (TRANSCATHETER AORTIC VALVE REPLACEMENT): ICD-10-CM

## 2024-08-27 DIAGNOSIS — Z95.2 S/P TAVR (TRANSCATHETER AORTIC VALVE REPLACEMENT): Primary | ICD-10-CM

## 2024-08-27 DIAGNOSIS — I35.0 SEVERE AORTIC STENOSIS: ICD-10-CM

## 2024-08-27 LAB
AORTIC ROOT: 3.2 CM
AORTIC VALVE MEAN VELOCITY: 14.5 M/S
APICAL FOUR CHAMBER EJECTION FRACTION: 55 %
ASCENDING AORTA: 3.5 CM
ATRIAL RATE: 60 BPM
AV AREA BY CONTINUOUS VTI: 1.4 CM2
AV AREA PEAK VELOCITY: 1.3 CM2
AV LVOT MEAN GRADIENT: 2 MMHG
AV LVOT PEAK GRADIENT: 4 MMHG
AV MEAN GRADIENT: 9 MMHG
AV PEAK GRADIENT: 17 MMHG
AV VALVE AREA: 2.15 CM2
AV VELOCITY RATIO: 0.53
BSA FOR ECHO PROCEDURE: 1.49 M2
DOP CALC AO PEAK VEL: 1.9 M/S
DOP CALC AO VTI: 40.63 CM
DOP CALC LVOT AREA: 3.8 CM2
DOP CALC LVOT CARDIAC INDEX: 2.35 L/MIN/M2
DOP CALC LVOT CARDIAC OUTPUT: 3.5 L/MIN
DOP CALC LVOT DIAMETER: 2.2 CM
DOP CALC LVOT PEAK VEL VTI: 23 CM
DOP CALC LVOT PEAK VEL: 1.01 M/S
DOP CALC LVOT STROKE INDEX: 39.6 ML/M2
DOP CALC LVOT STROKE VOLUME: 87.39 CM3
E WAVE DECELERATION TIME: 287 MS
E/A RATIO: 0.66
FRACTIONAL SHORTENING: 29 (ref 28–44)
INTERVENTRICULAR SEPTUM IN DIASTOLE (PARASTERNAL SHORT AXIS VIEW): 1 CM
INTERVENTRICULAR SEPTUM: 1 CM (ref 0.6–1.1)
LAAS-AP2: 17.3 CM2
LAAS-AP4: 17.2 CM2
LEFT ATRIUM SIZE: 3.7 CM
LEFT ATRIUM VOLUME (MOD BIPLANE): 48 ML
LEFT ATRIUM VOLUME INDEX (MOD BIPLANE): 32.2 ML/M2
LEFT INTERNAL DIMENSION IN SYSTOLE: 2.9 CM (ref 2.1–4)
LEFT VENTRICULAR INTERNAL DIMENSION IN DIASTOLE: 4.1 CM (ref 3.5–6)
LEFT VENTRICULAR POSTERIOR WALL IN END DIASTOLE: 1 CM
LEFT VENTRICULAR STROKE VOLUME: 42 ML
LVSV (TEICH): 42 ML
MV E'TISSUE VEL-SEP: 8 CM/S
MV PEAK A VEL: 1.21 M/S
MV PEAK E VEL: 80 CM/S
MV STENOSIS PRESSURE HALF TIME: 83 MS
MV VALVE AREA P 1/2 METHOD: 2.65
P AXIS: 3 DEGREES
PR INTERVAL: 162 MS
QRS AXIS: 50 DEGREES
QRSD INTERVAL: 112 MS
QT INTERVAL: 440 MS
QTC INTERVAL: 440 MS
RIGHT ATRIUM AREA SYSTOLE A4C: 10.5 CM2
RIGHT VENTRICLE ID DIMENSION: 2.9 CM
SL CV LEFT ATRIUM LENGTH A2C: 4.9 CM
SL CV LV EF: 55
SL CV PED ECHO LEFT VENTRICLE DIASTOLIC VOLUME (MOD BIPLANE) 2D: 74 ML
SL CV PED ECHO LEFT VENTRICLE SYSTOLIC VOLUME (MOD BIPLANE) 2D: 32 ML
T WAVE AXIS: 26 DEGREES
TR MAX PG: 12 MMHG
TR PEAK VELOCITY: 1.8 M/S
TRICUSPID ANNULAR PLANE SYSTOLIC EXCURSION: 2 CM
TRICUSPID VALVE PEAK REGURGITATION VELOCITY: 1.75 M/S
VENTRICULAR RATE: 60 BPM

## 2024-08-27 PROCEDURE — 93010 ELECTROCARDIOGRAM REPORT: CPT | Performed by: INTERNAL MEDICINE

## 2024-08-27 PROCEDURE — 93005 ELECTROCARDIOGRAM TRACING: CPT

## 2024-08-27 PROCEDURE — 93306 TTE W/DOPPLER COMPLETE: CPT

## 2024-08-27 PROCEDURE — 99214 OFFICE O/P EST MOD 30 MIN: CPT | Performed by: STUDENT IN AN ORGANIZED HEALTH CARE EDUCATION/TRAINING PROGRAM

## 2024-08-27 PROCEDURE — 93306 TTE W/DOPPLER COMPLETE: CPT | Performed by: INTERNAL MEDICINE

## 2024-08-27 NOTE — LETTER
"August 27, 2024     John Greenwood DO  [address] [place] [address]  [address] [place] [id number]    Patient: Ashley Eastman   YOB: 1935   Date of Visit: 8/27/2024       Dear Dr. Greenwood:    Thank you for referring Ashley Eastman to me for evaluation. Below are my notes for this consultation.    If you have questions, please do not hesitate to call me. I look forward to following your patient along with you.         Sincerely,        Julio Cesar Draper MD        CC: No Recipients    Vashti Pollard PA-C  8/27/2024 12:30 PM  Attested  Procedure: S/P transcatheter aortic valve replacement, performed on 7/25/24    History: Ashley Eastman is a 89 y.o. year old female who presents to our office today for routine follow up care following transcatheter aortic valve replacement. Postoperatively, patient had a Mobitz II HB with HR in 30-40's. TVP was left in place, and EP was consulted. She ultimately underwent dual chamber PPM placement on 7/26, and was discharged home on 7/27. She has been recovering well at home, and states that she feels quite well. She denies symptoms of shortness of breath, fatigue, edema. She has followed up with her PCP and cardiology. She has been taking her medications as directed. She does not plan on doing cardiac rehab. She states that she walks daily when the weather is nice, about a mile, and she will continue to do so. Cardiology adjusted her metoprolol from 25 mg to 50 mg BID.     Vital Signs:   Vitals:    08/27/24 1057 08/27/24 1058   BP: 126/60 129/60   BP Location: Left arm Right arm   Patient Position: Sitting Sitting   Cuff Size: Standard Standard   Pulse: 70    Temp: (!) 97.3 °F (36.3 °C)    TempSrc: Tympanic    SpO2: 97%    Weight: 55.8 kg (123 lb)    Height: 4' 11\" (1.499 m)        Home Medications:   Prior to Admission medications    Medication Sig Start Date End Date Taking? Authorizing Provider   amLODIPine (NORVASC) 5 mg tablet TAKE 1 TABLET BY MOUTH ONCE DAILY AT " BEDTIME 4/16/24  Yes Merced Holm MD   aspirin 81 mg chewable tablet Chew 1 tablet (81 mg total) daily 7/28/24 10/26/24 Yes Seda Bonner PA-C   atorvastatin (LIPITOR) 20 mg tablet Take 1 tablet (20 mg total) by mouth daily with dinner 7/27/24 10/25/24 Yes Seda Bonner PA-C   clopidogrel (PLAVIX) 75 mg tablet Take 1 tablet (75 mg total) by mouth daily 7/28/24 10/26/24 Yes Seda Bonner PA-C   metoprolol succinate (TOPROL-XL) 50 mg 24 hr tablet Take 1 tablet (50 mg total) by mouth daily 8/5/24  Yes ALEXANDER Lopez   Multiple Vitamins-Minerals (PRESERVISION AREDS PO) Take by mouth Takes 1 tab in the AM.  Takes 1 tab in the PM.   Yes Historical Provider, MD   docusate sodium (COLACE) 100 mg capsule Take 1 capsule (100 mg total) by mouth 2 (two) times a day  Patient not taking: Reported on 7/30/2024 7/27/24 8/26/24  Seda Bonner PA-C       Physical Exam:    HEENT/NECK:  Normocephalic. Atraumatic.  No jugular venous distention.    Cardiac: Regular rate and rhythm  Pulmonary:  Breath sounds clear bilaterally  Abdomen:  Non-tender, Non-distended, and Normal bowel sounds  Incisions: Groin puncture sites clean, dry, and intact without hematoma and Pacemaker incision is clean, dry, and intact.   Extremities: Extremities warm/dry  Neuro: Alert and oriented X 3  Skin: Warm/Dry, without rashes or lesions.    Lab Results:     Results from last 7 days   Lab Units 08/26/24  0746   WBC Thousand/uL 6.82   HEMOGLOBIN g/dL 12.2   HEMATOCRIT % 37.3   PLATELETS Thousands/uL 228     Results from last 7 days   Lab Units 08/26/24  0746   POTASSIUM mmol/L 4.1   CHLORIDE mmol/L 102   CO2 mmol/L 26   BUN mg/dL 13   CREATININE mg/dL 0.82   CALCIUM mg/dL 9.0       Imaging Studies:     Transthoracic Echocardiogram: 8/27/2024  Left Ventricle Left ventricular cavity size is normal. Wall thickness is normal. The left ventricular ejection fraction is 55%. Systolic function is normal. Wall motion is normal. Diastolic function is mildly  abnormal, consistent with grade I (abnormal) relaxation.   Right Ventricle Right ventricular cavity size is normal. Systolic function is normal.   Left Atrium The atrium is normal in size.   Right Atrium The atrium is normal in size.   Aortic Valve There is an Brown [place] 3 Ultra 23 mm TAVR bioprosthetic valve. There is no evidence of paravalvular regurgitation. There is no evidence of transvalvular regurgitation. The gradient recorded across the prosthetic aortic valve is within the expected range. The aortic valve peak velocity is 1.9 m/s. The aortic valve peak gradient is 17 mmHg. The aortic valve mean gradient is 9 mmHg. The dimensionless velocity index is 0.53. The aortic valve area is 2.15 cm2.   Mitral Valve The leaflets are not thickened. The leaflets are not calcified. The leaflets exhibit normal mobility. There is mild annular calcification.  There is mild regurgitation. There is no evidence of stenosis.   Tricuspid Valve Tricuspid valve structure is normal. There is mild regurgitation. There is no evidence of stenosis.   Pulmonic Valve Pulmonic valve structure is normal. There is no evidence of regurgitation. There is no evidence of stenosis.   Ascending Aorta The aortic root is normal in size. The ascending aorta is normal in size.   IVC/SVC The inferior vena cava is normal in size.   Pericardium There is no pericardial effusion. The pericardium is normal in appearance.       I have personally reviewed pertinent reports.      TAVR evaluation Assessment:     [facility]: I    Assessment: Aortic stenosis, Non-Rheumatic. S/P transcatheter aortic valve replacement;    Plan:     Ashley Eastman continues to recover well following transcatheter aortic valve replacement.  To date they have made progressive improvements with physical rehabilitation. At this point I have cleared them to begin outpatient cardiac rehabilitation and have encouraged them to to do so.  Ashley Eastman has also been cleared to resume driving at  this point. I have asked that they do so in small, progressive increments.    Ashlye Eastman has already been evaluated by their primary care physician and their cardiologist for ongoing medical care. Plavix will be continued for an additional 8 weeks, for a total of 3 months of therapy for TAVR prophylaxis.  Arrangements will be made for one year surveillance follow up with repeat ECG and echocardiogram.  I have advised them to notify their PCP with any new concerns that may arise in the interim. Ashley Eastman was comfortable with our recommendations and their questions were answered to their satisfaction.    Finally, Ashley Eastman was educated on their increased risk for developing a prosthetic valve infection with many common dental procedures. Subsequently, we advised them to inform their dentist that they have an implanted TAVR valve. They are not to schedule routine dental cleaning for six months following surgery and antibiotics will need to be prescribed by their dentist, prior to any dental procedures.     Vashti Pollard PA-C  08/27/24  11:08 AM    * This note was completed in part utilizing [employer] direct voice recognition software.   Grammatical errors, random word insertion, spelling mistakes, and incomplete sentences may be an occasional consequence of the system secondary to software limitations, ambient noise and hardware issues. At the time of dictation, efforts were made to edit, clarify and /or correct errors. Please read the chart carefully and recognize, using context, where substitutions have occurred.  If you have any questions or concerns about the context, text or information contained within the body of this dictation, please contact myself, the provider, for further clarification.  Attestation signed by Julio Cesar Draper MD at 8/27/2024  1:09 PM:  Attending Attestation:    I supervised the Advanced Practitioner.? In addition to personally performing portions of the history and  physical exam, I performed, in its entirety, the assessment/plan/medical decision making/counseling/care coordination component of the visit on 8/27/24.  I reviewed the Advanced Practitioner's note,  medications prescribed and orders placed.    Medical decision making is detailed below:  Ms. Eastman is s/p TF TAVR on 7/25/24 and is seen today in the office for routine follow up. She required PPM placement for heart block after TAVR, but has otherwise been recovering well. She has no complaints. I personally reviewed her TTE, which shows a well seated valve and no significant PVL. We discussed cardiac rehab, but she is feeling so well that she wants to defer it at this time. We will see her back in the office in 1 year time with routine TTE imaging.     Julio Cesar Draper MD  08/27/24  1:07 PM

## 2024-08-27 NOTE — PROGRESS NOTES
"Procedure: S/P transcatheter aortic valve replacement, performed on 7/25/24    History: sAhley Eastman is a 89 y.o. year old female who presents to our office today for routine follow up care following transcatheter aortic valve replacement. Postoperatively, patient had a Mobitz II HB with HR in 30-40's. TVP was left in place, and EP was consulted. She ultimately underwent dual chamber PPM placement on 7/26, and was discharged home on 7/27. She has been recovering well at home, and states that she feels quite well. She denies symptoms of shortness of breath, fatigue, edema. She has followed up with her PCP and cardiology. She has been taking her medications as directed. She does not plan on doing cardiac rehab. She states that she walks daily when the weather is nice, about a mile, and she will continue to do so. Cardiology adjusted her metoprolol from 25 mg to 50 mg BID.     Vital Signs:   Vitals:    08/27/24 1057 08/27/24 1058   BP: 126/60 129/60   BP Location: Left arm Right arm   Patient Position: Sitting Sitting   Cuff Size: Standard Standard   Pulse: 70    Temp: (!) 97.3 °F (36.3 °C)    TempSrc: Tympanic    SpO2: 97%    Weight: 55.8 kg (123 lb)    Height: 4' 11\" (1.499 m)        Home Medications:   Prior to Admission medications    Medication Sig Start Date End Date Taking? Authorizing Provider   amLODIPine (NORVASC) 5 mg tablet TAKE 1 TABLET BY MOUTH ONCE DAILY AT BEDTIME 4/16/24  Yes Merced Holm MD   aspirin 81 mg chewable tablet Chew 1 tablet (81 mg total) daily 7/28/24 10/26/24 Yes Seda Bonner PA-C   atorvastatin (LIPITOR) 20 mg tablet Take 1 tablet (20 mg total) by mouth daily with dinner 7/27/24 10/25/24 Yes Seda Bonner PA-C   clopidogrel (PLAVIX) 75 mg tablet Take 1 tablet (75 mg total) by mouth daily 7/28/24 10/26/24 Yes Seda Bonner PA-C   metoprolol succinate (TOPROL-XL) 50 mg 24 hr tablet Take 1 tablet (50 mg total) by mouth daily 8/5/24  Yes ALEXANDER Lopez   Multiple Vitamins-Minerals " (PRESERVISION AREDS PO) Take by mouth Takes 1 tab in the AM.  Takes 1 tab in the PM.   Yes Historical Provider, MD   docusate sodium (COLACE) 100 mg capsule Take 1 capsule (100 mg total) by mouth 2 (two) times a day  Patient not taking: Reported on 7/30/2024 7/27/24 8/26/24  Seda Bonner PA-C       Physical Exam:    HEENT/NECK:  Normocephalic. Atraumatic.  No jugular venous distention.    Cardiac: Regular rate and rhythm  Pulmonary:  Breath sounds clear bilaterally  Abdomen:  Non-tender, Non-distended, and Normal bowel sounds  Incisions: Groin puncture sites clean, dry, and intact without hematoma and Pacemaker incision is clean, dry, and intact.   Extremities: Extremities warm/dry  Neuro: Alert and oriented X 3  Skin: Warm/Dry, without rashes or lesions.    Lab Results:     Results from last 7 days   Lab Units 08/26/24  0746   WBC Thousand/uL 6.82   HEMOGLOBIN g/dL 12.2   HEMATOCRIT % 37.3   PLATELETS Thousands/uL 228     Results from last 7 days   Lab Units 08/26/24  0746   POTASSIUM mmol/L 4.1   CHLORIDE mmol/L 102   CO2 mmol/L 26   BUN mg/dL 13   CREATININE mg/dL 0.82   CALCIUM mg/dL 9.0       Imaging Studies:     Transthoracic Echocardiogram: 8/27/2024  Left Ventricle Left ventricular cavity size is normal. Wall thickness is normal. The left ventricular ejection fraction is 55%. Systolic function is normal. Wall motion is normal. Diastolic function is mildly abnormal, consistent with grade I (abnormal) relaxation.   Right Ventricle Right ventricular cavity size is normal. Systolic function is normal.   Left Atrium The atrium is normal in size.   Right Atrium The atrium is normal in size.   Aortic Valve There is an [place] 3 Ultra 23 mm TAVR bioprosthetic valve. There is no evidence of paravalvular regurgitation. There is no evidence of transvalvular regurgitation. The gradient recorded across the prosthetic aortic valve is within the expected range. The aortic valve peak velocity is 1.9 m/s. The aortic  valve peak gradient is 17 mmHg. The aortic valve mean gradient is 9 mmHg. The dimensionless velocity index is 0.53. The aortic valve area is 2.15 cm2.   Mitral Valve The leaflets are not thickened. The leaflets are not calcified. The leaflets exhibit normal mobility. There is mild annular calcification.  There is mild regurgitation. There is no evidence of stenosis.   Tricuspid Valve Tricuspid valve structure is normal. There is mild regurgitation. There is no evidence of stenosis.   Pulmonic Valve Pulmonic valve structure is normal. There is no evidence of regurgitation. There is no evidence of stenosis.   Ascending Aorta The aortic root is normal in size. The ascending aorta is normal in size.   IVC/SVC The inferior vena cava is normal in size.   Pericardium There is no pericardial effusion. The pericardium is normal in appearance.       I have personally reviewed pertinent reports.      TAVR evaluation Assessment:     [facility]: I    Assessment: Aortic stenosis, Non-Rheumatic. S/P transcatheter aortic valve replacement;    Plan:     Ashley Eastman continues to recover well following transcatheter aortic valve replacement.  To date they have made progressive improvements with physical rehabilitation. At this point I have cleared them to begin outpatient cardiac rehabilitation and have encouraged them to to do so.  Ashley Eastman has also been cleared to resume driving at this point. I have asked that they do so in small, progressive increments.    Ashley Eastman has already been evaluated by their primary care physician and their cardiologist for ongoing medical care. Plavix will be continued for an additional 8 weeks, for a total of 3 months of therapy for TAVR prophylaxis.  Arrangements will be made for one year surveillance follow up with repeat ECG and echocardiogram.  I have advised them to notify their PCP with any new concerns that may arise in the interim. Ashley Eastman was comfortable with our recommendations and  their questions were answered to their satisfaction.    Finally, Ashley Eastman was educated on their increased risk for developing a prosthetic valve infection with many common dental procedures. Subsequently, we advised them to inform their dentist that they have an implanted TAVR valve. They are not to schedule routine dental cleaning for six months following surgery and antibiotics will need to be prescribed by their dentist, prior to any dental procedures.     Vashti Pollard PA-C  08/27/24  11:08 AM    * This note was completed in part utilizing [employer] direct voice recognition software.   Grammatical errors, random word insertion, spelling mistakes, and incomplete sentences may be an occasional consequence of the system secondary to software limitations, ambient noise and hardware issues. At the time of dictation, efforts were made to edit, clarify and /or correct errors. Please read the chart carefully and recognize, using context, where substitutions have occurred.  If you have any questions or concerns about the context, text or information contained within the body of this dictation, please contact myself, the provider, for further clarification.

## 2024-09-09 ENCOUNTER — TELEPHONE (OUTPATIENT)
Age: 89
End: 2024-09-09

## 2024-09-09 NOTE — TELEPHONE ENCOUNTER
Received a call from patient asking about what precautions she needs to take for her upcoming chest CT and mammogram.  Patient given device clinic line and transferred.

## 2024-09-24 ENCOUNTER — TELEPHONE (OUTPATIENT)
Age: 89
End: 2024-09-24

## 2024-09-24 NOTE — TELEPHONE ENCOUNTER
Pt called in regards to apt 10/15 she had labs done 8/26 she wants to know if she needs new ones for that apt.    Please call pt if she needs to get sooner ones

## 2024-10-01 ENCOUNTER — TELEPHONE (OUTPATIENT)
Dept: SURGICAL ONCOLOGY | Facility: CLINIC | Age: 89
End: 2024-10-01

## 2024-10-01 NOTE — TELEPHONE ENCOUNTER
Called patient, rescheduled appointment with Dr. Rios on 10/15/24 to next available at [place] on 12/17/24. Patient prefers [place] location.

## 2024-10-08 ENCOUNTER — HOSPITAL ENCOUNTER (OUTPATIENT)
Dept: RADIOLOGY | Facility: MEDICAL CENTER | Age: 89
Discharge: HOME | End: 2024-10-08
Payer: MEDICARE

## 2024-10-08 DIAGNOSIS — Z08 ENCOUNTER FOR FOLLOW-UP SURVEILLANCE OF COLON CANCER: ICD-10-CM

## 2024-10-08 DIAGNOSIS — Z85.038 PERSONAL HISTORY OF COLON CANCER: ICD-10-CM

## 2024-10-08 DIAGNOSIS — Z85.038 ENCOUNTER FOR FOLLOW-UP SURVEILLANCE OF COLON CANCER: ICD-10-CM

## 2024-10-08 PROCEDURE — 71260 CT THORAX DX C+: CPT

## 2024-10-08 PROCEDURE — 74177 CT ABD & PELVIS W/CONTRAST: CPT

## 2024-10-08 RX ADMIN — IOHEXOL 100 ML: 350 INJECTION, SOLUTION INTRAVENOUS at 09:54

## 2024-10-16 DIAGNOSIS — I10 PRIMARY HYPERTENSION: ICD-10-CM

## 2024-10-17 RX ORDER — AMLODIPINE BESYLATE 5 MG/1
5 TABLET ORAL
Qty: 90 TABLET | Refills: 1 | Status: SHIPPED | OUTPATIENT
Start: 2024-10-17

## 2024-10-30 ENCOUNTER — HOSPITAL ENCOUNTER (OUTPATIENT)
Dept: RADIOLOGY | Facility: MEDICAL CENTER | Age: 89
Discharge: HOME | End: 2024-10-30
Payer: MEDICARE

## 2024-10-30 ENCOUNTER — TELEPHONE (OUTPATIENT)
Dept: ADMINISTRATIVE | Facility: OTHER | Age: 89
End: 2024-10-30

## 2024-10-30 VITALS — BODY MASS INDEX: 24.8 KG/M2 | WEIGHT: 123 LBS | HEIGHT: 59 IN

## 2024-10-30 DIAGNOSIS — Z12.31 ENCOUNTER FOR SCREENING MAMMOGRAM FOR MALIGNANT NEOPLASM OF BREAST: ICD-10-CM

## 2024-10-30 PROCEDURE — 77067 SCR MAMMO BI INCL CAD: CPT

## 2024-10-30 PROCEDURE — 77063 BREAST TOMOSYNTHESIS BI: CPT

## 2024-10-30 NOTE — TELEPHONE ENCOUNTER
10/30/24 12:32 PM    Patient contacted to bring Advance Directive, POLST, or Living Will document to next scheduled pcp visit.[facility] Department spoke with patient/ caregiver.    Thank you.  Claudia Reeves MA  [facility]

## 2024-11-05 ENCOUNTER — OFFICE VISIT (OUTPATIENT)
Dept: FAMILY MEDICINE CLINIC | Facility: MEDICAL CENTER | Age: 89
End: 2024-11-05
Payer: MEDICARE

## 2024-11-05 VITALS
HEART RATE: 72 BPM | RESPIRATION RATE: 18 BRPM | BODY MASS INDEX: 25.72 KG/M2 | HEIGHT: 59 IN | DIASTOLIC BLOOD PRESSURE: 68 MMHG | OXYGEN SATURATION: 96 % | TEMPERATURE: 98.2 F | WEIGHT: 127.6 LBS | SYSTOLIC BLOOD PRESSURE: 122 MMHG

## 2024-11-05 DIAGNOSIS — E78.00 PURE HYPERCHOLESTEROLEMIA: ICD-10-CM

## 2024-11-05 DIAGNOSIS — Z00.00 MEDICARE ANNUAL WELLNESS VISIT, SUBSEQUENT: Primary | ICD-10-CM

## 2024-11-05 DIAGNOSIS — Z85.828 HISTORY OF BASAL CELL CANCER: ICD-10-CM

## 2024-11-05 PROCEDURE — G0439 PPPS, SUBSEQ VISIT: HCPCS | Performed by: STUDENT IN AN ORGANIZED HEALTH CARE EDUCATION/TRAINING PROGRAM

## 2024-11-05 PROCEDURE — 99213 OFFICE O/P EST LOW 20 MIN: CPT | Performed by: STUDENT IN AN ORGANIZED HEALTH CARE EDUCATION/TRAINING PROGRAM

## 2024-11-05 NOTE — ASSESSMENT & PLAN NOTE
Orders:    Lipid Panel with Direct LDL reflex; Future    Comprehensive metabolic panel; Future

## 2024-11-05 NOTE — PROGRESS NOTES
Ambulatory Visit  Name: Ashley Eastman      : 1935      MRN: [id number]  Encounter Provider: John Greenwood DO  Encounter Date: 2024   Encounter department: [facility] [place]    Assessment & Plan  Medicare annual wellness visit, subsequent  Still considering Shingrix vaccination series, declines other recommended vaccines at this time       History of basal cell cancer    Orders:    Ambulatory Referral to Dermatology; Future    Pure hypercholesterolemia    Orders:    Lipid Panel with Direct LDL reflex; Future    Comprehensive metabolic panel; Future      Depression Screening and Follow-up Plan: Patient was screened for depression during today's encounter. They screened negative with a PHQ-2 score of 0.      Follow-up in 6 months and sooner as needed.    Preventive health issues were discussed with patient, and age appropriate screening tests were ordered as noted in patient's After Visit Summary. Personalized health advice and appropriate referrals for health education or preventive services given if needed, as noted in patient's After Visit Summary.    History of Present Illness     HPI   Patient Care Team:  John Greenwood DO as PCP - General (Family Medicine)  Rosario Rios MD (Surgical Oncology)  Kathrin Maldonado PA-C as Physician Assistant (Gastroenterology)    Review of Systems    As noted in HPI  Medical History Reviewed by provider this encounter:  Tobacco  Allergies  Meds  Problems  Med Hx  Surg Hx  Fam Hx       Annual Wellness Visit Questionnaire   Ashley is here for her Subsequent Wellness visit.     Health Risk Assessment:   Patient rates overall health as good. Patient feels that their physical health rating is slightly better. Patient is satisfied with their life. Eyesight was rated as same. Hearing was rated as same. Patient feels that their emotional and mental health rating is slightly better. Patients states they are never, rarely angry. Patient states they are  never, rarely unusually tired/fatigued. Pain experienced in the last 7 days has been none. Patient states that she has experienced no weight loss or gain in last 6 months.     Depression Screening:   PHQ-2 Score: 0      Fall Risk Screening:   In the past year, patient has experienced: no history of falling in past year      Urinary Incontinence Screening:   Patient has not leaked urine accidently in the last six months.     Home Safety:  Patient does not have trouble with stairs inside or outside of their home. Patient has working smoke alarms and has working carbon monoxide detector. Home safety hazards include: none.     Nutrition:   Current diet is Regular.     Medications:   Patient is currently taking over-the-counter supplements. OTC medications include: Preservision (eye drops). Patient is able to manage medications.     Activities of Daily Living (ADLs)/Instrumental Activities of Daily Living (IADLs):   Walk and transfer into and out of bed and chair?: Yes  Dress and groom yourself?: Yes    Bathe or shower yourself?: Yes    Feed yourself? Yes  Do your laundry/housekeeping?: Yes  Manage your money, pay your bills and track your expenses?: Yes  Make your own meals?: Yes    Do your own shopping?: Yes    Previous Hospitalizations:   Any hospitalizations or ED visits within the last 12 months?: Yes    How many hospitalizations have you had in the last year?: 1-2    Advance Care Planning:   Living will: Yes    Durable POA for healthcare: Yes    Advanced directive: Yes      Cognitive Screening:   Provider or family/friend/caregiver concerned regarding cognition?: No    PREVENTIVE SCREENINGS      Cardiovascular Screening:    General: History Lipid Disorder and Screening Current      Diabetes Screening:     General: Screening Current      Colorectal Cancer Screening:     General: Screening Not Indicated      Breast Cancer Screening:     General: Screening Current      Cervical Cancer Screening:    General: Screening  Not Indicated      Osteoporosis Screening:    General: Risks and Benefits Discussed and Patient Declines    Due for: DXA Axial      Abdominal Aortic Aneurysm (AAA) Screening:        General: Screening Not Indicated      Lung Cancer Screening:     General: Screening Not Indicated      Hepatitis C Screening:    General: Screening Current    Screening, Brief Intervention, and Referral to Treatment (SBIRT)    Screening  Typical number of drinks in a day: 0  Typical number of drinks in a week: 0  Interpretation: Low risk drinking behavior.    Single Item Drug Screening:  How often have you used an illegal drug (including marijuana) or a prescription medication for non-medical reasons in the past year? never    Single Item Drug Screen Score: 0  Interpretation: Negative screen for possible drug use disorder    Other Counseling Topics:   Car/seat belt/driving safety, sunscreen and calcium and vitamin D intake and regular weightbearing exercise.     Social Determinants of Health     Financial Resource Strain: Low Risk  (11/2/2023)    Overall Financial Resource Strain (CARDIA)     Difficulty of Paying Living Expenses: Not hard at all   Food Insecurity: No Food Insecurity (11/5/2024)    Hunger Vital Sign     Worried About Running Out of Food in the Last Year: Never true     Ran Out of Food in the Last Year: Never true   Transportation Needs: No Transportation Needs (11/5/2024)    PRAPARE - Transportation     Lack of Transportation (Medical): No     Lack of Transportation (Non-Medical): No   Housing Stability: Low Risk  (11/5/2024)    Housing Stability Vital Sign     Unable to Pay for Housing in the Last Year: No     Number of Times Moved in the Last Year: 0     Homeless in the Last Year: No   Utilities: Not At Risk (11/5/2024)    [facility] Utilities     Threatened with loss of utilities: No     No results found.    Objective     /68 (BP Location: Left arm, Patient Position: Sitting, Cuff Size: Standard)   Pulse 72   Temp  "98.2 °F (36.8 °C) (Temporal)   Resp 18   Ht 4' 11\" (1.499 m)   Wt 57.9 kg (127 lb 9.6 oz)   SpO2 96%   BMI 25.77 kg/m²     Physical Exam  Vitals reviewed.   Constitutional:       General: She is not in acute distress.     Appearance: Normal appearance.   HENT:      Head: Normocephalic and atraumatic.   Eyes:      Conjunctiva/sclera: Conjunctivae normal.   Pulmonary:      Effort: Pulmonary effort is normal.   Neurological:      Mental Status: She is alert and oriented to person, place, and time.   Psychiatric:         Mood and Affect: Mood normal.         Behavior: Behavior normal.         Thought Content: Thought content normal.         Judgment: Judgment normal.         "

## 2024-11-05 NOTE — PATIENT INSTRUCTIONS
Medicare Preventive Visit Patient Instructions  Thank you for completing your Welcome to Medicare Visit or Medicare Annual Wellness Visit today. Your next wellness visit will be due in one year (11/6/2025).  The screening/preventive services that you may require over the next 5-10 years are detailed below. Some tests may not apply to you based off risk factors and/or age. Screening tests ordered at today's visit but not completed yet may show as past due. Also, please note that scanned in results may not display below.  Preventive Screenings:  Service Recommendations Previous Testing/Comments   Colorectal Cancer Screening  * Colonoscopy    * Fecal Occult Blood Test (FOBT)/Fecal Immunochemical Test (FIT)  * Fecal DNA/Cologuard Test  * Flexible Sigmoidoscopy Age: 45-75 years old   Colonoscopy: every 10 years (may be performed more frequently if at higher risk)  OR  FOBT/FIT: every 1 year  OR  Cologuard: every 3 years  OR  Sigmoidoscopy: every 5 years  Screening may be recommended earlier than age 45 if at higher risk for colorectal cancer. Also, an individualized decision between you and your healthcare provider will decide whether screening between the ages of 76-85 would be appropriate. Colonoscopy: 07/13/2023  FOBT/FIT: 12/20/2021  Cologuard: Not on file  Sigmoidoscopy: Not on file    Screening Not Indicated     Breast Cancer Screening Age: 40+ years old  Frequency: every 1-2 years  Not required if history of left and right mastectomy Mammogram: 10/30/2024    Screening Current   Cervical Cancer Screening Between the ages of 21-29, pap smear recommended once every 3 years.   Between the ages of 30-65, can perform pap smear with HPV co-testing every 5 years.   Recommendations may differ for women with a history of total hysterectomy, cervical cancer, or abnormal pap smears in past. Pap Smear: Not on file    Screening Not Indicated   Hepatitis C Screening Once for adults born between 1945 and 1965  More frequently in  patients at high risk for Hepatitis C Hep C Antibody: 03/09/2023    Screening Current   Diabetes Screening 1-2 times per year if you're at risk for diabetes or have pre-diabetes Fasting glucose: 103 mg/dL (8/26/2024)  A1C: 5.5 (5/9/2024)  Screening Current   Cholesterol Screening Once every 5 years if you don't have a lipid disorder. May order more often based on risk factors. Lipid panel: 05/02/2024    Screening Not Indicated  History Lipid Disorder     Other Preventive Screenings Covered by Medicare:  Abdominal Aortic Aneurysm (AAA) Screening: covered once if your at risk. You're considered to be at risk if you have a family history of AAA.  Lung Cancer Screening: covers low dose CT scan once per year if you meet all of the following conditions: (1) Age 55-77; (2) No signs or symptoms of lung cancer; (3) Current smoker or have quit smoking within the last 15 years; (4) You have a tobacco smoking history of at least 20 pack years (packs per day multiplied by number of years you smoked); (5) You get a written order from a healthcare provider.  Glaucoma Screening: covered annually if you're considered high risk: (1) You have diabetes OR (2) Family history of glaucoma OR (3)  aged 50 and older OR (4)  American aged 65 and older  Osteoporosis Screening: covered every 2 years if you meet one of the following conditions: (1) You're estrogen deficient and at risk for osteoporosis based off medical history and other findings; (2) Have a vertebral abnormality; (3) On glucocorticoid therapy for more than 3 months; (4) Have primary hyperparathyroidism; (5) On osteoporosis medications and need to assess response to drug therapy.   Last bone density test (DXA Scan): Not on file.  HIV Screening: covered annually if you're between the age of 15-65. Also covered annually if you are younger than 15 and older than 65 with risk factors for HIV infection. For pregnant patients, it is covered up to 3 times per  pregnancy.    Immunizations:  Immunization Recommendations   Influenza Vaccine Annual influenza vaccination during flu season is recommended for all persons aged >= 6 months who do not have contraindications   Pneumococcal Vaccine   * Pneumococcal conjugate vaccine = PCV13 (Prevnar 13), PCV15 (Vaxneuvance), PCV20 (Prevnar 20)  * Pneumococcal polysaccharide vaccine = PPSV23 (Pneumovax) Adults 19-65 yo with certain risk factors or if 65+ yo  If never received any pneumonia vaccine: recommend Prevnar 20 (PCV20)  Give PCV20 if previously received 1 dose of PCV13 or PPSV23   Hepatitis B Vaccine 3 dose series if at intermediate or high risk (ex: diabetes, end stage renal disease, liver disease)   Respiratory syncytial virus (RSV) Vaccine - COVERED BY MEDICARE PART D  * RSVPreF3 (Arexvy) CDC recommends that adults 60 years of age and older may receive a single dose of RSV vaccine using shared clinical decision-making (SCDM)   Tetanus (Td) Vaccine - COST NOT COVERED BY MEDICARE PART B Following completion of primary series, a booster dose should be given every 10 years to maintain immunity against tetanus. Td may also be given as tetanus wound prophylaxis.   Tdap Vaccine - COST NOT COVERED BY MEDICARE PART B Recommended at least once for all adults. For pregnant patients, recommended with each pregnancy.   Shingles Vaccine (Shingrix) - COST NOT COVERED BY MEDICARE PART B  2 shot series recommended in those 19 years and older who have or will have weakened immune systems or those 50 years and older     Health Maintenance Due:      Topic Date Due   • Hepatitis C Screening  Completed     Immunizations Due:      Topic Date Due   • Influenza Vaccine (1) 09/01/2024   • COVID-19 Vaccine (5 - 2023-24 season) 09/01/2024     Advance Directives   What are advance directives?  Advance directives are legal documents that state your wishes and plans for medical care. These plans are made ahead of time in case you lose your ability to  make decisions for yourself. Advance directives can apply to any medical decision, such as the treatments you want, and if you want to donate organs.   What are the types of advance directives?  There are many types of advance directives, and each state has rules about how to use them. You may choose a combination of any of the following:  Living will:  This is a written record of the treatment you want. You can also choose which treatments you do not want, which to limit, and which to stop at a certain time. This includes surgery, medicine, IV fluid, and tube feedings.   Durable power of  for healthcare (DPAHC):  This is a written record that states who you want to make healthcare choices for you when you are unable to make them for yourself. This person, called a proxy, is usually a family member or a friend. You may choose more than 1 proxy.  Do not resuscitate (DNR) order:  A DNR order is used in case your heart stops beating or you stop breathing. It is a request not to have certain forms of treatment, such as CPR. A DNR order may be included in other types of advance directives.  Medical directive:  This covers the care that you want if you are in a coma, near death, or unable to make decisions for yourself. You can list the treatments you want for each condition. Treatment may include pain medicine, surgery, blood transfusions, dialysis, IV or tube feedings, and a ventilator (breathing machine).  Values history:  This document has questions about your views, beliefs, and how you feel and think about life. This information can help others choose the care that you would choose.  Why are advance directives important?  An advance directive helps you control your care. Although spoken wishes may be used, it is better to have your wishes written down. Spoken wishes can be misunderstood, or not followed. Treatments may be given even if you do not want them. An advance directive may make it easier for your  family to make difficult choices about your care.   Weight Management   Why it is important to manage your weight:  Being overweight increases your risk of health conditions such as heart disease, high blood pressure, type 2 diabetes, and certain types of cancer. It can also increase your risk for osteoarthritis, sleep apnea, and other respiratory problems. Aim for a slow, steady weight loss. Even a small amount of weight loss can lower your risk of health problems.  How to lose weight safely:  A safe and healthy way to lose weight is to eat fewer calories and get regular exercise. You can lose up about 1 pound a week by decreasing the number of calories you eat by 500 calories each day.   Healthy meal plan for weight management:  A healthy meal plan includes a variety of foods, contains fewer calories, and helps you stay healthy. A healthy meal plan includes the following:  Eat whole-grain foods more often.  A healthy meal plan should contain fiber. Fiber is the part of grains, fruits, and vegetables that is not broken down by your body. Whole-grain foods are healthy and provide extra fiber in your diet. Some examples of whole-grain foods are whole-wheat breads and pastas, oatmeal, brown rice, and bulgur.  Eat a variety of vegetables every day.  Include dark, leafy greens such as spinach, kale, jonn greens, and mustard greens. Eat yellow and orange vegetables such as carrots, sweet potatoes, and winter squash.   Eat a variety of fruits every day.  Choose fresh or canned fruit (canned in its own juice or light syrup) instead of juice. Fruit juice has very little or no fiber.  Eat low-fat dairy foods.  Drink fat-free (skim) milk or 1% milk. Eat fat-free yogurt and low-fat cottage cheese. Try low-fat cheeses such as mozzarella and other reduced-fat cheeses.  Choose meat and other protein foods that are low in fat.  Choose beans or other legumes such as split peas or lentils. Choose fish, skinless poultry (chicken  or turkey), or lean cuts of red meat (beef or pork). Before you cook meat or poultry, cut off any visible fat.   Use less fat and oil.  Try baking foods instead of frying them. Add less fat, such as margarine, sour cream, regular salad dressing and mayonnaise to foods. Eat fewer high-fat foods. Some examples of high-fat foods include french fries, doughnuts, ice cream, and cakes.  Eat fewer sweets.  Limit foods and drinks that are high in sugar. This includes candy, cookies, regular soda, and sweetened drinks.  Exercise:  Exercise at least 30 minutes per day on most days of the week. Some examples of exercise include walking, biking, dancing, and swimming. You can also fit in more physical activity by taking the stairs instead of the elevator or parking farther away from stores. Ask your healthcare provider about the best exercise plan for you.      © Copyright [employer] 2018 Information is for End User's use only and may not be sold, redistributed or otherwise used for commercial purposes. All illustrations and images included in CareNotes® are the copyrighted property of [employer] or [employer]

## 2024-11-26 ENCOUNTER — IN-CLINIC DEVICE VISIT (OUTPATIENT)
Dept: CARDIOLOGY CLINIC | Facility: MEDICAL CENTER | Age: 89
End: 2024-11-26
Payer: MEDICARE

## 2024-11-26 ENCOUNTER — RESULTS FOLLOW-UP (OUTPATIENT)
Dept: CARDIOLOGY CLINIC | Facility: CLINIC | Age: 89
End: 2024-11-26

## 2024-11-26 DIAGNOSIS — Z95.0 PRESENCE OF PERMANENT CARDIAC PACEMAKER: Primary | ICD-10-CM

## 2024-11-26 PROCEDURE — 93280 PM DEVICE PROGR EVAL DUAL: CPT | Performed by: STUDENT IN AN ORGANIZED HEALTH CARE EDUCATION/TRAINING PROGRAM

## 2024-11-26 NOTE — PROGRESS NOTES
MDT DC PM/ACTIVE SYSTEM IS MRI CONDITIONAL   DEVICE INTERROGATED IN THE [facility] OFFICE:  BATTERY VOLTAGE ADEQUATE (14.8 YR.).  AP 14.1%  <0.1%.  ALL LEAD PARAMETERS WITHIN NORMAL LIMITS.  2 DEVICE CLASSIFIED VT EPISODES;  PAT ON EGMS (7 @ 171 BPM, 8 @ 182 BPM).  NO PROGRAMMING CHANGES MADE TO DEVICE PARAMETERS.  NORMAL DEVICE FUNCTION.  RG

## 2024-12-02 NOTE — PROGRESS NOTES
Chart rvw'd on 7/18/2023. Colonoscopy date-  7/13/2023    Impression-  • Single invasive, malignant-appearing and ulcerated mass (not traversable) measuring 8 cm x 6 cm in the cecum and ascending colon, covering the whole circumference; performed partial removal by cold forceps biopsy; tattooed 95 cm proximal to the finding  • 2 medium (grade 2) hemorrhoids    Pathology-  7/13/2023-  A. 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 Scheduled appointments-  Patient is scheduled with Sandra Hernandez (surgical oncology) on 7/31/2023 and Dr. Marlene Arreguin (medical oncology) on 8/1/2023.        Surgical date-  N/A    Post surgical pathology-  N/A 02-Dec-2024 19:48

## 2024-12-13 ENCOUNTER — OFFICE VISIT (OUTPATIENT)
Dept: CARDIOLOGY CLINIC | Facility: MEDICAL CENTER | Age: 89
End: 2024-12-13
Payer: MEDICARE

## 2024-12-13 VITALS
OXYGEN SATURATION: 98 % | HEART RATE: 75 BPM | DIASTOLIC BLOOD PRESSURE: 78 MMHG | HEIGHT: 59 IN | WEIGHT: 128 LBS | BODY MASS INDEX: 25.8 KG/M2 | SYSTOLIC BLOOD PRESSURE: 126 MMHG

## 2024-12-13 DIAGNOSIS — I44.1 HEART BLOCK AV SECOND DEGREE: ICD-10-CM

## 2024-12-13 DIAGNOSIS — Z95.3 S/P TAVR (TRANSCATHETER AORTIC VALVE REPLACEMENT), BIOPROSTHETIC: ICD-10-CM

## 2024-12-13 DIAGNOSIS — I10 PRIMARY HYPERTENSION: ICD-10-CM

## 2024-12-13 DIAGNOSIS — I34.0 MITRAL VALVE INSUFFICIENCY, UNSPECIFIED ETIOLOGY: ICD-10-CM

## 2024-12-13 DIAGNOSIS — I35.0 SEVERE AORTIC STENOSIS: Primary | ICD-10-CM

## 2024-12-13 DIAGNOSIS — I51.7 LEFT VENTRICULAR HYPERTROPHY: ICD-10-CM

## 2024-12-13 DIAGNOSIS — E78.00 PURE HYPERCHOLESTEROLEMIA: ICD-10-CM

## 2024-12-13 PROCEDURE — 99214 OFFICE O/P EST MOD 30 MIN: CPT | Performed by: INTERNAL MEDICINE

## 2024-12-13 RX ORDER — ATORVASTATIN CALCIUM 10 MG/1
10 TABLET, FILM COATED ORAL
Start: 2024-12-13 | End: 2024-12-18 | Stop reason: SDUPTHER

## 2024-12-13 NOTE — ASSESSMENT & PLAN NOTE
With cardiac disease including aortic stenosis that is noted to be moderate to severe in severity with a mean gradient of 25 mmHg and peak gradient of 42 mmHg with an aortic valve area of 0.83 cm² on echocardiogram in March 2023.    Patient also noted to have moderate aortic regurgitation and moderate annular calcification of the mitral valve with mild mitral regurgitation.    She has a normal LV size and function with grade 1 diastolic dysfunction.  Patient has no pulmonary hypertension.    She is very active at baseline and goes for walks several times a week without any symptoms.    Now status post TAVR bioprosthesis

## 2024-12-13 NOTE — ASSESSMENT & PLAN NOTE
LDL very well controlled at 38 in February 2023.    Repeat levels in May 2024 revealed an LDL of 67

## 2024-12-13 NOTE — ASSESSMENT & PLAN NOTE
Mild in intensity on echocardiogram in March 2023 August 2024 echocardiogram revealed continued mild mitral regurgitation

## 2024-12-13 NOTE — ASSESSMENT & PLAN NOTE
Completed in July 2024  Echocardiogram in August 2024 revealed stable [place] 3 ultra 23 mm TAVR bioprosthetic valve with no evidence of paravalvular regurgitation or transvalvular regurgitation  Mean gradient of 9 mmHg with a dimensionless index of 0.53  Repeat echo at 1 year

## 2024-12-13 NOTE — PROGRESS NOTES
Cardiology Follow Up    Ashley Eastman  1935  [id number]  [facility] [place]  [address]  [address]  [facility] [address] [phone]      Assessment & Plan  Severe aortic stenosis  With cardiac disease including aortic stenosis that is noted to be moderate to severe in severity with a mean gradient of 25 mmHg and peak gradient of 42 mmHg with an aortic valve area of 0.83 cm² on echocardiogram in March 2023.    Patient also noted to have moderate aortic regurgitation and moderate annular calcification of the mitral valve with mild mitral regurgitation.    She has a normal LV size and function with grade 1 diastolic dysfunction.  Patient has no pulmonary hypertension.    She is very active at baseline and goes for walks several times a week without any symptoms.    Now status post TAVR bioprosthesis  Mitral valve insufficiency, unspecified etiology  Mild in intensity on echocardiogram in March 2023 August 2024 echocardiogram revealed continued mild mitral regurgitation  Left ventricular hypertrophy  As related to hypertension, continue blood pressure control  Primary hypertension  Well-controlled on amlodipine and Toprol  She is going to be stopping amlodipine  Heart block AV second degree  In the setting of TAVR bioprosthesis  Status post permanent pacemaker implantation  Normal device function on most recent interrogation  S/p TAVR (transcatheter aortic valve replacement), bioprosthetic  Completed in July 2024  Echocardiogram in August 2024 revealed stable [place] 3 ultra 23 mm TAVR bioprosthetic valve with no evidence of paravalvular regurgitation or transvalvular regurgitation  Mean gradient of 9 mmHg with a dimensionless index of 0.53  Repeat echo at 1 year  Pure hypercholesterolemia  LDL very well controlled at 38 in February 2023.    Repeat levels in May 2024 revealed an LDL of 67    Chief Complaint   Patient presents with    Follow-up     3 month f/up  "      Interval History: Patient feels well, without complaints.  No reported chest pain, shortness of breath, palpitations, lightheadedness, syncope, LE edema, orthopnea, PND, or significant weight changes.  Patient remains active without any increased fatigue out of the ordinary.        Blood pressure 126/78, pulse 75, height 4' 11\" (1.499 m), weight 58.1 kg (128 lb), SpO2 98%.      Review of Systems:  Review of Systems   Constitutional:  Negative for activity change, appetite change, chills, diaphoresis, fatigue and unexpected weight change.   HENT:  Negative for hearing loss, nosebleeds and sore throat.    Eyes:  Negative for photophobia and visual disturbance.   Respiratory:  Negative for cough, chest tightness, shortness of breath and wheezing.    Cardiovascular:  Negative for chest pain, palpitations and leg swelling.   Gastrointestinal:  Negative for abdominal pain, diarrhea, nausea and vomiting.   Endocrine: Negative for polyuria.   Genitourinary:  Negative for dysuria, frequency and hematuria.   Musculoskeletal:  Negative for arthralgias, back pain, gait problem and neck pain.   Skin:  Negative for pallor and rash.   Neurological:  Negative for dizziness, syncope and headaches.   Hematological:  Does not bruise/bleed easily.   Psychiatric/Behavioral:  Negative for behavioral problems and confusion.        Physical Exam:  Physical Exam  Vitals reviewed.   Constitutional:       Appearance: Normal appearance. She is well-developed. She is not ill-appearing or diaphoretic.   HENT:      Head: Normocephalic and atraumatic.      Nose: Nose normal.   Eyes:      General: No scleral icterus.     Extraocular Movements: Extraocular movements intact.      Pupils: Pupils are equal, round, and reactive to light.   Neck:      Vascular: No JVD.   Cardiovascular:      Rate and Rhythm: Normal rate and regular rhythm.      Heart sounds: Murmur heard.      Systolic murmur is present with a grade of 1/6.      No friction rub. No " gallop.   Pulmonary:      Effort: Pulmonary effort is normal. No respiratory distress.      Breath sounds: Normal breath sounds. No wheezing or rales.   Abdominal:      General: Bowel sounds are normal. There is no distension.      Palpations: Abdomen is soft.      Tenderness: There is no abdominal tenderness.   Musculoskeletal:         General: No deformity. Normal range of motion.      Cervical back: Normal range of motion and neck supple.      Right lower leg: No edema.      Left lower leg: No edema.   Skin:     General: Skin is warm and dry.      Findings: No rash.   Neurological:      Mental Status: She is alert and oriented to person, place, and time.      Cranial Nerves: No cranial nerve deficit.   Psychiatric:         Mood and Affect: Mood normal.         Behavior: Behavior normal.         Patient Active Problem List   Diagnosis    Pure hypercholesterolemia    Osteoarthritis    Left ventricular hypertrophy    Hypertension    Severe aortic stenosis    Pancreas cyst    Lower abdominal pain    Hepatic steatosis    Personal history of colon cancer    Pre-operative cardiovascular examination, valvular heart disease    Mitral valve regurgitation    S/P right colectomy    Skin lesion of back    Encounter for follow-up surveillance of colon cancer    H/O: rheumatic fever    S/p TAVR (transcatheter aortic valve replacement), bioprosthetic    Heart block AV second degree    Thrombocytopenia (HCC)     Past Medical History:   Diagnosis Date    Aortic valve regurgitation     Aortic valve stenosis     Colon cancer (HCC)     Elevated alkaline phosphatase level 06/16/2023    Hypertension     Left ventricular hypertrophy     Mitral valve regurgitation     Osteoarthritis     Pure hypercholesterolemia     Shingles outbreak     back and left side     Social History     Socioeconomic History    Marital status:      Spouse name: Not on file    Number of children: Not on file    Years of education: Not on file    Highest  education level: Not on file   Occupational History    Not on file   Tobacco Use    Smoking status: Former     Current packs/day: 0.00     Average packs/day: 1 pack/day for 25.0 years (25.0 ttl pk-yrs)     Types: Cigarettes     Start date:      Quit date:      Years since quittin.9     Passive exposure: Past    Smokeless tobacco: Never   Vaping Use    Vaping status: Never Used   Substance and Sexual Activity    Alcohol use: Not Currently    Drug use: Not Currently    Sexual activity: Not on file   Other Topics Concern    Not on file   Social History Narrative    · Most recent tobacco use screenin2019      · Do you currently or have you served in the [employer]:   No      Social Drivers of Health     Financial Resource Strain: Low Risk  (2023)    Overall Financial Resource Strain (CARDIA)     Difficulty of Paying Living Expenses: Not hard at all   Food Insecurity: No Food Insecurity (2024)    Hunger Vital Sign     Worried About Running Out of Food in the Last Year: Never true     Ran Out of Food in the Last Year: Never true   Transportation Needs: No Transportation Needs (2024)    PRAPARE - Transportation     Lack of Transportation (Medical): No     Lack of Transportation (Non-Medical): No   Physical Activity: Not on file   Stress: Not on file   Social Connections: Not on file   Intimate Partner Violence: Not on file   Housing Stability: Low Risk  (2024)    Housing Stability Vital Sign     Unable to Pay for Housing in the Last Year: No     Number of Times Moved in the Last Year: 0     Homeless in the Last Year: No      Family History   Problem Relation Age of Onset    No Known Problems Mother     Coronary artery disease Father     Throat cancer Father     Diabetes Sister     Cancer Sister     Pancreatic cancer Sister 60    No Known Problems Daughter     No Known Problems Daughter     No Known Problems Maternal Grandmother     No Known Problems Maternal Grandfather      No Known Problems Paternal Grandmother     No Known Problems Paternal Grandfather     No Known Problems Brother     Cancer Brother     No Known Problems Son     No Known Problems Son     No Known Problems Son      Past Surgical History:   Procedure Laterality Date    BUNIONECTOMY Left 04/16/2014    CARDIAC CATHETERIZATION Left 7/9/2024    Procedure: Cardiac Left Heart Cath;  Surgeon: Fred Berger DO;  Location: [facility];  Service: Cardiology    CARDIAC CATHETERIZATION N/A 7/9/2024    Procedure: Cardiac Coronary Angiogram;  Surgeon: Fred Berger DO;  [facility]: [facility];  Service: Cardiology    CARDIAC CATHETERIZATION N/A 7/9/2024    Procedure: Cardiac catheterization;  Surgeon: Fred Berger DO;  [facility];  Service: Cardiology    CARDIAC CATHETERIZATION N/A 7/25/2024    Procedure: Cardiac tavr;  Surgeon: Fred Berger DO;  Location: [facility];  Service: Cardiology    CARDIAC ELECTROPHYSIOLOGY PROCEDURE N/A 7/26/2024    Procedure: Cardiac pacer implant;  Surgeon: Aleksander Messer MD;  Location: [facility];  Service: Cardiology    COLONOSCOPY  07/13/2023    CORRECTION HAMMER TOE Left 04/16/2014    MI COLECTOMY PARTIAL W/ANASTOMOSIS N/A 08/15/2023    Procedure: RIGHT HEMICOLECTOMY;  Surgeon: Rosario Rios MD;  Location: [facility];  Service: Surgical Oncology    MI REPLACE AORTIC VALVE OPENFEMORAL ARTERY APPROACH N/A 7/25/2024    Procedure: REPLACEMENT AORTIC VALVE TRANSCATHETER (TAVR) TRANSFEMORAL W/ 23MM VALVE(ACCESS ON RIGHT) BINU;  Surgeon: Julio Cesar Draper MD;  Location: [facility];  Service: Cardiac Surgery       Current Outpatient Medications:     atorvastatin (LIPITOR) 10 mg tablet, Take 1 tablet (10 mg total) by mouth daily with dinner, Disp: , Rfl:     metoprolol succinate (TOPROL-XL) 50 mg 24 hr tablet, Take 1 tablet (50 mg total) by mouth daily, Disp: 90 tablet, Rfl: 3    aspirin 81 mg chewable tablet, Chew 1 tablet  (81 mg total) daily, Disp: 30 tablet, Rfl: 2    Multiple Vitamins-Minerals (PRESERVISION AREDS PO), Take by mouth Takes 1 tab in the AM. Takes 1 tab in the PM. (Patient not taking: Reported on 11/5/2024), Disp: , Rfl:   Allergies   Allergen Reactions    Acetaminophen Hives       Labs:  Hospital Outpatient Visit on 08/27/2024   Component Date Value    Triscuspid Valve Regurgi* 08/27/2024 12.0     RAA A4C 08/27/2024 10.5     LA Volume Index (BP) 08/27/2024 32.2     MV Peak A [place] 08/27/2024 1.21     MV stenosis pressure 1/2* 08/27/2024 83     MV Peak E Arm[place] 08/27/2024 80     AV peak gradient 08/27/2024 17     LVOT stroke volume 08/27/2024 87.39     Ao VTI 08/27/2024 40.63     Aortic valve peak veloci* 08/27/2024 1.9     LVOT peak VTI 08/27/2024 23.0     LVOT peak armani 08/27/2024 1.01     LVOT diameter 08/27/2024 2.2     E wave deceleration time 08/27/2024 287     E/A ratio 08/27/2024 0.66     MV valve area p 1/2 meth* 08/27/2024 2.65     AV LVOT peak gradient 08/27/2024 4     AV mean gradient 08/27/2024 9     TR Peak Armani 08/27/2024 1.8     AV area peak armani 08/27/2024 1.3     AV area by cont VTI 08/27/2024 1.4     LVOT mn grad 08/27/2024 2.0     RVID d 08/27/2024 2.9     A4C EF 08/27/2024 55     Aortic valve mean veloci* 08/27/2024 14.50     Tricuspid valve peak reg* 08/27/2024 1.75     Left ventricular stroke * 08/27/2024 42.00     IVSd 08/27/2024 1.00     Tricuspid annular plane * 08/27/2024 2.00     Ao root 08/27/2024 3.20     LVPWd 08/27/2024 1.00     LA size 08/27/2024 3.7     Asc Ao 08/27/2024 3.5     LA volume (BP) 08/27/2024 48     FS 08/27/2024 29     LVIDS 08/27/2024 2.90     IVS 08/27/2024 1     LVIDd 08/27/2024 4.10     LA length (A2C) 08/27/2024 4.90     LEFT VENTRICLE SYSTOLIC * 08/27/2024 32     LV DIASTOLIC VOLUME (MOD* 08/27/2024 74     LVOT Cardiac Index 08/27/2024 2.35     LVOT stroke volume index 08/27/2024 39.60     LVOT Cardiac Output 08/27/2024 3.50     Left Atrium Area-systoli* 08/27/2024 17.2      Left Atrium Area-systoli* 08/27/2024 17.3     MV E' Tissue Velocity Se* 08/27/2024 8     LVSV, 2D 08/27/2024 42     BSA 08/27/2024 1.49     LVOT area 08/27/2024 3.80     DVI 08/27/2024 0.53     AV valve area 08/27/2024 2.15     LV EF 08/27/2024 55    Hospital Outpatient Visit on 08/27/2024   Component Date Value    Ventricular Rate 08/27/2024 60     Atrial Rate 08/27/2024 60     OR Interval 08/27/2024 162     QRSD Interval 08/27/2024 112     QT Interval 08/27/2024 440     QTC Interval 08/27/2024 440     P Axis 08/27/2024 3     QRS Axis 08/27/2024 50     T Wave [place] 08/27/2024 26    Appointment on 08/26/2024   Component Date Value    Sodium 08/26/2024 136     Potassium 08/26/2024 4.1     Chloride 08/26/2024 102     CO2 08/26/2024 26     ANION GAP 08/26/2024 8     BUN 08/26/2024 13     Creatinine 08/26/2024 0.82     Glucose, Fasting 08/26/2024 103 (H)     Calcium 08/26/2024 9.0     eGFR 08/26/2024 63     WBC 08/26/2024 6.82     RBC 08/26/2024 3.93     Hemoglobin 08/26/2024 12.2     Hematocrit 08/26/2024 37.3     MCV 08/26/2024 95     MCH 08/26/2024 31.0     MCHC 08/26/2024 32.7     RDW 08/26/2024 14.1     MPV 08/26/2024 9.4     Platelets 08/26/2024 228     nRBC 08/26/2024 0     Segmented % 08/26/2024 59     Immature Grans % 08/26/2024 1     Lymphocytes % 08/26/2024 26     Monocytes % 08/26/2024 9     Eosinophils Relative 08/26/2024 4     Basophils Relative 08/26/2024 1     Absolute Neutrophils 08/26/2024 4.14     Absolute Immature Grans 08/26/2024 0.04     Absolute Lymphocytes 08/26/2024 1.74     Absolute Monocytes 08/26/2024 0.60     Eosinophils Absolute 08/26/2024 0.26     Basophils Absolute 08/26/2024 0.04    No results displayed because visit has over 200 results.      Lab Requisition on 07/18/2024   Component Date Value    ABO Grouping 07/18/2024 A     Rh Factor 07/18/2024 Positive     Antibody Screen 07/18/2024 Negative     Specimen Expiration Date 07/18/2024 20240815    Appointment on 07/18/2024   Component  Date Value    Protime 07/18/2024 13.9     INR 07/18/2024 1.08     MRSA Culture Only 07/18/2024 No Methicillin Resistant Staphlyococcus aureus (MRSA) isolated    Admission on 07/09/2024, Discharged on 07/09/2024   Component Date Value    Ventricular Rate 07/09/2024 79     Atrial Rate 07/09/2024 79     OK Interval 07/09/2024 130     QRSD Interval 07/09/2024 118     QT Interval 07/09/2024 428     QTC Interval 07/09/2024 490     P Axis 07/09/2024 54     QRS Axis 07/09/2024 36     T Wave [place] 07/09/2024 30    Appointment on 06/25/2024   Component Date Value    WBC 06/25/2024 8.01     RBC 06/25/2024 4.06     Hemoglobin 06/25/2024 12.9     Hematocrit 06/25/2024 38.5     MCV 06/25/2024 95     MCH 06/25/2024 31.8     MCHC 06/25/2024 33.5     RDW 06/25/2024 13.4     MPV 06/25/2024 9.7     Platelets 06/25/2024 255     nRBC 06/25/2024 0     Segmented % 06/25/2024 62     Immature Grans % 06/25/2024 0     Lymphocytes % 06/25/2024 26     Monocytes % 06/25/2024 9     Eosinophils Relative 06/25/2024 2     Basophils Relative 06/25/2024 1     Absolute Neutrophils 06/25/2024 4.93     Absolute Immature Grans 06/25/2024 0.02     Absolute Lymphocytes 06/25/2024 2.11     Absolute Monocytes 06/25/2024 0.71     Eosinophils Absolute 06/25/2024 0.19     Basophils Absolute 06/25/2024 0.05     Sodium 06/25/2024 137     Potassium 06/25/2024 4.2     Chloride 06/25/2024 101     CO2 06/25/2024 26     ANION GAP 06/25/2024 10     BUN 06/25/2024 9     Creatinine 06/25/2024 0.71     Glucose, Fasting 06/25/2024 102 (H)     Calcium 06/25/2024 9.2     AST 06/25/2024 20     ALT 06/25/2024 18     Alkaline Phosphatase 06/25/2024 91     Total Protein 06/25/2024 7.5     Albumin 06/25/2024 4.1     Total Bilirubin 06/25/2024 0.78     eGFR 06/25/2024 76      Lab Results   Component Value Date    TRIG 67 05/02/2024    TRIG 52 10/01/2020    HDL 55 05/02/2024    HDL 60 10/01/2020     Imaging: Cardiac EP device report  Result Date: 11/26/2024  Narrative: VIKRAM GOODSON  PM/ACTIVE SYSTEM IS MRI CONDITIONAL DEVICE INTERROGATED IN THE [facility] OFFICE:  BATTERY VOLTAGE ADEQUATE (14.8 YR.).  AP 14.1%  <0.1%.  ALL LEAD PARAMETERS WITHIN NORMAL LIMITS.  2 DEVICE CLASSIFIED VT EPISODES;  PAT ON EGMS (7 @ 171 BPM, 8 @ 182 BPM).  NO PROGRAMMING CHANGES MADE TO DEVICE PARAMETERS.  NORMAL DEVICE FUNCTION.  RG

## 2024-12-13 NOTE — ASSESSMENT & PLAN NOTE
In the setting of TAVR bioprosthesis  Status post permanent pacemaker implantation  Normal device function on most recent interrogation

## 2024-12-17 ENCOUNTER — OFFICE VISIT (OUTPATIENT)
Dept: SURGICAL ONCOLOGY | Facility: CLINIC | Age: 89
End: 2024-12-17
Payer: MEDICARE

## 2024-12-17 VITALS
BODY MASS INDEX: 25.8 KG/M2 | DIASTOLIC BLOOD PRESSURE: 74 MMHG | RESPIRATION RATE: 16 BRPM | TEMPERATURE: 97.6 F | WEIGHT: 128 LBS | OXYGEN SATURATION: 99 % | SYSTOLIC BLOOD PRESSURE: 138 MMHG | HEART RATE: 78 BPM | HEIGHT: 59 IN

## 2024-12-17 DIAGNOSIS — Z08 ENCOUNTER FOR FOLLOW-UP SURVEILLANCE OF COLON CANCER: Primary | ICD-10-CM

## 2024-12-17 DIAGNOSIS — Z95.3 S/P TAVR (TRANSCATHETER AORTIC VALVE REPLACEMENT), BIOPROSTHETIC: ICD-10-CM

## 2024-12-17 DIAGNOSIS — Z85.038 ENCOUNTER FOR FOLLOW-UP SURVEILLANCE OF COLON CANCER: Primary | ICD-10-CM

## 2024-12-17 DIAGNOSIS — Z85.038 PERSONAL HISTORY OF COLON CANCER: ICD-10-CM

## 2024-12-17 DIAGNOSIS — Z90.49 S/P RIGHT COLECTOMY: ICD-10-CM

## 2024-12-17 PROCEDURE — 99214 OFFICE O/P EST MOD 30 MIN: CPT | Performed by: STUDENT IN AN ORGANIZED HEALTH CARE EDUCATION/TRAINING PROGRAM

## 2024-12-17 RX ORDER — ATORVASTATIN CALCIUM 10 MG/1
10 TABLET, FILM COATED ORAL
Qty: 30 TABLET | Refills: 2 | Status: CANCELLED | OUTPATIENT
Start: 2024-12-17 | End: 2025-03-17

## 2024-12-17 NOTE — PROGRESS NOTES
Surgical Oncology Follow Up    [address] [facility]  [facility]  [address] [facility] [place] [address]    Ashley Eastman  1935  [id number]      ASSESSMENT AND PLAN    1. Encounter for follow-up surveillance of colon cancer  -     Ambulatory Referral to Gastroenterology; Future  2. Personal history of colon cancer  Assessment & Plan:  Doing very well. GEOVANI clinically and on scan. Strongly prefers to avoid another c-scope. I think this is safe. Will see her in 6 mo with repeat imaging for standard surveillance.   Orders:  -     CT chest abdomen pelvis w contrast; Future; Expected date: 06/17/2025  -     Ambulatory Referral to Gastroenterology; Future  3. S/P right colectomy        Chief Complaint   Patient presents with    OFFICE VISIT       Return in about 6 months (around 6/17/2025) for Imaging - See orders, Office Visit 15 min.      Oncology History   Personal history of colon cancer   7/13/2023 Biopsy    A. Colon, “Right ascending colon mass,” Biopsy:  - Invasive adenocarcinoma with mucinous features arising in a tubular adenoma     7/28/2023 Initial Diagnosis    Colon adenocarcinoma (HCC)     8/15/2023 Surgery    A. Terminal ileum, ascending colon, appendix and omentum, right hemicolectomy:  - Mucinous adenocarcinoma (8.8 cm), moderately differentiated, arising in a tubular adenoma.  - Tumor invades through the muscularis propria into pericolorectal tissue (pT3).  - Terminal ileum, appendix and omentum with no pathologic abnormality.  - Sixteen lymph nodes, negative for malignancy (0/16).     8/29/2023 -  Cancer Staged    Staging form: Colon and Rectum, AJCC 8th Edition  - Pathologic stage from 8/29/2023: Stage IIA (pT3, pN0, cM0) - Signed by Rosario Rios MD on 8/29/2023  Total positive nodes: 0           Staging: T3N0 colon cancer  Treatment history: resection 8/2023  Current treatment: obs  Disease status: GEOVANI    History of Present Illness: Doing very well. No  symptoms. No pain. Did have a TAVR over the summer from which she has recovered very well. Doing well - eating well, no n/v, regular Bms. Recent scan GEOVANI.     Review of Systems  Complete ROS Surg Onc:   Constitutional: The patient denies new or recent history of general fatigue, no recent weight loss, no change in appetite.   Eyes: No complaints of visual problems, no scleral icterus.   ENT: no complaints of ear pain, no hoarseness, no difficulty swallowing,  no tinnitus and no new masses in head, oral cavity, or neck.   Cardiovascular: No complaints of chest pain, no palpitations, no ankle edema.   Respiratory: No complaints of shortness of breath, no cough.   Gastrointestinal: No complaints of jaundice, no bloody stools, no pale stools.   Genitourinary: No complaints of dysuria, no hematuria, no nocturia, no frequent urination, no urethral discharge.   Musculoskeletal: No complaints of weakness, paralysis, joint stiffness or arthralgias.  Integumentary: No complaints of rash, no new lesions.   Neurological: No complaints of convulsions, no seizures, no dizziness.   Hematologic/Lymphatic: No complaints of easy bruising.   Endocrine:  No hot or cold intolerance.  No polydipsia, polyphagia, or polyuria.  Allergy/immunology:  No environmental allergies.  No food allergies.  Not immunocompromised.  Skin:  No pallor or rash.  No wound.        Patient Active Problem List   Diagnosis    Pure hypercholesterolemia    Osteoarthritis    Left ventricular hypertrophy    Hypertension    Severe aortic stenosis    Pancreas cyst    Lower abdominal pain    Hepatic steatosis    Personal history of colon cancer    Pre-operative cardiovascular examination, valvular heart disease    Mitral valve regurgitation    S/P right colectomy    Skin lesion of back    Encounter for follow-up surveillance of colon cancer    H/O: rheumatic fever    S/p TAVR (transcatheter aortic valve replacement), bioprosthetic    Heart block AV second degree     Thrombocytopenia (HCC)     Past Medical History:   Diagnosis Date    Aortic valve regurgitation     Aortic valve stenosis     Colon cancer (HCC)     Elevated alkaline phosphatase level 06/16/2023    Hypertension     Left ventricular hypertrophy     Mitral valve regurgitation     Osteoarthritis     Pure hypercholesterolemia     Shingles outbreak     back and left side     Past Surgical History:   Procedure Laterality Date    BUNIONECTOMY Left 04/16/2014    CARDIAC CATHETERIZATION Left 7/9/2024    Procedure: Cardiac Left Heart Cath;  Surgeon: Fred Berger DO;  [facility]: [facility];  Service: Cardiology    CARDIAC CATHETERIZATION N/A 7/9/2024    Procedure: Cardiac Coronary Angiogram;  Surgeon: Ferd Berger DO;  [facility]: [facility];  Service: Cardiology    CARDIAC CATHETERIZATION N/A 7/9/2024    Procedure: Cardiac catheterization;  Surgeon: Fred Berger DO;  [facility];  Service: Cardiology    CARDIAC CATHETERIZATION N/A 7/25/2024    Procedure: Cardiac tavr;  Surgeon: Fred Berger DO;  Location: [facility];  Service: Cardiology    CARDIAC ELECTROPHYSIOLOGY PROCEDURE N/A 7/26/2024    Procedure: Cardiac pacer implant;  Surgeon: Aleksander Messer MD;  [facility]: [facility];  Service: Cardiology    COLONOSCOPY  07/13/2023    CORRECTION HAMMER TOE Left 04/16/2014    HI COLECTOMY PARTIAL W/ANASTOMOSIS N/A 08/15/2023    Procedure: RIGHT HEMICOLECTOMY;  Surgeon: Rosario Rios MD;  Location: [facility];  Service: Surgical Oncology    HI REPLACE AORTIC VALVE OPENFEMORAL ARTERY APPROACH N/A 7/25/2024    Procedure: REPLACEMENT AORTIC VALVE TRANSCATHETER (TAVR) TRANSFEMORAL W/ 23MM VALVE(ACCESS ON RIGHT) BINU;  Surgeon: Julio Cesar Draper MD;  Location: [facility];  Service: Cardiac Surgery     Family History   Problem Relation Age of Onset    No Known Problems Mother     Coronary artery disease Father     Throat cancer Father     Diabetes Sister      Cancer Sister     Pancreatic cancer Sister 60    No Known Problems Daughter     No Known Problems Daughter     No Known Problems Maternal Grandmother     No Known Problems Maternal Grandfather     No Known Problems Paternal Grandmother     No Known Problems Paternal Grandfather     No Known Problems Brother     Cancer Brother     No Known Problems Son     No Known Problems Son     No Known Problems Son      Social History     Socioeconomic History    Marital status:      Spouse name: Not on file    Number of children: Not on file    Years of education: Not on file    Highest education level: Not on file   Occupational History    Not on file   Tobacco Use    Smoking status: Former     Current packs/day: 0.00     Average packs/day: 1 pack/day for 25.0 years (25.0 ttl pk-yrs)     Types: Cigarettes     Start date:      Quit date:      Years since quittin.9     Passive exposure: Past    Smokeless tobacco: Never   Vaping Use    Vaping status: Never Used   Substance and Sexual Activity    Alcohol use: Not Currently    Drug use: Not Currently    Sexual activity: Not on file   Other Topics Concern    Not on file   Social History Narrative    · Most recent tobacco use screenin2019      · Do you currently or have you served in the [employer]:   No      Social Drivers of Health     Financial Resource Strain: Low Risk  (2023)    Overall Financial Resource Strain (CARDIA)     Difficulty of Paying Living Expenses: Not hard at all   Food Insecurity: No Food Insecurity (2024)    Hunger Vital Sign     Worried About Running Out of Food in the Last Year: Never true     Ran Out of Food in the Last Year: Never true   Transportation Needs: No Transportation Needs (2024)    PRAPARE - Transportation     Lack of Transportation (Medical): No     Lack of Transportation (Non-Medical): No   Physical Activity: Not on file   Stress: Not on file   Social Connections: Not on file   Intimate Partner  Violence: Not on file   Housing Stability: Low Risk  (11/5/2024)    Housing Stability Vital Sign     Unable to Pay for Housing in the Last Year: No     Number of Times Moved in the Last Year: 0     Homeless in the Last Year: No       Current Outpatient Medications:     aspirin 81 mg chewable tablet, Chew 1 tablet (81 mg total) daily, Disp: 30 tablet, Rfl: 2    atorvastatin (LIPITOR) 10 mg tablet, Take 1 tablet (10 mg total) by mouth daily with dinner, Disp: , Rfl:     metoprolol succinate (TOPROL-XL) 50 mg 24 hr tablet, Take 1 tablet (50 mg total) by mouth daily, Disp: 90 tablet, Rfl: 3    Multiple Vitamins-Minerals (PRESERVISION AREDS PO), Take by mouth Takes 1 tab in the AM. Takes 1 tab in the PM. (Patient not taking: Reported on 11/5/2024), Disp: , Rfl:   Allergies   Allergen Reactions    Acetaminophen Hives     Vitals:    12/17/24 1258   BP: 138/74   Pulse: 78   Resp: 16   Temp: 97.6 °F (36.4 °C)   SpO2: 99%       Physical Exam  Constitutional: Patient is well-developed and well-nourished who appears the stated age in no acute distress. Patient is pleasant and talkative.     HEENT:  Normocephalic.  Sclerae are anicteric. Mucous membranes are moist. Neck is supple without adenopathy. No JVD.     Chest: Equal chest rise, easy WOB  Cardiac: Heart is regular rate.     Abdomen: Abdomen is non-tender, non-distended and without masses.   Incision benign  Extremities: There is no clubbing or cyanosis. There is no edema.  Symmetric.  Neuro: Grossly nonfocal. Gait is normal.     Lymphatic: No evidence of cervical adenopathy bilaterally. No evidence of axillary adenopathy bilaterally. No evidence of inguinal adenopathy bilaterally.     Skin: Warm, anicteric.    Psych:  Patient is pleasant and talkative.    Pathology:  As above    Labs:  Reviewed in [place] personally    Imaging  CT chest wo contrast    Result Date: 8/9/2023  Narrative: CT CHEST WITHOUT IV CONTRAST INDICATION:   C18.9: Malignant neoplasm of colon, unspecified.  COMPARISON: Abdominal CT July 24, 2023. TECHNIQUE: CT examination of the chest was performed without intravenous contrast. Multiplanar 2D reformatted images were created from the source data. This examination, like all CT scans performed in the [facility], was performed utilizing techniques to minimize radiation dose exposure, including the use of iterative reconstruction and automated exposure control. Radiation dose length product (DLP) for this visit:  235 mGy-cm FINDINGS: LUNGS: Punctate calcified granulomas are present. No suspicious pulmonary nodules. Minimal prominence of the bibasilar subpleural reticular markings, stable. No honeycombing or bronchiectasis. PLEURA:  Unremarkable. HEART/GREAT VESSELS: Normal heart size. Dense mitral annular calcifications. Mild coronary artery calcifications. No thoracic aortic aneurysm. Moderate aortic root calcification, with otherwise mild aortic calcification. MEDIASTINUM AND YASMIN:  Unremarkable. CHEST WALL AND LOWER NECK: 1.3 x 1.5 cm subcutaneous lesion in the retroareolar right breast. VISUALIZED STRUCTURES IN THE UPPER ABDOMEN: Mild diffuse hepatic steatosis. OSSEOUS STRUCTURES:  No acute fracture or destructive osseous lesion.     Impression: 1. No metastatic disease in the chest. 2. 1.3 x 1.5 cm indeterminate subcutaneous lesion in the retroareolar right breast. Recommend diagnostic mammogram for further evaluation. 3. Mild diffuse hepatic steatosis. The study was marked in EPIC for significant notification. Workstation performed: [id number]       I personally reviewed and interpreted the available laboratory and imaging data, including c-scope, op report, path, cardiac notes, recent and past CT

## 2024-12-17 NOTE — ASSESSMENT & PLAN NOTE
Doing very well. GEOVANI clinically and on scan. Strongly prefers to avoid another c-scope. I think this is safe. Will see her in 6 mo with repeat imaging for standard surveillance.

## 2024-12-17 NOTE — TELEPHONE ENCOUNTER
Requested Prescriptions     Pending Prescriptions Disp Refills    atorvastatin (LIPITOR) 10 mg tablet 30 tablet 2     Sig: Take 1 tablet (10 mg total) by mouth daily with dinner      Patient would like sent to  [id number]

## 2024-12-18 DIAGNOSIS — Z95.3 S/P TAVR (TRANSCATHETER AORTIC VALVE REPLACEMENT), BIOPROSTHETIC: ICD-10-CM

## 2024-12-18 RX ORDER — ATORVASTATIN CALCIUM 10 MG/1
10 TABLET, FILM COATED ORAL
Qty: 30 TABLET | Refills: 5 | Status: SHIPPED | OUTPATIENT
Start: 2024-12-18 | End: 2025-06-16

## 2024-12-18 NOTE — TELEPHONE ENCOUNTER
Medication: atorvastatin (LIPITOR) 10 mg tablet     Dose/Frequency: Take 1 tablet (10 mg total) by mouth daily with dinner     Quantity: 90 day    Pharmacy: [facility] [address] [place] [address]      Office:   [x] PCP/Provider -   [] Speciality/Provider -     Does the patient have enough for 3 days?   [] Yes   [x] No - Send as HP to POD

## 2025-01-15 ENCOUNTER — TELEPHONE (OUTPATIENT)
Age: OVER 89
End: 2025-01-15

## 2025-01-15 DIAGNOSIS — Z95.3 S/P TAVR (TRANSCATHETER AORTIC VALVE REPLACEMENT), BIOPROSTHETIC: ICD-10-CM

## 2025-01-15 RX ORDER — ATORVASTATIN CALCIUM 10 MG/1
10 TABLET, FILM COATED ORAL
Qty: 90 TABLET | Refills: 1 | Status: SHIPPED | OUTPATIENT
Start: 2025-01-15 | End: 2025-07-14

## 2025-01-15 NOTE — TELEPHONE ENCOUNTER
Patient called in asking if her script could be switched from a 30 day to 90 day for her 10 mg atorvastatin and be sent to [facility].    Please advise, thank you

## 2025-02-20 ENCOUNTER — TELEPHONE (OUTPATIENT)
Age: OVER 89
End: 2025-02-20

## 2025-02-20 DIAGNOSIS — Z79.2 PROPHYLACTIC ANTIBIOTIC: Primary | ICD-10-CM

## 2025-02-20 RX ORDER — AMOXICILLIN 500 MG/1
2000 CAPSULE ORAL ONCE
Qty: 4 CAPSULE | Refills: 0 | Status: SHIPPED | OUTPATIENT
Start: 2025-02-20 | End: 2025-02-20

## 2025-02-20 NOTE — TELEPHONE ENCOUNTER
Caller: Ashley Eastman    Doctor: Dr. Draper    Reason for call: Patient s/p TAVR 7/25/24 and has an upcoming dental appointment. She is requesting script for Amoxicillin be sent to her pharmacy. [address]    Call back#: [phone]

## 2025-02-28 ENCOUNTER — REMOTE DEVICE CLINIC VISIT (OUTPATIENT)
Dept: CARDIOLOGY CLINIC | Facility: CLINIC | Age: OVER 89
End: 2025-02-28
Payer: MEDICARE

## 2025-02-28 DIAGNOSIS — Z95.0 PRESENCE OF PERMANENT CARDIAC PACEMAKER: Primary | ICD-10-CM

## 2025-02-28 PROCEDURE — 93294 REM INTERROG EVL PM/LDLS PM: CPT | Performed by: INTERNAL MEDICINE

## 2025-02-28 PROCEDURE — 93296 REM INTERROG EVL PM/IDS: CPT | Performed by: INTERNAL MEDICINE

## 2025-02-28 NOTE — PROGRESS NOTES
MDT DC PM/ACTIVE SYSTEM IS MRI CONDITIONAL   CARELINK TRANSMISSION: BATTERY VOLTAGE ADEQUATE (14.5 YR).  AP  18.1%   <0.1% (AAI-DDD 60 PPM).  ALL AVAILABLE LEAD PARAMETERS WITHIN NORMAL LIMITS. 3 DEVICE CLASSIFIED VT EPISODES W/PAT ON ALL EGM'S AVAILABLE (7 @ 194 BPM, 12 @ 160 BPM, 8 @ 179 BPM). EF 55% (8/27/24 ECHO). PATIENT TAKING ASA 81MG, METOPROLOL SUCC. NORMAL DEVICE FUNCTION.  ES

## 2025-03-01 ENCOUNTER — RESULTS FOLLOW-UP (OUTPATIENT)
Dept: CARDIOLOGY CLINIC | Facility: CLINIC | Age: OVER 89
End: 2025-03-01

## 2025-03-17 ENCOUNTER — TELEPHONE (OUTPATIENT)
Dept: GASTROENTEROLOGY | Facility: CLINIC | Age: OVER 89
End: 2025-03-17

## 2025-03-17 NOTE — TELEPHONE ENCOUNTER
Pt is due in May for a repeat MRI for pancreatic cyst.  Can order please be created? Thank you so much!

## 2025-03-19 ENCOUNTER — TELEPHONE (OUTPATIENT)
Dept: SURGICAL ONCOLOGY | Facility: CLINIC | Age: OVER 89
End: 2025-03-19

## 2025-03-19 NOTE — TELEPHONE ENCOUNTER
Called patient and rescheduled appointment with Dr. Rios on 6/17/25, patient is now scheduled at the next available appointment at [facility] on 7/15/25. I also confirmed CT appointment scheduled on 6/10/25.

## 2025-05-06 ENCOUNTER — APPOINTMENT (OUTPATIENT)
Dept: LAB | Facility: MEDICAL CENTER | Age: OVER 89
End: 2025-05-06
Attending: STUDENT IN AN ORGANIZED HEALTH CARE EDUCATION/TRAINING PROGRAM
Payer: MEDICARE

## 2025-05-06 DIAGNOSIS — Z85.038 ENCOUNTER FOR FOLLOW-UP SURVEILLANCE OF COLON CANCER: ICD-10-CM

## 2025-05-06 DIAGNOSIS — E78.00 PURE HYPERCHOLESTEROLEMIA: ICD-10-CM

## 2025-05-06 DIAGNOSIS — Z85.038 PERSONAL HISTORY OF COLON CANCER: ICD-10-CM

## 2025-05-06 DIAGNOSIS — Z08 ENCOUNTER FOR FOLLOW-UP SURVEILLANCE OF COLON CANCER: ICD-10-CM

## 2025-05-06 LAB
ALBUMIN SERPL BCG-MCNC: 4 G/DL (ref 3.5–5)
ALP SERPL-CCNC: 101 U/L (ref 34–104)
ALT SERPL W P-5'-P-CCNC: 16 U/L (ref 7–52)
ANION GAP SERPL CALCULATED.3IONS-SCNC: 7 MMOL/L (ref 4–13)
AST SERPL W P-5'-P-CCNC: 19 U/L (ref 13–39)
BILIRUB SERPL-MCNC: 0.71 MG/DL (ref 0.2–1)
BUN SERPL-MCNC: 10 MG/DL (ref 5–25)
CALCIUM SERPL-MCNC: 9.2 MG/DL (ref 8.4–10.2)
CHLORIDE SERPL-SCNC: 102 MMOL/L (ref 96–108)
CHOLEST SERPL-MCNC: 140 MG/DL (ref ?–200)
CO2 SERPL-SCNC: 28 MMOL/L (ref 21–32)
CREAT SERPL-MCNC: 0.7 MG/DL (ref 0.6–1.3)
GFR SERPL CREATININE-BSD FRML MDRD: 77 ML/MIN/1.73SQ M
GLUCOSE P FAST SERPL-MCNC: 98 MG/DL (ref 65–99)
HDLC SERPL-MCNC: 60 MG/DL
LDLC SERPL CALC-MCNC: 64 MG/DL (ref 0–100)
POTASSIUM SERPL-SCNC: 4.7 MMOL/L (ref 3.5–5.3)
PROT SERPL-MCNC: 7.2 G/DL (ref 6.4–8.4)
SODIUM SERPL-SCNC: 137 MMOL/L (ref 135–147)
TRIGL SERPL-MCNC: 82 MG/DL (ref ?–150)

## 2025-05-06 PROCEDURE — 80061 LIPID PANEL: CPT

## 2025-05-06 PROCEDURE — 36415 COLL VENOUS BLD VENIPUNCTURE: CPT

## 2025-05-06 PROCEDURE — 80053 COMPREHEN METABOLIC PANEL: CPT

## 2025-05-09 ENCOUNTER — OFFICE VISIT (OUTPATIENT)
Dept: FAMILY MEDICINE CLINIC | Facility: MEDICAL CENTER | Age: OVER 89
End: 2025-05-09
Payer: MEDICARE

## 2025-05-09 VITALS
WEIGHT: 130.8 LBS | OXYGEN SATURATION: 99 % | HEART RATE: 56 BPM | SYSTOLIC BLOOD PRESSURE: 116 MMHG | DIASTOLIC BLOOD PRESSURE: 70 MMHG | HEIGHT: 59 IN | BODY MASS INDEX: 26.37 KG/M2 | TEMPERATURE: 97.5 F | RESPIRATION RATE: 18 BRPM

## 2025-05-09 DIAGNOSIS — I10 PRIMARY HYPERTENSION: ICD-10-CM

## 2025-05-09 DIAGNOSIS — Z85.038 PERSONAL HISTORY OF COLON CANCER: ICD-10-CM

## 2025-05-09 DIAGNOSIS — I51.7 LEFT VENTRICULAR HYPERTROPHY: ICD-10-CM

## 2025-05-09 DIAGNOSIS — Z09 FOLLOW-UP EXAM, 3-6 MONTHS SINCE PREVIOUS EXAM: Primary | ICD-10-CM

## 2025-05-09 DIAGNOSIS — I44.1 HEART BLOCK AV SECOND DEGREE: ICD-10-CM

## 2025-05-09 DIAGNOSIS — E78.00 PURE HYPERCHOLESTEROLEMIA: ICD-10-CM

## 2025-05-09 DIAGNOSIS — Z95.3 S/P TAVR (TRANSCATHETER AORTIC VALVE REPLACEMENT), BIOPROSTHETIC: ICD-10-CM

## 2025-05-09 DIAGNOSIS — I34.0 MITRAL VALVE INSUFFICIENCY, UNSPECIFIED ETIOLOGY: ICD-10-CM

## 2025-05-09 PROBLEM — D69.6 THROMBOCYTOPENIA (HCC): Status: RESOLVED | Noted: 2024-07-27 | Resolved: 2025-05-09

## 2025-05-09 PROCEDURE — G2211 COMPLEX E/M VISIT ADD ON: HCPCS | Performed by: STUDENT IN AN ORGANIZED HEALTH CARE EDUCATION/TRAINING PROGRAM

## 2025-05-09 PROCEDURE — 99214 OFFICE O/P EST MOD 30 MIN: CPT | Performed by: STUDENT IN AN ORGANIZED HEALTH CARE EDUCATION/TRAINING PROGRAM

## 2025-05-09 RX ORDER — AMOXICILLIN 500 MG/1
CAPSULE ORAL
COMMUNITY
Start: 2025-02-20 | End: 2025-05-09

## 2025-05-09 NOTE — ASSESSMENT & PLAN NOTE
I have reviewed pertinent labs:  CMP:   Lab Results   Component Value Date    SODIUM 137 05/06/2025    K 4.7 05/06/2025     05/06/2025    CO2 28 05/06/2025    AGAP 7 05/06/2025    BUN 10 05/06/2025    CREATININE 0.70 05/06/2025    GLUC 118 07/27/2024    GLUF 98 05/06/2025    CALCIUM 9.2 05/06/2025    AST 19 05/06/2025    ALT 16 05/06/2025    ALKPHOS 101 05/06/2025    TP 7.2 05/06/2025    ALB 4.0 05/06/2025    TBILI 0.71 05/06/2025    EGFR 77 05/06/2025     Lipid Profile:   Lab Results   Component Value Date    CHOLESTEROL 140 05/06/2025    HDL 60 05/06/2025    TRIG 82 05/06/2025    LDLCALC 64 05/06/2025    NONHDLC 86 11/30/2021     Continue Lipitor 10 mg p.o. nightly

## 2025-05-09 NOTE — PROGRESS NOTES
":  Assessment & Plan  Follow-up exam, 3-6 months since previous exam  Return in about 6 months for AWV and sooner as needed       S/p TAVR (transcatheter aortic valve replacement), bioprosthetic  Following with cardiology       Heart block AV second degree  Permanent pacemaker       Left ventricular hypertrophy  Hypertension well-controlled with current regimen       Primary hypertension  Well-controlled with current regimen, continue Toprol-XL 50 mg p.o. daily       Mitral valve insufficiency, unspecified etiology  Following with cardiology       Pure hypercholesterolemia  I have reviewed pertinent labs:  CMP:   Lab Results   Component Value Date    SODIUM 137 05/06/2025    K 4.7 05/06/2025     05/06/2025    CO2 28 05/06/2025    AGAP 7 05/06/2025    BUN 10 05/06/2025    CREATININE 0.70 05/06/2025    GLUC 118 07/27/2024    GLUF 98 05/06/2025    CALCIUM 9.2 05/06/2025    AST 19 05/06/2025    ALT 16 05/06/2025    ALKPHOS 101 05/06/2025    TP 7.2 05/06/2025    ALB 4.0 05/06/2025    TBILI 0.71 05/06/2025    EGFR 77 05/06/2025     Lipid Profile:   Lab Results   Component Value Date    CHOLESTEROL 140 05/06/2025    HDL 60 05/06/2025    TRIG 82 05/06/2025    LDLCALC 64 05/06/2025    NONHDLC 86 11/30/2021     Continue Lipitor 10 mg p.o. nightly           Personal history of colon cancer  Surveillance with surgical oncology           History of Present Illness     Ashley Eastman is a 89 y.o. female who presents for follow-up.  Patient feels well today and offers no acute complaints.    Review of Systems    As noted in HPI   Objective   /70 (BP Location: Left arm, Patient Position: Sitting, Cuff Size: Large)   Pulse 56   Temp 97.5 °F (36.4 °C) (Temporal)   Resp 18   Ht 4' 11\" (1.499 m)   Wt 59.3 kg (130 lb 12.8 oz)   SpO2 99%   BMI 26.42 kg/m²      Physical Exam  Vitals reviewed.   Constitutional:       General: She is not in acute distress.     Appearance: Normal appearance.   HENT:      Head: Normocephalic " and atraumatic.   Eyes:      Conjunctiva/sclera: Conjunctivae normal.   Cardiovascular:      Rate and Rhythm: Normal rate and regular rhythm.      Pulses: Normal pulses.      Heart sounds: Normal heart sounds.   Pulmonary:      Effort: Pulmonary effort is normal.      Breath sounds: Normal breath sounds.   Abdominal:      General: Abdomen is flat. Bowel sounds are normal.      Palpations: Abdomen is soft.      Tenderness: There is no abdominal tenderness.   Musculoskeletal:      Right lower leg: No edema.      Left lower leg: No edema.   Skin:     General: Skin is warm and dry.   Neurological:      Mental Status: She is alert and oriented to person, place, and time.   Psychiatric:         Mood and Affect: Mood normal.         Behavior: Behavior normal.         Thought Content: Thought content normal.         Judgment: Judgment normal.

## 2025-06-04 ENCOUNTER — TELEPHONE (OUTPATIENT)
Age: OVER 89
End: 2025-06-04

## 2025-06-04 NOTE — TELEPHONE ENCOUNTER
Patient calling in states she received a letter that states she is due to for a TVAR follow up; Called office and was advised a call will be made back to pt

## 2025-06-09 DIAGNOSIS — I35.0 SEVERE AORTIC STENOSIS: ICD-10-CM

## 2025-06-09 DIAGNOSIS — Z95.2 S/P TAVR (TRANSCATHETER AORTIC VALVE REPLACEMENT): Primary | ICD-10-CM

## 2025-06-09 NOTE — TELEPHONE ENCOUNTER
Patient calling back to follow up on phone call; Called office and was advised a call will be made back to the patient

## 2025-06-10 ENCOUNTER — HOSPITAL ENCOUNTER (OUTPATIENT)
Dept: RADIOLOGY | Facility: MEDICAL CENTER | Age: OVER 89
Discharge: HOME | End: 2025-06-10
Payer: MEDICARE

## 2025-06-10 DIAGNOSIS — Z85.038 PERSONAL HISTORY OF COLON CANCER: ICD-10-CM

## 2025-06-10 PROCEDURE — 74177 CT ABD & PELVIS W/CONTRAST: CPT

## 2025-06-10 PROCEDURE — 71260 CT THORAX DX C+: CPT

## 2025-06-10 RX ADMIN — IOHEXOL 75 ML: 350 INJECTION, SOLUTION INTRAVENOUS at 09:24

## 2025-06-16 ENCOUNTER — APPOINTMENT (OUTPATIENT)
Dept: LAB | Facility: MEDICAL CENTER | Age: OVER 89
End: 2025-06-16
Payer: MEDICARE

## 2025-06-16 ENCOUNTER — OFFICE VISIT (OUTPATIENT)
Dept: CARDIOLOGY CLINIC | Facility: MEDICAL CENTER | Age: OVER 89
End: 2025-06-16
Payer: MEDICARE

## 2025-06-16 VITALS
BODY MASS INDEX: 26 KG/M2 | SYSTOLIC BLOOD PRESSURE: 128 MMHG | HEART RATE: 66 BPM | HEIGHT: 59 IN | WEIGHT: 129 LBS | DIASTOLIC BLOOD PRESSURE: 70 MMHG | OXYGEN SATURATION: 98 %

## 2025-06-16 DIAGNOSIS — I35.0 SEVERE AORTIC STENOSIS: ICD-10-CM

## 2025-06-16 DIAGNOSIS — I34.0 NONRHEUMATIC MITRAL VALVE REGURGITATION: ICD-10-CM

## 2025-06-16 DIAGNOSIS — Z95.2 S/P TAVR (TRANSCATHETER AORTIC VALVE REPLACEMENT): ICD-10-CM

## 2025-06-16 DIAGNOSIS — I10 PRIMARY HYPERTENSION: ICD-10-CM

## 2025-06-16 DIAGNOSIS — Z95.3 S/P TAVR (TRANSCATHETER AORTIC VALVE REPLACEMENT), BIOPROSTHETIC: ICD-10-CM

## 2025-06-16 DIAGNOSIS — I44.1 HEART BLOCK AV SECOND DEGREE: ICD-10-CM

## 2025-06-16 DIAGNOSIS — I51.7 LEFT VENTRICULAR HYPERTROPHY: ICD-10-CM

## 2025-06-16 DIAGNOSIS — I35.0 SEVERE AORTIC STENOSIS: Primary | ICD-10-CM

## 2025-06-16 DIAGNOSIS — E78.00 PURE HYPERCHOLESTEROLEMIA: ICD-10-CM

## 2025-06-16 LAB
ANION GAP SERPL CALCULATED.3IONS-SCNC: 7 MMOL/L (ref 4–13)
BASOPHILS # BLD AUTO: 0.03 THOUSANDS/ÂΜL (ref 0–0.1)
BASOPHILS NFR BLD AUTO: 0 % (ref 0–1)
BUN SERPL-MCNC: 12 MG/DL (ref 5–25)
CALCIUM SERPL-MCNC: 9 MG/DL (ref 8.4–10.2)
CHLORIDE SERPL-SCNC: 100 MMOL/L (ref 96–108)
CO2 SERPL-SCNC: 27 MMOL/L (ref 21–32)
CREAT SERPL-MCNC: 0.71 MG/DL (ref 0.6–1.3)
EOSINOPHIL # BLD AUTO: 0.23 THOUSAND/ÂΜL (ref 0–0.61)
EOSINOPHIL NFR BLD AUTO: 3 % (ref 0–6)
ERYTHROCYTE [DISTWIDTH] IN BLOOD BY AUTOMATED COUNT: 13.6 % (ref 11.6–15.1)
GFR SERPL CREATININE-BSD FRML MDRD: 75 ML/MIN/1.73SQ M
GLUCOSE P FAST SERPL-MCNC: 84 MG/DL (ref 65–99)
HCT VFR BLD AUTO: 40.4 % (ref 34.8–46.1)
HGB BLD-MCNC: 13 G/DL (ref 11.5–15.4)
IMM GRANULOCYTES # BLD AUTO: 0.02 THOUSAND/UL (ref 0–0.2)
IMM GRANULOCYTES NFR BLD AUTO: 0 % (ref 0–2)
LYMPHOCYTES # BLD AUTO: 2.17 THOUSANDS/ÂΜL (ref 0.6–4.47)
LYMPHOCYTES NFR BLD AUTO: 29 % (ref 14–44)
MCH RBC QN AUTO: 31.2 PG (ref 26.8–34.3)
MCHC RBC AUTO-ENTMCNC: 32.2 G/DL (ref 31.4–37.4)
MCV RBC AUTO: 97 FL (ref 82–98)
MONOCYTES # BLD AUTO: 0.6 THOUSAND/ÂΜL (ref 0.17–1.22)
MONOCYTES NFR BLD AUTO: 8 % (ref 4–12)
NEUTROPHILS # BLD AUTO: 4.42 THOUSANDS/ÂΜL (ref 1.85–7.62)
NEUTS SEG NFR BLD AUTO: 60 % (ref 43–75)
NRBC BLD AUTO-RTO: 0 /100 WBCS
PLATELET # BLD AUTO: 222 THOUSANDS/UL (ref 149–390)
PMV BLD AUTO: 9.9 FL (ref 8.9–12.7)
POTASSIUM SERPL-SCNC: 4.5 MMOL/L (ref 3.5–5.3)
RBC # BLD AUTO: 4.17 MILLION/UL (ref 3.81–5.12)
SODIUM SERPL-SCNC: 134 MMOL/L (ref 135–147)
WBC # BLD AUTO: 7.47 THOUSAND/UL (ref 4.31–10.16)

## 2025-06-16 PROCEDURE — 85025 COMPLETE CBC W/AUTO DIFF WBC: CPT

## 2025-06-16 PROCEDURE — 99214 OFFICE O/P EST MOD 30 MIN: CPT | Performed by: INTERNAL MEDICINE

## 2025-06-16 PROCEDURE — 36415 COLL VENOUS BLD VENIPUNCTURE: CPT

## 2025-06-16 PROCEDURE — 80048 BASIC METABOLIC PNL TOTAL CA: CPT

## 2025-06-16 NOTE — PROGRESS NOTES
Cardiology Follow Up    Ashley Eastman  1935  [id number]  [facility] [place]  [address]  [address]  [facility] [address] [phone]      Assessment & Plan  Severe aortic stenosis  With cardiac disease including aortic stenosis that is noted to be moderate to severe in severity with a mean gradient of 25 mmHg and peak gradient of 42 mmHg with an aortic valve area of 0.83 cm² on echocardiogram in March 2023.    Patient also noted to have moderate aortic regurgitation and moderate annular calcification of the mitral valve with mild mitral regurgitation.    She has a normal LV size and function with grade 1 diastolic dysfunction.  Patient has no pulmonary hypertension.    She is very active at baseline and goes for walks several times a week without any symptoms.    Now status post TAVR bioprosthesis and doing well  Nonrheumatic mitral valve regurgitation  Mild in intensity on echocardiogram in March 2023 August 2024 echocardiogram revealed continued mild mitral regurgitation  Repeat echo scheduled for the summer  Left ventricular hypertrophy  As related to hypertension, continue blood pressure control  Primary hypertension  Well-controlled on Toprol  She is going to be stopping amlodipine, continued adequate control  Heart block AV second degree  In the setting of TAVR bioprosthesis  Status post permanent pacemaker implantation  Normal device function on most recent interrogation with brief episodes of NSVT  Continue beta-blocker  S/p TAVR (transcatheter aortic valve replacement), bioprosthetic  Completed in July 2024  Echocardiogram in August 2024 revealed stable [place] 3 ultra 23 mm TAVR bioprosthetic valve with no evidence of paravalvular regurgitation or transvalvular regurgitation. Mean gradient of 9 mmHg with a dimensionless index of 0.53  Repeat echo at 1 year pending  Pure hypercholesterolemia  LDL very well controlled at 38 in February 2023.    Repeat  "levels in May 2024 revealed an LDL of 67  LDL 64 in May 2025    Chief Complaint   Patient presents with    Follow-up     6 month f/up. No complaints.       Interval History: Patient feels well, without complaints.  No reported chest pain, shortness of breath, palpitations, lightheadedness, syncope, LE edema, orthopnea, PND, or significant weight changes.  Patient remains active without any increased fatigue out of the ordinary.        Blood pressure 128/70, pulse 66, height 4' 11\" (1.499 m), weight 58.5 kg (129 lb), SpO2 98%.      Review of Systems:  Review of Systems   Constitutional:  Negative for activity change, appetite change, chills, diaphoresis, fatigue and unexpected weight change.   HENT:  Negative for hearing loss, nosebleeds and sore throat.    Eyes:  Negative for photophobia and visual disturbance.   Respiratory:  Negative for cough, chest tightness, shortness of breath and wheezing.    Cardiovascular:  Negative for chest pain, palpitations and leg swelling.   Gastrointestinal:  Negative for abdominal pain, diarrhea, nausea and vomiting.   Endocrine: Negative for polyuria.   Genitourinary:  Negative for dysuria, frequency and hematuria.   Musculoskeletal:  Negative for arthralgias, back pain, gait problem and neck pain.   Skin:  Negative for pallor and rash.   Neurological:  Negative for dizziness, syncope and headaches.   Hematological:  Does not bruise/bleed easily.   Psychiatric/Behavioral:  Negative for behavioral problems and confusion.        Physical Exam:  Physical Exam  Vitals reviewed.   Constitutional:       Appearance: Normal appearance. She is well-developed. She is not ill-appearing or diaphoretic.   HENT:      Head: Normocephalic and atraumatic.      Nose: Nose normal.     Eyes:      General: No scleral icterus.     Extraocular Movements: Extraocular movements intact.      Pupils: Pupils are equal, round, and reactive to light.     Neck:      Vascular: No JVD.     Cardiovascular:      " Rate and Rhythm: Normal rate and regular rhythm.      Heart sounds: Murmur heard.      Systolic murmur is present with a grade of 2/6.      No friction rub. No gallop.   Pulmonary:      Effort: Pulmonary effort is normal. No respiratory distress.      Breath sounds: Normal breath sounds. No wheezing or rales.   Abdominal:      General: Bowel sounds are normal. There is no distension.      Palpations: Abdomen is soft.      Tenderness: There is no abdominal tenderness.     Musculoskeletal:         General: No deformity. Normal range of motion.      Cervical back: Normal range of motion and neck supple.      Right lower leg: No edema.      Left lower leg: No edema.     Skin:     General: Skin is warm and dry.      Findings: No rash.     Neurological:      Mental Status: She is alert and oriented to person, place, and time.      Cranial Nerves: No cranial nerve deficit.     Psychiatric:         Mood and Affect: Mood normal.         Behavior: Behavior normal.         Patient Active Problem List   Diagnosis    Pure hypercholesterolemia    Osteoarthritis    Left ventricular hypertrophy    Hypertension    Severe aortic stenosis    Pancreas cyst    Lower abdominal pain    Hepatic steatosis    Personal history of colon cancer    Pre-operative cardiovascular examination, valvular heart disease    Mitral valve regurgitation    S/P right colectomy    Skin lesion of back    Encounter for follow-up surveillance of colon cancer    H/O: rheumatic fever    S/p TAVR (transcatheter aortic valve replacement), bioprosthetic    Heart block AV second degree     Past Medical History:   Diagnosis Date    Aortic valve regurgitation     Aortic valve stenosis     Colon cancer (HCC)     Elevated alkaline phosphatase level 06/16/2023    Hypertension     Left ventricular hypertrophy     Mitral valve regurgitation     Osteoarthritis     Pure hypercholesterolemia     Shingles outbreak     back and left side    Thrombocytopenia (HCC) 07/27/2024      Social History     Socioeconomic History    Marital status:      Spouse name: Not on file    Number of children: Not on file    Years of education: Not on file    Highest education level: Not on file   Occupational History    Not on file   Tobacco Use    Smoking status: Former     Current packs/day: 0.00     Average packs/day: 1 pack/day for 25.0 years (25.0 ttl pk-yrs)     Types: Cigarettes     Start date:      Quit date:      Years since quittin.4     Passive exposure: Past    Smokeless tobacco: Never   Vaping Use    Vaping status: Never Used   Substance and Sexual Activity    Alcohol use: Not Currently    Drug use: Not Currently    Sexual activity: Not on file   Other Topics Concern    Not on file   Social History Narrative    · Most recent tobacco use screenin2019      · Do you currently or have you served in the [employer]:   No      Social Drivers of Health     Financial Resource Strain: Low Risk  (2023)    Overall Financial Resource Strain (CARDIA)     Difficulty of Paying Living Expenses: Not hard at all   Food Insecurity: No Food Insecurity (2024)    Nursing - Inadequate Food Risk Classification     Worried About Running Out of Food in the Last Year: Never true     Ran Out of Food in the Last Year: Never true     Ran Out of Food in the Last Year: Not on file   Transportation Needs: No Transportation Needs (2024)    PRAPARE - Transportation     Lack of Transportation (Medical): No     Lack of Transportation (Non-Medical): No   Physical Activity: Not on file   Stress: Not on file   Social Connections: Not on file   Intimate Partner Violence: Not on file   Housing Stability: Low Risk  (2024)    Housing Stability Vital Sign     Unable to Pay for Housing in the Last Year: No     Number of Times Moved in the Last Year: 0     Homeless in the Last Year: No      Family History   Problem Relation Name Age of Onset    No Known Problems Mother      Coronary  artery disease Father      Throat cancer Father      Diabetes Sister      Cancer Sister      Pancreatic cancer Sister  60    No Known Problems Daughter      No Known Problems Daughter      No Known Problems Maternal Grandmother      No Known Problems Maternal Grandfather      No Known Problems Paternal Grandmother      No Known Problems Paternal Grandfather      No Known Problems Brother      Cancer Brother      No Known Problems Son      No Known Problems Son      No Known Problems Son       Past Surgical History:   Procedure Laterality Date    BUNIONECTOMY Left 04/16/2014    CARDIAC CATHETERIZATION Left 7/9/2024    Procedure: Cardiac Left Heart Cath;  Surgeon: Fred Berger DO;  Location: [facility];  Service: Cardiology    CARDIAC CATHETERIZATION N/A 7/9/2024    Procedure: Cardiac Coronary Angiogram;  Surgeon: Fred Berger DO;  Location: [facility];  Service: Cardiology    CARDIAC CATHETERIZATION N/A 7/9/2024    Procedure: Cardiac catheterization;  Surgeon: Fred Berger DO;  Location: [facility];  Service: Cardiology    CARDIAC CATHETERIZATION N/A 7/25/2024    Procedure: Cardiac tavr;  Surgeon: Fred Berger DO;  Location: [facility];  Service: Cardiology    CARDIAC ELECTROPHYSIOLOGY PROCEDURE N/A 7/26/2024    Procedure: Cardiac pacer implant;  Surgeon: Aleksander Messer MD;  Location: [facility];  Service: Cardiology    COLONOSCOPY  07/13/2023    CORRECTION HAMMER TOE Left 04/16/2014    ND COLECTOMY PARTIAL W/ANASTOMOSIS N/A 08/15/2023    Procedure: RIGHT HEMICOLECTOMY;  Surgeon: Rosario Rios MD;  Location: [facility];  Service: Surgical Oncology    ND REPLACE AORTIC VALVE OPENFEMORAL ARTERY APPROACH N/A 7/25/2024    Procedure: REPLACEMENT AORTIC VALVE TRANSCATHETER (TAVR) TRANSFEMORAL W/ 23MM VALVE(ACCESS ON RIGHT) BINU;  Surgeon: Julio Cesar Draper MD;  Location: [facility];  Service: Cardiac Surgery       Current Outpatient Medications:      aspirin 81 mg chewable tablet, Chew 1 tablet (81 mg total) daily, Disp: 30 tablet, Rfl: 2    atorvastatin (LIPITOR) 10 mg tablet, Take 1 tablet (10 mg total) by mouth daily with dinner, Disp: 90 tablet, Rfl: 1    metoprolol succinate (TOPROL-XL) 50 mg 24 hr tablet, Take 1 tablet (50 mg total) by mouth daily, Disp: 90 tablet, Rfl: 3    Multiple Vitamins-Minerals (PRESERVISION AREDS PO), Take by mouth Takes 1 tab in the AM. Takes 1 tab in the PM. (Patient not taking: Reported on 11/5/2024), Disp: , Rfl:   Allergies   Allergen Reactions    Acetaminophen Hives       Labs:  Appointment on 05/06/2025   Component Date Value    Cholesterol 05/06/2025 140     Triglycerides 05/06/2025 82     HDL, Direct 05/06/2025 60     LDL Calculated 05/06/2025 64     Sodium 05/06/2025 137     Potassium 05/06/2025 4.7     Chloride 05/06/2025 102     CO2 05/06/2025 28     ANION GAP 05/06/2025 7     BUN 05/06/2025 10     Creatinine 05/06/2025 0.70     Glucose, Fasting 05/06/2025 98     Calcium 05/06/2025 9.2     AST 05/06/2025 19     ALT 05/06/2025 16     Alkaline Phosphatase 05/06/2025 101     Total Protein 05/06/2025 7.2     Albumin 05/06/2025 4.0     Total Bilirubin 05/06/2025 0.71     eGFR 05/06/2025 77      Lab Results   Component Value Date    TRIG 82 05/06/2025    TRIG 52 10/01/2020    HDL 60 05/06/2025    HDL 60 10/01/2020     Imaging: Cardiac EP device report  Result Date: 11/26/2024  Narrative: MDT DC PM/ACTIVE SYSTEM IS MRI CONDITIONAL DEVICE INTERROGATED IN THE [facility] OFFICE:  BATTERY VOLTAGE ADEQUATE (14.8 YR.).  AP 14.1%  <0.1%.  ALL LEAD PARAMETERS WITHIN NORMAL LIMITS.  2 DEVICE CLASSIFIED VT EPISODES;  PAT ON EGMS (7 @ 171 BPM, 8 @ 182 BPM).  NO PROGRAMMING CHANGES MADE TO DEVICE PARAMETERS.  NORMAL DEVICE FUNCTION.  LUNA

## 2025-06-16 NOTE — ASSESSMENT & PLAN NOTE
With cardiac disease including aortic stenosis that is noted to be moderate to severe in severity with a mean gradient of 25 mmHg and peak gradient of 42 mmHg with an aortic valve area of 0.83 cm² on echocardiogram in March 2023.    Patient also noted to have moderate aortic regurgitation and moderate annular calcification of the mitral valve with mild mitral regurgitation.    She has a normal LV size and function with grade 1 diastolic dysfunction.  Patient has no pulmonary hypertension.    She is very active at baseline and goes for walks several times a week without any symptoms.    Now status post TAVR bioprosthesis and doing well

## 2025-06-16 NOTE — ASSESSMENT & PLAN NOTE
Completed in July 2024  Echocardiogram in August 2024 revealed stable [place] 3 ultra 23 mm TAVR bioprosthetic valve with no evidence of paravalvular regurgitation or transvalvular regurgitation. Mean gradient of 9 mmHg with a dimensionless index of 0.53  Repeat echo at 1 year pending

## 2025-06-16 NOTE — ASSESSMENT & PLAN NOTE
LDL very well controlled at 38 in February 2023.    Repeat levels in May 2024 revealed an LDL of 67  LDL 64 in May 2025

## 2025-06-16 NOTE — ASSESSMENT & PLAN NOTE
In the setting of TAVR bioprosthesis  Status post permanent pacemaker implantation  Normal device function on most recent interrogation with brief episodes of NSVT  Continue beta-blocker

## 2025-06-16 NOTE — ASSESSMENT & PLAN NOTE
Mild in intensity on echocardiogram in March 2023 August 2024 echocardiogram revealed continued mild mitral regurgitation  Repeat echo scheduled for the summer

## 2025-07-15 ENCOUNTER — OFFICE VISIT (OUTPATIENT)
Dept: SURGICAL ONCOLOGY | Facility: CLINIC | Age: OVER 89
End: 2025-07-15
Payer: MEDICARE

## 2025-07-15 VITALS
WEIGHT: 129 LBS | TEMPERATURE: 96.6 F | BODY MASS INDEX: 26 KG/M2 | OXYGEN SATURATION: 98 % | SYSTOLIC BLOOD PRESSURE: 122 MMHG | DIASTOLIC BLOOD PRESSURE: 64 MMHG | HEIGHT: 59 IN | HEART RATE: 69 BPM

## 2025-07-15 DIAGNOSIS — Z85.038 ENCOUNTER FOR FOLLOW-UP SURVEILLANCE OF COLON CANCER: ICD-10-CM

## 2025-07-15 DIAGNOSIS — C18.9 COLON ADENOCARCINOMA (HCC): Primary | ICD-10-CM

## 2025-07-15 DIAGNOSIS — Z08 ENCOUNTER FOR FOLLOW-UP SURVEILLANCE OF COLON CANCER: ICD-10-CM

## 2025-07-15 PROCEDURE — 99214 OFFICE O/P EST MOD 30 MIN: CPT | Performed by: STUDENT IN AN ORGANIZED HEALTH CARE EDUCATION/TRAINING PROGRAM

## 2025-07-17 DIAGNOSIS — Z95.3 S/P TAVR (TRANSCATHETER AORTIC VALVE REPLACEMENT), BIOPROSTHETIC: ICD-10-CM

## 2025-07-17 RX ORDER — ATORVASTATIN CALCIUM 10 MG/1
TABLET, FILM COATED ORAL
Qty: 90 TABLET | Refills: 1 | Status: SHIPPED | OUTPATIENT
Start: 2025-07-17

## 2025-07-30 ENCOUNTER — APPOINTMENT (OUTPATIENT)
Dept: LAB | Facility: CLINIC | Age: OVER 89
End: 2025-07-30
Attending: PHYSICIAN ASSISTANT
Payer: MEDICARE

## 2025-07-30 ENCOUNTER — HOSPITAL ENCOUNTER (OUTPATIENT)
Dept: NON INVASIVE DIAGNOSTICS | Facility: CLINIC | Age: OVER 89
Discharge: HOME | End: 2025-07-30
Attending: PHYSICIAN ASSISTANT
Payer: MEDICARE

## 2025-07-30 VITALS
SYSTOLIC BLOOD PRESSURE: 122 MMHG | HEIGHT: 59 IN | WEIGHT: 129 LBS | DIASTOLIC BLOOD PRESSURE: 64 MMHG | BODY MASS INDEX: 26 KG/M2 | HEART RATE: 65 BPM

## 2025-07-30 DIAGNOSIS — Z95.2 S/P TAVR (TRANSCATHETER AORTIC VALVE REPLACEMENT): ICD-10-CM

## 2025-07-30 DIAGNOSIS — I35.0 SEVERE AORTIC STENOSIS: ICD-10-CM

## 2025-07-30 LAB
AORTIC ROOT: 3.2 CM
AORTIC VALVE MEAN VELOCITY: 10.4 M/S
ASCENDING AORTA: 3.2 CM
ATRIAL RATE: 60 BPM
AV AREA BY CONTINUOUS VTI: 3.1 CM2
AV AREA PEAK VELOCITY: 3 CM2
AV LVOT MEAN GRADIENT: 2 MMHG
AV LVOT PEAK GRADIENT: 4 MMHG
AV MEAN PRESS GRAD SYS DOP V1V2: 5 MMHG
AV ORIFICE AREA US: 2.35 CM2
AV PEAK GRADIENT: 10 MMHG
AV VELOCITY RATIO: 0.68
AV VMAX SYS DOP: 1.57 M/S
BSA FOR ECHO PROCEDURE: 1.53 M2
DOP CALC AO VTI: 32.75 CM
DOP CALC LVOT AREA: 3.46 CM2
DOP CALC LVOT CARDIAC INDEX: 4.25 L/MIN/M2
DOP CALC LVOT CARDIAC OUTPUT: 6.51 L/MIN
DOP CALC LVOT DIAMETER: 2.1 CM
DOP CALC LVOT PEAK VEL VTI: 22.22 CM
DOP CALC LVOT PEAK VEL: 1.04 M/S
DOP CALC LVOT STROKE INDEX: 63.4 ML/M2
DOP CALC LVOT STROKE VOLUME: 76.92 CM3
E WAVE DECELERATION TIME: 264 MS
E/A RATIO: 0.88
FRACTIONAL SHORTENING: 31 (ref 28–44)
INTERVENTRICULAR SEPTUM IN DIASTOLE (PARASTERNAL SHORT AXIS VIEW): 1.4 CM
INTERVENTRICULAR SEPTUM: 1.4 CM (ref 0.6–1.1)
LAAS-AP2: 19.4 CM2
LAAS-AP4: 20.1 CM2
LEFT ATRIUM SIZE: 3.8 CM
LEFT ATRIUM VOLUME (MOD BIPLANE): 63 ML
LEFT ATRIUM VOLUME INDEX (MOD BIPLANE): 41.2 ML/M2
LEFT INTERNAL DIMENSION IN SYSTOLE: 2.2 CM (ref 2.1–4)
LEFT VENTRICULAR INTERNAL DIMENSION IN DIASTOLE: 3.2 CM (ref 3.5–6)
LEFT VENTRICULAR POSTERIOR WALL IN END DIASTOLE: 0.9 CM
LEFT VENTRICULAR STROKE VOLUME: 25 ML
LV EF US.2D.A4C+ESTIMATED: 70 %
LVSV (TEICH): 25 ML
MV E'TISSUE VEL-SEP: 6 CM/S
MV PEAK A VEL: 1.24 M/S
MV PEAK E VEL: 109 CM/S
MV STENOSIS PRESSURE HALF TIME: 76 MS
MV VALVE AREA P 1/2 METHOD: 2.89
P AXIS: 32 DEGREES
PR INTERVAL: 162 MS
QRS AXIS: 34 DEGREES
QRSD INTERVAL: 78 MS
QT INTERVAL: 428 MS
QTC INTERVAL: 428 MS
RIGHT ATRIUM AREA SYSTOLE A4C: 9.7 CM2
RIGHT VENTRICLE ID DIMENSION: 3.1 CM
SL CV LEFT ATRIUM LENGTH A2C: 5 CM
SL CV LV EF: 65
SL CV PED ECHO LEFT VENTRICLE DIASTOLIC VOLUME (MOD BIPLANE) 2D: 41 ML
SL CV PED ECHO LEFT VENTRICLE SYSTOLIC VOLUME (MOD BIPLANE) 2D: 15 ML
T WAVE AXIS: 36 DEGREES
TR MAX PG: 22 MMHG
TR PEAK VELOCITY: 2.3 M/S
TRICUSPID ANNULAR PLANE SYSTOLIC EXCURSION: 1.8 CM
TRICUSPID VALVE PEAK REGURGITATION VELOCITY: 2.34 M/S
VENTRICULAR RATE: 60 BPM

## 2025-07-30 PROCEDURE — 93306 TTE W/DOPPLER COMPLETE: CPT | Performed by: INTERNAL MEDICINE

## 2025-07-30 PROCEDURE — 93306 TTE W/DOPPLER COMPLETE: CPT

## 2025-08-07 ENCOUNTER — TELEMEDICINE (OUTPATIENT)
Dept: CARDIAC SURGERY | Facility: CLINIC | Age: OVER 89
End: 2025-08-07
Payer: MEDICARE

## 2025-08-07 DIAGNOSIS — Z95.3 S/P TAVR (TRANSCATHETER AORTIC VALVE REPLACEMENT), BIOPROSTHETIC: Primary | ICD-10-CM

## 2025-08-07 PROCEDURE — 98013 SYNCH AUDIO-ONLY EST LOW 20: CPT | Performed by: STUDENT IN AN ORGANIZED HEALTH CARE EDUCATION/TRAINING PROGRAM

## 2025-08-21 DIAGNOSIS — Z95.3 S/P TAVR (TRANSCATHETER AORTIC VALVE REPLACEMENT), BIOPROSTHETIC: ICD-10-CM

## 2025-08-21 DIAGNOSIS — I10 PRIMARY HYPERTENSION: ICD-10-CM

## 2025-08-21 RX ORDER — METOPROLOL SUCCINATE 50 MG/1
50 TABLET, EXTENDED RELEASE ORAL DAILY
Qty: 90 TABLET | Refills: 3 | Status: SHIPPED | OUTPATIENT
Start: 2025-08-21

## (undated) DEVICE — WOUND RETRACTOR AND PROTECTOR: Brand: ALEXIS O WOUND PROTECTOR-RETRACTOR

## (undated) DEVICE — GLOVE SRG BIOGEL ECLIPSE 7

## (undated) DEVICE — BETHLEHEM MAJOR GENERAL PACK: Brand: CARDINAL HEALTH

## (undated) DEVICE — DGW .035 FC J3MM 260CM TEF: Brand: EMERALD

## (undated) DEVICE — CATH DIAG 5FR IMPULSE 110CM PIG

## (undated) DEVICE — GAUZE SPONGES,USP TYPE VII GAUZE, 12 PLY: Brand: CURITY

## (undated) DEVICE — TRAY FOLEY 16FR URIMETER SURESTEP

## (undated) DEVICE — STERILE EMESIS BASIN                 070: Brand: CARDINAL HEALTH

## (undated) DEVICE — 3M™ TEGADERM™ CHG DRESSING 25/CARTON 4 CARTONS/CASE 1659: Brand: TEGADERM™

## (undated) DEVICE — PROXIMATE SKIN STAPLERS (35 WIDE) CONTAINS 35 STAINLESS STEEL STAPLES (FIXED HEAD): Brand: PROXIMATE

## (undated) DEVICE — PINNACLE INTRODUCER SHEATH: Brand: PINNACLE

## (undated) DEVICE — SUT SILK 0 CT-1 30 IN 424H

## (undated) DEVICE — GUIDEWIRE LUNDERQUIST XTRA STIFF 0.035IN 260CM LES

## (undated) DEVICE — CARDIO PERI-GROIN: Brand: CONVERTORS

## (undated) DEVICE — SUT PROLENE 3-0 SH 30 IN 8832H

## (undated) DEVICE — SPONGE 4 X 4 XRAY 16 PLY STRL LF RFD

## (undated) DEVICE — INTENDED FOR TISSUE SEPARATION, AND OTHER PROCEDURES THAT REQUIRE A SHARP SURGICAL BLADE TO PUNCTURE OR CUT.: Brand: BARD-PARKER ® CARBON RIB-BACK BLADES

## (undated) DEVICE — GLOVE SRG BIOGEL 6.5

## (undated) DEVICE — PROXIMATE LINEAR CUTTER RELOAD, BLUE, 75MM: Brand: PROXIMATE

## (undated) DEVICE — 3000CC GUARDIAN II: Brand: GUARDIAN

## (undated) DEVICE — Device

## (undated) DEVICE — SUT SILK 2-0 SH CR/8 18 IN C012D

## (undated) DEVICE — DGW .035 FC STR 260CM TEF: Brand: EMERALD

## (undated) DEVICE — PROXIMATE RELOADABLE LINEAR STAPLER, 60MM: Brand: PROXIMATE

## (undated) DEVICE — RADIFOCUS OPTITORQUE ANGIOGRAPHIC CATHETER: Brand: OPTITORQUE

## (undated) DEVICE — RADIFOCUS GLIDEWIRE: Brand: GLIDEWIRE

## (undated) DEVICE — PAD GROUNDING DUAL ADULT

## (undated) DEVICE — MEDI-VAC YANK SUCT HNDL W/TPRD BULBOUS TIP: Brand: CARDINAL HEALTH

## (undated) DEVICE — CATH GUIDING FIXED SHAPE 43CM

## (undated) DEVICE — PROXIMATE LINEAR STAPLER RELOADS: Brand: PROXIMATE

## (undated) DEVICE — INTENDED FOR TISSUE SEPARATION, AND OTHER PROCEDURES THAT REQUIRE A SHARP SURGICAL BLADE TO PUNCTURE OR CUT.: Brand: BARD-PARKER SAFETY BLADES SIZE 10, STERILE

## (undated) DEVICE — HEAVY DUTY TABLE COVER: Brand: CONVERTORS

## (undated) DEVICE — GUIDEWIRE AMPLATZ SS 260CM J TIP

## (undated) DEVICE — SUT VICRYL 2-0 D-SPECIAL 27 IN D8114

## (undated) DEVICE — SUT SILK 2-0 18 IN A185H

## (undated) DEVICE — GLOVE INDICATOR PI UNDERGLOVE SZ 7.5 BLUE

## (undated) DEVICE — SWAN-GANZ BIPOLAR PACING CATHETER: Brand: SWAN-GANZ

## (undated) DEVICE — PLUMEPEN PRO 10FT

## (undated) DEVICE — AVITENE MICROFIBRILLAR COLLAGEN HEMOSTAT NON-WOVEN WEB: Brand: AVITENE NON-WOVEN WEB

## (undated) DEVICE — SUT SILK 3-0 SH CR/8 18 IN C013D

## (undated) DEVICE — ENSEAL 20 CM SHAFT, LARGE JAW: Brand: ENSEAL X1

## (undated) DEVICE — TRAY FOLEY 16FR SURESTEP TEMP SENS URIMETER STAT LOK

## (undated) DEVICE — DGW .035 FC J3MM 150CM TEF: Brand: EMERALD

## (undated) DEVICE — GUIDEWIRE WHOLEY HI TORQUE INTERM MOD J .035 145CM

## (undated) DEVICE — GLIDESHEATH SLENDER STAINLESS STEEL KIT: Brand: GLIDESHEATH SLENDER

## (undated) DEVICE — SLITTER ADJUSTABLE

## (undated) DEVICE — CATH DIAG 5FR IMPULSE 100CM FR4

## (undated) DEVICE — EXOFIN PRECISION PEN HIGH VISCOSITY TOPICAL SKIN ADHESIVE: Brand: EXOFIN PRECISION PEN, 1G

## (undated) DEVICE — INTENDED FOR TISSUE SEPARATION, AND OTHER PROCEDURES THAT REQUIRE A SHARP SURGICAL BLADE TO PUNCTURE OR CUT.: Brand: BARD-PARKER SAFETY BLADES SIZE 15, STERILE

## (undated) DEVICE — CARDIOVASCULAR SPLIT DRAPE: Brand: CONVERTORS

## (undated) DEVICE — ELECTRODE BLADE MOD E-Z CLEAN 2.5IN 6.4CM -0012M

## (undated) DEVICE — ADHESIVE SKIN HIGH VISCOSITY EXOFIN 1ML

## (undated) DEVICE — THERMOFLECT BLANKET, L, 25EA                               TS THERMOFLECT BLANKET, 48" X 84", SILVER, 5/BG, 5 BG/CS NW: Brand: THERMOFLECT

## (undated) DEVICE — INTRO SHEATH PEEL AWAY 7FR

## (undated) DEVICE — PROXIMATE RELOADABLE LINEAR CUTTER WITH SAFETY LOCK-OUT, 75MM: Brand: PROXIMATE